# Patient Record
Sex: FEMALE | Race: WHITE | NOT HISPANIC OR LATINO | Employment: OTHER | URBAN - METROPOLITAN AREA
[De-identification: names, ages, dates, MRNs, and addresses within clinical notes are randomized per-mention and may not be internally consistent; named-entity substitution may affect disease eponyms.]

---

## 2017-03-06 ENCOUNTER — HOSPITAL ENCOUNTER (OUTPATIENT)
Dept: RADIOLOGY | Facility: HOSPITAL | Age: 67
Discharge: HOME | End: 2017-03-06
Attending: OBSTETRICS & GYNECOLOGY
Payer: MEDICARE

## 2017-03-06 DIAGNOSIS — Z12.31 OTHER SCREENING MAMMOGRAM: ICD-10-CM

## 2017-03-06 PROCEDURE — 77063 BREAST TOMOSYNTHESIS BI: CPT | Mod: 26,,, | Performed by: RADIOLOGY

## 2017-03-06 PROCEDURE — 77067 SCR MAMMO BI INCL CAD: CPT | Mod: 26,,, | Performed by: RADIOLOGY

## 2017-03-06 PROCEDURE — 77067 SCR MAMMO BI INCL CAD: CPT | Mod: TC

## 2017-08-04 RX ORDER — CYCLOBENZAPRINE HCL 10 MG
TABLET ORAL
Qty: 90 TABLET | Refills: 11 | OUTPATIENT
Start: 2017-08-04

## 2018-04-11 DIAGNOSIS — Z12.39 SCREENING BREAST EXAMINATION: Primary | ICD-10-CM

## 2018-04-12 ENCOUNTER — HOSPITAL ENCOUNTER (OUTPATIENT)
Dept: RADIOLOGY | Facility: HOSPITAL | Age: 68
Discharge: HOME | End: 2018-04-12
Attending: OBSTETRICS & GYNECOLOGY
Payer: MEDICARE

## 2018-04-12 DIAGNOSIS — N63.10 BREAST MASS, RIGHT: ICD-10-CM

## 2018-04-12 DIAGNOSIS — Z12.39 SCREENING BREAST EXAMINATION: ICD-10-CM

## 2018-04-12 PROCEDURE — 77067 SCR MAMMO BI INCL CAD: CPT | Mod: TC

## 2018-04-12 PROCEDURE — 77063 BREAST TOMOSYNTHESIS BI: CPT | Mod: 26,,, | Performed by: RADIOLOGY

## 2018-04-12 PROCEDURE — 77067 SCR MAMMO BI INCL CAD: CPT | Mod: 26,,, | Performed by: RADIOLOGY

## 2018-10-17 ENCOUNTER — OFFICE VISIT (OUTPATIENT)
Dept: ORTHOPEDICS | Facility: CLINIC | Age: 68
End: 2018-10-17
Payer: MEDICARE

## 2018-10-17 VITALS — BODY MASS INDEX: 27.67 KG/M2 | WEIGHT: 172.19 LBS | HEIGHT: 66 IN

## 2018-10-17 DIAGNOSIS — S63.501A WRIST SPRAIN, RIGHT, INITIAL ENCOUNTER: Primary | ICD-10-CM

## 2018-10-17 PROCEDURE — 99213 OFFICE O/P EST LOW 20 MIN: CPT | Mod: ,,, | Performed by: ORTHOPAEDIC SURGERY

## 2018-10-17 PROCEDURE — 73110 X-RAY EXAM OF WRIST: CPT | Mod: FY,RT,, | Performed by: ORTHOPAEDIC SURGERY

## 2018-10-17 RX ORDER — GABAPENTIN 300 MG/1
300 CAPSULE ORAL NIGHTLY
Qty: 30 CAPSULE | Refills: 1 | Status: SHIPPED | OUTPATIENT
Start: 2018-10-17 | End: 2019-08-07

## 2018-10-17 NOTE — PROGRESS NOTES
Subjective:       Chief Complaint    Chief Complaint   Patient presents with    Wrist Injury     DOI: 2.5 months. Pt states that she feel steping down off of a step and missed her step. She slammed her Rt wrist on the floor when she fell. She states that she has some pain in her right wrist.She states that her pain is in the right side of her right wrist. She has pain when she uses her Rt hand a lot. Pain today 2/10. She ia wearing a wrist brace and she states that it helps a lot.        HPI  Venkat Lara is a 68 y.o.  female who presents  to half months post injury to the right wrist. Has been keeping it splinted sometimes with a wooden tongue blade  in the wrist splint initially seemed to help. Now the problems getting worse. It's a neuritic type of pain on the ulnar side of the wrist.      Past History  Past Medical History:   Diagnosis Date    Cervical radiculopathy     Chronic pain of right wrist     COPD, moderate     Degeneration of cervical intervertebral disc     Depression with anxiety     Hypertension     Incontinence     Lumbar spinal stenosis     Primary osteoarthritis of first carpometacarpal joints, bilateral     Rheumatoid arthritis     Seizures      Past Surgical History:   Procedure Laterality Date    BUNIONECTOMY Bilateral 01/1999    CATARACT EXTRACTION BILATERAL W/ ANTERIOR VITRECTOMY  04/2010    CMC ARTHROPLASTY, RIGHT  02/23/2004    JOINT REPLACEMENT      RHINOPLASTY TIP  02/2010    TONSILLECTOMY, ADENOIDECTOMY  1970    TOTAL KNEE ARTHROPLASTY Right 12/2008    TOTAL KNEE ARTHROPLASTY Left 03/2015    TOTAL REDUCTION MAMMOPLASTY  01/2008    TUBAL LIGATION       Social History     Socioeconomic History    Marital status:      Spouse name: Not on file    Number of children: Not on file    Years of education: Not on file    Highest education level: Not on file   Social Needs    Financial resource strain: Not on file    Food insecurity - worry: Not on file    Food  insecurity - inability: Not on file    Transportation needs - medical: Not on file    Transportation needs - non-medical: Not on file   Occupational History    Not on file   Tobacco Use    Smoking status: Former Smoker     Types: Cigarettes     Last attempt to quit: 1996     Years since quittin.4   Substance and Sexual Activity    Alcohol use: Yes     Comment: socially    Drug use: Not on file    Sexual activity: Not on file   Other Topics Concern    Not on file   Social History Narrative    Not on file         Medications  Current Outpatient Medications   Medication Sig    albuterol (PROAIR HFA) 90 mcg/actuation HFAA Inhale 2 puffs into the lungs every 6 (six) hours as needed.    azelastine (ASTELIN) 137 mcg nasal spray 1 spray by Nasal route 2 (two) times daily.    buPROPion (WELLBUTRIN XL) 300 MG 24 hr tablet Take 300 mg by mouth once daily.    eszopiclone (LUNESTA) 2 MG Tab Take 2 mg by mouth every evening.    multivitamin with minerals tablet Take 1 tablet by mouth once daily.    telmisartan-hydrochlorothiazide (MICARDIS HCT) 40-12.5 mg per tablet Take 1 tablet by mouth once daily.    estradiol (ESTRING) 2 mg vaginal ring Place 2 mg vaginally every 3 (three) months.    estradiol-norethindrone (ACTIVELLA) 1-0.5 mg per tablet Take 1 tablet by mouth once daily.    gabapentin (NEURONTIN) 300 MG capsule Take 1 capsule (300 mg total) by mouth every evening.    MOMETASONE/FORMOTEROL (DULERA INHL) Inhale into the lungs.    tiotropium (SPIRIVA) 18 mcg inhalation capsule Inhale 18 mcg into the lungs once daily.     No current facility-administered medications for this visit.        Allergies  Review of patient's allergies indicates:   Allergen Reactions    Doxycycline          Review of Systems     Constitutional: Negative    HENT: Negative  Eyes: Negative  Respiratory: Negative  Cardiovascular: Negative  Musculoskeletal: HPI  Skin: Negative  Neurological: Negative  Hematological:  Negative  Endocrine: Negative      Physical Exam    There were no vitals filed for this visit.  Physical Examination: Right wrist exam shows full range of motion, flexion, extension and forearm rotation. Tinel sign is negative on the ulnar side. There is no swelling. The distal radioulnar joint is nontender. The neuritic symptoms are triggered by wiggling the fifth finger lateral ligaments of the fifth MCP joint are intact. There is no tenderness around the joint.     Skin-clear  General appearance -  well appearing, and in no distress  Mental status - awake  Neck - supple  Chest -  symmetric air entry  Heart - normal rate   Abdomen - soft      Assessment/Plan   Wrist sprain, right, initial encounter  -     X-Ray Wrist Complete Right    Other orders  -     gabapentin (NEURONTIN) 300 MG capsule; Take 1 capsule (300 mg total) by mouth every evening.  Dispense: 30 capsule; Refill: 1        Discontinue the splint. Follow-up will be with the text.    This note was dictated using voice recognition software and may contain grammatical errors.

## 2019-05-07 DIAGNOSIS — Z12.39 SCREENING BREAST EXAMINATION: Primary | ICD-10-CM

## 2019-05-09 ENCOUNTER — OFFICE VISIT (OUTPATIENT)
Dept: PODIATRY | Facility: CLINIC | Age: 69
End: 2019-05-09
Payer: MEDICARE

## 2019-05-09 VITALS
SYSTOLIC BLOOD PRESSURE: 120 MMHG | RESPIRATION RATE: 19 BRPM | BODY MASS INDEX: 29.89 KG/M2 | HEIGHT: 66 IN | HEART RATE: 96 BPM | DIASTOLIC BLOOD PRESSURE: 78 MMHG | WEIGHT: 186 LBS

## 2019-05-09 DIAGNOSIS — M21.619 BUNION: ICD-10-CM

## 2019-05-09 DIAGNOSIS — M79.671 BILATERAL FOOT PAIN: Primary | ICD-10-CM

## 2019-05-09 DIAGNOSIS — M19.071 OSTEOARTHRITIS OF MIDTARSAL JOINT OF RIGHT FOOT: ICD-10-CM

## 2019-05-09 DIAGNOSIS — M19.072 OSTEOARTHRITIS OF MIDTARSAL JOINT OF LEFT FOOT: ICD-10-CM

## 2019-05-09 DIAGNOSIS — M79.672 BILATERAL FOOT PAIN: Primary | ICD-10-CM

## 2019-05-09 PROCEDURE — 99203 OFFICE O/P NEW LOW 30 MIN: CPT | Mod: 25,,, | Performed by: PODIATRIST

## 2019-05-09 PROCEDURE — 73630 X-RAY EXAM OF FOOT: CPT | Mod: RT,,, | Performed by: PODIATRIST

## 2019-05-09 PROCEDURE — 99203 PR OFFICE/OUTPT VISIT, NEW, LEVL III, 30-44 MIN: ICD-10-PCS | Mod: 25,,, | Performed by: PODIATRIST

## 2019-05-09 PROCEDURE — 73630 PR  X-RAY FOOT 3+ VW: ICD-10-PCS | Mod: RT,,, | Performed by: PODIATRIST

## 2019-05-09 RX ORDER — PHENYLPROPANOLAMINE/CLEMASTINE 75-1.34MG
1 TABLET, EXTENDED RELEASE ORAL EVERY 6 HOURS PRN
COMMUNITY

## 2019-05-09 RX ORDER — MIRABEGRON 25 MG/1
25 TABLET, FILM COATED, EXTENDED RELEASE ORAL NIGHTLY
COMMUNITY
Start: 2019-04-26 | End: 2023-03-30

## 2019-05-09 RX ORDER — OMEPRAZOLE 40 MG/1
40 CAPSULE, DELAYED RELEASE ORAL EVERY MORNING
COMMUNITY
Start: 2019-04-05

## 2019-05-09 RX ORDER — HYDROGEN PEROXIDE 3 %
SOLUTION, NON-ORAL MISCELLANEOUS
COMMUNITY
End: 2019-05-09

## 2019-05-09 RX ORDER — OLOPATADINE HYDROCHLORIDE 1 MG/ML
1 SOLUTION/ DROPS OPHTHALMIC 2 TIMES DAILY
COMMUNITY
Start: 2019-04-05 | End: 2023-08-21 | Stop reason: ALTCHOICE

## 2019-05-09 RX ORDER — OXYCODONE HYDROCHLORIDE 10 MG/1
TABLET ORAL
COMMUNITY
End: 2019-05-09

## 2019-05-09 RX ORDER — TELMISARTAN 80 MG/1
80 TABLET ORAL DAILY
Status: ON HOLD | COMMUNITY
End: 2020-08-28 | Stop reason: SDUPTHER

## 2019-05-09 RX ORDER — VERAPAMIL HYDROCHLORIDE 240 MG/1
240 CAPSULE, EXTENDED RELEASE ORAL 2 TIMES DAILY
COMMUNITY
Start: 2019-04-05

## 2019-05-09 NOTE — PROGRESS NOTES
[address] [facility] [address]  [place] [address]  Phone: [phone]   [phone]    Patient's PCP:Jeffrey Henry MD  Referring Provider: No ref. provider found    Subjective:      Chief Complaint:: Bunions (bilateral)    HPI  Venkat Lara is a 69 y.o. female who presents with a complaint of  bilalteral bunion pain left is worse than right lasting for years, previously had surgery on both feet 20 years ago . Onset of the symptoms was unknown.  Current symptoms include pain and instability.  Aggravating factors are closed toe shoes. Symptoms have gotten worse in the last 5 years.Treatment to date have included cbd oil and nsaids. Patients rates pain 9/10 on pain scale.        Vitals:    05/09/19 1003   BP: 120/78   Pulse: 96   Resp: 19     Shoe Size: 10    Past Surgical History:   Procedure Laterality Date    BUNIONECTOMY Bilateral 01/1999    CATARACT EXTRACTION BILATERAL W/ ANTERIOR VITRECTOMY  04/2010    CMC ARTHROPLASTY, RIGHT  02/23/2004    JOINT REPLACEMENT      RHINOPLASTY TIP  02/2010    TONSILLECTOMY, ADENOIDECTOMY  1970    TOTAL KNEE ARTHROPLASTY Right 12/2008    TOTAL KNEE ARTHROPLASTY Left 03/2015    TOTAL REDUCTION MAMMOPLASTY  01/2008    TUBAL LIGATION       Past Medical History:   Diagnosis Date    Cervical radiculopathy     Chronic pain of right wrist     COPD, moderate     Degeneration of cervical intervertebral disc     Depression with anxiety     Hypertension     Incontinence     Lumbar spinal stenosis     Primary osteoarthritis of first carpometacarpal joints, bilateral     Rheumatoid arthritis     Seizures      Family History   Problem Relation Age of Onset    Cancer Father         colon        Social History:   Marital Status:   Alcohol History:  reports that she drinks alcohol.  Tobacco History:  reports that she quit smoking about 23 years ago. Her smoking use included cigarettes. She does not have any smokeless tobacco history on file.  Drug History:  has  no drug history on file.    Review of patient's allergies indicates:   Allergen Reactions    Doxycycline        Current Outpatient Medications   Medication Sig Dispense Refill    albuterol (PROAIR HFA) 90 mcg/actuation HFAA Inhale 2 puffs into the lungs every 6 (six) hours as needed.      azelastine (ASTELIN) 137 mcg nasal spray 1 spray by Nasal route 2 (two) times daily.      buPROPion (WELLBUTRIN XL) 300 MG 24 hr tablet Take 300 mg by mouth once daily.      eszopiclone (LUNESTA) 2 MG Tab Take 2 mg by mouth every evening.      ibuprofen 200 mg Cap ibuprofen 200 mg capsule   Take 1 capsule every 6 hours by oral route as needed.      MOMETASONE/FORMOTEROL (DULERA INHL) Inhale into the lungs.      multivitamin with minerals tablet Take 1 tablet by mouth once daily.      MYRBETRIQ 25 mg Tb24 ER tablet       olopatadine (PATANOL) 0.1 % ophthalmic solution       omeprazole (PRILOSEC) 40 MG capsule       telmisartan (MICARDIS) 80 MG Tab Micardis 80 mg tablet   Take 1 tablet every day by oral route.      tiotropium (SPIRIVA) 18 mcg inhalation capsule Inhale 18 mcg into the lungs once daily.      verapamil (VERELAN) 240 MG C24P       gabapentin (NEURONTIN) 300 MG capsule Take 1 capsule (300 mg total) by mouth every evening. 30 capsule 1    telmisartan-hydrochlorothiazide (MICARDIS HCT) 40-12.5 mg per tablet Take 1 tablet by mouth once daily.       No current facility-administered medications for this visit.        Review of Systems      Objective:        Physical Exam:   Foot Exam  Physical Exam   Musculoskeletal:        Feet:        Imaging:   X-Ray Foot Complete Bilateral  Bilateral x-rays reveal osteoarthritis Lisfranc's joint specifically second third metatarsocuneiform joints left foot worse than right foot. Mild bunion deformity with previous K wire fixation intra-osseus first metatarsal head with good alignment. Mild degenerative joint disease. Left foot has moderate bunion deformity with hallux  limitus 2 screws are in place no problem with hardware. She has increased intermetatarsal angle proximally 16°. Proximally 30° of hallux valgus angle.           Assessment:       1. Bilateral foot pain    2. 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 Osteoarthritis of midtarsal joint of right foot - Right Foot    4. 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 If deformity pain become worse patient come back to see me for further evaluation.  No follow-ups on file.    Procedures - None    Counseling:   I provided patient education regarding: Bunion deformity and causes. I discussed conservative treatment with shoe modification, pads, treating symptoms with OTC NSAIDs, pads between toes, inserts and pads over the bunion. I explained that conservative treatment is non-curative but may help with pain and slow down the progression of the deformity.     I discussed the conservative treatent of arthritis consisiting of shoe modification, OTC NSAID, cortisone shots, a series of supartz injections, avoiding painful activities and common sense.  I explained that the arthritis may become more painful causng more disability and the possibility of further treatment.  Also discussed may need evaluation by Rheumatologist for possible inflammatory arthritis.  I provided patient education verbally regarding:   Patient diagnosis, treatment options, as well as alternatives, risks, and benefits.     This note was created using Dragon voice recognition software that occasionally misinterpreted phrases or words.

## 2019-05-10 ENCOUNTER — HOSPITAL ENCOUNTER (OUTPATIENT)
Dept: RADIOLOGY | Facility: HOSPITAL | Age: 69
Discharge: HOME | End: 2019-05-10
Attending: OBSTETRICS & GYNECOLOGY
Payer: MEDICARE

## 2019-05-10 DIAGNOSIS — Z12.39 SCREENING BREAST EXAMINATION: ICD-10-CM

## 2019-05-10 PROCEDURE — 77067 MAMMO DIGITAL SCREENING BILAT WITH TOMOSYNTHESIS_CAD: ICD-10-PCS | Mod: 26,,, | Performed by: RADIOLOGY

## 2019-05-10 PROCEDURE — 77063 BREAST TOMOSYNTHESIS BI: CPT | Mod: 26,,, | Performed by: RADIOLOGY

## 2019-05-10 PROCEDURE — 77067 SCR MAMMO BI INCL CAD: CPT | Mod: TC

## 2019-05-10 PROCEDURE — 77067 SCR MAMMO BI INCL CAD: CPT | Mod: 26,,, | Performed by: RADIOLOGY

## 2019-05-10 PROCEDURE — 77063 MAMMO DIGITAL SCREENING BILAT WITH TOMOSYNTHESIS_CAD: ICD-10-PCS | Mod: 26,,, | Performed by: RADIOLOGY

## 2019-06-27 ENCOUNTER — OFFICE VISIT (OUTPATIENT)
Dept: PODIATRY | Facility: CLINIC | Age: 69
End: 2019-06-27
Payer: MEDICARE

## 2019-06-27 VITALS
DIASTOLIC BLOOD PRESSURE: 76 MMHG | WEIGHT: 186 LBS | HEIGHT: 66 IN | BODY MASS INDEX: 29.89 KG/M2 | HEART RATE: 92 BPM | SYSTOLIC BLOOD PRESSURE: 118 MMHG

## 2019-06-27 DIAGNOSIS — M19.072 OSTEOARTHRITIS OF MIDTARSAL JOINT OF LEFT FOOT: Primary | ICD-10-CM

## 2019-06-27 DIAGNOSIS — M79.672 BILATERAL FOOT PAIN: ICD-10-CM

## 2019-06-27 DIAGNOSIS — M21.619 BUNION: ICD-10-CM

## 2019-06-27 DIAGNOSIS — M79.671 BILATERAL FOOT PAIN: ICD-10-CM

## 2019-06-27 PROCEDURE — 99213 PR OFFICE/OUTPT VISIT, EST, LEVL III, 20-29 MIN: ICD-10-PCS | Mod: 25,,, | Performed by: PODIATRIST

## 2019-06-27 PROCEDURE — 20600 DRAIN/INJ JOINT/BURSA W/O US: CPT | Mod: LT,,, | Performed by: PODIATRIST

## 2019-06-27 PROCEDURE — 20600 SMALL JOINT ASPIRATION/INJECTION: L INTERTARSAL: ICD-10-PCS | Mod: LT,,, | Performed by: PODIATRIST

## 2019-06-27 PROCEDURE — 99213 OFFICE O/P EST LOW 20 MIN: CPT | Mod: 25,,, | Performed by: PODIATRIST

## 2019-06-27 RX ORDER — SULFAMETHOXAZOLE AND TRIMETHOPRIM 800; 160 MG/1; MG/1
TABLET ORAL
COMMUNITY
Start: 2019-05-07 | End: 2019-08-07

## 2019-06-27 RX ORDER — DEXAMETHASONE SODIUM PHOSPHATE 4 MG/ML
4 INJECTION, SOLUTION INTRA-ARTICULAR; INTRALESIONAL; INTRAMUSCULAR; INTRAVENOUS; SOFT TISSUE
Status: DISCONTINUED | OUTPATIENT
Start: 2019-06-27 | End: 2019-08-07

## 2019-06-27 RX ORDER — BUPIVACAINE HYDROCHLORIDE 5 MG/ML
1.5 INJECTION, SOLUTION PERINEURAL
Status: DISCONTINUED | OUTPATIENT
Start: 2019-06-27 | End: 2019-08-07

## 2019-06-27 RX ORDER — AMOXICILLIN AND CLAVULANATE POTASSIUM 875; 125 MG/1; MG/1
TABLET, FILM COATED ORAL
COMMUNITY
Start: 2019-05-07 | End: 2019-08-07

## 2019-06-27 RX ORDER — METHYLPREDNISOLONE ACETATE 40 MG/ML
40 INJECTION, SUSPENSION INTRA-ARTICULAR; INTRALESIONAL; INTRAMUSCULAR; SOFT TISSUE
Status: DISCONTINUED | OUTPATIENT
Start: 2019-06-27 | End: 2019-08-07

## 2019-06-27 RX ORDER — METHYLPREDNISOLONE ACETATE 40 MG/ML
40 INJECTION, SUSPENSION INTRA-ARTICULAR; INTRALESIONAL; INTRAMUSCULAR; SOFT TISSUE
Status: DISCONTINUED | OUTPATIENT
Start: 2019-06-27 | End: 2019-06-27 | Stop reason: HOSPADM

## 2019-06-27 RX ORDER — CYCLOSPORINE 0.5 MG/ML
EMULSION OPHTHALMIC
COMMUNITY
Start: 2019-06-03 | End: 2020-08-25

## 2019-06-27 RX ORDER — DEXAMETHASONE SODIUM PHOSPHATE 4 MG/ML
4 INJECTION, SOLUTION INTRA-ARTICULAR; INTRALESIONAL; INTRAMUSCULAR; INTRAVENOUS; SOFT TISSUE
Status: DISCONTINUED | OUTPATIENT
Start: 2019-06-27 | End: 2019-06-27 | Stop reason: HOSPADM

## 2019-06-27 RX ADMIN — DEXAMETHASONE SODIUM PHOSPHATE 4 MG: 4 INJECTION, SOLUTION INTRA-ARTICULAR; INTRALESIONAL; INTRAMUSCULAR; INTRAVENOUS; SOFT TISSUE at 05:06

## 2019-06-27 RX ADMIN — METHYLPREDNISOLONE ACETATE 40 MG: 40 INJECTION, SUSPENSION INTRA-ARTICULAR; INTRALESIONAL; INTRAMUSCULAR; SOFT TISSUE at 05:06

## 2019-06-27 NOTE — PROGRESS NOTES
[address] [facility] [address]  [place] [address]  Phone: [phone]   [phone]    Patient's PCP:Jeffrey Henry MD  Referring Provider: No ref. provider found    Subjective:      Chief Complaint:: Bunions (surgical consult)    HPI  Venkat Lara is a 69 y.o. female who presents with a complaint of bilalteral bunion pain left is worse than right lasting for years, previously had surgery on both feet 20 years ago . Onset of the symptoms was unknown.  Current symptoms include pain and instability.  Aggravating factors are closed toe shoes. Symptoms have gotten worse in the last 5 years.Treatment to date have included cbd oil, cbdlotions and nsaids. Patients rates pain 10/10 on pain scale.    Pt would like an injection and discuss surgery but would not like to schedule until after her trips after July.       Vitals:    06/27/19 1455   BP: 118/76   Pulse: 92     Shoe Size: 10     Past Surgical History:   Procedure Laterality Date    BUNIONECTOMY Bilateral 01/1999    CATARACT EXTRACTION BILATERAL W/ ANTERIOR VITRECTOMY  04/2010    CMC ARTHROPLASTY, RIGHT  02/23/2004    JOINT REPLACEMENT      RHINOPLASTY TIP  02/2010    TONSILLECTOMY, ADENOIDECTOMY  1970    TOTAL KNEE ARTHROPLASTY Right 12/2008    TOTAL KNEE ARTHROPLASTY Left 03/2015    TOTAL REDUCTION MAMMOPLASTY  01/2008    TUBAL LIGATION       Past Medical History:   Diagnosis Date    Cervical radiculopathy     Chronic pain of right wrist     COPD, moderate     Degeneration of cervical intervertebral disc     Depression with anxiety     Hypertension     Incontinence     Lumbar spinal stenosis     Primary osteoarthritis of first carpometacarpal joints, bilateral     Rheumatoid arthritis     Seizures      Family History   Problem Relation Age of Onset    Cancer Father         colon        Social History:   Marital Status:   Alcohol History:  reports that she drinks alcohol.  Tobacco History:  reports that she quit smoking about 23  years ago. Her smoking use included cigarettes. She does not have any smokeless tobacco history on file.  Drug History:  has no drug history on file.    Review of patient's allergies indicates:   Allergen Reactions    Doxycycline        Current Outpatient Medications   Medication Sig Dispense Refill    albuterol (PROAIR HFA) 90 mcg/actuation HFAA Inhale 2 puffs into the lungs every 6 (six) hours as needed.      amoxicillin-clavulanate 875-125mg (AUGMENTIN) 875-125 mg per tablet       azelastine (ASTELIN) 137 mcg nasal spray 1 spray by Nasal route 2 (two) times daily.      buPROPion (WELLBUTRIN XL) 300 MG 24 hr tablet Take 300 mg by mouth once daily.      eszopiclone (LUNESTA) 2 MG Tab Take 2 mg by mouth every evening.      ibuprofen 200 mg Cap ibuprofen 200 mg capsule   Take 1 capsule every 6 hours by oral route as needed.      linaCLOtide (LINZESS) 72 mcg Cap capsule Linzess 72 mcg capsule   TAKE 1 CAPSULE BY MOUTH ONCE DAILY IN THE MORNING FOR 30 DAYS      MOMETASONE/FORMOTEROL (DULERA INHL) Inhale into the lungs.      multivitamin with minerals tablet Take 1 tablet by mouth once daily.      MYRBETRIQ 25 mg Tb24 ER tablet       olopatadine (PATANOL) 0.1 % ophthalmic solution       omeprazole (PRILOSEC) 40 MG capsule       phytonadione, vit K1, (MEPHYTON ORAL) vitamin K      RESTASIS 0.05 % ophthalmic emulsion       sulfamethoxazole-trimethoprim 800-160mg (BACTRIM DS) 800-160 mg Tab       telmisartan (MICARDIS) 80 MG Tab Micardis 80 mg tablet   Take 1 tablet every day by oral route.      telmisartan-hydrochlorothiazide (MICARDIS HCT) 40-12.5 mg per tablet Take 1 tablet by mouth once daily.      tiotropium (SPIRIVA) 18 mcg inhalation capsule Inhale 18 mcg into the lungs once daily.      verapamil (VERELAN) 240 MG C24P       gabapentin (NEURONTIN) 300 MG capsule Take 1 capsule (300 mg total) by mouth every evening. 30 capsule 1     No current facility-administered medications for this visit.         Review of Systems      Objective:        Physical Exam:   Foot Exam    General  General Appearance: appears stated age and healthy   Orientation: alert and oriented to person, place, and time   Affect: appropriate   Gait: antalgic       Left Foot/Ankle      Inspection and Palpation  Ecchymosis: none  Tenderness: (Lisfranc's joint dorsal surface with palpable exostosis)  Swelling: (Dorsal surface Lisfranc's joint)  Arch: normal  Hammertoes: second toe (Semirigid hammertoe second with dorsal contracture of second MPJ)  Hallux valgus: yes  Skin Exam: skin intact;     Neurovascular  Dorsalis pedis: 2+  Posterior tibial: 2+  Saphenous nerve sensation: normal  Tibial nerve sensation: normal  Superficial peroneal nerve sensation: normal  Deep peroneal nerve sensation: normal  Sural nerve sensation: normal    Muscle Strength  Ankle dorsiflexion: 5  Ankle plantar flexion: 5  Ankle inversion: 5  Ankle eversion: 5  Great toe extension: 5  Great toe flexion: 5          Physical Exam   Cardiovascular:   Pulses:       Dorsalis pedis pulses are 2+ on the left side.        Posterior tibial pulses are 2+ on the left side.   Musculoskeletal:        Feet:        Imaging:            Assessment:       1. Bilateral foot pain    2. Bunion - Left Foot      Plan:   Bilateral foot pain    Bunion - Left Foot      No follow-ups on file.    Small Joint Aspiration/Injection: L intertarsal  Date/Time: 6/27/2019 5:59 PM  Performed by: Jackson Brannon DPM  Authorized by: Jackson Brannon DPM     Consent Done?:  Yes (Verbal)  Indications:  Pain and joint swelling  Site marked: The procedure site was marked    Timeout: Prior to procedure the correct patient, procedure, and site was verified      Location:  Foot  Site:  L intertarsal  Prep: Patient was prepped and draped in usual sterile fashion    Ultrasonic Guidance for needle placement: No  Needle size:  25 G  Approach:  Dorsal  Medications:  4 mg dexamethasone 4 mg/mL; 40 mg  methylPREDNISolone acetate 40 mg/mL  Patient tolerance:  Patient tolerated the procedure well with no immediate complications     - None  Patient was educated about the entire pre, giovani and post-operative time period.  They  were educated about the pro's and con's of surgery.  All conservative measures have been exhausted. Patient understands the particular risks involved which may occur in connection with the procedure proposed including pain, swelling, infection, stiffness, decreased ROM, recurrence, rejection, numbness, delayed healing, scar formation.    Patient was educated that their diagnosis may be surgically treated.  The patient's problem will probably advance and usually will not get better without surgery.  All of the pre-operative treatment plans have been exhausted or will no longer be successful at this point in time.  Patient was told of the possible outcomes and expectations of the surgical procedure.  They  will need to be followed-up post-operatively.  Today, pictures were drawn, questions answered.      We discussed the following surgical procedures: Redo bunionectomy with first metatarsal osteotomy left foot, arthrodesis PIPJ second toe and tenotomy and capsulotomy second MPJ left foot   Counseling:   I discussed the conservative treatent of arthritis consisiting of shoe modification, OTC NSAID, cortisone shots, a series of supartz injections, avoiding painful activities and common sense.  I explained that the arthritis may become more painful causng more disability and the possibility of further treatment.  Also discussed may need evaluation by Rheumatologist for possible inflammatory arthritis.    I discussed hammertoe deformity and conservative treatment of deep, wider shoes, padding of the sore areas, OTC NSAID, skin softners, palliative care.  I discussed surgical procedures of fusion of the PIPJ of the toes with wires or implants, the follow up and the possible complications.  I provided  patient education verbally regarding:   Patient diagnosis, treatment options, as well as alternatives, risks, and benefits.     This note was created using Dragon voice recognition software that occasionally misinterpreted phrases or words.

## 2019-06-28 ENCOUNTER — TELEPHONE (OUTPATIENT)
Dept: PODIATRY | Facility: CLINIC | Age: 69
End: 2019-06-28

## 2019-06-28 DIAGNOSIS — M19.072 OSTEOARTHRITIS OF MIDTARSAL JOINT OF LEFT FOOT: ICD-10-CM

## 2019-06-28 DIAGNOSIS — M21.619 BUNION: Primary | ICD-10-CM

## 2019-08-07 ENCOUNTER — HOSPITAL ENCOUNTER (OUTPATIENT)
Dept: PREADMISSION TESTING | Facility: HOSPITAL | Age: 69
Discharge: HOME | End: 2019-08-07
Attending: PODIATRIST
Payer: MEDICARE

## 2019-08-07 ENCOUNTER — HOSPITAL ENCOUNTER (OUTPATIENT)
Dept: RADIOLOGY | Facility: HOSPITAL | Age: 69
Discharge: HOME | End: 2019-08-07
Attending: PODIATRIST
Payer: MEDICARE

## 2019-08-07 VITALS
HEIGHT: 66 IN | OXYGEN SATURATION: 94 % | BODY MASS INDEX: 29.8 KG/M2 | TEMPERATURE: 98 F | DIASTOLIC BLOOD PRESSURE: 66 MMHG | HEART RATE: 87 BPM | RESPIRATION RATE: 18 BRPM | WEIGHT: 185.44 LBS | SYSTOLIC BLOOD PRESSURE: 130 MMHG

## 2019-08-07 DIAGNOSIS — M21.619 BUNION: Primary | ICD-10-CM

## 2019-08-07 DIAGNOSIS — M21.619 ASYMPTOMATIC BUNION: ICD-10-CM

## 2019-08-07 DIAGNOSIS — Z01.818 PREOP EXAMINATION: ICD-10-CM

## 2019-08-07 PROCEDURE — 71046 X-RAY EXAM CHEST 2 VIEWS: CPT | Mod: TC

## 2019-08-07 PROCEDURE — 93005 ELECTROCARDIOGRAM TRACING: CPT

## 2019-08-07 RX ORDER — UBIDECARENONE 30 MG
100 CAPSULE ORAL DAILY
COMMUNITY

## 2019-08-07 RX ORDER — L. ACIDOPHILUS/L.BULGARICUS 100MM CELL
1 GRANULES IN PACKET (EA) ORAL DAILY
COMMUNITY
End: 2023-09-08

## 2019-08-07 RX ORDER — AMPICILLIN TRIHYDRATE 250 MG
1200 CAPSULE ORAL DAILY
COMMUNITY
End: 2020-08-25

## 2019-08-07 RX ORDER — LANOLIN ALCOHOL/MO/W.PET/CERES
100 CREAM (GRAM) TOPICAL DAILY
COMMUNITY

## 2019-08-07 RX ORDER — CHOLECALCIFEROL (VITAMIN D3) 25 MCG
5000 TABLET ORAL DAILY
COMMUNITY

## 2019-08-07 RX ORDER — GARLIC 1000 MG
1000 CAPSULE ORAL DAILY
COMMUNITY

## 2019-08-13 ENCOUNTER — ANESTHESIA EVENT (OUTPATIENT)
Dept: SURGERY | Facility: HOSPITAL | Age: 69
End: 2019-08-13
Payer: MEDICARE

## 2019-08-14 ENCOUNTER — HOSPITAL ENCOUNTER (OUTPATIENT)
Facility: HOSPITAL | Age: 69
Discharge: HOME | End: 2019-08-14
Attending: PODIATRIST | Admitting: PODIATRIST
Payer: MEDICARE

## 2019-08-14 ENCOUNTER — ANESTHESIA (OUTPATIENT)
Dept: SURGERY | Facility: HOSPITAL | Age: 69
End: 2019-08-14
Payer: MEDICARE

## 2019-08-14 VITALS
HEART RATE: 73 BPM | RESPIRATION RATE: 16 BRPM | OXYGEN SATURATION: 94 % | DIASTOLIC BLOOD PRESSURE: 77 MMHG | TEMPERATURE: 98 F | SYSTOLIC BLOOD PRESSURE: 113 MMHG

## 2019-08-14 DIAGNOSIS — M20.12 HALLUX VALGUS OF LEFT FOOT: ICD-10-CM

## 2019-08-14 DIAGNOSIS — M21.619 BUNION: Primary | ICD-10-CM

## 2019-08-14 PROCEDURE — 71000015 HC POSTOP RECOV 1ST HR: Performed by: PODIATRIST

## 2019-08-14 PROCEDURE — 27000284 HC CANNULA NASAL: Performed by: STUDENT IN AN ORGANIZED HEALTH CARE EDUCATION/TRAINING PROGRAM

## 2019-08-14 PROCEDURE — 71000033 HC RECOVERY, INTIAL HOUR: Performed by: PODIATRIST

## 2019-08-14 PROCEDURE — 25000003 PHARM REV CODE 250: Performed by: NURSE ANESTHETIST, CERTIFIED REGISTERED

## 2019-08-14 PROCEDURE — 25000003 PHARM REV CODE 250: Performed by: PODIATRIST

## 2019-08-14 PROCEDURE — 27202107 HC XP QUATRO SENSOR: Performed by: STUDENT IN AN ORGANIZED HEALTH CARE EDUCATION/TRAINING PROGRAM

## 2019-08-14 PROCEDURE — 36000706: Performed by: PODIATRIST

## 2019-08-14 PROCEDURE — 27000671 HC TUBING MICROBORE EXT: Performed by: STUDENT IN AN ORGANIZED HEALTH CARE EDUCATION/TRAINING PROGRAM

## 2019-08-14 PROCEDURE — C9290 INJ, BUPIVACAINE LIPOSOME: HCPCS | Performed by: PODIATRIST

## 2019-08-14 PROCEDURE — C1713 ANCHOR/SCREW BN/BN,TIS/BN: HCPCS | Performed by: PODIATRIST

## 2019-08-14 PROCEDURE — 25000003 PHARM REV CODE 250: Performed by: ANESTHESIOLOGY

## 2019-08-14 PROCEDURE — 27000673 HC TUBING BLOOD Y: Performed by: STUDENT IN AN ORGANIZED HEALTH CARE EDUCATION/TRAINING PROGRAM

## 2019-08-14 PROCEDURE — 37000009 HC ANESTHESIA EA ADD 15 MINS: Performed by: PODIATRIST

## 2019-08-14 PROCEDURE — 63600175 PHARM REV CODE 636 W HCPCS

## 2019-08-14 PROCEDURE — 27000653 HC CATH, IV CATHLN: Performed by: STUDENT IN AN ORGANIZED HEALTH CARE EDUCATION/TRAINING PROGRAM

## 2019-08-14 PROCEDURE — 28285 PR REPAIR OF HAMMERTOE,ONE: ICD-10-PCS | Mod: 59,T1,, | Performed by: PODIATRIST

## 2019-08-14 PROCEDURE — 20680 PR REMOVAL DEEP IMPLANT: ICD-10-PCS | Mod: 51,LT,, | Performed by: PODIATRIST

## 2019-08-14 PROCEDURE — 27000080 OPTIME MED/SURG SUP & DEVICES GENERAL CLASSIFICATION: Performed by: PODIATRIST

## 2019-08-14 PROCEDURE — 27201423 OPTIME MED/SURG SUP & DEVICES STERILE SUPPLY: Performed by: PODIATRIST

## 2019-08-14 PROCEDURE — 28296 COR HLX VLGS DSTL MTAR OSTEO: CPT | Mod: TA,,, | Performed by: PODIATRIST

## 2019-08-14 PROCEDURE — S0020 INJECTION, BUPIVICAINE HYDRO: HCPCS | Performed by: PODIATRIST

## 2019-08-14 PROCEDURE — 36000707: Performed by: PODIATRIST

## 2019-08-14 PROCEDURE — 28296 PR CORRECT BUNION, DISTAL METATARSAL OSTEOTOMY: ICD-10-PCS | Mod: TA,,, | Performed by: PODIATRIST

## 2019-08-14 PROCEDURE — 63600175 PHARM REV CODE 636 W HCPCS: Performed by: ANESTHESIOLOGY

## 2019-08-14 PROCEDURE — 37000008 HC ANESTHESIA 1ST 15 MINUTES: Performed by: PODIATRIST

## 2019-08-14 PROCEDURE — 63600175 PHARM REV CODE 636 W HCPCS: Performed by: NURSE ANESTHETIST, CERTIFIED REGISTERED

## 2019-08-14 PROCEDURE — C1769 GUIDE WIRE: HCPCS | Performed by: PODIATRIST

## 2019-08-14 PROCEDURE — 20680 REMOVAL OF IMPLANT DEEP: CPT | Mod: 51,LT,, | Performed by: PODIATRIST

## 2019-08-14 PROCEDURE — 28285 REPAIR OF HAMMERTOE: CPT | Mod: 59,T1,, | Performed by: PODIATRIST

## 2019-08-14 PROCEDURE — 63600175 PHARM REV CODE 636 W HCPCS: Performed by: PODIATRIST

## 2019-08-14 PROCEDURE — 27201107 HC STYLET, STANDARD: Performed by: STUDENT IN AN ORGANIZED HEALTH CARE EDUCATION/TRAINING PROGRAM

## 2019-08-14 DEVICE — IMPLANTABLE DEVICE: Type: IMPLANTABLE DEVICE | Site: FOOT | Status: FUNCTIONAL

## 2019-08-14 RX ORDER — ROCURONIUM BROMIDE 10 MG/ML
INJECTION, SOLUTION INTRAVENOUS
Status: DISCONTINUED | OUTPATIENT
Start: 2019-08-14 | End: 2019-08-14

## 2019-08-14 RX ORDER — OXYCODONE HYDROCHLORIDE 5 MG/1
5 TABLET ORAL
Status: DISCONTINUED | OUTPATIENT
Start: 2019-08-14 | End: 2019-08-14 | Stop reason: HOSPADM

## 2019-08-14 RX ORDER — HYDROCODONE BITARTRATE AND ACETAMINOPHEN 10; 325 MG/1; MG/1
1 TABLET ORAL EVERY 4 HOURS PRN
Status: DISCONTINUED | OUTPATIENT
Start: 2019-08-14 | End: 2019-08-14

## 2019-08-14 RX ORDER — DEXAMETHASONE SODIUM PHOSPHATE 4 MG/ML
INJECTION, SOLUTION INTRA-ARTICULAR; INTRALESIONAL; INTRAMUSCULAR; INTRAVENOUS; SOFT TISSUE
Status: DISCONTINUED | OUTPATIENT
Start: 2019-08-14 | End: 2019-08-14

## 2019-08-14 RX ORDER — LIDOCAINE HYDROCHLORIDE 20 MG/ML
INJECTION, SOLUTION EPIDURAL; INFILTRATION; INTRACAUDAL; PERINEURAL
Status: DISCONTINUED | OUTPATIENT
Start: 2019-08-14 | End: 2019-08-14

## 2019-08-14 RX ORDER — GLYCOPYRROLATE 0.2 MG/ML
INJECTION INTRAMUSCULAR; INTRAVENOUS
Status: DISCONTINUED | OUTPATIENT
Start: 2019-08-14 | End: 2019-08-14

## 2019-08-14 RX ORDER — CEPHALEXIN 500 MG/1
500 CAPSULE ORAL EVERY 12 HOURS
Qty: 14 CAPSULE | Refills: 0
Start: 2019-08-14 | End: 2019-10-15

## 2019-08-14 RX ORDER — PROPOFOL 10 MG/ML
VIAL (ML) INTRAVENOUS
Status: DISCONTINUED | OUTPATIENT
Start: 2019-08-14 | End: 2019-08-14

## 2019-08-14 RX ORDER — GABAPENTIN 300 MG/1
300 CAPSULE ORAL ONCE
Status: COMPLETED | OUTPATIENT
Start: 2019-08-14 | End: 2019-08-14

## 2019-08-14 RX ORDER — ACETAMINOPHEN 10 MG/ML
INJECTION, SOLUTION INTRAVENOUS
Status: DISCONTINUED | OUTPATIENT
Start: 2019-08-14 | End: 2019-08-14

## 2019-08-14 RX ORDER — MEPERIDINE HYDROCHLORIDE 50 MG/ML
12.5 INJECTION INTRAMUSCULAR; INTRAVENOUS; SUBCUTANEOUS EVERY 10 MIN PRN
Status: DISCONTINUED | OUTPATIENT
Start: 2019-08-14 | End: 2019-08-14 | Stop reason: HOSPADM

## 2019-08-14 RX ORDER — MIDAZOLAM HYDROCHLORIDE 1 MG/ML
INJECTION INTRAMUSCULAR; INTRAVENOUS
Status: DISCONTINUED | OUTPATIENT
Start: 2019-08-14 | End: 2019-08-14

## 2019-08-14 RX ORDER — ONDANSETRON 2 MG/ML
4 INJECTION INTRAMUSCULAR; INTRAVENOUS DAILY PRN
Status: DISCONTINUED | OUTPATIENT
Start: 2019-08-14 | End: 2019-08-14 | Stop reason: HOSPADM

## 2019-08-14 RX ORDER — FENTANYL CITRATE 50 UG/ML
INJECTION, SOLUTION INTRAMUSCULAR; INTRAVENOUS
Status: DISCONTINUED | OUTPATIENT
Start: 2019-08-14 | End: 2019-08-14

## 2019-08-14 RX ORDER — SODIUM CHLORIDE, SODIUM LACTATE, POTASSIUM CHLORIDE, CALCIUM CHLORIDE 600; 310; 30; 20 MG/100ML; MG/100ML; MG/100ML; MG/100ML
INJECTION, SOLUTION INTRAVENOUS CONTINUOUS PRN
Status: DISCONTINUED | OUTPATIENT
Start: 2019-08-14 | End: 2019-08-14

## 2019-08-14 RX ORDER — ONDANSETRON 4 MG/1
4 TABLET, ORALLY DISINTEGRATING ORAL EVERY 12 HOURS PRN
Status: DISCONTINUED | OUTPATIENT
Start: 2019-08-14 | End: 2019-08-14

## 2019-08-14 RX ORDER — BUPIVACAINE HYDROCHLORIDE 5 MG/ML
INJECTION, SOLUTION EPIDURAL; INTRACAUDAL
Status: DISCONTINUED | OUTPATIENT
Start: 2019-08-14 | End: 2019-08-14 | Stop reason: HOSPADM

## 2019-08-14 RX ORDER — CEFAZOLIN SODIUM 1 G/3ML
INJECTION, POWDER, FOR SOLUTION INTRAMUSCULAR; INTRAVENOUS
Status: DISCONTINUED | OUTPATIENT
Start: 2019-08-14 | End: 2019-08-14

## 2019-08-14 RX ORDER — SODIUM CHLORIDE 0.9 % (FLUSH) 0.9 %
10 SYRINGE (ML) INJECTION
Status: DISCONTINUED | OUTPATIENT
Start: 2019-08-14 | End: 2019-08-14 | Stop reason: HOSPADM

## 2019-08-14 RX ORDER — CEPHALEXIN 250 MG/1
500 CAPSULE ORAL EVERY 12 HOURS
Status: DISCONTINUED | OUTPATIENT
Start: 2019-08-14 | End: 2019-08-14 | Stop reason: CLARIF

## 2019-08-14 RX ORDER — ONDANSETRON 4 MG/1
4 TABLET, ORALLY DISINTEGRATING ORAL EVERY 12 HOURS PRN
Qty: 1 TABLET | Refills: 0
Start: 2019-08-14 | End: 2020-08-25

## 2019-08-14 RX ORDER — PHENYLEPHRINE HYDROCHLORIDE 10 MG/ML
INJECTION INTRAVENOUS
Status: DISCONTINUED | OUTPATIENT
Start: 2019-08-14 | End: 2019-08-14

## 2019-08-14 RX ORDER — HYDROMORPHONE HYDROCHLORIDE 1 MG/ML
0.2 INJECTION, SOLUTION INTRAMUSCULAR; INTRAVENOUS; SUBCUTANEOUS
Status: DISCONTINUED | OUTPATIENT
Start: 2019-08-14 | End: 2019-08-14 | Stop reason: HOSPADM

## 2019-08-14 RX ORDER — HYDROCODONE BITARTRATE AND ACETAMINOPHEN 10; 325 MG/1; MG/1
1 TABLET ORAL EVERY 4 HOURS PRN
Qty: 30 TABLET | Refills: 0
Start: 2019-08-14 | End: 2019-08-16 | Stop reason: SDUPTHER

## 2019-08-14 RX ORDER — DIPHENHYDRAMINE HYDROCHLORIDE 50 MG/ML
25 INJECTION INTRAMUSCULAR; INTRAVENOUS EVERY 6 HOURS PRN
Status: DISCONTINUED | OUTPATIENT
Start: 2019-08-14 | End: 2019-08-14 | Stop reason: HOSPADM

## 2019-08-14 RX ADMIN — PROPOFOL 50 MG: 10 INJECTION, EMULSION INTRAVENOUS at 09:08

## 2019-08-14 RX ADMIN — ACETAMINOPHEN 1000 MG: 10 INJECTION, SOLUTION INTRAVENOUS at 07:08

## 2019-08-14 RX ADMIN — PROPOFOL 50 MG: 10 INJECTION, EMULSION INTRAVENOUS at 07:08

## 2019-08-14 RX ADMIN — DEXAMETHASONE SODIUM PHOSPHATE 4 MG: 4 INJECTION, SOLUTION INTRAMUSCULAR; INTRAVENOUS at 07:08

## 2019-08-14 RX ADMIN — PROPOFOL 150 MG: 10 INJECTION, EMULSION INTRAVENOUS at 07:08

## 2019-08-14 RX ADMIN — MIDAZOLAM 2 MG: 1 INJECTION INTRAMUSCULAR; INTRAVENOUS at 07:08

## 2019-08-14 RX ADMIN — PHENYLEPHRINE HYDROCHLORIDE 100 MCG: 10 INJECTION INTRAVENOUS at 07:08

## 2019-08-14 RX ADMIN — GLYCOPYRROLATE 0.6 MG: 0.2 INJECTION INTRAMUSCULAR; INTRAVENOUS at 09:08

## 2019-08-14 RX ADMIN — GABAPENTIN 300 MG: 300 CAPSULE ORAL at 06:08

## 2019-08-14 RX ADMIN — ONDANSETRON 4 MG: 2 INJECTION INTRAMUSCULAR; INTRAVENOUS at 07:08

## 2019-08-14 RX ADMIN — CEFAZOLIN 2 G: 330 INJECTION, POWDER, FOR SOLUTION INTRAMUSCULAR; INTRAVENOUS at 07:08

## 2019-08-14 RX ADMIN — ROCURONIUM BROMIDE 35 MG: 10 INJECTION, SOLUTION INTRAVENOUS at 07:08

## 2019-08-14 RX ADMIN — PROPOFOL 50 MG: 10 INJECTION, EMULSION INTRAVENOUS at 08:08

## 2019-08-14 RX ADMIN — FENTANYL CITRATE 50 MCG: 50 INJECTION, SOLUTION INTRAMUSCULAR; INTRAVENOUS at 09:08

## 2019-08-14 RX ADMIN — FENTANYL CITRATE 100 MCG: 50 INJECTION, SOLUTION INTRAMUSCULAR; INTRAVENOUS at 07:08

## 2019-08-14 RX ADMIN — SODIUM CHLORIDE, SODIUM LACTATE, POTASSIUM CHLORIDE, AND CALCIUM CHLORIDE: .6; .31; .03; .02 INJECTION, SOLUTION INTRAVENOUS at 07:08

## 2019-08-14 RX ADMIN — ROCURONIUM BROMIDE 20 MG: 10 INJECTION, SOLUTION INTRAVENOUS at 08:08

## 2019-08-14 RX ADMIN — PHENYLEPHRINE HYDROCHLORIDE 100 MCG: 10 INJECTION INTRAVENOUS at 08:08

## 2019-08-14 RX ADMIN — LIDOCAINE HYDROCHLORIDE 60 MG: 20 INJECTION, SOLUTION EPIDURAL; INFILTRATION; INTRACAUDAL; PERINEURAL at 07:08

## 2019-08-14 RX ADMIN — ROCURONIUM BROMIDE 5 MG: 10 INJECTION, SOLUTION INTRAVENOUS at 07:08

## 2019-08-14 NOTE — OP NOTE
Identifying data:  69-year-old female    Preoperative diagnosis:  Hallux valgus left foot, hammertoe 2nd left foot    Postoperative diagnosis:  Same as the above    Surgeon:  Jackson Brannon DPM    Assistance surgeon:  Danis Valenzuela DPM, PGY 2    Patient was consented for bunionectomy with osteotomy and and arthrodesis of 2nd toe left foot. She realizes that there is a possibility of further scar tissue vascular compromise failure of surgery secondary to previous surgery on this foot 20 years ago.    Antibiotics:  2 g of Ancef IV preop    Anesthesia:  General anesthesia supplemented with postoperative block with local anesthesia Exparel and Marcaine plain    Description of procedures:  The patient brought the operating room placed on the operating table supine position.  General anesthesia was obtained.  At the usual aseptic prep and drape of the left foot it was exsanguinated and attention was placed 1st MPJ.  Patient had previous bunion surgery at screws in the metatarsal.  Using the old cicatrix with a made an 8 cm longitudinal incision.  I carried down to the level of the capsule.  I coagulated a couple superficial vessels.  A protected the extensor hallucis longus tendon and I protected the medial neurovascular structures.  I made a T-shaped incision to the capsule of the 1st MPJ on the medial side.  A free the head of its soft tissue connections.  Identified the 3.0 screws that were using a previous surgery and I removed the.  The screw holes her my way of doing any distal metatarsal osteotomy site and reflect the periosteum proximally and I performed a daysi osteotomy.  I was able to pivot the head laterally but felt like it was still too tight so I did a sesamoid release in the interspace.  Also incised conjoined tendon. This freed the head up tells able displaced slightly more laterally I have temporarily fixated with 045 K-wires.  The correction of the intermetatarsal angle was better possibly not ideal  but better.  I fixated this with 2 x 3.0 mm partially threaded cancellous screws from dorsal plantar.  The osteotomy was well fixated.  Everything was verified by C-arm.  I irrigated the area and then modified close the capsular tissue when the toe was in a nice rectus position at the MPJ.  A close the subcutaneous tissue with 4 by:  3 0 Vicryl.  Attention was then placed in the 2nd toe and made a 3 cm longitudinal incision centered over the PIPJ.  I incised the PIPJ capsule reflected the tissue from the head remove the distal head of the proximal phalanx and the articulating cartilage from the base of the middle phalanx.  A fixated the arthrodesis site with a 0.045 K-wire.  Verified with C-arm that we had good approximation of the fusion site and good alignment of the toe.  With the foot loaded 2nd MPJ was in neutral site did not need to do anything with the 2nd MPJ.  Problem really is that her 3rd 4th and 5th toes are contracted and will probably need either flexor tendon releases are arthrodesis.  I did not have consented for these because they were not bothering her.  I discussed this with her aunt when she is in the office.  The area was irrigated the tendon closed 4.0 Vicryl suture skin with silver up to 4.0 Prolene sutures.  The foot was injected with Marcaine and Exparel.  The area was dressed with 4x4s and Krystle then pneumatic ankle tourniquet was deflated and vascular status of digits were intact.  The K-wire had been bent cut a cover the tip of the toe.  I placed an Ace wrap on the foot.  Cam Walker boot cast was applied anesthesia was reversed and the patient left the operating with stable vitals.    Blood loss:  2 cc    Specimen:  Bone from 2nd toe not sent for histopathology.    Complications:  None    Disposition:  Transferred to PACU from there to same day surgery will be sent home with ice elevation bathroom privileges for 5 days.  Partial weight-bearing on the heel of the Cam Walker boot cast or no  weight-bearing.  She will see me in 5 days in the office.    Prescriptions:  Keflex 500 mg dispense 14 1 twice a day, Norco 10/325 dispense 30, Zofran 4 mg dispense 15.

## 2019-08-14 NOTE — ANESTHESIA POSTPROCEDURE EVALUATION
Anesthesia Post Evaluation    Patient: Venkat Lara    Procedure(s) Performed: Procedure(s) (LRB):  BUNIONECTOMY, WITH METATARSAL OSTEOTOMY (Left)  REMOVAL, HARDWARE, LOWER EXTREMITY (Left)  CORRECTION, HAMMER TOE (Left)    Final Anesthesia Type: general  Patient location during evaluation: PACU  Patient participation: Yes- Able to Participate  Level of consciousness: awake and alert and awake  Post-procedure vital signs: reviewed and stable  Pain management: adequate  Airway patency: patent  PONV status at discharge: No PONV  Anesthetic complications: no      Cardiovascular status: blood pressure returned to baseline and hemodynamically stable  Respiratory status: unassisted, spontaneous ventilation and nasal cannula  Hydration status: euvolemic  Follow-up needed           Vitals Value Taken Time   /59 8/14/2019 10:15 AM   Temp 36.2 °C (97.2 °F) 8/14/2019 10:15 AM   Pulse 79 8/14/2019 10:24 AM   Resp 23 8/14/2019 10:24 AM   SpO2 98 % 8/14/2019 10:24 AM   Vitals shown include unvalidated device data.    Vital signs stable, patient with occasional decreased oxygen saturations into the low 90s. Patient does have history of using oxygen at home when necessary and at night. Will release patient to same day surgery and ultimately to be discharged home patient was instructed to use her oxygen this evening and overnightand then as needed afterwards.    No case tracking events are documented in the log.      Pain/Anabelle Score: Anabelle Score: 8 (8/14/2019  9:43 AM)

## 2019-08-14 NOTE — H&P
"                                                                                                             08/14/2019  Venkat Lara is a 69 y.o., female.    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    Osteoarthritis of midtarsal joint of left foot - Left Foot    Bilateral foot pain     Lab Results   Component Value Date    WBC 5.85 08/07/2019    HGB 13.7 08/07/2019    HCT 42.1 08/07/2019     (H) 08/07/2019     08/07/2019     BMP  Lab Results   Component Value Date     (L) 08/07/2019    K 4.2 08/07/2019    CL 99 08/07/2019    CO2 26 08/07/2019    BUN 23 08/07/2019    CREATININE 1.0 08/07/2019    CALCIUM 9.5 08/07/2019    ANIONGAP 9 08/07/2019    ESTGFRAFRICA >60.0 08/07/2019    EGFRNONAA 57.6 (A) 08/07/2019     LFT - WNL    CXR -8/7/19   No acute disease, normal    EKG 8/7/19     Normal sinus rhythm  Nonspecific T wave abnormality  Abnormal ECG  When compared with ECG of 25-JAN-2008 10:09,  Nonspecific T wave abnormality now evident in Anterior-lateral leads  Confirmed by Arun Muro MD ([id number]) on 8/7/2019 5:58:19 PM      Pre-op Assessment    I have reviewed the Patient Summary Reports.    I have reviewed the Nursing Notes.   I have reviewed the Medications.     Review of Systems  Anesthesia Hx:  No problems with previous Anesthesia  History of prior surgery of interest to airway management or planning: Previous anesthesia: General, MAC Denies Family Hx of Anesthesia complications.   Denies Personal Hx of Anesthesia complications.   Social:  Former Smoker, Social Alcohol Use    Cardiovascular:   Hypertension, well controlled Valvular problems/murmurs: hx of "leaky valves" Dysrhythmias (hx of tachycardia)    Pulmonary:   COPD (home O2), moderate Sleep Apnea (no CPAP)    Hepatic/GI:   Hiatal Hernia, GERD, well controlled    Musculoskeletal:   Arthritis (cervical " degenerative changes, bilateral feet)   Spine Disorders: lumbar stenosis.    Neurological:   Neuromuscular Disease, Headaches    Psych:   Psychiatric History (ADHD) anxiety          Physical Exam  General:  Well nourished    Airway/Jaw/Neck:  Airway Findings: Mouth Opening: Normal Tongue: Normal  General Airway Assessment: Adult  Mallampati: II  Improves to II with phonation.  TM Distance: Normal, at least 6 cm  Jaw/Neck Findings:  Neck ROM: Normal ROM      Dental:  Dental Findings: upper incisors bridge.   Chest/Lungs:  Chest/Lungs Findings: Clear to auscultation, Normal Respiratory Rate     Heart/Vascular:  Heart Findings: Rate: Normal  Rhythm: Regular Rhythm  Sounds: Normal     Abdomen:  Abdomen Findings: Normal    Musculoskeletal:  Musculoskeletal Findings: Normal   Skin:  Skin Findings: Normal    Mental Status:  Mental Status Findings:  Cooperative, Alert and Oriented         Anesthesia Plan  Type of Anesthesia, risks & benefits discussed:  Anesthesia Type:  general  Patient's Preference:   Intra-op Monitoring Plan: standard ASA monitors  Intra-op Monitoring Plan Comments:   Post Op Pain Control Plan: multimodal analgesia  Post Op Pain Control Plan Comments:   Induction:   IV  Beta Blocker:  Patient is not currently on a Beta-Blocker (No further documentation required).       Informed Consent: Patient understands risks and agrees with Anesthesia plan.  Questions answered. Anesthesia consent signed with patient.  ASA Score: 2

## 2019-08-14 NOTE — DISCHARGE INSTRUCTIONS
Ice pack and elevate x 5 days. Partial weight bearing on left heel. Patient states will use walker at home. Do not remove boot until instructed to by Dr. Brannon

## 2019-08-14 NOTE — ANESTHESIA PREPROCEDURE EVALUATION
"                                                                                                             08/14/2019  Venkat Lara is a 69 y.o., female.    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    Osteoarthritis of midtarsal joint of left foot - Left Foot    Bilateral foot pain     Lab Results   Component Value Date    WBC 5.85 08/07/2019    HGB 13.7 08/07/2019    HCT 42.1 08/07/2019     (H) 08/07/2019     08/07/2019     BMP  Lab Results   Component Value Date     (L) 08/07/2019    K 4.2 08/07/2019    CL 99 08/07/2019    CO2 26 08/07/2019    BUN 23 08/07/2019    CREATININE 1.0 08/07/2019    CALCIUM 9.5 08/07/2019    ANIONGAP 9 08/07/2019    ESTGFRAFRICA >60.0 08/07/2019    EGFRNONAA 57.6 (A) 08/07/2019     LFT - WNL    CXR -8/7/19   No acute disease, normal    EKG 8/7/19     Normal sinus rhythm  Nonspecific T wave abnormality  Abnormal ECG  When compared with ECG of 25-JAN-2008 10:09,  Nonspecific T wave abnormality now evident in Anterior-lateral leads  Confirmed by Arun Muro MD ([id number]) on 8/7/2019 5:58:19 PM      Pre-op Assessment    I have reviewed the Patient Summary Reports.    I have reviewed the Nursing Notes.   I have reviewed the Medications.     Review of Systems  Anesthesia Hx:  No problems with previous Anesthesia  History of prior surgery of interest to airway management or planning: Previous anesthesia: General, MAC Denies Family Hx of Anesthesia complications.   Denies Personal Hx of Anesthesia complications.   Social:  Former Smoker, Social Alcohol Use    Cardiovascular:   Hypertension, well controlled Valvular problems/murmurs: hx of "leaky valves" Dysrhythmias (hx of tachycardia)    Pulmonary:   COPD (home O2), moderate Sleep Apnea (no CPAP)    Hepatic/GI:   Hiatal Hernia, GERD, well controlled    Musculoskeletal:   Arthritis (cervical " degenerative changes, bilateral feet)   Spine Disorders: lumbar stenosis.    Neurological:   Neuromuscular Disease, Headaches    Psych:   Psychiatric History (ADHD) anxiety          Physical Exam  General:  Well nourished    Airway/Jaw/Neck:  Airway Findings: Mouth Opening: Normal Tongue: Normal  General Airway Assessment: Adult  Mallampati: II  Improves to II with phonation.  TM Distance: Normal, at least 6 cm  Jaw/Neck Findings:  Neck ROM: Normal ROM      Dental:  Dental Findings: upper incisors bridge.   Chest/Lungs:  Chest/Lungs Findings: Clear to auscultation, Normal Respiratory Rate     Heart/Vascular:  Heart Findings: Rate: Normal  Rhythm: Regular Rhythm  Sounds: Normal     Abdomen:  Abdomen Findings: Normal    Musculoskeletal:  Musculoskeletal Findings: Normal   Skin:  Skin Findings: Normal    Mental Status:  Mental Status Findings:  Cooperative, Alert and Oriented         Anesthesia Plan  Type of Anesthesia, risks & benefits discussed:  Anesthesia Type:  general  Patient's Preference:   Intra-op Monitoring Plan: standard ASA monitors  Intra-op Monitoring Plan Comments:   Post Op Pain Control Plan: multimodal analgesia  Post Op Pain Control Plan Comments:   Induction:   IV  Beta Blocker:  Patient is not currently on a Beta-Blocker (No further documentation required).       Informed Consent: Patient understands risks and agrees with Anesthesia plan.  Questions answered. Anesthesia consent signed with patient.  ASA Score: 2     Day of Surgery Review of History & Physical: I have interviewed and examined the patient. I have reviewed the patient's H&P dated:  There are no significant changes.      Anesthesia Plan Notes: GETA  Neurontin 300 given  Ofirmev intraop  Zofran/Decadron

## 2019-08-14 NOTE — TRANSFER OF CARE
Anesthesia Transfer of Care Note    Patient: Venkat Lara    Procedure(s) Performed: Procedure(s) (LRB):  BUNIONECTOMY, WITH METATARSAL OSTEOTOMY (Left)  REMOVAL, HARDWARE, LOWER EXTREMITY (Left)  CORRECTION, HAMMER TOE (Left)    Patient location: PACU    Anesthesia Type: general    Transport from OR: Transported from OR on room air with adequate spontaneous ventilation    Post pain: adequate analgesia    Post assessment: no apparent anesthetic complications    Post vital signs: stable    Level of consciousness: awake and alert    Nausea/Vomiting: no nausea/vomiting    Complications: none    Transfer of care protocol was followed      Last vitals:   Visit Vitals  /75   Pulse 84   Temp 36.6 °C (97.9 °F) (Temporal)   Resp 20   SpO2 98%   Breastfeeding? No

## 2019-08-15 ENCOUNTER — TELEPHONE (OUTPATIENT)
Dept: PODIATRY | Facility: CLINIC | Age: 69
End: 2019-08-15

## 2019-08-15 DIAGNOSIS — M19.072 OSTEOARTHRITIS OF MIDTARSAL JOINT OF LEFT FOOT: ICD-10-CM

## 2019-08-15 DIAGNOSIS — M21.619 BUNION: Primary | ICD-10-CM

## 2019-08-15 NOTE — TELEPHONE ENCOUNTER
Spoke with patient regarding prescription for walker for home use for after surgery.  [facility]. Faxed 8/15/2019@4483

## 2019-08-16 DIAGNOSIS — M21.619 BUNION: Primary | ICD-10-CM

## 2019-08-16 NOTE — TELEPHONE ENCOUNTER
Pt penny regarding pain medicine she was prescribed after her surgery.  Said that she was told that first order was incorrect.    Said the Rx was ordered for 30 tablets and that was more than [facility] would allow.  Asked that we re-send the Rx in to be written for 28 tablets.      Rx is attached - please sign.

## 2019-08-17 RX ORDER — HYDROCODONE BITARTRATE AND ACETAMINOPHEN 10; 325 MG/1; MG/1
1 TABLET ORAL EVERY 4 HOURS PRN
Qty: 28 TABLET | Refills: 0 | Status: SHIPPED | OUTPATIENT
Start: 2019-08-17 | End: 2019-08-19 | Stop reason: SDUPTHER

## 2019-08-19 ENCOUNTER — OFFICE VISIT (OUTPATIENT)
Dept: PODIATRY | Facility: CLINIC | Age: 69
End: 2019-08-19
Payer: MEDICARE

## 2019-08-19 VITALS
WEIGHT: 185 LBS | SYSTOLIC BLOOD PRESSURE: 109 MMHG | HEIGHT: 66 IN | HEART RATE: 96 BPM | BODY MASS INDEX: 29.73 KG/M2 | DIASTOLIC BLOOD PRESSURE: 72 MMHG

## 2019-08-19 DIAGNOSIS — M21.619 BUNION: Primary | ICD-10-CM

## 2019-08-19 DIAGNOSIS — M20.42 HAMMER TOE OF LEFT FOOT: ICD-10-CM

## 2019-08-19 DIAGNOSIS — M21.619 BUNION: ICD-10-CM

## 2019-08-19 PROCEDURE — 99999 PR PBB SHADOW E&M-EST. PATIENT-LVL III: ICD-10-PCS | Mod: PBBFAC,,, | Performed by: PODIATRIST

## 2019-08-19 PROCEDURE — 99024 POSTOP FOLLOW-UP VISIT: CPT | Mod: POP,,, | Performed by: PODIATRIST

## 2019-08-19 PROCEDURE — 99999 PR PBB SHADOW E&M-EST. PATIENT-LVL III: CPT | Mod: PBBFAC,,, | Performed by: PODIATRIST

## 2019-08-19 PROCEDURE — 99213 OFFICE O/P EST LOW 20 MIN: CPT | Mod: PBBFAC | Performed by: PODIATRIST

## 2019-08-19 PROCEDURE — 99024 PR POST-OP FOLLOW-UP VISIT: ICD-10-PCS | Mod: POP,,, | Performed by: PODIATRIST

## 2019-08-19 RX ORDER — GABAPENTIN 300 MG/1
300 CAPSULE ORAL NIGHTLY
Refills: 1 | COMMUNITY
Start: 2019-08-15 | End: 2020-09-08 | Stop reason: CLARIF

## 2019-08-19 RX ORDER — HYDROCODONE BITARTRATE AND ACETAMINOPHEN 10; 325 MG/1; MG/1
1 TABLET ORAL EVERY 4 HOURS PRN
Qty: 28 TABLET | Refills: 0 | Status: SHIPPED | OUTPATIENT
Start: 2019-08-19 | End: 2019-08-19 | Stop reason: SDUPTHER

## 2019-08-19 NOTE — PROGRESS NOTES
[address] [facility] [address]  [place] [address]  Phone: [phone]   [phone]    Patient's PCP:Jeffrey Henry MD  Referring Provider:No ref. provider found    Subjective:      Chief Complaint: Post-Op    Date of Surgery:8-14-19   Procedure: Removal of hardware left first and bunionectomy with osteotomy left and arthrodesis left second toe    HPI:   Venkat Lara is a 69 y.o. female who returns to the clinic today for her pop-operative visit. Venkat Lara rates pain a 0/10 on a pain scale. Compliance of Care: dressing dry,ace wrap and nwb with boot cast.    Vitals:    08/19/19 1017   BP: 109/72   Pulse: 96       Past Surgical History:   Procedure Laterality Date    BREAST SURGERY  2008    Reduction    BUNIONECTOMY Bilateral 01/1999    BUNIONECTOMY, WITH METATARSAL OSTEOTOMY Left 8/14/2019    Performed by Jackson Brannon DPM at [facility]    CARDIOVASCULAR STRESS TEST  2013    CARPAL TUNNEL RELEASE Left 10/08/2015    CATARACT EXTRACTION BILATERAL W/ ANTERIOR VITRECTOMY  04/2010    [facility]MC ARTHROPLASTY, RIGHT  02/23/2004    COLONOSCOPY  04/18/2013    CORRECTION, HAMMER TOE Left 8/14/2019    Performed by Jackson Brannon DPM at [facility]    ESOPHAGOGASTRODUODENOSCOPY  12/21/2012    2012-with dilation #1, 2013 #2    HAND SURGERY  2003    JOINT REPLACEMENT      REMOVAL, HARDWARE, LOWER EXTREMITY Left 8/14/2019    Performed by Jackson Brannon DPM at [facility]    RHINOPLASTY TIP  02/2010    TONSILLECTOMY, ADENOIDECTOMY  1970    TOTAL KNEE ARTHROPLASTY Right 12/2008    TOTAL KNEE ARTHROPLASTY Left 03/2015    TOTAL REDUCTION MAMMOPLASTY  01/2008    TUBAL LIGATION      VAGINAL DELIVERY  1983    x2     Past Medical History:   Diagnosis Date    ADHD 01/01/1975    Bronchitis 01/09/2015    Bruises easily 01/01/2012    Cataract     [id number]    Cervical radiculopathy     Chronic pain of right wrist     Colon polyp 01/01/2013    Constipation 01/01/2010    COPD, moderate 01/01/2010    was told  by Dr. Mccormick she has 60% lung capacity    Degeneration of cervical intervertebral disc     Depression 01/01/1997    Depression with anxiety     Dry eye 01/01/1994    GERD (gastroesophageal reflux disease) 01/01/2008    Hallux valgus (acquired), left foot 08/07/2019    Dr Truong    Hammertoe of second toe of left foot 08/07/2019    dr truong    Headaches, cluster 01/01/1997    Heart valve regurgitation 2014    dr verde    Hemorrhoids 01/01/1983    Hiatal hernia 01/01/1997    Hyperlipidemia 01/01/1995    Hypertension     on meds    Incontinence     Leg swelling 01/01/2014    bilateral, wears compression socks    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/1976    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    Lumbar spinal stenosis     Menopause 01/01/1997    MRSA infection 01/03/2015    Open leg wound 09/01/2015    right    Oxygen decrease 2014    02 2l/nc pm    Oxygen dependent 01/01/2011    2L NC Nightly    Primary osteoarthritis of first carpometacarpal joints, bilateral     Rheumatoid arthritis     Scoliosis 01/01/2014    Spinal stenosis 01/01/2014    upper and lowerr back - tingling in left arm at times    Tachycardia 02/01/2014    controlled with meds    Thumb pain 01/09/2015    Left     Family History   Problem Relation Age of Onset    Cancer Father         colon        Social History:   Marital Status:   Alcohol History:  reports that she drinks about 2.4 oz of alcohol per week.  Tobacco History:  reports that she quit smoking about 22 years ago. Her smoking use included cigarettes. She has a 66.00 pack-year smoking history. She quit smokeless tobacco use about 22 years ago.  Drug History:  reports that she does not use drugs.    Review of patient's allergies indicates:   Allergen Reactions    Statins-hmg-coa reductase inhibitors Other (See Comments)     Muscle cramps    Doxycycline Other (See Comments)     Stops gallbladder function    Adhesive Other (See Comments)     bruises    Erythromycin  Other (See Comments)     Stomach pain       Current Outpatient Medications   Medication Sig Dispense Refill    albuterol (PROAIR HFA) 90 mcg/actuation HFAA Inhale 2 puffs into the lungs every 6 (six) hours as needed.      buPROPion (WELLBUTRIN XL) 300 MG 24 hr tablet Take 300 mg by mouth every evening.       cephALEXin (KEFLEX) 500 MG capsule Take 1 capsule (500 mg total) by mouth every 12 (twelve) hours. 14 capsule 0    co-enzyme Q-10 30 mg capsule Take 100 mg by mouth once daily.      cyanocobalamin (VITAMIN B-12) 1000 MCG tablet Take 100 mcg by mouth once daily.      eszopiclone (LUNESTA) 2 MG Tab Take 2 mg by mouth every evening.      gabapentin (NEURONTIN) 300 MG capsule TAKE 1 CAPSULE BY MOUTH IN THE EVENING  1    garlic 1,000 mg Cap Take 1,000 mg by mouth once daily.      HYDROcodone-acetaminophen (NORCO)  mg per tablet Take 1 tablet by mouth every 4 (four) hours as needed for Pain. 28 tablet 0    ibuprofen 200 mg Cap ibuprofen 200 mg capsule   Take 1 capsule every 6 hours by oral route as needed.      lactobacillus acidophilus & bulgar (LACTINEX) 100 million cell packet Take 1 tablet by mouth once daily.      multivitamin with minerals tablet Take 1 tablet by mouth once daily.      MYRBETRIQ 25 mg Tb24 ER tablet Take 25 mg by mouth every evening.       olopatadine (PATANOL) 0.1 % ophthalmic solution       omeprazole (PRILOSEC) 40 MG capsule       ondansetron (ZOFRAN-ODT) 4 MG TbDL Take 1 tablet (4 mg total) by mouth every 12 (twelve) hours as needed. 1 tablet 0    phytonadione, vit K1, (MEPHYTON ORAL) vitamin K      red yeast rice 600 mg Cap Take 1,200 mg by mouth once daily.      RESTASIS 0.05 % ophthalmic emulsion       telmisartan (MICARDIS) 80 MG Tab Micardis 80 mg tablet   Take 1 tablet every day by oral route.      tiotropium-olodaterol (STIOLTO RESPIMAT) 2.5-2.5 mcg/actuation Mist Inhale into the lungs once daily. Controller      verapamil (VERELAN) 240 MG C24P Take 240 mg  by mouth 2 (two) times daily.       vitamin D (VITAMIN D3) 1000 units Tab Take 5,000 Units by mouth once daily.       No current facility-administered medications for this visit.        Review of Systems      Objective:        Post-op surgery of the osteotomy 1st metatarsal left foot and arthrodesis PIPJ 2nd toe left foot with normal healing, no signs of infection or dehiscence of wound. Hardware in place. Normal post op exam today. No redness, no drainage, no increase in local temperature, no significant swelling, sutures.steri-strips are intact.     Imaging:     Physical Exam:   Foot Exam  Physical Exam       Assessment:       1. Bunion - Left Foot    2. Hammer toe of left foot      Plan:   Bunion - Left Foot  -     HYDROcodone-acetaminophen (NORCO)  mg per tablet; Take 1 tablet by mouth every 4 (four) hours as needed for Pain.  Dispense: 28 tablet; Refill: 0    Hammer toe of left foot     Continue Cam walker boot with weight on the heel.  Return to see me in 9 days for evaluation.  Norco 7.5 dispensed today  No follow-ups on file.    Procedures - None    The surgical dressing was removed showing no signs of infection, excess edema or malalignment. A new dry dressing was applied and patient was instructed to leave dressing intact until next visit or until instructed to remove.     This note was created using Dragon voice recognition software that occasionally misinterpreted phrases or words.

## 2019-08-20 RX ORDER — HYDROCODONE BITARTRATE AND ACETAMINOPHEN 10; 325 MG/1; MG/1
1 TABLET ORAL EVERY 4 HOURS PRN
Qty: 28 TABLET | Refills: 0 | Status: SHIPPED | OUTPATIENT
Start: 2019-08-20 | End: 2019-09-19

## 2019-08-29 ENCOUNTER — OFFICE VISIT (OUTPATIENT)
Dept: PODIATRY | Facility: CLINIC | Age: 69
End: 2019-08-29
Payer: MEDICARE

## 2019-08-29 VITALS
HEART RATE: 75 BPM | HEIGHT: 66 IN | BODY MASS INDEX: 29.73 KG/M2 | WEIGHT: 185 LBS | SYSTOLIC BLOOD PRESSURE: 121 MMHG | DIASTOLIC BLOOD PRESSURE: 72 MMHG

## 2019-08-29 DIAGNOSIS — M21.619 BUNION: Primary | ICD-10-CM

## 2019-08-29 DIAGNOSIS — M20.42 HAMMER TOE OF LEFT FOOT: ICD-10-CM

## 2019-08-29 DIAGNOSIS — M19.072 OSTEOARTHRITIS OF MIDTARSAL JOINT OF LEFT FOOT: ICD-10-CM

## 2019-08-29 PROCEDURE — 99024 PR POST-OP FOLLOW-UP VISIT: ICD-10-PCS | Mod: POP,,, | Performed by: PODIATRIST

## 2019-08-29 PROCEDURE — 99024 POSTOP FOLLOW-UP VISIT: CPT | Mod: POP,,, | Performed by: PODIATRIST

## 2019-08-29 PROCEDURE — 99213 OFFICE O/P EST LOW 20 MIN: CPT | Mod: PBBFAC | Performed by: PODIATRIST

## 2019-08-29 PROCEDURE — 99999 PR PBB SHADOW E&M-EST. PATIENT-LVL III: ICD-10-PCS | Mod: PBBFAC,,, | Performed by: PODIATRIST

## 2019-08-29 PROCEDURE — 99999 PR PBB SHADOW E&M-EST. PATIENT-LVL III: CPT | Mod: PBBFAC,,, | Performed by: PODIATRIST

## 2019-08-29 NOTE — PROGRESS NOTES
[address] [facility] [address]  [place] [address]  Phone: [phone]   [phone]    Patient's PCP:Jeffrey Henry MD  Referring Provider:No ref. provider found    Subjective:      Chief Complaint: Post-Op      Date of Surgery: 8-14-19  Procedure:Removal of hard ware left first toe, bunionectomy with osteotomy left foot and arthrodesis left second toe     HPI:   Venkat Lara is a 69 y.o. female who returns to the clinic today for her pop-operative visit. Venkat Lara rates pain a 4/10 on a pain scale. Compliance of Care: Having a hard time keeping the dressing on. nwb boot cast, ace wrap and dressing dry.    There were no vitals filed for this visit.    Past Surgical History:   Procedure Laterality Date    BREAST SURGERY  2008    Reduction    BUNIONECTOMY Bilateral 01/1999    BUNIONECTOMY, WITH METATARSAL OSTEOTOMY Left 8/14/2019    Performed by Jackson Brannon DPM at [facility] OR    CARDIOVASCULAR STRESS TEST  2013    CARPAL TUNNEL RELEASE Left 10/08/2015    CATARACT EXTRACTION BILATERAL W/ ANTERIOR VITRECTOMY  04/2010    CMC ARTHROPLASTY, RIGHT  02/23/2004    COLONOSCOPY  04/18/2013    CORRECTION, HAMMER TOE Left 8/14/2019    Performed by Jackson Brannon DPM at [facility] OR    ESOPHAGOGASTRODUODENOSCOPY  12/21/2012    2012-with dilation #1, 2013 #2    HAND SURGERY  2003    JOINT REPLACEMENT      REMOVAL, HARDWARE, LOWER EXTREMITY Left 8/14/2019    Performed by Jackson Brannon DPM at [facility] OR    RHINOPLASTY TIP  02/2010    TONSILLECTOMY, ADENOIDECTOMY  1970    TOTAL KNEE ARTHROPLASTY Right 12/2008    TOTAL KNEE ARTHROPLASTY Left 03/2015    TOTAL REDUCTION MAMMOPLASTY  01/2008    TUBAL LIGATION      VAGINAL DELIVERY  1983    x2     Past Medical History:   Diagnosis Date    ADHD 01/01/1975    Bronchitis 01/09/2015    Bruises easily 01/01/2012    Cataract     [id number]    Cervical radiculopathy     Chronic pain of right wrist     Colon polyp 01/01/2013    Constipation 01/01/2010     COPD, moderate 01/01/2010    was told by Dr. Mccormick she has 60% lung capacity    Degeneration of cervical intervertebral disc     Depression 01/01/1997    Depression with anxiety     Dry eye 01/01/1994    GERD (gastroesophageal reflux disease) 01/01/2008    Hallux valgus (acquired), left foot 08/07/2019    Dr Truong    Hammyanete of second toe of left foot 08/07/2019    dr truong    Headaches, cluster 01/01/1997    Heart valve regurgitation 2014    dr verde    Hemorrhoids 01/01/1983    Hiatal hernia 01/01/1997    Hyperlipidemia 01/01/1995    Hypertension     on meds    Incontinence     Leg swelling 01/01/2014    bilateral, wears compression socks    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/1976    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    Lumbar spinal stenosis     Menopause 01/01/1997    MRSA infection 01/03/2015    Open leg wound 09/01/2015    right    Oxygen decrease 2014 02 2l/nc pm    Oxygen dependent 01/01/2011    2L NC Nightly    Primary osteoarthritis of first carpometacarpal joints, bilateral     Rheumatoid arthritis     Scoliosis 01/01/2014    Spinal stenosis 01/01/2014    upper and lowerr back - tingling in left arm at times    Tachycardia 02/01/2014    controlled with meds    Thumb pain 01/09/2015    Left     Family History   Problem Relation Age of Onset    Cancer Father         colon        Social History:   Marital Status:   Alcohol History:  reports that she drinks about 2.4 oz of alcohol per week.  Tobacco History:  reports that she quit smoking about 22 years ago. Her smoking use included cigarettes. She has a 66.00 pack-year smoking history. She quit smokeless tobacco use about 22 years ago.  Drug History:  reports that she does not use drugs.    Review of patient's allergies indicates:   Allergen Reactions    Statins-hmg-coa reductase inhibitors Other (See Comments)     Muscle cramps    Doxycycline Other (See Comments)     Stops gallbladder function    Adhesive Other (See  Comments)     bruises    Erythromycin Other (See Comments)     Stomach pain       Current Outpatient Medications   Medication Sig Dispense Refill    albuterol (PROAIR HFA) 90 mcg/actuation HFAA Inhale 2 puffs into the lungs every 6 (six) hours as needed.      buPROPion (WELLBUTRIN XL) 300 MG 24 hr tablet Take 300 mg by mouth every evening.       cephALEXin (KEFLEX) 500 MG capsule Take 1 capsule (500 mg total) by mouth every 12 (twelve) hours. 14 capsule 0    co-enzyme Q-10 30 mg capsule Take 100 mg by mouth once daily.      cyanocobalamin (VITAMIN B-12) 1000 MCG tablet Take 100 mcg by mouth once daily.      eszopiclone (LUNESTA) 2 MG Tab Take 2 mg by mouth every evening.      gabapentin (NEURONTIN) 300 MG capsule TAKE 1 CAPSULE BY MOUTH IN THE EVENING  1    garlic 1,000 mg Cap Take 1,000 mg by mouth once daily.      HYDROcodone-acetaminophen (NORCO)  mg per tablet Take 1 tablet by mouth every 4 (four) hours as needed for Pain. 28 tablet 0    ibuprofen 200 mg Cap ibuprofen 200 mg capsule   Take 1 capsule every 6 hours by oral route as needed.      lactobacillus acidophilus & bulgar (LACTINEX) 100 million cell packet Take 1 tablet by mouth once daily.      multivitamin with minerals tablet Take 1 tablet by mouth once daily.      MYRBETRIQ 25 mg Tb24 ER tablet Take 25 mg by mouth every evening.       olopatadine (PATANOL) 0.1 % ophthalmic solution       omeprazole (PRILOSEC) 40 MG capsule       ondansetron (ZOFRAN-ODT) 4 MG TbDL Take 1 tablet (4 mg total) by mouth every 12 (twelve) hours as needed. 1 tablet 0    phytonadione, vit K1, (MEPHYTON ORAL) vitamin K      red yeast rice 600 mg Cap Take 1,200 mg by mouth once daily.      RESTASIS 0.05 % ophthalmic emulsion       telmisartan (MICARDIS) 80 MG Tab Micardis 80 mg tablet   Take 1 tablet every day by oral route.      tiotropium-olodaterol (STIOLTO RESPIMAT) 2.5-2.5 mcg/actuation Mist Inhale into the lungs once daily. Controller       verapamil (VERELAN) 240 MG C24P Take 240 mg by mouth 2 (two) times daily.       vitamin D (VITAMIN D3) 1000 units Tab Take 5,000 Units by mouth once daily.       No current facility-administered medications for this visit.        Review of Systems      Objective:        Post-op surgery of the bunionectomy with 1st metatarsal osteotomy left foot and arthrodesis PIPJ 2nd toe is 0.045 K-wire fixation with normal healing, no signs of infection or dehiscence of wound. Hardware in place. Normal post op exam today. No redness, no drainage, no increase in local temperature, no significant swelling, sutures.steri-strips are intact.     Imaging:     Physical Exam:   Foot Exam  Physical Exam       Assessment:       1. Bunion - Left Foot    2. Hammer toe of left foot    3. 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 She remain nonweightbearing return to see me and 4 weeks for x-rays.  If she has a problems with the dressing she is to call me for assistance.  No follow-ups on file.    Procedures - None    The surgical dressing was removed showing no signs of infection, excess edema or malalignment. A new dry dressing was applied and patient was instructed to leave dressing intact until next visit or until instructed to remove.     This note was created using Dragon voice recognition software that occasionally misinterpreted phrases or words.

## 2019-09-26 ENCOUNTER — OFFICE VISIT (OUTPATIENT)
Dept: PODIATRY | Facility: CLINIC | Age: 69
End: 2019-09-26
Payer: MEDICARE

## 2019-09-26 ENCOUNTER — HOSPITAL ENCOUNTER (OUTPATIENT)
Dept: RADIOLOGY | Facility: CLINIC | Age: 69
Discharge: HOME | End: 2019-09-26
Attending: PODIATRIST
Payer: MEDICARE

## 2019-09-26 VITALS
WEIGHT: 185 LBS | BODY MASS INDEX: 29.73 KG/M2 | SYSTOLIC BLOOD PRESSURE: 114 MMHG | HEIGHT: 66 IN | HEART RATE: 89 BPM | DIASTOLIC BLOOD PRESSURE: 56 MMHG

## 2019-09-26 DIAGNOSIS — M20.42 HAMMER TOE OF LEFT FOOT: ICD-10-CM

## 2019-09-26 DIAGNOSIS — M19.072 OSTEOARTHRITIS OF MIDTARSAL JOINT OF LEFT FOOT: ICD-10-CM

## 2019-09-26 DIAGNOSIS — M21.619 BUNION: ICD-10-CM

## 2019-09-26 DIAGNOSIS — M21.619 BUNION: Primary | ICD-10-CM

## 2019-09-26 PROCEDURE — 99024 POSTOP FOLLOW-UP VISIT: CPT | Mod: POP,,, | Performed by: PODIATRIST

## 2019-09-26 PROCEDURE — 99214 OFFICE O/P EST MOD 30 MIN: CPT | Mod: PBBFAC | Performed by: PODIATRIST

## 2019-09-26 PROCEDURE — 99999 PR PBB SHADOW E&M-EST. PATIENT-LVL IV: ICD-10-PCS | Mod: PBBFAC,,, | Performed by: PODIATRIST

## 2019-09-26 PROCEDURE — 73630 XR FOOT COMPLETE 3 VIEW LEFT: ICD-10-PCS | Mod: 26,S$PBB,LT, | Performed by: PODIATRIST

## 2019-09-26 PROCEDURE — 99024 PR POST-OP FOLLOW-UP VISIT: ICD-10-PCS | Mod: POP,,, | Performed by: PODIATRIST

## 2019-09-26 PROCEDURE — 73630 X-RAY EXAM OF FOOT: CPT | Mod: 26,S$PBB,LT, | Performed by: PODIATRIST

## 2019-09-26 PROCEDURE — 73630 X-RAY EXAM OF FOOT: CPT | Mod: PBBFAC,LT

## 2019-09-26 PROCEDURE — 99999 PR PBB SHADOW E&M-EST. PATIENT-LVL IV: CPT | Mod: PBBFAC,,, | Performed by: PODIATRIST

## 2019-09-26 NOTE — PROGRESS NOTES
[address] [facility] [address]  [place] [address]  Phone: [phone]   [phone]    Patient's PCP:Jeffrey Henry MD  Referring Provider:No ref. provider found    Subjective:      Chief Complaint: Post-Op      Date of Surgery: 08/14/2019  Procedure:    bunionectomy with osteotomy left foot, and arthrodesis PIPJ left second toe    HPI:   Venkat Lara is a 69 y.o. female who returns to the clinic today for Post-operative X-ray. Venkat Lara rates pain a 2/10 on a pain scale. Compliance of Care: Boot cast, rest, and elevation.       Vitals:    09/26/19 1349   BP: (!) 114/56   Pulse: 89       Past Surgical History:   Procedure Laterality Date    BREAST SURGERY  2008    Reduction    BUNIONECTOMY Bilateral 01/1999    CARDIOVASCULAR STRESS TEST  2013    CARPAL TUNNEL RELEASE Left 10/08/2015    CATARACT EXTRACTION BILATERAL W/ ANTERIOR VITRECTOMY  04/2010    CMC ARTHROPLASTY, RIGHT  02/23/2004    COLONOSCOPY  04/18/2013    CORRECTION OF HAMMER TOE Left 8/14/2019    Procedure: CORRECTION, HAMMER TOE;  Surgeon: Jackson Brannon DPM;  Location: [facility] OR;  Service: Podiatry;  Laterality: Left;    ESOPHAGOGASTRODUODENOSCOPY  12/21/2012    2012-with dilation #1, 2013 #2    HAND SURGERY  2003    JOINT REPLACEMENT      REMOVAL OF HARDWARE FROM LOWER EXTREMITY Left 8/14/2019    Procedure: REMOVAL, HARDWARE, LOWER EXTREMITY;  Surgeon: Jackson Brannon DPM;  Location: [facility] OR;  Service: Podiatry;  Laterality: Left;    RHINOPLASTY TIP  02/2010    SURGICAL REMOVAL OF BUNION WITH OSTEOTOMY OF METATARSAL BONE Left 8/14/2019    Procedure: BUNIONECTOMY, WITH METATARSAL OSTEOTOMY;  Surgeon: Jackson Brannon DPM;  Location: [facility] OR;  Service: Podiatry;  Laterality: Left;  Arthrodesis 2nd PIP joint, screw 1st toe, C-arm, synthes-Gaby, 3.0, 4.0, AO modular set GABY BRUNSON NOTIFIED    TONSILLECTOMY, ADENOIDECTOMY  1970    TOTAL KNEE ARTHROPLASTY Right 12/2008    TOTAL KNEE ARTHROPLASTY Left 03/2015    TOTAL  REDUCTION MAMMOPLASTY  01/2008    TUBAL LIGATION      VAGINAL DELIVERY  1983    x2     Past Medical History:   Diagnosis Date    ADHD 01/01/1975    Bronchitis 01/09/2015    Bruises easily 01/01/2012    Cataract     [id number]    Cervical radiculopathy     Chronic pain of right wrist     Colon polyp 01/01/2013    Constipation 01/01/2010    COPD, moderate 01/01/2010    was told by Dr. Mccormick she has 60% lung capacity    Degeneration of cervical intervertebral disc     Depression 01/01/1997    Depression with anxiety     Dry eye 01/01/1994    GERD (gastroesophageal reflux disease) 01/01/2008    Hallux valgus (acquired), left foot 08/07/2019    Dr Truong    Hammertoe of second toe of left foot 08/07/2019    dr truong    Headaches, cluster 01/01/1997    Heart valve regurgitation 2014    dr verde    Hemorrhoids 01/01/1983    Hiatal hernia 01/01/1997    Hyperlipidemia 01/01/1995    Hypertension     on meds    Incontinence     Leg swelling 01/01/2014    bilateral, wears compression socks    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/1976    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    Lumbar spinal stenosis     Menopause 01/01/1997    MRSA infection 01/03/2015    Open leg wound 09/01/2015    right    Oxygen decrease 2014    02 2l/nc pm    Oxygen dependent 01/01/2011    2L NC Nightly    Primary osteoarthritis of first carpometacarpal joints, bilateral     Rheumatoid arthritis     Scoliosis 01/01/2014    Spinal stenosis 01/01/2014    upper and lowerr back - tingling in left arm at times    Tachycardia 02/01/2014    controlled with meds    Thumb pain 01/09/2015    Left     Family History   Problem Relation Age of Onset    Cancer Father         colon        Social History:   Marital Status:   Alcohol History:  reports that she drinks about 4.0 standard drinks of alcohol per week.  Tobacco History:  reports that she quit smoking about 22 years ago. Her smoking use included cigarettes. She has a 66.00  pack-year smoking history. She quit smokeless tobacco use about 22 years ago.  Drug History:  reports that she does not use drugs.    Review of patient's allergies indicates:   Allergen Reactions    Statins-hmg-coa reductase inhibitors Other (See Comments)     Muscle cramps    Doxycycline Other (See Comments)     Stops gallbladder function    Adhesive Other (See Comments)     bruises    Erythromycin Other (See Comments)     Stomach pain       Current Outpatient Medications   Medication Sig Dispense Refill    albuterol (PROAIR HFA) 90 mcg/actuation HFAA Inhale 2 puffs into the lungs every 6 (six) hours as needed.      buPROPion (WELLBUTRIN XL) 300 MG 24 hr tablet Take 300 mg by mouth every evening.       cephALEXin (KEFLEX) 500 MG capsule Take 1 capsule (500 mg total) by mouth every 12 (twelve) hours. 14 capsule 0    co-enzyme Q-10 30 mg capsule Take 100 mg by mouth once daily.      cyanocobalamin (VITAMIN B-12) 1000 MCG tablet Take 100 mcg by mouth once daily.      eszopiclone (LUNESTA) 2 MG Tab Take 2 mg by mouth every evening.      gabapentin (NEURONTIN) 300 MG capsule TAKE 1 CAPSULE BY MOUTH IN THE EVENING  1    garlic 1,000 mg Cap Take 1,000 mg by mouth once daily.      ibuprofen 200 mg Cap ibuprofen 200 mg capsule   Take 1 capsule every 6 hours by oral route as needed.      lactobacillus acidophilus & bulgar (LACTINEX) 100 million cell packet Take 1 tablet by mouth once daily.      multivitamin with minerals tablet Take 1 tablet by mouth once daily.      MYRBETRIQ 25 mg Tb24 ER tablet Take 25 mg by mouth every evening.       olopatadine (PATANOL) 0.1 % ophthalmic solution       omeprazole (PRILOSEC) 40 MG capsule       ondansetron (ZOFRAN-ODT) 4 MG TbDL Take 1 tablet (4 mg total) by mouth every 12 (twelve) hours as needed. 1 tablet 0    phytonadione, vit K1, (MEPHYTON ORAL) vitamin K      red yeast rice 600 mg Cap Take 1,200 mg by mouth once daily.      RESTASIS 0.05 % ophthalmic emulsion        telmisartan (MICARDIS) 80 MG Tab Micardis 80 mg tablet   Take 1 tablet every day by oral route.      tiotropium-olodaterol (STIOLTO RESPIMAT) 2.5-2.5 mcg/actuation Mist Inhale into the lungs once daily. Controller      verapamil (VERELAN) 240 MG C24P Take 240 mg by mouth 2 (two) times daily.       vitamin D (VITAMIN D3) 1000 units Tab Take 5,000 Units by mouth once daily.       No current facility-administered medications for this visit.        Review of Systems      Objective:        Post-op surgery of the bunionectomy with osteotomy left foot arthrodesis PIPJ 2nd toe left foot with normal healing, no signs of infection or dehiscence of wound. Hardware in place. Normal post op exam today. No redness, no drainage, no increase in local temperature, no significant swelling, sutures.steri-strips are intact.     Imaging:  AP, lateral, lateral oblique weight-bearing x-rays left foot:  Good alignment of metatarsal fragments with mild hallux valgus.  Osteotomy not completely healed.  No complication hardware.  Arthrodesis PIPJ 2nd toe healing normally.  Physical Exam:   Foot Exam  Physical Exam       Assessment:       1. Bunion - Left Foot    2. Hammer toe of left foot    3. 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 Bunion    5. 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  I demonstrated how to splint the 1st and 2nd toes with Coban  and Ace foam pad between the toes. She has continue Cam Walker boot cast for 3 weeks and return for x-rays.  No follow-ups on file.    Procedures - None        This note was created using Dragon voice recognition software that occasionally misinterpreted phrases or words.

## 2019-10-15 ENCOUNTER — OFFICE VISIT (OUTPATIENT)
Dept: PODIATRY | Facility: CLINIC | Age: 69
End: 2019-10-15
Payer: MEDICARE

## 2019-10-15 ENCOUNTER — HOSPITAL ENCOUNTER (OUTPATIENT)
Dept: RADIOLOGY | Facility: CLINIC | Age: 69
Discharge: HOME | End: 2019-10-15
Attending: PODIATRIST
Payer: MEDICARE

## 2019-10-15 VITALS
HEIGHT: 66 IN | DIASTOLIC BLOOD PRESSURE: 77 MMHG | BODY MASS INDEX: 29.73 KG/M2 | WEIGHT: 185 LBS | SYSTOLIC BLOOD PRESSURE: 120 MMHG | HEART RATE: 91 BPM

## 2019-10-15 DIAGNOSIS — M21.619 BUNION: Primary | ICD-10-CM

## 2019-10-15 DIAGNOSIS — M19.072 OSTEOARTHRITIS OF MIDTARSAL JOINT OF LEFT FOOT: ICD-10-CM

## 2019-10-15 DIAGNOSIS — M21.619 BUNION: ICD-10-CM

## 2019-10-15 PROCEDURE — 73630 XR FOOT COMPLETE 3 VIEW LEFT: ICD-10-PCS | Mod: 26,S$PBB,LT, | Performed by: PODIATRIST

## 2019-10-15 PROCEDURE — 99024 POSTOP FOLLOW-UP VISIT: CPT | Mod: POP,,, | Performed by: PODIATRIST

## 2019-10-15 PROCEDURE — 99213 OFFICE O/P EST LOW 20 MIN: CPT | Mod: 25 | Performed by: PODIATRIST

## 2019-10-15 PROCEDURE — 73630 X-RAY EXAM OF FOOT: CPT | Mod: PBBFAC,LT | Performed by: PODIATRIST

## 2019-10-15 PROCEDURE — 99024 PR POST-OP FOLLOW-UP VISIT: ICD-10-PCS | Mod: POP,,, | Performed by: PODIATRIST

## 2019-10-15 NOTE — PROGRESS NOTES
[address] [facility] [address]  [place] [address]  Phone: [phone]   [phone]    Patient's PCP:Jeffrey Henry MD  Referring Provider:No ref. provider found    Subjective:      Chief Complaint: Post-Op      Date of Surgery:8-14-19  Procedure: Bunionectomy with osteotomy left, arthroplasty left second toe    HPI:   Venkat Lara is a 69 y.o. female who returns to the clinic today for her pop-operative visit. Venkat Lara rates pain a 6/10 on a pain scale. Compliance of Care:  Boot cast.    There were no vitals filed for this visit.    Past Surgical History:   Procedure Laterality Date    BREAST SURGERY  2008    Reduction    BUNIONECTOMY Bilateral 01/1999    CARDIOVASCULAR STRESS TEST  2013    CARPAL TUNNEL RELEASE Left 10/08/2015    CATARACT EXTRACTION BILATERAL W/ ANTERIOR VITRECTOMY  04/2010    CMC ARTHROPLASTY, RIGHT  02/23/2004    COLONOSCOPY  04/18/2013    CORRECTION OF HAMMER TOE Left 8/14/2019    Procedure: CORRECTION, HAMMER TOE;  Surgeon: Jackson Brannon DPM;  Location: [facility] OR;  Service: Podiatry;  Laterality: Left;    ESOPHAGOGASTRODUODENOSCOPY  12/21/2012    2012-with dilation #1, 2013 #2    HAND SURGERY  2003    JOINT REPLACEMENT      REMOVAL OF HARDWARE FROM LOWER EXTREMITY Left 8/14/2019    Procedure: REMOVAL, HARDWARE, LOWER EXTREMITY;  Surgeon: Jackson Brannon DPM;  Location: [facility] OR;  Service: Podiatry;  Laterality: Left;    RHINOPLASTY TIP  02/2010    SURGICAL REMOVAL OF BUNION WITH OSTEOTOMY OF METATARSAL BONE Left 8/14/2019    Procedure: BUNIONECTOMY, WITH METATARSAL OSTEOTOMY;  Surgeon: Jackson Brannon DPM;  Location: [facility] OR;  Service: Podiatry;  Laterality: Left;  Arthrodesis 2nd PIP joint, screw 1st toe, C-arm, synthes-Gaby, 3.0, 4.0, AO modular set GABY BRUNSON NOTIFIED    TONSILLECTOMY, ADENOIDECTOMY  1970    TOTAL KNEE ARTHROPLASTY Right 12/2008    TOTAL KNEE ARTHROPLASTY Left 03/2015    TOTAL REDUCTION MAMMOPLASTY  01/2008    TUBAL LIGATION       VAGINAL DELIVERY  1983    x2     Past Medical History:   Diagnosis Date    ADHD 01/01/1975    Bronchitis 01/09/2015    Bruises easily 01/01/2012    Cataract     [id number]    Cervical radiculopathy     Chronic pain of right wrist     Colon polyp 01/01/2013    Constipation 01/01/2010    COPD, moderate 01/01/2010    was told by Dr. Mccormick she has 60% lung capacity    Degeneration of cervical intervertebral disc     Depression 01/01/1997    Depression with anxiety     Dry eye 01/01/1994    GERD (gastroesophageal reflux disease) 01/01/2008    Hallux valgus (acquired), left foot 08/07/2019    Dr Truong    Hammertoe of second toe of left foot 08/07/2019    dr truong    Headaches, cluster 01/01/1997    Heart valve regurgitation 2014    dr verde    Hemorrhoids 01/01/1983    Hiatal hernia 01/01/1997    Hyperlipidemia 01/01/1995    Hypertension     on meds    Incontinence     Leg swelling 01/01/2014    bilateral, wears compression socks    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/1976    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    Lumbar spinal stenosis     Menopause 01/01/1997    MRSA infection 01/03/2015    Open leg wound 09/01/2015    right    Oxygen decrease 2014    02 2l/nc pm    Oxygen dependent 01/01/2011    2L NC Nightly    Primary osteoarthritis of first carpometacarpal joints, bilateral     Rheumatoid arthritis     Scoliosis 01/01/2014    Spinal stenosis 01/01/2014    upper and lowerr back - tingling in left arm at times    Tachycardia 02/01/2014    controlled with meds    Thumb pain 01/09/2015    Left     Family History   Problem Relation Age of Onset    Cancer Father         colon        Social History:   Marital Status:   Alcohol History:  reports that she drinks about 4.0 standard drinks of alcohol per week.  Tobacco History:  reports that she quit smoking about 22 years ago. Her smoking use included cigarettes. She has a 66.00 pack-year smoking history. She quit smokeless tobacco use  about 22 years ago.  Drug History:  reports that she does not use drugs.    Review of patient's allergies indicates:   Allergen Reactions    Statins-hmg-coa reductase inhibitors Other (See Comments)     Muscle cramps    Doxycycline Other (See Comments)     Stops gallbladder function    Adhesive Other (See Comments)     bruises    Erythromycin Other (See Comments)     Stomach pain       Current Outpatient Medications   Medication Sig Dispense Refill    albuterol (PROAIR HFA) 90 mcg/actuation HFAA Inhale 2 puffs into the lungs every 6 (six) hours as needed.      buPROPion (WELLBUTRIN XL) 300 MG 24 hr tablet Take 300 mg by mouth every evening.       cephALEXin (KEFLEX) 500 MG capsule Take 1 capsule (500 mg total) by mouth every 12 (twelve) hours. 14 capsule 0    co-enzyme Q-10 30 mg capsule Take 100 mg by mouth once daily.      cyanocobalamin (VITAMIN B-12) 1000 MCG tablet Take 100 mcg by mouth once daily.      eszopiclone (LUNESTA) 2 MG Tab Take 2 mg by mouth every evening.      gabapentin (NEURONTIN) 300 MG capsule TAKE 1 CAPSULE BY MOUTH IN THE EVENING  1    garlic 1,000 mg Cap Take 1,000 mg by mouth once daily.      ibuprofen 200 mg Cap ibuprofen 200 mg capsule   Take 1 capsule every 6 hours by oral route as needed.      lactobacillus acidophilus & bulgar (LACTINEX) 100 million cell packet Take 1 tablet by mouth once daily.      multivitamin with minerals tablet Take 1 tablet by mouth once daily.      MYRBETRIQ 25 mg Tb24 ER tablet Take 25 mg by mouth every evening.       olopatadine (PATANOL) 0.1 % ophthalmic solution       omeprazole (PRILOSEC) 40 MG capsule       ondansetron (ZOFRAN-ODT) 4 MG TbDL Take 1 tablet (4 mg total) by mouth every 12 (twelve) hours as needed. 1 tablet 0    phytonadione, vit K1, (MEPHYTON ORAL) vitamin K      red yeast rice 600 mg Cap Take 1,200 mg by mouth once daily.      RESTASIS 0.05 % ophthalmic emulsion       telmisartan (MICARDIS) 80 MG Tab Micardis 80 mg  tablet   Take 1 tablet every day by oral route.      tiotropium-olodaterol (STIOLTO RESPIMAT) 2.5-2.5 mcg/actuation Mist Inhale into the lungs once daily. Controller      verapamil (VERELAN) 240 MG C24P Take 240 mg by mouth 2 (two) times daily.       vitamin D (VITAMIN D3) 1000 units Tab Take 5,000 Units by mouth once daily.       No current facility-administered medications for this visit.        Review of Systems      Objective:        Post-op surgery of the bunionectomy with osteotomy and screw fixation left foot with normal healing, no signs of infection or dehiscence of wound. Hardware in place. Normal post op exam today. No redness, no drainage, no increase in local temperature, no significant swelling, sutures.steri-strips are intact.     Imaging:   AP, lateral, lateral oblique weight-bearing x-rays left foot:  There is good alignment of the osteotomy in good alignment of 1st MPJ.  No complications with hardware.  There is still some healing that is not completely consolidated at the osteotomy site.  There is some bone callus forming.  Physical Exam:   Foot Exam  Physical Exam   Musculoskeletal:        Feet:           Assessment:       1. Bunion - Left Foot    2. 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  If her pain increases she is continues in a Cam walker boot longer before trying a running shoe.  No barefoot no impact exercising.  Return to see me in 4 weeks for x-rays.  No follow-ups on file.    Procedures - None        This note was created using Dragon voice recognition software that occasionally misinterpreted phrases or words.

## 2019-11-12 ENCOUNTER — OFFICE VISIT (OUTPATIENT)
Dept: PODIATRY | Facility: CLINIC | Age: 69
End: 2019-11-12
Payer: MEDICARE

## 2019-11-12 ENCOUNTER — HOSPITAL ENCOUNTER (OUTPATIENT)
Dept: RADIOLOGY | Facility: CLINIC | Age: 69
Discharge: HOME | End: 2019-11-12
Attending: PODIATRIST
Payer: MEDICARE

## 2019-11-12 VITALS
BODY MASS INDEX: 29.73 KG/M2 | DIASTOLIC BLOOD PRESSURE: 78 MMHG | SYSTOLIC BLOOD PRESSURE: 112 MMHG | HEIGHT: 66 IN | HEART RATE: 76 BPM | WEIGHT: 185 LBS

## 2019-11-12 DIAGNOSIS — M21.619 BUNION: Primary | ICD-10-CM

## 2019-11-12 DIAGNOSIS — M79.672 LEFT FOOT PAIN: ICD-10-CM

## 2019-11-12 DIAGNOSIS — M20.42 HAMMER TOE OF LEFT FOOT: ICD-10-CM

## 2019-11-12 DIAGNOSIS — M21.619 BUNION: ICD-10-CM

## 2019-11-12 PROCEDURE — 73630 XR FOOT COMPLETE 3 VIEW LEFT: ICD-10-PCS | Mod: 26,S$PBB,LT, | Performed by: PODIATRIST

## 2019-11-12 PROCEDURE — 99024 POSTOP FOLLOW-UP VISIT: CPT | Mod: POP,,, | Performed by: PODIATRIST

## 2019-11-12 PROCEDURE — 73630 X-RAY EXAM OF FOOT: CPT | Mod: PBBFAC,LT | Performed by: PODIATRIST

## 2019-11-12 PROCEDURE — 99024 PR POST-OP FOLLOW-UP VISIT: ICD-10-PCS | Mod: POP,,, | Performed by: PODIATRIST

## 2019-11-12 PROCEDURE — 99213 OFFICE O/P EST LOW 20 MIN: CPT | Mod: 25 | Performed by: PODIATRIST

## 2019-11-12 NOTE — PROGRESS NOTES
[address] [facility] [address]  [place] [address]  Phone: [phone]   [phone]    Patient's PCP:Jeffrey Henry MD  Referring Provider:No ref. provider found    Subjective:      Chief Complaint: Post-Op    Date of Surgery: 8-14-19  Procedure: Bunionectomy with osteotomy left foot, arthrodesis left second toe and removal of hardware left foot.    HPI:   Venkat Lara is a 69 y.o. female who returns to the clinic today for her pop-operative visit. Venkat Lara rates pain a 4/10 on a pain scale.     Vitals:    11/12/19 1341   BP: 112/78   Pulse: 76       Past Surgical History:   Procedure Laterality Date    BREAST SURGERY  2008    Reduction    BUNIONECTOMY Bilateral 01/1999    CARDIOVASCULAR STRESS TEST  2013    CARPAL TUNNEL RELEASE Left 10/08/2015    CATARACT EXTRACTION BILATERAL W/ ANTERIOR VITRECTOMY  04/2010    CMC ARTHROPLASTY, RIGHT  02/23/2004    COLONOSCOPY  04/18/2013    CORRECTION OF HAMMER TOE Left 8/14/2019    Procedure: CORRECTION, HAMMER TOE;  Surgeon: Jackson Brannon DPM;  Location: [facility] OR;  Service: Podiatry;  Laterality: Left;    ESOPHAGOGASTRODUODENOSCOPY  12/21/2012    2012-with dilation #1, 2013 #2    HAND SURGERY  2003    JOINT REPLACEMENT      REMOVAL OF HARDWARE FROM LOWER EXTREMITY Left 8/14/2019    Procedure: REMOVAL, HARDWARE, LOWER EXTREMITY;  Surgeon: Jackson Brannon DPM;  Location: [facility] OR;  Service: Podiatry;  Laterality: Left;    RHINOPLASTY TIP  02/2010    SURGICAL REMOVAL OF BUNION WITH OSTEOTOMY OF METATARSAL BONE Left 8/14/2019    Procedure: BUNIONECTOMY, WITH METATARSAL OSTEOTOMY;  Surgeon: Jackson Brannon DPM;  Location: [facility] OR;  Service: Podiatry;  Laterality: Left;  Arthrodesis 2nd PIP joint, screw 1st toe, C-arm, synthes-Gaby, 3.0, 4.0, AO modular set GABY BRUNSON NOTIFIED    TONSILLECTOMY, ADENOIDECTOMY  1970    TOTAL KNEE ARTHROPLASTY Right 12/2008    TOTAL KNEE ARTHROPLASTY Left 03/2015    TOTAL REDUCTION MAMMOPLASTY  01/2008    TUBAL  LIGATION      VAGINAL DELIVERY  1983    x2     Past Medical History:   Diagnosis Date    ADHD 01/01/1975    Bronchitis 01/09/2015    Bruises easily 01/01/2012    Cataract     [id number]    Cervical radiculopathy     Chronic pain of right wrist     Colon polyp 01/01/2013    Constipation 01/01/2010    COPD, moderate 01/01/2010    was told by Dr. Mccormick she has 60% lung capacity    Degeneration of cervical intervertebral disc     Depression 01/01/1997    Depression with anxiety     Dry eye 01/01/1994    GERD (gastroesophageal reflux disease) 01/01/2008    Hallux valgus (acquired), left foot 08/07/2019    Dr Truong    Hammertoe of second toe of left foot 08/07/2019    dr truong    Headaches, cluster 01/01/1997    Heart valve regurgitation 2014    dr verde    Hemorrhoids 01/01/1983    Hiatal hernia 01/01/1997    Hyperlipidemia 01/01/1995    Hypertension     on meds    Incontinence     Leg swelling 01/01/2014    bilateral, wears compression socks    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/1976    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    Lumbar spinal stenosis     Menopause 01/01/1997    MRSA infection 01/03/2015    Open leg wound 09/01/2015    right    Oxygen decrease 2014    02 2l/nc pm    Oxygen dependent 01/01/2011    2L NC Nightly    Primary osteoarthritis of first carpometacarpal joints, bilateral     Rheumatoid arthritis     Scoliosis 01/01/2014    Spinal stenosis 01/01/2014    upper and lowerr back - tingling in left arm at times    Tachycardia 02/01/2014    controlled with meds    Thumb pain 01/09/2015    Left     Family History   Problem Relation Age of Onset    Cancer Father         colon        Social History:   Marital Status:   Alcohol History:  reports that she drinks about 4.0 standard drinks of alcohol per week.  Tobacco History:  reports that she quit smoking about 22 years ago. Her smoking use included cigarettes. She has a 66.00 pack-year smoking history. She quit smokeless  tobacco use about 22 years ago.  Drug History:  reports that she does not use drugs.    Review of patient's allergies indicates:   Allergen Reactions    Statins-hmg-coa reductase inhibitors Other (See Comments)     Muscle cramps    Doxycycline Other (See Comments)     Stops gallbladder function    Adhesive Other (See Comments)     bruises    Erythromycin Other (See Comments)     Stomach pain       Current Outpatient Medications   Medication Sig Dispense Refill    albuterol (PROAIR HFA) 90 mcg/actuation HFAA Inhale 2 puffs into the lungs every 6 (six) hours as needed.      buPROPion (WELLBUTRIN XL) 300 MG 24 hr tablet Take 300 mg by mouth every evening.       co-enzyme Q-10 30 mg capsule Take 100 mg by mouth once daily.      cyanocobalamin (VITAMIN B-12) 1000 MCG tablet Take 100 mcg by mouth once daily.      eszopiclone (LUNESTA) 2 MG Tab Take 2 mg by mouth every evening.      gabapentin (NEURONTIN) 300 MG capsule TAKE 1 CAPSULE BY MOUTH IN THE EVENING  1    garlic 1,000 mg Cap Take 1,000 mg by mouth once daily.      ibuprofen 200 mg Cap ibuprofen 200 mg capsule   Take 1 capsule every 6 hours by oral route as needed.      lactobacillus acidophilus & bulgar (LACTINEX) 100 million cell packet Take 1 tablet by mouth once daily.      multivitamin with minerals tablet Take 1 tablet by mouth once daily.      MYRBETRIQ 25 mg Tb24 ER tablet Take 25 mg by mouth every evening.       olopatadine (PATANOL) 0.1 % ophthalmic solution       omeprazole (PRILOSEC) 40 MG capsule       ondansetron (ZOFRAN-ODT) 4 MG TbDL Take 1 tablet (4 mg total) by mouth every 12 (twelve) hours as needed. 1 tablet 0    phytonadione, vit K1, (MEPHYTON ORAL) vitamin K      red yeast rice 600 mg Cap Take 1,200 mg by mouth once daily.      RESTASIS 0.05 % ophthalmic emulsion       telmisartan (MICARDIS) 80 MG Tab Micardis 80 mg tablet   Take 1 tablet every day by oral route.      tiotropium-olodaterol (STIOLTO RESPIMAT) 2.5-2.5  mcg/actuation Mist Inhale into the lungs once daily. Controller      verapamil (VERELAN) 240 MG C24P Take 240 mg by mouth 2 (two) times daily.       vitamin D (VITAMIN D3) 1000 units Tab Take 5,000 Units by mouth once daily.       No current facility-administered medications for this visit.        Review of Systems      Objective:        Post-op surgery of the bunionectomy with osteotomy 1st metatarsal left foot and arthrodesis PIPJ 2nd toe with normal healing, no signs of infection or dehiscence of wound. Hardware in place. Normal post op exam today. No redness, no drainage, no increase in local temperature, no significant swelling, sutures.steri-strips are intact.     Imaging:   The osteotomy is slowly healing in a appears to be healed more than last visit.  There is some healing still to occur the hardware is in place there is slight elevation of the 1st toe of the 1st MPJ mild flexion contracture of the IPJ of the 1st toe.  There is good fusion of the arthrodesis site of the PIPJ of the 2nd toe with good alignment.  Physical Exam:   Foot Exam    General  General Appearance: appears stated age and healthy   Orientation: alert and oriented to person, place, and time   Affect: appropriate   Gait: antalgic       Left Foot/Ankle      Inspection and Palpation  Tenderness: great toe metatarsophalangeal joint   Swelling: great toe metatarsophalangeal joint   Hallux valgus: yes (Mild deformity 1st toe with slight flexion at IPJ slight elevation and MPJ.)  Skin Exam: skin intact;     Neurovascular  Dorsalis pedis: 2+  Posterior tibial: 2+          Physical Exam   Cardiovascular:   Pulses:       Dorsalis pedis pulses are 2+ on the left side.        Posterior tibial pulses are 2+ on the left side.                  Assessment:       1. Bunion - Left Foot    2. Left foot pain    3. Bunion    4. Hammer toe of left foot      Plan:   Bunion - Left Foot  -     X-Ray Foot Complete Left    Left foot pain    Bunion  -     X-Ray  Foot Complete Left    Hammer toe of left foot     Patient gradually increase activity levels as tolerated stay with comfortable shoes.  She was able do not impact exercising on a cruise in did fine so she will continue lead gradually increase her activity level.  Return in 2 months or as needed.  No follow-ups on file.    Procedures - None        This note was created using Dragon voice recognition software that occasionally misinterpreted phrases or words.

## 2019-12-23 ENCOUNTER — OFFICE VISIT (OUTPATIENT)
Dept: PODIATRY | Facility: CLINIC | Age: 69
End: 2019-12-23
Payer: MEDICARE

## 2019-12-23 VITALS
BODY MASS INDEX: 29.73 KG/M2 | DIASTOLIC BLOOD PRESSURE: 79 MMHG | HEIGHT: 66 IN | HEART RATE: 85 BPM | SYSTOLIC BLOOD PRESSURE: 130 MMHG | WEIGHT: 185 LBS

## 2019-12-23 DIAGNOSIS — L60.0 INGROWN TOENAIL OF BOTH FEET: ICD-10-CM

## 2019-12-23 DIAGNOSIS — L60.1 ONYCHOLYSIS OF TOENAIL: Primary | ICD-10-CM

## 2019-12-23 PROCEDURE — 1159F MED LIST DOCD IN RCRD: CPT | Mod: ,,, | Performed by: PODIATRIST

## 2019-12-23 PROCEDURE — 1159F PR MEDICATION LIST DOCUMENTED IN MEDICAL RECORD: ICD-10-PCS | Mod: ,,, | Performed by: PODIATRIST

## 2019-12-23 PROCEDURE — 99214 PR OFFICE/OUTPT VISIT, EST, LEVL IV, 30-39 MIN: ICD-10-PCS | Mod: S$PBB,,, | Performed by: PODIATRIST

## 2019-12-23 PROCEDURE — 1126F PR PAIN SEVERITY QUANTIFIED, NO PAIN PRESENT: ICD-10-PCS | Mod: ,,, | Performed by: PODIATRIST

## 2019-12-23 PROCEDURE — 99214 OFFICE O/P EST MOD 30 MIN: CPT | Mod: S$PBB,,, | Performed by: PODIATRIST

## 2019-12-23 PROCEDURE — 99213 OFFICE O/P EST LOW 20 MIN: CPT | Performed by: PODIATRIST

## 2019-12-23 PROCEDURE — 1126F AMNT PAIN NOTED NONE PRSNT: CPT | Mod: ,,, | Performed by: PODIATRIST

## 2019-12-23 RX ORDER — CEPHALEXIN 500 MG/1
500 CAPSULE ORAL EVERY 12 HOURS
Qty: 20 CAPSULE | Refills: 0 | Status: CANCELLED | OUTPATIENT
Start: 2019-12-23 | End: 2020-01-02

## 2019-12-23 NOTE — PATIENT INSTRUCTIONS

## 2019-12-23 NOTE — PROGRESS NOTES
[address] [facility] [address]  [place] [address]  Phone: [phone]   [phone]    Patient's PCP:Jeffrey Henry MD  Referring Provider: No ref. provider found    Subjective:      Chief Complaint:: Ingrown Toenail (bilateral)    HPI  Venkat Lara is a 69 y.o. female who presents with a complaint of ingrown toenails bilateral first toenails, bilateral medial border second toenail. I don't like the way they look and grown out.    Vitals:    12/23/19 1351   BP: 130/79   Pulse: 85     Shoe Size:     Past Surgical History:   Procedure Laterality Date    BREAST SURGERY  2008    Reduction    BUNIONECTOMY Bilateral 01/1999    CARDIOVASCULAR STRESS TEST  2013    CARPAL TUNNEL RELEASE Left 10/08/2015    CATARACT EXTRACTION BILATERAL W/ ANTERIOR VITRECTOMY  04/2010    CMC ARTHROPLASTY, RIGHT  02/23/2004    COLONOSCOPY  04/18/2013    CORRECTION OF HAMMER TOE Left 8/14/2019    Procedure: CORRECTION, HAMMER TOE;  Surgeon: Jackson Brannon DPM;  Location: [facility] OR;  Service: Podiatry;  Laterality: Left;    ESOPHAGOGASTRODUODENOSCOPY  12/21/2012    2012-with dilation #1, 2013 #2    HAND SURGERY  2003    JOINT REPLACEMENT      REMOVAL OF HARDWARE FROM LOWER EXTREMITY Left 8/14/2019    Procedure: REMOVAL, HARDWARE, LOWER EXTREMITY;  Surgeon: Jackson Brannon DPM;  Location: [facility] OR;  Service: Podiatry;  Laterality: Left;    RHINOPLASTY TIP  02/2010    SURGICAL REMOVAL OF BUNION WITH OSTEOTOMY OF METATARSAL BONE Left 8/14/2019    Procedure: BUNIONECTOMY, WITH METATARSAL OSTEOTOMY;  Surgeon: Jackson Brannon DPM;  Location: [facility] OR;  Service: Podiatry;  Laterality: Left;  Arthrodesis 2nd PIP joint, screw 1st toe, C-arm, synthes-Gaby, 3.0, 4.0, AO modular set GABY BRUNSON NOTIFIED    TONSILLECTOMY, ADENOIDECTOMY  1970    TOTAL KNEE ARTHROPLASTY Right 12/2008    TOTAL KNEE ARTHROPLASTY Left 03/2015    TOTAL REDUCTION MAMMOPLASTY  01/2008    TUBAL LIGATION      VAGINAL DELIVERY  1983    x2     Past  Medical History:   Diagnosis Date    ADHD 01/01/1975    Bronchitis 01/09/2015    Bruises easily 01/01/2012    Cataract     [id number]    Cervical radiculopathy     Chronic pain of right wrist     Colon polyp 01/01/2013    Constipation 01/01/2010    COPD, moderate 01/01/2010    was told by Dr. Mccormick she has 60% lung capacity    Degeneration of cervical intervertebral disc     Depression 01/01/1997    Depression with anxiety     Dry eye 01/01/1994    GERD (gastroesophageal reflux disease) 01/01/2008    Hallux valgus (acquired), left foot 08/07/2019    Dr Truong    Hammertoe of second toe of left foot 08/07/2019    dr truong    Headaches, cluster 01/01/1997    Heart valve regurgitation 2014    dr verde    Hemorrhoids 01/01/1983    Hiatal hernia 01/01/1997    Hyperlipidemia 01/01/1995    Hypertension     on meds    Incontinence     Leg swelling 01/01/2014    bilateral, wears compression socks    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/1976    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    Lumbar spinal stenosis     Menopause 01/01/1997    MRSA infection 01/03/2015    Open leg wound 09/01/2015    right    Oxygen decrease 2014    02 2l/nc pm    Oxygen dependent 01/01/2011    2L NC Nightly    Primary osteoarthritis of first carpometacarpal joints, bilateral     Rheumatoid arthritis     Scoliosis 01/01/2014    Spinal stenosis 01/01/2014    upper and lowerr back - tingling in left arm at times    Tachycardia 02/01/2014    controlled with meds    Thumb pain 01/09/2015    Left     Family History   Problem Relation Age of Onset    Cancer Father         colon        Social History:   Marital Status:   Alcohol History:  reports that she drinks about 4.0 standard drinks of alcohol per week.  Tobacco History:  reports that she quit smoking about 22 years ago. Her smoking use included cigarettes. She has a 66.00 pack-year smoking history. She quit smokeless tobacco use about 22 years ago.  Drug History:   reports that she does not use drugs.    Review of patient's allergies indicates:   Allergen Reactions    Statins-hmg-coa reductase inhibitors Other (See Comments)     Muscle cramps    Doxycycline Other (See Comments)     Stops gallbladder function    Adhesive Other (See Comments)     bruises    Erythromycin Other (See Comments)     Stomach pain       Current Outpatient Medications   Medication Sig Dispense Refill    albuterol (PROAIR HFA) 90 mcg/actuation HFAA Inhale 2 puffs into the lungs every 6 (six) hours as needed.      buPROPion (WELLBUTRIN XL) 300 MG 24 hr tablet Take 300 mg by mouth every evening.       co-enzyme Q-10 30 mg capsule Take 100 mg by mouth once daily.      cyanocobalamin (VITAMIN B-12) 1000 MCG tablet Take 100 mcg by mouth once daily.      eszopiclone (LUNESTA) 2 MG Tab Take 2 mg by mouth every evening.      gabapentin (NEURONTIN) 300 MG capsule TAKE 1 CAPSULE BY MOUTH IN THE EVENING  1    garlic 1,000 mg Cap Take 1,000 mg by mouth once daily.      ibuprofen 200 mg Cap ibuprofen 200 mg capsule   Take 1 capsule every 6 hours by oral route as needed.      lactobacillus acidophilus & bulgar (LACTINEX) 100 million cell packet Take 1 tablet by mouth once daily.      multivitamin with minerals tablet Take 1 tablet by mouth once daily.      MYRBETRIQ 25 mg Tb24 ER tablet Take 25 mg by mouth every evening.       olopatadine (PATANOL) 0.1 % ophthalmic solution       omeprazole (PRILOSEC) 40 MG capsule       ondansetron (ZOFRAN-ODT) 4 MG TbDL Take 1 tablet (4 mg total) by mouth every 12 (twelve) hours as needed. (Patient not taking: Reported on 12/23/2019) 1 tablet 0    phytonadione, vit K1, (MEPHYTON ORAL) vitamin K      red yeast rice 600 mg Cap Take 1,200 mg by mouth once daily.      RESTASIS 0.05 % ophthalmic emulsion       telmisartan (MICARDIS) 80 MG Tab Micardis 80 mg tablet   Take 1 tablet every day by oral route.      tiotropium-olodaterol (STIOLTO RESPIMAT) 2.5-2.5  mcg/actuation Mist Inhale into the lungs once daily. Controller      verapamil (VERELAN) 240 MG C24P Take 240 mg by mouth 2 (two) times daily.       vitamin D (VITAMIN D3) 1000 units Tab Take 5,000 Units by mouth once daily.       No current facility-administered medications for this visit.        Review of Systems      Objective:        Physical Exam:   Foot Exam    General  General Appearance: appears stated age and healthy   Orientation: alert and oriented to person, place, and time   Affect: appropriate   Gait: unimpaired           Physical Exam   Musculoskeletal:        Feet:        Imaging:            Assessment:       1. Onycholysis of toenail - Left Foot    2. Ingrown toenail of both feet - Left Foot      Plan:   Onycholysis of toenail - Left Foot    Ingrown toenail of both feet - Left Foot    Utilizing sterile nail nippers and electric , I aggressively debrided nails 1-2 left foot  down to nail beds to thin the nail plates. Pt. tolerated well. No blood was drawn.  No follow-ups on file.    Procedures - None    Counseling:     I provided patient education verbally regarding:   Patient diagnosis, treatment options, as well as alternatives, risks, and benefits.     This note was created using Dragon voice recognition software that occasionally misinterpreted phrases or words.

## 2020-08-12 DIAGNOSIS — R06.02 SHORTNESS OF BREATH: Primary | ICD-10-CM

## 2020-08-21 ENCOUNTER — HOSPITAL ENCOUNTER (OUTPATIENT)
Dept: PULMONOLOGY | Facility: HOSPITAL | Age: 70
Discharge: HOME | End: 2020-08-21
Attending: INTERNAL MEDICINE
Payer: MEDICARE

## 2020-08-21 VITALS — HEIGHT: 66 IN | BODY MASS INDEX: 29.73 KG/M2 | WEIGHT: 185 LBS

## 2020-08-21 DIAGNOSIS — R06.02 SHORTNESS OF BREATH: ICD-10-CM

## 2020-08-21 PROCEDURE — 99900035 HC TECH TIME PER 15 MIN (STAT)

## 2020-08-21 PROCEDURE — 94618 PULMONARY STRESS TESTING: CPT

## 2020-08-21 NOTE — CARE UPDATE
"   08/21/20 1007   6MW Test   Ordering Provider Dr. Gregory Mccormick   Diagnosis Qualify for Oxygen   Height 5' 6" (1.676 m)   Weight 83.9 kg (185 lb)   BMI (Calculated) 29.9   Predicted Distance 283.32   Patient Race    6MWT Status completed without stopping   Patient Reported Dyspnea   Was O2 used? Yes   Delivery Method Cannula;Pull Tank;Continuous Flow   6MW Distance walked (feet) 1200 feet   Distance walked (meters) 365.76 meters   Did patient stop? No   Type of assistive device(s) used? no assistive devices   Is extra documentation required for this patient? Yes   Pre-Exercise   Oxygen Saturation 89 %   Supplemental Oxygen Room Air   Heart Rate 109 bpm   Efraín Dyspnea Rating  moderate   Exercise 1 Minute   Oxygen Saturation 85 %   Supplemental Oxygen Room Air   Heart Rate 114 bpm   Exercise 2 Minutes   Oxygen Saturation 87 %   Supplemental Oxygen 2 L/M   Heart Rate 112 bpm   Exercise 3 Minutes   Oxygen Saturation 90 %   Supplemental Oxygen 3 L/M   Heart Rate 112 bpm   Exercise 4 Minutes   Oxygen Saturation 93 %   Supplemental Oxygen 3 L/M   Heart Rate 110 bpm   Exercise 5 Minutes   Oxygen Saturation 91 %   Supplemental Oxygen 3 L/M   Heart Rate 113 bpm   Post Exercise   Oxygen Saturation 96 %   Supplemental Oxygen 3 L/M   Heart Rate 100 bpm   Efraín Dyspnea Rating  somewhat heavy   Recovery   Oxygen Saturation 96 %   Supplemental Oxygen 3 L/M   Heart Rate 98 bpm   Efraín Dyspnea Rating  moderate   Interpretation   Is procedure ready for interpretation? Yes   Did the patient stop or pause? No   Total Time Walked (Calculated) 360 seconds   Total Laps Walked 6   Final Partial Lap Distance (feet) 0 feet   Total Distance Feet (Calculated) 1200 feet   Total Distance Meters (Calculated) 365.76 meters   Predicted Distance Meters (Calculated) 423.87 meters   Percentage of Predicted (Calculated) 86.29   Peak VO2 (Calculated) 14.95   Mets 4.27   Comments This is a hypoxemic 6 min. walk. Patient requires 3lpm of  " supplemental oxygen with ambulation.   Oxygen Qualification   Oxygen Qualification? Yes

## 2020-08-24 ENCOUNTER — HOSPITAL ENCOUNTER (OUTPATIENT)
Dept: RADIOLOGY | Facility: HOSPITAL | Age: 70
Discharge: HOME | DRG: 177 | End: 2020-08-24
Attending: NURSE PRACTITIONER
Payer: MEDICARE

## 2020-08-24 PROCEDURE — 71046 X-RAY EXAM CHEST 2 VIEWS: CPT | Mod: TC,PO

## 2020-08-25 ENCOUNTER — HOSPITAL ENCOUNTER (INPATIENT)
Facility: HOSPITAL | Age: 70
LOS: 3 days | Discharge: HOME | DRG: 177 | End: 2020-08-28
Attending: EMERGENCY MEDICINE | Admitting: INTERNAL MEDICINE
Payer: MEDICARE

## 2020-08-25 DIAGNOSIS — J44.1 COPD EXACERBATION: ICD-10-CM

## 2020-08-25 DIAGNOSIS — J96.22 ACUTE ON CHRONIC RESPIRATORY FAILURE WITH HYPOXIA AND HYPERCAPNIA: ICD-10-CM

## 2020-08-25 DIAGNOSIS — J69.0 ASPIRATION PNEUMONIA OF LEFT LOWER LOBE DUE TO GASTRIC SECRETIONS: Primary | ICD-10-CM

## 2020-08-25 DIAGNOSIS — J96.21 ACUTE ON CHRONIC RESPIRATORY FAILURE WITH HYPOXIA AND HYPERCAPNIA: ICD-10-CM

## 2020-08-25 DIAGNOSIS — R07.9 CHEST PAIN: ICD-10-CM

## 2020-08-25 DIAGNOSIS — R06.02 SHORTNESS OF BREATH: ICD-10-CM

## 2020-08-25 DIAGNOSIS — J18.9 ATYPICAL PNEUMONIA: ICD-10-CM

## 2020-08-25 DIAGNOSIS — R07.9 ACUTE CHEST PAIN: ICD-10-CM

## 2020-08-25 PROBLEM — J44.9 COPD (CHRONIC OBSTRUCTIVE PULMONARY DISEASE): Status: ACTIVE | Noted: 2020-08-25

## 2020-08-25 PROBLEM — I10 ESSENTIAL HYPERTENSION: Status: ACTIVE | Noted: 2020-08-25

## 2020-08-25 LAB
ALBUMIN SERPL BCP-MCNC: 3.8 G/DL (ref 3.5–5.2)
ALP SERPL-CCNC: 76 U/L (ref 55–135)
ALT SERPL W/O P-5'-P-CCNC: 26 U/L (ref 10–44)
ANION GAP SERPL CALC-SCNC: 11 MMOL/L (ref 8–16)
AST SERPL-CCNC: 28 U/L (ref 10–40)
BASOPHILS # BLD AUTO: 0.04 K/UL (ref 0–0.2)
BASOPHILS NFR BLD: 0.6 % (ref 0–1.9)
BILIRUB SERPL-MCNC: 0.9 MG/DL (ref 0.1–1)
BNP SERPL-MCNC: 26 PG/ML (ref 0–99)
BUN SERPL-MCNC: 24 MG/DL (ref 8–23)
CALCIUM SERPL-MCNC: 9.4 MG/DL (ref 8.7–10.5)
CHLORIDE SERPL-SCNC: 101 MMOL/L (ref 95–110)
CO2 SERPL-SCNC: 23 MMOL/L (ref 23–29)
CREAT SERPL-MCNC: 0.9 MG/DL (ref 0.5–1.4)
DIFFERENTIAL METHOD: ABNORMAL
EOSINOPHIL # BLD AUTO: 0.4 K/UL (ref 0–0.5)
EOSINOPHIL NFR BLD: 6.1 % (ref 0–8)
ERYTHROCYTE [DISTWIDTH] IN BLOOD BY AUTOMATED COUNT: 12.8 % (ref 11.5–14.5)
EST. GFR  (AFRICAN AMERICAN): >60 ML/MIN/1.73 M^2
EST. GFR  (NON AFRICAN AMERICAN): >60 ML/MIN/1.73 M^2
GLUCOSE SERPL-MCNC: 101 MG/DL (ref 70–110)
HCT VFR BLD AUTO: 43.1 % (ref 37–48.5)
HGB BLD-MCNC: 14 G/DL (ref 12–16)
IMM GRANULOCYTES # BLD AUTO: 0.02 K/UL (ref 0–0.04)
IMM GRANULOCYTES NFR BLD AUTO: 0.3 % (ref 0–0.5)
INR PPP: 1
LYMPHOCYTES # BLD AUTO: 1.2 K/UL (ref 1–4.8)
LYMPHOCYTES NFR BLD: 17.9 % (ref 18–48)
MAGNESIUM SERPL-MCNC: 1.7 MG/DL (ref 1.6–2.6)
MCH RBC QN AUTO: 32.6 PG (ref 27–31)
MCHC RBC AUTO-ENTMCNC: 32.5 G/DL (ref 32–36)
MCV RBC AUTO: 101 FL (ref 82–98)
MONOCYTES # BLD AUTO: 0.6 K/UL (ref 0.3–1)
MONOCYTES NFR BLD: 8.9 % (ref 4–15)
NEUTROPHILS # BLD AUTO: 4.6 K/UL (ref 1.8–7.7)
NEUTROPHILS NFR BLD: 66.2 % (ref 38–73)
NRBC BLD-RTO: 0 /100 WBC
PLATELET # BLD AUTO: 260 K/UL (ref 150–350)
PMV BLD AUTO: 8.8 FL (ref 9.2–12.9)
POTASSIUM SERPL-SCNC: 4.6 MMOL/L (ref 3.5–5.1)
PROCALCITONIN SERPL IA-MCNC: <0.05 NG/ML (ref 0–0.5)
PROT SERPL-MCNC: 7.5 G/DL (ref 6–8.4)
PROTHROMBIN TIME: 12.7 SEC (ref 10.6–14.8)
RBC # BLD AUTO: 4.29 M/UL (ref 4–5.4)
SARS-COV-2 RDRP RESP QL NAA+PROBE: NEGATIVE
SODIUM SERPL-SCNC: 135 MMOL/L (ref 136–145)
TROPONIN I SERPL DL<=0.01 NG/ML-MCNC: <0.03 NG/ML
WBC # BLD AUTO: 6.93 K/UL (ref 3.9–12.7)

## 2020-08-25 PROCEDURE — 84484 ASSAY OF TROPONIN QUANT: CPT

## 2020-08-25 PROCEDURE — 83735 ASSAY OF MAGNESIUM: CPT

## 2020-08-25 PROCEDURE — 25000242 PHARM REV CODE 250 ALT 637 W/ HCPCS: Performed by: EMERGENCY MEDICINE

## 2020-08-25 PROCEDURE — 94640 AIRWAY INHALATION TREATMENT: CPT

## 2020-08-25 PROCEDURE — 94761 N-INVAS EAR/PLS OXIMETRY MLT: CPT

## 2020-08-25 PROCEDURE — 85025 COMPLETE CBC W/AUTO DIFF WBC: CPT

## 2020-08-25 PROCEDURE — 63600175 PHARM REV CODE 636 W HCPCS: Performed by: INTERNAL MEDICINE

## 2020-08-25 PROCEDURE — 12000002 HC ACUTE/MED SURGE SEMI-PRIVATE ROOM

## 2020-08-25 PROCEDURE — 99900035 HC TECH TIME PER 15 MIN (STAT)

## 2020-08-25 PROCEDURE — 96374 THER/PROPH/DIAG INJ IV PUSH: CPT

## 2020-08-25 PROCEDURE — 36415 COLL VENOUS BLD VENIPUNCTURE: CPT

## 2020-08-25 PROCEDURE — 84145 PROCALCITONIN (PCT): CPT

## 2020-08-25 PROCEDURE — 63600175 PHARM REV CODE 636 W HCPCS: Performed by: EMERGENCY MEDICINE

## 2020-08-25 PROCEDURE — U0002 COVID-19 LAB TEST NON-CDC: HCPCS

## 2020-08-25 PROCEDURE — 83880 ASSAY OF NATRIURETIC PEPTIDE: CPT

## 2020-08-25 PROCEDURE — 85610 PROTHROMBIN TIME: CPT

## 2020-08-25 PROCEDURE — 25000003 PHARM REV CODE 250: Performed by: INTERNAL MEDICINE

## 2020-08-25 PROCEDURE — 27000221 HC OXYGEN, UP TO 24 HOURS

## 2020-08-25 PROCEDURE — 80053 COMPREHEN METABOLIC PANEL: CPT

## 2020-08-25 PROCEDURE — 93005 ELECTROCARDIOGRAM TRACING: CPT | Performed by: INTERNAL MEDICINE

## 2020-08-25 PROCEDURE — 99285 EMERGENCY DEPT VISIT HI MDM: CPT | Mod: 25

## 2020-08-25 RX ORDER — BUPROPION HYDROCHLORIDE 150 MG/1
300 TABLET ORAL NIGHTLY
Status: DISCONTINUED | OUTPATIENT
Start: 2020-08-25 | End: 2020-08-28 | Stop reason: HOSPADM

## 2020-08-25 RX ORDER — OLOPATADINE HYDROCHLORIDE 1 MG/ML
1 SOLUTION/ DROPS OPHTHALMIC 2 TIMES DAILY
Status: DISCONTINUED | OUTPATIENT
Start: 2020-08-25 | End: 2020-08-28 | Stop reason: HOSPADM

## 2020-08-25 RX ORDER — TIOTROPIUM BROMIDE 18 UG/1
1 CAPSULE ORAL; RESPIRATORY (INHALATION) DAILY
Status: DISCONTINUED | OUTPATIENT
Start: 2020-08-26 | End: 2020-08-28 | Stop reason: HOSPADM

## 2020-08-25 RX ORDER — OXYBUTYNIN CHLORIDE 5 MG/1
5 TABLET, EXTENDED RELEASE ORAL DAILY
Status: DISCONTINUED | OUTPATIENT
Start: 2020-08-26 | End: 2020-08-28 | Stop reason: HOSPADM

## 2020-08-25 RX ORDER — METHYLPREDNISOLONE SOD SUCC 125 MG
125 VIAL (EA) INJECTION
Status: COMPLETED | OUTPATIENT
Start: 2020-08-25 | End: 2020-08-25

## 2020-08-25 RX ORDER — TELMISARTAN 40 MG/1
80 TABLET ORAL DAILY
Status: DISCONTINUED | OUTPATIENT
Start: 2020-08-26 | End: 2020-08-27

## 2020-08-25 RX ORDER — VERAPAMIL HYDROCHLORIDE 240 MG/1
240 TABLET, FILM COATED, EXTENDED RELEASE ORAL 2 TIMES DAILY
Status: DISCONTINUED | OUTPATIENT
Start: 2020-08-25 | End: 2020-08-28 | Stop reason: HOSPADM

## 2020-08-25 RX ORDER — FLUTICASONE FUROATE AND VILANTEROL 100; 25 UG/1; UG/1
1 POWDER RESPIRATORY (INHALATION) DAILY
Status: DISCONTINUED | OUTPATIENT
Start: 2020-08-26 | End: 2020-08-28 | Stop reason: HOSPADM

## 2020-08-25 RX ORDER — GABAPENTIN 300 MG/1
300 CAPSULE ORAL NIGHTLY
Status: DISCONTINUED | OUTPATIENT
Start: 2020-08-25 | End: 2020-08-28 | Stop reason: HOSPADM

## 2020-08-25 RX ORDER — METHYLPREDNISOLONE SOD SUCC 125 MG
60 VIAL (EA) INJECTION EVERY 8 HOURS
Status: DISCONTINUED | OUTPATIENT
Start: 2020-08-25 | End: 2020-08-26

## 2020-08-25 RX ORDER — ALBUTEROL SULFATE 0.83 MG/ML
2.5 SOLUTION RESPIRATORY (INHALATION) EVERY 4 HOURS PRN
Status: DISCONTINUED | OUTPATIENT
Start: 2020-08-25 | End: 2020-08-28 | Stop reason: HOSPADM

## 2020-08-25 RX ORDER — ALBUTEROL SULFATE 0.83 MG/ML
2.5 SOLUTION RESPIRATORY (INHALATION) EVERY 4 HOURS PRN
COMMUNITY

## 2020-08-25 RX ORDER — GLUCAGON 1 MG
1 KIT INJECTION
Status: DISCONTINUED | OUTPATIENT
Start: 2020-08-25 | End: 2020-08-28 | Stop reason: HOSPADM

## 2020-08-25 RX ORDER — ALBUTEROL SULFATE 90 UG/1
2 AEROSOL, METERED RESPIRATORY (INHALATION) EVERY 6 HOURS PRN
Status: DISCONTINUED | OUTPATIENT
Start: 2020-08-25 | End: 2020-08-28 | Stop reason: HOSPADM

## 2020-08-25 RX ORDER — IBUPROFEN 200 MG
16 TABLET ORAL
Status: DISCONTINUED | OUTPATIENT
Start: 2020-08-25 | End: 2020-08-28 | Stop reason: HOSPADM

## 2020-08-25 RX ORDER — IPRATROPIUM BROMIDE 0.5 MG/2.5ML
1 SOLUTION RESPIRATORY (INHALATION)
Status: COMPLETED | OUTPATIENT
Start: 2020-08-25 | End: 2020-08-25

## 2020-08-25 RX ORDER — ZOLPIDEM TARTRATE 5 MG/1
5 TABLET ORAL NIGHTLY PRN
Status: DISCONTINUED | OUTPATIENT
Start: 2020-08-25 | End: 2020-08-28 | Stop reason: HOSPADM

## 2020-08-25 RX ORDER — PREDNISONE 20 MG/1
20 TABLET ORAL DAILY
COMMUNITY
Start: 2020-08-11 | End: 2020-08-28

## 2020-08-25 RX ORDER — IBUPROFEN 200 MG
24 TABLET ORAL
Status: DISCONTINUED | OUTPATIENT
Start: 2020-08-25 | End: 2020-08-28 | Stop reason: HOSPADM

## 2020-08-25 RX ORDER — ENOXAPARIN SODIUM 100 MG/ML
40 INJECTION SUBCUTANEOUS EVERY 24 HOURS
Status: DISCONTINUED | OUTPATIENT
Start: 2020-08-25 | End: 2020-08-28 | Stop reason: HOSPADM

## 2020-08-25 RX ORDER — LEVALBUTEROL 1.25 MG/.5ML
1.25 SOLUTION, CONCENTRATE RESPIRATORY (INHALATION)
Status: COMPLETED | OUTPATIENT
Start: 2020-08-25 | End: 2020-08-25

## 2020-08-25 RX ORDER — SODIUM CHLORIDE 0.9 % (FLUSH) 0.9 %
10 SYRINGE (ML) INJECTION
Status: DISCONTINUED | OUTPATIENT
Start: 2020-08-25 | End: 2020-08-28 | Stop reason: HOSPADM

## 2020-08-25 RX ORDER — CIPROFLOXACIN 500 MG/1
500 TABLET ORAL EVERY 12 HOURS
COMMUNITY
Start: 2020-08-11 | End: 2020-08-28

## 2020-08-25 RX ORDER — PANTOPRAZOLE SODIUM 40 MG/1
40 TABLET, DELAYED RELEASE ORAL DAILY
Status: DISCONTINUED | OUTPATIENT
Start: 2020-08-26 | End: 2020-08-28 | Stop reason: HOSPADM

## 2020-08-25 RX ADMIN — LEVALBUTEROL HYDROCHLORIDE 1.25 MG: 1.25 SOLUTION, CONCENTRATE RESPIRATORY (INHALATION) at 04:08

## 2020-08-25 RX ADMIN — IPRATROPIUM BROMIDE 1 MG: 0.5 SOLUTION RESPIRATORY (INHALATION) at 04:08

## 2020-08-25 RX ADMIN — BUPROPION HYDROCHLORIDE 300 MG: 150 TABLET, FILM COATED, EXTENDED RELEASE ORAL at 09:08

## 2020-08-25 RX ADMIN — METHYLPREDNISOLONE SODIUM SUCCINATE 125 MG: 125 INJECTION, POWDER, FOR SOLUTION INTRAMUSCULAR; INTRAVENOUS at 04:08

## 2020-08-25 RX ADMIN — METHYLPREDNISOLONE SODIUM SUCCINATE 60 MG: 125 INJECTION, POWDER, FOR SOLUTION INTRAMUSCULAR; INTRAVENOUS at 09:08

## 2020-08-25 RX ADMIN — VERAPAMIL HYDROCHLORIDE 240 MG: 240 TABLET, FILM COATED, EXTENDED RELEASE ORAL at 09:08

## 2020-08-25 NOTE — PLAN OF CARE
08/25/20 1605   Patient Assessment/Suction   Level of Consciousness (AVPU) alert   Respiratory Effort Unlabored   Expansion/Accessory Muscles/Retractions no use of accessory muscles   Rhythm/Pattern, Respiratory unlabored   Cough Frequency with stimulation   PRE-TX-O2   O2 Device (Oxygen Therapy) nasal cannula   $ Is the patient on Low Flow Oxygen? Yes   Flow (L/min) 2.5   SpO2 98 %   Pulse Oximetry Type Continuous   $ Pulse Oximetry - Multiple Charge Pulse Oximetry - Multiple   Oximetry Probe Site Assessed   Pulse 91   Resp 20   Positioning HOB elevated 30 degrees   Aerosol Therapy   $ Aerosol Therapy Charges Aerosol Treatment   Daily Review of Necessity (SVN) completed   Respiratory Treatment Status (SVN) given   Treatment Route (SVN) mask   Patient Position (SVN) HOB elevated   Post Treatment Assessment (SVN) breath sounds improved   Signs of Intolerance (SVN) none   Breath Sounds Post-Respiratory Treatment   Throughout All Fields Post-Treatment All Fields   Throughout All Fields Post-Treatment aeration increased   Post-treatment Heart Rate (beats/min) 94   Post-treatment Resp Rate (breaths/min) 20

## 2020-08-25 NOTE — ED PROVIDER NOTES
Encounter Date: 8/25/2020       History     Chief Complaint   Patient presents with    Cough     TOLD SHE HAS PNEUMONIA PER PCP, CXR DONE YESTERDAY    Shortness of Breath     LOW 02 SATS, 80'S     Patient here with reported cough with associated shortness of breath onset over last few weeks she was treated at home with 2 rounds of antibiotics and around steroids however despite this she can not remains hypoxic today on her home O2 the patient was experiencing oxygen saturation of 87% at rest she was told to come to the emergency department by her pulmonologist for further evaluation patient reports significant dyspnea on exertion she denies any orthopnea she did see her cardiologist who felt that her symptoms are more likely lung related        Review of patient's allergies indicates:   Allergen Reactions    Statins-hmg-coa reductase inhibitors Other (See Comments)     Muscle cramps    Doxycycline Other (See Comments)     Stops gallbladder function    Adhesive Other (See Comments)     bruises    Erythromycin Other (See Comments)     Stomach pain     Past Medical History:   Diagnosis Date    ADHD 01/01/1975    Bronchitis 01/09/2015    Bruises easily 01/01/2012    Cataract     [id number]    Cervical radiculopathy     Chronic pain of right wrist     Colon polyp 01/01/2013    Constipation 01/01/2010    COPD, moderate 01/01/2010    was told by Dr. Mccormick she has 60% lung capacity    Degeneration of cervical intervertebral disc     Depression 01/01/1997    Depression with anxiety     Dry eye 01/01/1994    GERD (gastroesophageal reflux disease) 01/01/2008    Hallux valgus (acquired), left foot 08/07/2019    Dr Truong    Hammertoe of second toe of left foot 08/07/2019    dr truong    Headaches, cluster 01/01/1997    Heart valve regurgitation 2014    dr verde    Hemorrhoids 01/01/1983    Hiatal hernia 01/01/1997    Hyperlipidemia 01/01/1995    Hypertension     on meds    Incontinence     Leg  swelling 01/01/2014    bilateral, wears compression socks    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/1976    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    Lumbar spinal stenosis     Menopause 01/01/1997    MRSA infection 01/03/2015    Open leg wound 09/01/2015    right    Oxygen decrease 2014    02 2l/nc pm    Oxygen dependent 01/01/2011    2L NC Nightly    Primary osteoarthritis of first carpometacarpal joints, bilateral     Rheumatoid arthritis     Scoliosis 01/01/2014    Spinal stenosis 01/01/2014    upper and lowerr back - tingling in left arm at times    Tachycardia 02/01/2014    controlled with meds    Thumb pain 01/09/2015    Left     Past Surgical History:   Procedure Laterality Date    BREAST SURGERY  2008    Reduction    BUNIONECTOMY Bilateral 01/1999    CARDIOVASCULAR STRESS TEST  2013    CARPAL TUNNEL RELEASE Left 10/08/2015    CATARACT EXTRACTION BILATERAL W/ ANTERIOR VITRECTOMY  04/2010    CMC ARTHROPLASTY, RIGHT  02/23/2004    COLONOSCOPY  04/18/2013    CORRECTION OF HAMMER TOE Left 8/14/2019    Procedure: CORRECTION, HAMMER TOE;  Surgeon: Jackson Brannon DPM;  Location: [facility] OR;  Service: Podiatry;  Laterality: Left;    ESOPHAGOGASTRODUODENOSCOPY  12/21/2012    2012-with dilation #1, 2013 #2    HAND SURGERY  2003    JOINT REPLACEMENT      REMOVAL OF HARDWARE FROM LOWER EXTREMITY Left 8/14/2019    Procedure: REMOVAL, HARDWARE, LOWER EXTREMITY;  Surgeon: Jackson Brannon DPM;  Location: [facility] OR;  Service: Podiatry;  Laterality: Left;    RHINOPLASTY TIP  02/2010    SURGICAL REMOVAL OF BUNION WITH OSTEOTOMY OF METATARSAL BONE Left 8/14/2019    Procedure: BUNIONECTOMY, WITH METATARSAL OSTEOTOMY;  Surgeon: Jackson Brannon DPM;  Location: [facility] OR;  Service: Podiatry;  Laterality: Left;  Arthrodesis 2nd PIP joint, screw 1st toe, C-arm, synthes-Merrill, 3.0, 4.0, AO modular set MERRILL BRUNSON NOTIFIED    TONSILLECTOMY, ADENOIDECTOMY  1970    TOTAL KNEE ARTHROPLASTY Right 12/2008    TOTAL KNEE  ARTHROPLASTY Left 2015    TOTAL REDUCTION MAMMOPLASTY  2008    TUBAL LIGATION      VAGINAL DELIVERY  1983    x2     Family History   Problem Relation Age of Onset    Cancer Father         colon     Social History     Tobacco Use    Smoking status: Former Smoker     Packs/day: 2.00     Years: 33.00     Pack years: 66.00     Types: Cigarettes     Quit date: 1997     Years since quittin.2    Smokeless tobacco: Former User     Quit date: 1997   Substance Use Topics    Alcohol use: Yes     Alcohol/week: 4.0 standard drinks     Types: 4 Glasses of wine per week     Comment: monthly    Drug use: Never     Review of Systems   Constitutional: Positive for fatigue. Negative for chills and fever.   HENT: Negative for congestion, ear pain and sore throat.    Eyes: Negative for pain.   Respiratory: Positive for cough and shortness of breath.    Cardiovascular: Negative for chest pain and leg swelling.   Gastrointestinal: Negative for abdominal pain, constipation, diarrhea, nausea and vomiting.   Genitourinary: Negative for dysuria, frequency and hematuria.   Skin: Negative for rash.   Neurological: Negative for weakness and headaches.   Hematological: Negative for adenopathy.       Physical Exam     Initial Vitals [20 1345]   BP Pulse Resp Temp SpO2   (!) 143/67 103 (!) 22 99.7 °F (37.6 °C) (!) 87 %      MAP       --         Physical Exam    Constitutional: She appears well-developed and well-nourished. No distress.   HENT:   Head: Normocephalic and atraumatic.   Right Ear: External ear normal.   Left Ear: External ear normal.   Mouth/Throat: Oropharynx is clear and moist.   Eyes: Conjunctivae and EOM are normal. Pupils are equal, round, and reactive to light.   Neck: Normal range of motion. Neck supple.   Cardiovascular: Normal rate, regular rhythm, normal heart sounds and intact distal pulses.   Pulmonary/Chest: Tachypnea noted. She has decreased breath sounds. She has rhonchi.   Abdominal:  Soft. Bowel sounds are normal. There is no abdominal tenderness.   Musculoskeletal: Normal range of motion. No edema.   Neurological: She is alert and oriented to person, place, and time. GCS score is 15. GCS eye subscore is 4. GCS verbal subscore is 5. GCS motor subscore is 6.   Skin: Skin is warm and dry. Capillary refill takes less than 2 seconds. No rash noted.   Psychiatric: She has a normal mood and affect. Her behavior is normal.         ED Course   Procedures  Labs Reviewed   CBC W/ AUTO DIFFERENTIAL - Abnormal; Notable for the following components:       Result Value    Mean Corpuscular Volume 101 (*)     Mean Corpuscular Hemoglobin 32.6 (*)     MPV 8.8 (*)     Lymph% 17.9 (*)     All other components within normal limits   COMPREHENSIVE METABOLIC PANEL - Abnormal; Notable for the following components:    Sodium 135 (*)     BUN, Bld 24 (*)     All other components within normal limits   TROPONIN I   B-TYPE NATRIURETIC PEPTIDE   PROTIME-INR   SARS-COV-2 RNA AMPLIFICATION, QUAL   MAGNESIUM        ECG Results          EKG 12-lead (In process)  Result time 08/25/20 14:50:44    In process by Interface, Lab In [facility] (08/25/20 14:50:44)                 Narrative:    Test Reason : R06.02,    Vent. Rate : 097 BPM     Atrial Rate : 097 BPM     P-R Int : 172 ms          QRS Dur : 070 ms      QT Int : 352 ms       P-R-T Axes : 070 006 054 degrees     QTc Int : 447 ms    Normal sinus rhythm  Normal ECG  When compared with ECG of 07-AUG-2019 13:26,  Nonspecific T wave abnormality no longer evident in Lateral leads    Referred By: [email or web address]   SELF           Confirmed By:                             Imaging Results          X-Ray Chest AP Portable (Final result)  Result time 08/25/20 14:21:53    Final result by Jackson Reilly MD (08/25/20 14:21:53)                 Narrative:    CLINICAL HISTORY:  70 years (1950) Female CHF COUGH, SOB Hx-CHF.; 08/07/2019 Hallux  valgus (acquired), left foot; 08/07/2019 Ilene  of second toe of  left foot; 09/01/2015 Open leg wound; 01/09/2015 Bronchitis;  01/09/2015 Thumb pain; 01/03/2015 MRSA infection; 02/01/2014  Tachycardia; 01/01/2014 Scoliosis; 01/01/2014 Leg swelling; 01/01/2014  Spinal stenosis; 2014 Heart valve regurgitation; 2014 Oxygen decrease;  01/01/2013 Colon polyp; 01/01/ TECHNIQUE:  Portable AP radiograph the chest.    COMPARISON:  Most recent radiograph from  August 24, 2020    FINDINGS:  Slightly improved reticular lung markings with no consolidative  airspace opacity. Costophrenic angles are seen without effusion. No  pneumothorax is identified. The heart is normal in size. The  mediastinum is within normal limits. Osseous structures show  degenerative changes in the spine. The visualized upper abdomen is  unremarkable.    IMPRESSION:  Slightly improved reticular interstitial lung markings when compared  to the previous exam.                  .            Electronically Signed by KRISTIAN Lamar on 8/25/2020 2:24 PM                               Medical Decision Making:   ED Management:  Patient here with reported cough worsened over last few weeks radiographs revealed no evidence of infiltrates coronavirus screen is negative patient was persistently hypoxic despite oxygen replacement therapy at home she has received [facility] emergency department I have discussed case with hospitalist Dr. Castano to evaluate patient emergency department                                 Clinical Impression:       ICD-10-CM ICD-9-CM   1. COPD exacerbation  J44.1 491.21   2. Shortness of breath  R06.02 786.05   3. Acute on chronic respiratory failure with hypoxia and hypercapnia  J96.21 518.84    J96.22 786.09     799.02   4. Chest pain  R07.9 786.50                                Ron Joyce MD  08/26/20 1004

## 2020-08-25 NOTE — NURSING
Attempted to get report from Nilda in the ER. Was not able to give me much information. When I asked a simple question about the patient's history, she stated that she didn't know and I would have to ask the patient all those funs questions. When I tried to ask more questions I was told 'tell you what I will just bring the patient up and you can ask her any questions you have'.

## 2020-08-26 PROBLEM — J96.12 CHRONIC RESPIRATORY FAILURE WITH HYPERCAPNIA: Chronic | Status: ACTIVE | Noted: 2020-08-26

## 2020-08-26 LAB
ANION GAP SERPL CALC-SCNC: 10 MMOL/L (ref 8–16)
BASOPHILS # BLD AUTO: 0 K/UL (ref 0–0.2)
BASOPHILS NFR BLD: 0 % (ref 0–1.9)
BUN SERPL-MCNC: 27 MG/DL (ref 8–23)
CALCIUM SERPL-MCNC: 9.5 MG/DL (ref 8.7–10.5)
CHLORIDE SERPL-SCNC: 102 MMOL/L (ref 95–110)
CO2 SERPL-SCNC: 24 MMOL/L (ref 23–29)
CREAT SERPL-MCNC: 0.8 MG/DL (ref 0.5–1.4)
DIFFERENTIAL METHOD: ABNORMAL
EOSINOPHIL # BLD AUTO: 0 K/UL (ref 0–0.5)
EOSINOPHIL NFR BLD: 0 % (ref 0–8)
ERYTHROCYTE [DISTWIDTH] IN BLOOD BY AUTOMATED COUNT: 12.2 % (ref 11.5–14.5)
EST. GFR  (AFRICAN AMERICAN): >60 ML/MIN/1.73 M^2
EST. GFR  (NON AFRICAN AMERICAN): >60 ML/MIN/1.73 M^2
GLUCOSE SERPL-MCNC: 186 MG/DL (ref 70–110)
HCT VFR BLD AUTO: 39.8 % (ref 37–48.5)
HGB BLD-MCNC: 13.6 G/DL (ref 12–16)
IMM GRANULOCYTES # BLD AUTO: 0 K/UL (ref 0–0.04)
IMM GRANULOCYTES NFR BLD AUTO: 0 % (ref 0–0.5)
LYMPHOCYTES # BLD AUTO: 0.4 K/UL (ref 1–4.8)
LYMPHOCYTES NFR BLD: 14 % (ref 18–48)
MAGNESIUM SERPL-MCNC: 2 MG/DL (ref 1.6–2.6)
MCH RBC QN AUTO: 33.3 PG (ref 27–31)
MCHC RBC AUTO-ENTMCNC: 34.2 G/DL (ref 32–36)
MCV RBC AUTO: 98 FL (ref 82–98)
MONOCYTES # BLD AUTO: 0 K/UL (ref 0.3–1)
MONOCYTES NFR BLD: 1.1 % (ref 4–15)
NEUTROPHILS # BLD AUTO: 2.4 K/UL (ref 1.8–7.7)
NEUTROPHILS NFR BLD: 84.9 % (ref 38–73)
NRBC BLD-RTO: 0 /100 WBC
PLATELET # BLD AUTO: 259 K/UL (ref 150–350)
PMV BLD AUTO: 8.8 FL (ref 9.2–12.9)
POTASSIUM SERPL-SCNC: 4.6 MMOL/L (ref 3.5–5.1)
RBC # BLD AUTO: 4.08 M/UL (ref 4–5.4)
SODIUM SERPL-SCNC: 136 MMOL/L (ref 136–145)
WBC # BLD AUTO: 2.79 K/UL (ref 3.9–12.7)

## 2020-08-26 PROCEDURE — 94640 AIRWAY INHALATION TREATMENT: CPT

## 2020-08-26 PROCEDURE — 99900035 HC TECH TIME PER 15 MIN (STAT)

## 2020-08-26 PROCEDURE — 63600175 PHARM REV CODE 636 W HCPCS: Performed by: INTERNAL MEDICINE

## 2020-08-26 PROCEDURE — 25500020 PHARM REV CODE 255: Performed by: INTERNAL MEDICINE

## 2020-08-26 PROCEDURE — 87205 SMEAR GRAM STAIN: CPT

## 2020-08-26 PROCEDURE — 80048 BASIC METABOLIC PNL TOTAL CA: CPT

## 2020-08-26 PROCEDURE — 87070 CULTURE OTHR SPECIMN AEROBIC: CPT

## 2020-08-26 PROCEDURE — 94761 N-INVAS EAR/PLS OXIMETRY MLT: CPT

## 2020-08-26 PROCEDURE — 12000002 HC ACUTE/MED SURGE SEMI-PRIVATE ROOM

## 2020-08-26 PROCEDURE — 85025 COMPLETE CBC W/AUTO DIFF WBC: CPT

## 2020-08-26 PROCEDURE — 36415 COLL VENOUS BLD VENIPUNCTURE: CPT

## 2020-08-26 PROCEDURE — 99223 PR INITIAL HOSPITAL CARE,LEVL III: ICD-10-PCS | Mod: ,,, | Performed by: INTERNAL MEDICINE

## 2020-08-26 PROCEDURE — 25000242 PHARM REV CODE 250 ALT 637 W/ HCPCS: Performed by: INTERNAL MEDICINE

## 2020-08-26 PROCEDURE — 27000221 HC OXYGEN, UP TO 24 HOURS

## 2020-08-26 PROCEDURE — 25000003 PHARM REV CODE 250: Performed by: INTERNAL MEDICINE

## 2020-08-26 PROCEDURE — 83735 ASSAY OF MAGNESIUM: CPT

## 2020-08-26 PROCEDURE — 99223 1ST HOSP IP/OBS HIGH 75: CPT | Mod: ,,, | Performed by: INTERNAL MEDICINE

## 2020-08-26 RX ORDER — LEVOFLOXACIN 750 MG/1
750 TABLET ORAL DAILY
Status: DISCONTINUED | OUTPATIENT
Start: 2020-08-27 | End: 2020-08-28 | Stop reason: HOSPADM

## 2020-08-26 RX ORDER — METHYLPREDNISOLONE SOD SUCC 125 MG
40 VIAL (EA) INJECTION EVERY 12 HOURS
Status: DISCONTINUED | OUTPATIENT
Start: 2020-08-26 | End: 2020-08-28 | Stop reason: HOSPADM

## 2020-08-26 RX ADMIN — OLOPATADINE HYDROCHLORIDE 1 DROP: 1 SOLUTION/ DROPS OPHTHALMIC at 08:08

## 2020-08-26 RX ADMIN — BUPROPION HYDROCHLORIDE 300 MG: 150 TABLET, FILM COATED, EXTENDED RELEASE ORAL at 08:08

## 2020-08-26 RX ADMIN — OXYBUTYNIN CHLORIDE 5 MG: 5 TABLET, EXTENDED RELEASE ORAL at 08:08

## 2020-08-26 RX ADMIN — METHYLPREDNISOLONE SODIUM SUCCINATE 40 MG: 125 INJECTION, POWDER, FOR SOLUTION INTRAMUSCULAR; INTRAVENOUS at 08:08

## 2020-08-26 RX ADMIN — ALBUTEROL SULFATE 2 PUFF: 90 AEROSOL, METERED RESPIRATORY (INHALATION) at 09:08

## 2020-08-26 RX ADMIN — TIOTROPIUM BROMIDE 18 MCG: 18 CAPSULE ORAL; RESPIRATORY (INHALATION) at 09:08

## 2020-08-26 RX ADMIN — IOHEXOL 100 ML: 350 INJECTION, SOLUTION INTRAVENOUS at 04:08

## 2020-08-26 RX ADMIN — ALBUTEROL SULFATE 2.5 MG: 2.5 SOLUTION RESPIRATORY (INHALATION) at 09:08

## 2020-08-26 RX ADMIN — PANTOPRAZOLE SODIUM 40 MG: 40 TABLET, DELAYED RELEASE ORAL at 05:08

## 2020-08-26 RX ADMIN — METHYLPREDNISOLONE SODIUM SUCCINATE 60 MG: 125 INJECTION, POWDER, FOR SOLUTION INTRAMUSCULAR; INTRAVENOUS at 01:08

## 2020-08-26 RX ADMIN — VERAPAMIL HYDROCHLORIDE 240 MG: 240 TABLET, FILM COATED, EXTENDED RELEASE ORAL at 08:08

## 2020-08-26 RX ADMIN — FLUTICASONE FUROATE AND VILANTEROL TRIFENATATE 1 PUFF: 100; 25 POWDER RESPIRATORY (INHALATION) at 09:08

## 2020-08-26 RX ADMIN — TELMISARTAN 80 MG: 40 TABLET ORAL at 08:08

## 2020-08-26 RX ADMIN — METHYLPREDNISOLONE SODIUM SUCCINATE 60 MG: 125 INJECTION, POWDER, FOR SOLUTION INTRAMUSCULAR; INTRAVENOUS at 05:08

## 2020-08-26 NOTE — PROGRESS NOTES
[facility] Medicine  Progress Note    Patient name: Venkat Lara  MRN: [id number]  Admit Date: 8/25/2020   LOS: 1 day     SUBJECTIVE:     Principal problem: Acute on chronic respiratory failure with hypoxia    Interval History:  No acute overnight events reported.  Shortness of breath is about the same; worse with exertion and better with rest.  Denies wheezing.  Cough is somewhat improved.  No chest pain or chest pressure.  Denies bilateral lower extremity edema.  She could not rest well last night secondary to steroids.    Scheduled Meds:   buPROPion  300 mg Oral QHS    enoxaparin  40 mg Subcutaneous Q24H    fluticasone furoate-vilanteroL  1 puff Inhalation Daily    gabapentin  300 mg Oral QHS    methylPREDNISolone sodium succinate  40 mg Intravenous Q12H    olopatadine  1 drop Both Eyes BID    oxybutynin  5 mg Oral Daily    pantoprazole  40 mg Oral Daily    telmisartan  80 mg Oral Daily    tiotropium  1 capsule Inhalation Daily    verapamiL  240 mg Oral BID     Continuous Infusions:  PRN Meds:albuterol, albuterol, dextrose 50%, dextrose 50%, glucagon (human recombinant), glucose, glucose, sodium chloride 0.9%, zolpidem    Review of patient's allergies indicates:   Allergen Reactions    Statins-hmg-coa reductase inhibitors Other (See Comments)     Muscle cramps    Doxycycline Other (See Comments)     Stops gallbladder function    Adhesive Other (See Comments)     bruises    Erythromycin Other (See Comments)     Stomach pain       Review of Systems: As per interval history    OBJECTIVE:     Vital Signs (Most Recent)  Temp: 97.8 °F (36.6 °C) (08/26/20 1218)  Pulse: 79 (08/26/20 1218)  Resp: 19 (08/26/20 1218)  BP: 102/63 (08/26/20 1218)  SpO2: (!) 90 % (08/26/20 1218)    Vital Signs Range (Last 24H):  Temp:  [97.5 °F (36.4 °C)-98.7 °F (37.1 °C)]   Pulse:  []   Resp:  [16-35]   BP: (102-169)/(55-71)   SpO2:  [90 %-98 %]     I & O (Last 24H):No intake or output data in the 24  hours ending 08/26/20 1512    Physical Exam:  General: Patient resting comfortably in no acute distress. Appears as stated age. Calm  Eyes: No conjunctival injection. No scleral icterus.  ENT: Hearing grossly intact. No discharge from ears. No nasal discharge.   Neck: Supple, trachea midline. No JVD  CVS: RRR. No LE edema BL  Lungs:  No tachypnea or accessory muscle use.  Improved air movement when compared to yesterday.  No wheezing or rhonchi  Abdomen:  Soft, nontender and nondistended.  No organomegaly  Neuro: AOx3. Moves all extremities. Follows commands. Responds appropriately   Skin:  No rash or erythema noted    Laboratory:  CBC:   Recent Labs   Lab 08/26/20  0449   WBC 2.79*   RBC 4.08   HGB 13.6   HCT 39.8      MCV 98   MCH 33.3*   MCHC 34.2     CMP:   Recent Labs   Lab 08/25/20  1356 08/26/20  0449    186*   CALCIUM 9.4 9.5   ALBUMIN 3.8  --    PROT 7.5  --    * 136   K 4.6 4.6   CO2 23 24    102   BUN 24* 27*   CREATININE 0.9 0.8   ALKPHOS 76  --    ALT 26  --    AST 28  --    BILITOT 0.9  --      Microbiology Results (last 7 days)     Procedure Component Value Units Date/Time    Culture, Respiratory with Gram Stain [[id number]] Collected: 08/26/20 0020    Order Status: Completed Specimen: Respiratory from Sputum Updated: 08/26/20 0602     Gram Stain (Respiratory) <10 epithelial cells per low power field      Gram Stain (Respiratory) Moderate WBC's     Gram Stain (Respiratory) Moderate Gram positive cocci     Gram Stain (Respiratory) Few Gram negative rods    Culture, Respiratory with Gram Stain [[id number]]     Order Status: Sent Specimen: Sputum, Expectorated           Diagnostic Results:  Labs: Reviewed    ASSESSMENT/PLAN:     70-year-old  female with known moderate COPD on nocturnal home oxygen as well as bronchiectasis admitted secondary to worsening shortness of breath.    Active Hospital Problems    Diagnosis  POA    *Acute on chronic respiratory failure with  hypoxia [J96.21]  Yes    Chronic respiratory failure with hypercapnia [J96.12]  Yes     Chronic    Chronic obstructive pulmonary disease with acute exacerbation [J44.1]  Yes    Atypical pneumonia [J18.9]  Yes    Essential hypertension [I10]  Yes      Resolved Hospital Problems   No resolved problems to display.       Plan:   Continue supplemental oxygen to maintain SpO2 in the range of 88-92%  Appreciate pulmonology input  CTA chest to rule out pulmonary embolism and further evaluation of her lung disease  Will reduce the frequency of intravenous methylprednisolone to 40 mg q.12 hours   Continue scheduled bronchodilators and p.r.n. nebulizers  Procalcitonin negative. Follow sputum cx  Home meds for chronic medical conditions      VTE Risk Mitigation (From admission, onward)         Ordered     enoxaparin injection 40 mg  Every 24 hours      08/25/20 1835     IP VTE HIGH RISK PATIENT  Once      08/25/20 1835     Place sequential compression device  Until discontinued      08/25/20 1835                  Department Hospital Medicine  [facility]  Abilio Castano MD  Date of service: 08/26/2020

## 2020-08-26 NOTE — H&P
[facility] Medicine  History & Physical    Patient Name: Venkat Lara  MRN: [id number]  Admission Date: 8/25/2020  Attending Physician: Abilio Castano MD  Primary Care Provider: Soha Rowan NP         Patient information was obtained from patient, past medical records and ER records.     Subjective:     Principal Problem:Acute on chronic respiratory failure with hypoxia and hypercapnia    Chief Complaint:   Chief Complaint   Patient presents with    Cough     TOLD SHE HAS PNEUMONIA PER PCP, CXR DONE YESTERDAY    Shortness of Breath     LOW 02 SATS, 80'S        HPI: Patient is a 70-year-old  female with known COPD on home oxygen at night and essential hypertension was directed to the ED by her pulmonologist secondary to gradually worsening shortness of breath.    Onset of symptoms approximately 6 weeks ago after she returned from a trip to [place].  She had worsening exertional dyspnea with increasing productive cough worse than her baseline. Sx are constant and moderate in severity. She finished a round of cephalosporin (?cefuroxime) for 10 days with no improvement.  She was then started on ciprofloxacin for a week along with a short burst of prednisone with no significant improvement in symptoms.  She was evaluated by her cardiologist to rule out possible cardiac etiology however chest x-ray obtained via cardiologist's office revealed bilateral reticular interstitial markings and thus the patient was directed here.    She endorses subjective fevers but denies chills.  Cough is productive with occasional greenish sputum.  States that she was hypoxic down to the 80s despite using her home supplemental oxygen which she uses at night.  Has been needing to use her nebulizers and rescue inhaler more often lately.  She denies chest pain or chest pressure.  COVID testing from 08/12/2020 is negative and repeat COVID-19 testing today in the ER is also negative.  She denies  palpitations, lightheadedness/dizziness or peripheral edema.    In the ED:  Hemodynamically stable.  She was noted to be hypoxic down to 87% on room air.  Received nebulizers as well as methylprednisolone.  Chest x-ray with no jesús infiltrate and instead shows improvement in reticular opacities.    Rest of the 10 point review of systems is negative except as mentioned above.      Past Medical History:   Diagnosis Date    ADHD 01/01/1975    Bronchitis 01/09/2015    Bruises easily 01/01/2012    Cataract     [id number]    Cervical radiculopathy     Chronic pain of right wrist     Colon polyp 01/01/2013    Constipation 01/01/2010    COPD, moderate 01/01/2010    was told by Dr. Mccormick she has 60% lung capacity    Degeneration of cervical intervertebral disc     Depression 01/01/1997    Depression with anxiety     Dry eye 01/01/1994    GERD (gastroesophageal reflux disease) 01/01/2008    Hallux valgus (acquired), left foot 08/07/2019    Dr Truong    Hammertoe of second toe of left foot 08/07/2019    dr truong    Headaches, cluster 01/01/1997    Heart valve regurgitation 2014    dr verde    Hemorrhoids 01/01/1983    Hiatal hernia 01/01/1997    Hyperlipidemia 01/01/1995    Hypertension     on meds    Incontinence     Leg swelling 01/01/2014    bilateral, wears compression socks    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/1976    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    Lumbar spinal stenosis     Menopause 01/01/1997    MRSA infection 01/03/2015    Open leg wound 09/01/2015    right    Oxygen decrease 2014    02 2l/nc pm    Oxygen dependent 01/01/2011    2L NC Nightly    Primary osteoarthritis of first carpometacarpal joints, bilateral     Rheumatoid arthritis     Scoliosis 01/01/2014    Spinal stenosis 01/01/2014    upper and lowerr back - tingling in left arm at times    Tachycardia 02/01/2014    controlled with meds    Thumb pain 01/09/2015    Left       Past Surgical History:   Procedure Laterality  Date    BREAST SURGERY  2008    Reduction    BUNIONECTOMY Bilateral 01/1999    CARDIOVASCULAR STRESS TEST  2013    CARPAL TUNNEL RELEASE Left 10/08/2015    CATARACT EXTRACTION BILATERAL W/ ANTERIOR VITRECTOMY  04/2010    CMC ARTHROPLASTY, RIGHT  02/23/2004    COLONOSCOPY  04/18/2013    CORRECTION OF HAMMER TOE Left 8/14/2019    Procedure: CORRECTION, HAMMER TOE;  Surgeon: Jackson Brannon DPM;  Location: [facility] OR;  Service: Podiatry;  Laterality: Left;    ESOPHAGOGASTRODUODENOSCOPY  12/21/2012    2012-with dilation #1, 2013 #2    HAND SURGERY  2003    JOINT REPLACEMENT      REMOVAL OF HARDWARE FROM LOWER EXTREMITY Left 8/14/2019    Procedure: REMOVAL, HARDWARE, LOWER EXTREMITY;  Surgeon: Jackson Brannon DPM;  Location: [facility] OR;  Service: Podiatry;  Laterality: Left;    RHINOPLASTY TIP  02/2010    SURGICAL REMOVAL OF BUNION WITH OSTEOTOMY OF METATARSAL BONE Left 8/14/2019    Procedure: BUNIONECTOMY, WITH METATARSAL OSTEOTOMY;  Surgeon: Jackson Brannon DPM;  Location: [facility] OR;  Service: Podiatry;  Laterality: Left;  Arthrodesis 2nd PIP joint, screw 1st toe, C-arm, POPRAGEOUS-Merrill, 3.0, 4.0, AO modular set MERRILL BRUNSON NOTIFIED    TONSILLECTOMY, ADENOIDECTOMY  1970    TOTAL KNEE ARTHROPLASTY Right 12/2008    TOTAL KNEE ARTHROPLASTY Left 03/2015    TOTAL REDUCTION MAMMOPLASTY  01/2008    TUBAL LIGATION      VAGINAL DELIVERY  1983    x2       Review of patient's allergies indicates:   Allergen Reactions    Statins-hmg-coa reductase inhibitors Other (See Comments)     Muscle cramps    Doxycycline Other (See Comments)     Stops gallbladder function    Adhesive Other (See Comments)     bruises    Erythromycin Other (See Comments)     Stomach pain       No current facility-administered medications on file prior to encounter.      Current Outpatient Medications on File Prior to Encounter   Medication Sig    albuterol (PROAIR HFA) 90 mcg/actuation HFAA Inhale 2 puffs into the lungs every 6 (six)  hours as needed.    albuterol (PROVENTIL) 2.5 mg /3 mL (0.083 %) nebulizer solution Take 2.5 mg by nebulization every 4 (four) hours as needed for Wheezing. Rescue    buPROPion (WELLBUTRIN XL) 300 MG 24 hr tablet Take 300 mg by mouth every evening.     co-enzyme Q-10 30 mg capsule Take 100 mg by mouth once daily.    cyanocobalamin (VITAMIN B-12) 1000 MCG tablet Take 100 mcg by mouth once daily.    eszopiclone (LUNESTA) 2 MG Tab Take 2 mg by mouth every evening.    garlic 1,000 mg Cap Take 1,000 mg by mouth once daily.    lactobacillus acidophilus & bulgar (LACTINEX) 100 million cell packet Take 1 tablet by mouth once daily.    MYRBETRIQ 25 mg Tb24 ER tablet Take 25 mg by mouth every evening.     olopatadine (PATANOL) 0.1 % ophthalmic solution Place 1 drop into both eyes 2 (two) times daily.     omeprazole (PRILOSEC) 40 MG capsule Take 40 mg by mouth every morning.     telmisartan (MICARDIS) 80 MG Tab Take 80 mg by mouth once daily.     tiotropium-olodaterol (STIOLTO RESPIMAT) 2.5-2.5 mcg/actuation Mist Inhale 1 puff into the lungs once daily. Controller     verapamil (VERELAN) 240 MG C24P Take 240 mg by mouth 2 (two) times daily.     vitamin D (VITAMIN D3) 1000 units Tab Take 5,000 Units by mouth once daily.    gabapentin (NEURONTIN) 300 MG capsule Take 300 mg by mouth every evening.     ibuprofen 200 mg Cap ibuprofen 200 mg capsule   Take 1 capsule every 6 hours by oral route as needed.    multivitamin with minerals tablet Take 1 tablet by mouth once daily.    [DISCONTINUED] ondansetron (ZOFRAN-ODT) 4 MG TbDL Take 1 tablet (4 mg total) by mouth every 12 (twelve) hours as needed. (Patient not taking: Reported on 12/23/2019)    [DISCONTINUED] phytonadione, vit K1, (MEPHYTON ORAL) vitamin K    [DISCONTINUED] red yeast rice 600 mg Cap Take 1,200 mg by mouth once daily.    [DISCONTINUED] RESTASIS 0.05 % ophthalmic emulsion      Family History     Problem Relation (Age of Onset)    Cancer Father         Tobacco Use    Smoking status: Former Smoker     Packs/day: 2.00     Years: 33.00     Pack years: 66.00     Types: Cigarettes     Quit date: 1997     Years since quittin.2    Smokeless tobacco: Former User     Quit date: 1997   Substance and Sexual Activity    Alcohol use: Yes     Alcohol/week: 4.0 standard drinks     Types: 4 Glasses of wine per week     Comment: monthly    Drug use: Never    Sexual activity: Not on file       Objective:     Vital Signs (Most Recent):  Temp: 99.7 °F (37.6 °C) (20 1345)  Pulse: 98 (20 1730)  Resp: (!) 28 (20 1730)  BP: 138/61 (20 1730)  SpO2: (!) 94 % (20 1730) Vital Signs (24h Range):  Temp:  [99.7 °F (37.6 °C)] 99.7 °F (37.6 °C)  Pulse:  [] 98  Resp:  [16-35] 28  SpO2:  [87 %-98 %] 94 %  BP: (115-169)/(57-88) 138/61     Weight: 81.6 kg (180 lb)  Body mass index is 29.05 kg/m².    Physical Exam  Vitals signs and nursing note reviewed.   Constitutional:       General: She is not in acute distress.     Appearance: Normal appearance. She is well-developed.   HENT:      Head: Normocephalic and atraumatic.   Eyes:      General: No scleral icterus.     Conjunctiva/sclera: Conjunctivae normal.   Neck:      Musculoskeletal: Normal range of motion and neck supple.      Thyroid: No thyromegaly.   Cardiovascular:      Rate and Rhythm: Normal rate and regular rhythm.      Heart sounds: Normal heart sounds. No murmur.   Pulmonary:      Effort: Tachypnea present. No respiratory distress.      Breath sounds: Decreased air movement present. No wheezing.      Comments: Diminished air entry at the bases (L>R)  Abdominal:      General: Bowel sounds are normal. There is no distension.      Palpations: Abdomen is soft.      Tenderness: There is no abdominal tenderness.   Musculoskeletal:         General: No deformity.      Right lower leg: No edema.      Left lower leg: No edema.   Skin:     General: Skin is warm and dry.      Capillary Refill:  Capillary refill takes less than 2 seconds.      Findings: No erythema.   Neurological:      General: No focal deficit present.      Mental Status: She is alert and oriented to person, place, and time. Mental status is at baseline.   Psychiatric:         Mood and Affect: Mood normal.         Behavior: Behavior normal.             Significant Labs:   CBC:   Recent Labs   Lab 08/25/20  1356   WBC 6.93   HGB 14.0   HCT 43.1        CMP:   Recent Labs   Lab 08/25/20  1356   *   K 4.6      CO2 23      BUN 24*   CREATININE 0.9   CALCIUM 9.4   PROT 7.5   ALBUMIN 3.8   BILITOT 0.9   ALKPHOS 76   AST 28   ALT 26   ANIONGAP 11   EGFRNONAA >60.0       Significant Imaging: I have reviewed all pertinent imaging results/findings within the past 24 hours.     Imaging Results          X-Ray Chest AP Portable (Final result)  Result time 08/25/20 14:21:53    Final result by Jackson Reilly MD (08/25/20 14:21:53)                 Narrative:    CLINICAL HISTORY:  70 years (1950) Female CHF COUGH, SOB Hx-CHF.; 08/07/2019 Hallux  valgus (acquired), left foot; 08/07/2019 Hammertoe of second toe of  left foot; 09/01/2015 Open leg wound; 01/09/2015 Bronchitis;  01/09/2015 Thumb pain; 01/03/2015 MRSA infection; 02/01/2014  Tachycardia; 01/01/2014 Scoliosis; 01/01/2014 Leg swelling; 01/01/2014  Spinal stenosis; 2014 Heart valve regurgitation; 2014 Oxygen decrease;  01/01/2013 Colon polyp; 01/01/    TECHNIQUE:  Portable AP radiograph the chest.    COMPARISON:  Most recent radiograph from  August 24, 2020    FINDINGS:  Slightly improved reticular lung markings with no consolidative  airspace opacity. Costophrenic angles are seen without effusion. No  pneumothorax is identified. The heart is normal in size. The  mediastinum is within normal limits. Osseous structures show  degenerative changes in the spine. The visualized upper abdomen is  unremarkable.    IMPRESSION:  Slightly improved reticular interstitial  lung markings when compared  to the previous exam.                  .            Electronically Signed by KRISTIAN Lamar on 8/25/2020 2:24 PM                                Assessment/Plan:     Active Hospital Problems    Diagnosis  POA    *Acute on chronic respiratory failure with hypoxia and hypercapnia [J96.21, J96.22]  Yes    Chronic obstructive pulmonary disease with acute exacerbation [J44.1]  Yes    Atypical pneumonia [J18.9]  Yes    Essential hypertension [I10]  Yes      Resolved Hospital Problems   No resolved problems to display.       Plan:  Suspect acute on chronic respiratory failure likely secondary to atypical infection in setting of COPD exacerbation  Supplemental oxygen to target SpO2 of 88-92%; wean as tolerated  Failed a trial of oral steroids; will start IV steroids  Scheduled fluticasone/vilanterol and tiotropium inhaler daily  P.r.n. nebulized breathing treatments  Obtain sputum culture with gram stain and procalcitonin  Will hold off on antibiotics pending procalcitonin; of note patient is intolerant of doxycycline as well as erythromycin  Obtain pulmonology consultation        VTE Risk Mitigation (From admission, onward)         Ordered     enoxaparin injection 40 mg  Every 24 hours      08/25/20 1835     IP VTE HIGH RISK PATIENT  Once      08/25/20 1835     Place sequential compression device  Until discontinued      08/25/20 1835                   Abilio Castano MD  Department of Hospital Medicine   Formerly [facility]

## 2020-08-26 NOTE — PLAN OF CARE
Important Message from Medicare was sign, explained and given to patient/caregiver on 08/26/2020 at 9:35 am

## 2020-08-26 NOTE — PROGRESS NOTES
Pulmonary/Critical Care Consult      PATIENT NAME: Venkat Lara  MRN: [id number]  TODAY'S DATE: 2020  2:22 PM  ADMIT DATE: 2020  AGE: 70 y.o. : 1950    CONSULT REQUESTED BY: Abilio Castano MD    REASON FOR CONSULT:   Shortness of breath    HPI:  The patient is a 70-year-old female patient of Dr. Mccormick who has been feeling more short of breath since she got back from [place].  She normally coughs up sputum daily.  She normally takes rotating antibiotics.  She normally sleeps on oxygen.  She noted that her sats are in the low 80s at home.  She has an Acapella.  She is not familiar with the word bronchiectasis.  She did not have a CT a on presentation.  She states she thinks she has some COPD and emphysema.    REVIEW OF SYSTEMS  GENERAL: Feeling Well.  EYES: Vision is good.  ENT: No sinusitis or pharyngitis.   HEART: No chest pain or palpitations.  LUNGS: No cough, sputum, or wheezing.  GI: No Nausea, vomiting, constipation, diarrhea, or reflux.  : No dysuria, hesitancy, or nocturia.  SKIN: No lesions or rashes.  MUSCULOSKELETAL: No joint pain or myalgias.  NEURO: No headaches or neuropathy.  LYMPH: No edema or adenopathy.  PSYCH: No anxiety or depression.  ENDO: No weight change.    ALLERGIES  Review of patient's allergies indicates:   Allergen Reactions    Statins-hmg-coa reductase inhibitors Other (See Comments)     Muscle cramps    Doxycycline Other (See Comments)     Stops gallbladder function    Adhesive Other (See Comments)     bruises    Erythromycin Other (See Comments)     Stomach pain       INPATIENT SCHEDULED MEDICATIONS   buPROPion  300 mg Oral QHS    enoxaparin  40 mg Subcutaneous Q24H    fluticasone furoate-vilanteroL  1 puff Inhalation Daily    gabapentin  300 mg Oral QHS    methylPREDNISolone sodium succinate  60 mg Intravenous Q8H    olopatadine  1 drop Both Eyes BID    oxybutynin  5 mg Oral Daily    pantoprazole  40 mg Oral Daily    telmisartan  80 mg Oral  Daily    tiotropium  1 capsule Inhalation Daily    verapamiL  240 mg Oral BID         MEDICAL AND SURGICAL HISTORY  Past Medical History:   Diagnosis Date    ADHD 01/01/1975    Bronchitis 01/09/2015    Bruises easily 01/01/2012    Cataract     [id number]    Cervical radiculopathy     Chronic pain of right wrist     Colon polyp 01/01/2013    Constipation 01/01/2010    COPD, moderate 01/01/2010    was told by Dr. Mccormick she has 60% lung capacity    Degeneration of cervical intervertebral disc     Depression 01/01/1997    Depression with anxiety     Dry eye 01/01/1994    GERD (gastroesophageal reflux disease) 01/01/2008    Hallux valgus (acquired), left foot 08/07/2019    Dr Truong    Hammertoe of second toe of left foot 08/07/2019    dr truong    Headaches, cluster 01/01/1997    Heart valve regurgitation 2014    dr verde    Hemorrhoids 01/01/1983    Hiatal hernia 01/01/1997    Hyperlipidemia 01/01/1995    Hypertension     on meds    Incontinence     Leg swelling 01/01/2014    bilateral, wears compression socks    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/1976    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    Lumbar spinal stenosis     Menopause 01/01/1997    MRSA infection 01/03/2015    Open leg wound 09/01/2015    right    Oxygen decrease 2014    02 2l/nc pm    Oxygen dependent 01/01/2011    2L NC Nightly    Primary osteoarthritis of first carpometacarpal joints, bilateral     Rheumatoid arthritis     Scoliosis 01/01/2014    Spinal stenosis 01/01/2014    upper and lowerr back - tingling in left arm at times    Tachycardia 02/01/2014    controlled with meds    Thumb pain 01/09/2015    Left     Past Surgical History:   Procedure Laterality Date    BREAST SURGERY  2008    Reduction    BUNIONECTOMY Bilateral 01/1999    CARDIOVASCULAR STRESS TEST  2013    CARPAL TUNNEL RELEASE Left 10/08/2015    CATARACT EXTRACTION BILATERAL W/ ANTERIOR VITRECTOMY  04/2010    CMC ARTHROPLASTY, RIGHT  02/23/2004     COLONOSCOPY  2013    CORRECTION OF HAMMER TOE Left 2019    Procedure: CORRECTION, HAMMER TOE;  Surgeon: Jackson Brannon DPM;  Location: [facility] OR;  Service: Podiatry;  Laterality: Left;    ESOPHAGOGASTRODUODENOSCOPY  2012    2012-with dilation #1, 2013 #2    HAND SURGERY  2003    JOINT REPLACEMENT      REMOVAL OF HARDWARE FROM LOWER EXTREMITY Left 2019    Procedure: REMOVAL, HARDWARE, LOWER EXTREMITY;  Surgeon: Jackson Brannon DPM;  Location: [facility] OR;  Service: Podiatry;  Laterality: Left;    RHINOPLASTY TIP  2010    SURGICAL REMOVAL OF BUNION WITH OSTEOTOMY OF METATARSAL BONE Left 2019    Procedure: BUNIONECTOMY, WITH METATARSAL OSTEOTOMY;  Surgeon: Jackson Brannon DPM;  Location: [facility] OR;  Service: Podiatry;  Laterality: Left;  Arthrodesis 2nd PIP joint, screw 1st toe, C-arm, synthes-Merrill, 3.0, 4.0, AO modular set MERRILL BRUNSON NOTIFIED    TONSILLECTOMY, ADENOIDECTOMY  1970    TOTAL KNEE ARTHROPLASTY Right 2008    TOTAL KNEE ARTHROPLASTY Left 2015    TOTAL REDUCTION MAMMOPLASTY  2008    TUBAL LIGATION      VAGINAL DELIVERY  1983    x2       ALCOHOL, TOBACCO AND DRUG USE  Social History     Tobacco Use   Smoking Status Former Smoker    Packs/day: 2.00    Years: 33.00    Pack years: 66.00    Types: Cigarettes    Quit date: 1997    Years since quittin.2   Smokeless Tobacco Former User    Quit date: 1997     Social History     Substance and Sexual Activity   Alcohol Use Yes    Alcohol/week: 4.0 standard drinks    Types: 4 Glasses of wine per week    Comment: monthly     Social History     Substance and Sexual Activity   Drug Use Never       FAMILY HISTORY  Family History   Problem Relation Age of Onset    Cancer Father         colon       VITAL SIGNS (MOST RECENT)  Temp: 97.8 °F (36.6 °C) (20 121)  Pulse: 79 (20 1218)  Resp: 19 (20 121)  BP: 102/63 (20)  SpO2: (!) 90 % (20)    INTAKE AND OUTPUT  (LAST 24 HOURS):No intake or output data in the 24 hours ending 08/26/20 1422    WEIGHT  Wt Readings from Last 1 Encounters:   08/25/20 81.6 kg (180 lb)       PHYSICAL EXAM  GENERAL: Older patient in no distress.  HEENT: Pupils equal and reactive. Extraocular movements intact. Nose intact. Pharynx moist.  NECK: Supple.   HEART: Regular rate and rhythm. No murmur or gallop auscultated.  LUNGS: Clear to auscultation and percussion. Lung excursion symmetrical. No change in fremitus. No adventitial noises.  ABDOMEN: Bowel sounds present. Non-tender, no masses palpated.  : Normal anatomy.  EXTREMITIES: Normal muscle tone and joint movement, no cyanosis or clubbing.   LYMPHATICS: No adenopathy palpated, no edema.  SKIN: Dry, intact, no lesions.  Patient has chronic hyperpigmentation over the lower extremities.  NEURO: Cranial nerves II-XII intact. Motor strength 5/5 bilaterally, upper and lower extremities.  PSYCH: Appropriate affect    CBC LAST (LAST 24 HOURS)  Recent Labs   Lab 08/26/20  0449   WBC 2.79*   RBC 4.08   HGB 13.6   HCT 39.8   MCV 98   MCH 33.3*   MCHC 34.2   RDW 12.2      MPV 8.8*   GRAN 84.9*  2.4   LYMPH 14.0*  0.4*   MONO 1.1*  0.0*   BASO 0.00   NRBC 0       CHEMISTRY LAST (LAST 24 HOURS)  Recent Labs   Lab 08/26/20  0449      K 4.6      CO2 24   ANIONGAP 10   BUN 27*   CREATININE 0.8   *   CALCIUM 9.5   MG 2.0       COAGULATION LAST (LAST 24 HOURS)  No results for input(s): LABPT, INR, APTT in the last 24 hours.    CARDIAC PROFILE (LAST 24 HOURS)  Recent Labs   Lab 08/25/20  1356   BNP 26   TROPONINI <0.030       LAST 7 DAYS MICROBIOLOGY   Microbiology Results (last 7 days)     Procedure Component Value Units Date/Time    Culture, Respiratory with Gram Stain [[id number]] Collected: 08/26/20 0020    Order Status: Completed Specimen: Respiratory from Sputum Updated: 08/26/20 0602     Gram Stain (Respiratory) <10 epithelial cells per low power field      Gram Stain  (Respiratory) Moderate WBC's     Gram Stain (Respiratory) Moderate Gram positive cocci     Gram Stain (Respiratory) Few Gram negative rods    Culture, Respiratory with Gram Stain [[id number]]     Order Status: Sent Specimen: Sputum, Expectorated           MOST RECENT IMAGING  X-Ray Chest AP Portable, 8/25  Slightly improved reticular lung markings with no consolidative  airspace opacity. Costophrenic angles are seen without effusion. No  pneumothorax is identified. The heart is normal in size. The  mediastinum is within normal limits. Osseous structures show  degenerative changes in the spine. The visualized upper abdomen is  unremarkable.    I  CURRENT VISIT EKG  Results for orders placed or performed during the hospital encounter of 08/25/20   EKG 12-lead    Narrative    Test Reason : R06.02,    Vent. Rate : 097 BPM     Atrial Rate : 097 BPM     P-R Int : 172 ms          QRS Dur : 070 ms      QT Int : 352 ms       P-R-T Axes : 070 006 054 degrees     QTc Int : 447 ms    Normal sinus rhythm  Normal ECG  When compared with ECG of 07-AUG-2019 13:26,  Nonspecific T wave abnormality no longer evident in Lateral leads    Referred By: [email or web address]   SELF           Confirmed By:      Per Dr. Mccormick's note from October of 2019, the patient has moderate COPD with an FEV1 of 55% of predicted.  She also has bronchiectasis.    IMPRESSION AND PLAN    Increased shortness of breath since returning from a prolonged car trip  History of COPD  History of nocturnal hypoxemia  History of bronchiectasis    CTA to rule out PE and better delineate degree of bronchiectasis and emphysema  No evidence of pneumonia on current chest x-ray  Continue baseline bronchodilators  Would decrease the amount of steroids being given as she is not wheezing nor she having excessive mucus production  Will reassess after evaluating CT a

## 2020-08-26 NOTE — SUBJECTIVE & OBJECTIVE
Past Medical History:   Diagnosis Date    ADHD 01/01/1975    Bronchitis 01/09/2015    Bruises easily 01/01/2012    Cataract     [id number]    Cervical radiculopathy     Chronic pain of right wrist     Colon polyp 01/01/2013    Constipation 01/01/2010    COPD, moderate 01/01/2010    was told by Dr. Mccormick she has 60% lung capacity    Degeneration of cervical intervertebral disc     Depression 01/01/1997    Depression with anxiety     Dry eye 01/01/1994    GERD (gastroesophageal reflux disease) 01/01/2008    Hallux valgus (acquired), left foot 08/07/2019    Dr Truong    Hammertoe of second toe of left foot 08/07/2019    dr truong    Headaches, cluster 01/01/1997    Heart valve regurgitation 2014    dr verde    Hemorrhoids 01/01/1983    Hiatal hernia 01/01/1997    Hyperlipidemia 01/01/1995    Hypertension     on meds    Incontinence     Leg swelling 01/01/2014    bilateral, wears compression socks    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/1976    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    Lumbar spinal stenosis     Menopause 01/01/1997    MRSA infection 01/03/2015    Open leg wound 09/01/2015    right    Oxygen decrease 2014    02 2l/nc pm    Oxygen dependent 01/01/2011    2L NC Nightly    Primary osteoarthritis of first carpometacarpal joints, bilateral     Rheumatoid arthritis     Scoliosis 01/01/2014    Spinal stenosis 01/01/2014    upper and lowerr back - tingling in left arm at times    Tachycardia 02/01/2014    controlled with meds    Thumb pain 01/09/2015    Left       Past Surgical History:   Procedure Laterality Date    BREAST SURGERY  2008    Reduction    BUNIONECTOMY Bilateral 01/1999    CARDIOVASCULAR STRESS TEST  2013    CARPAL TUNNEL RELEASE Left 10/08/2015    CATARACT EXTRACTION BILATERAL W/ ANTERIOR VITRECTOMY  04/2010    CMC ARTHROPLASTY, RIGHT  02/23/2004    COLONOSCOPY  04/18/2013    CORRECTION OF HAMMER TOE Left 8/14/2019    Procedure: CORRECTION, HAMMER TOE;  Surgeon:  Jackson Brannon DPM;  Location: [facility] OR;  Service: Podiatry;  Laterality: Left;    ESOPHAGOGASTRODUODENOSCOPY  12/21/2012    2012-with dilation #1, 2013 #2    HAND SURGERY  2003    JOINT REPLACEMENT      REMOVAL OF HARDWARE FROM LOWER EXTREMITY Left 8/14/2019    Procedure: REMOVAL, HARDWARE, LOWER EXTREMITY;  Surgeon: Jackson Brannon DPM;  Location: [facility] OR;  Service: Podiatry;  Laterality: Left;    RHINOPLASTY TIP  02/2010    SURGICAL REMOVAL OF BUNION WITH OSTEOTOMY OF METATARSAL BONE Left 8/14/2019    Procedure: BUNIONECTOMY, WITH METATARSAL OSTEOTOMY;  Surgeon: Jackson Brannon DPM;  Location: [facility] OR;  Service: Podiatry;  Laterality: Left;  Arthrodesis 2nd PIP joint, screw 1st toe, C-arm, synthes-Merrill, 3.0, 4.0, AO modular set MERRILL BRUNSON NOTIFIED    TONSILLECTOMY, ADENOIDECTOMY  1970    TOTAL KNEE ARTHROPLASTY Right 12/2008    TOTAL KNEE ARTHROPLASTY Left 03/2015    TOTAL REDUCTION MAMMOPLASTY  01/2008    TUBAL LIGATION      VAGINAL DELIVERY  1983    x2       Review of patient's allergies indicates:   Allergen Reactions    Statins-hmg-coa reductase inhibitors Other (See Comments)     Muscle cramps    Doxycycline Other (See Comments)     Stops gallbladder function    Adhesive Other (See Comments)     bruises    Erythromycin Other (See Comments)     Stomach pain       No current facility-administered medications on file prior to encounter.      Current Outpatient Medications on File Prior to Encounter   Medication Sig    albuterol (PROAIR HFA) 90 mcg/actuation HFAA Inhale 2 puffs into the lungs every 6 (six) hours as needed.    albuterol (PROVENTIL) 2.5 mg /3 mL (0.083 %) nebulizer solution Take 2.5 mg by nebulization every 4 (four) hours as needed for Wheezing. Rescue    buPROPion (WELLBUTRIN XL) 300 MG 24 hr tablet Take 300 mg by mouth every evening.     co-enzyme Q-10 30 mg capsule Take 100 mg by mouth once daily.    cyanocobalamin (VITAMIN B-12) 1000 MCG tablet Take 100 mcg by  mouth once daily.    eszopiclone (LUNESTA) 2 MG Tab Take 2 mg by mouth every evening.    garlic 1,000 mg Cap Take 1,000 mg by mouth once daily.    lactobacillus acidophilus & bulgar (LACTINEX) 100 million cell packet Take 1 tablet by mouth once daily.    MYRBETRIQ 25 mg Tb24 ER tablet Take 25 mg by mouth every evening.     olopatadine (PATANOL) 0.1 % ophthalmic solution Place 1 drop into both eyes 2 (two) times daily.     omeprazole (PRILOSEC) 40 MG capsule Take 40 mg by mouth every morning.     telmisartan (MICARDIS) 80 MG Tab Take 80 mg by mouth once daily.     tiotropium-olodaterol (STIOLTO RESPIMAT) 2.5-2.5 mcg/actuation Mist Inhale 1 puff into the lungs once daily. Controller     verapamil (VERELAN) 240 MG C24P Take 240 mg by mouth 2 (two) times daily.     vitamin D (VITAMIN D3) 1000 units Tab Take 5,000 Units by mouth once daily.    gabapentin (NEURONTIN) 300 MG capsule Take 300 mg by mouth every evening.     ibuprofen 200 mg Cap ibuprofen 200 mg capsule   Take 1 capsule every 6 hours by oral route as needed.    multivitamin with minerals tablet Take 1 tablet by mouth once daily.    [DISCONTINUED] ondansetron (ZOFRAN-ODT) 4 MG TbDL Take 1 tablet (4 mg total) by mouth every 12 (twelve) hours as needed. (Patient not taking: Reported on 2019)    [DISCONTINUED] phytonadione, vit K1, (MEPHYTON ORAL) vitamin K    [DISCONTINUED] red yeast rice 600 mg Cap Take 1,200 mg by mouth once daily.    [DISCONTINUED] RESTASIS 0.05 % ophthalmic emulsion      Family History     Problem Relation (Age of Onset)    Cancer Father        Tobacco Use    Smoking status: Former Smoker     Packs/day: 2.00     Years: 33.00     Pack years: 66.00     Types: Cigarettes     Quit date: 1997     Years since quittin.2    Smokeless tobacco: Former User     Quit date: 1997   Substance and Sexual Activity    Alcohol use: Yes     Alcohol/week: 4.0 standard drinks     Types: 4 Glasses of wine per week      Comment: monthly    Drug use: Never    Sexual activity: Not on file       Objective:     Vital Signs (Most Recent):  Temp: 99.7 °F (37.6 °C) (08/25/20 1345)  Pulse: 98 (08/25/20 1730)  Resp: (!) 28 (08/25/20 1730)  BP: 138/61 (08/25/20 1730)  SpO2: (!) 94 % (08/25/20 1730) Vital Signs (24h Range):  Temp:  [99.7 °F (37.6 °C)] 99.7 °F (37.6 °C)  Pulse:  [] 98  Resp:  [16-35] 28  SpO2:  [87 %-98 %] 94 %  BP: (115-169)/(57-88) 138/61     Weight: 81.6 kg (180 lb)  Body mass index is 29.05 kg/m².    Physical Exam  Vitals signs and nursing note reviewed.   Constitutional:       General: She is not in acute distress.     Appearance: Normal appearance. She is well-developed.   HENT:      Head: Normocephalic and atraumatic.   Eyes:      General: No scleral icterus.     Conjunctiva/sclera: Conjunctivae normal.   Neck:      Musculoskeletal: Normal range of motion and neck supple.      Thyroid: No thyromegaly.   Cardiovascular:      Rate and Rhythm: Normal rate and regular rhythm.      Heart sounds: Normal heart sounds. No murmur.   Pulmonary:      Effort: Tachypnea present. No respiratory distress.      Breath sounds: Decreased air movement present. No wheezing.      Comments: Diminished air entry at the bases (L>R)  Abdominal:      General: Bowel sounds are normal. There is no distension.      Palpations: Abdomen is soft.      Tenderness: There is no abdominal tenderness.   Musculoskeletal:         General: No deformity.      Right lower leg: No edema.      Left lower leg: No edema.   Skin:     General: Skin is warm and dry.      Capillary Refill: Capillary refill takes less than 2 seconds.      Findings: No erythema.   Neurological:      General: No focal deficit present.      Mental Status: She is alert and oriented to person, place, and time. Mental status is at baseline.   Psychiatric:         Mood and Affect: Mood normal.         Behavior: Behavior normal.             Significant Labs:   CBC:   Recent Labs   Lab  08/25/20  1356   WBC 6.93   HGB 14.0   HCT 43.1        CMP:   Recent Labs   Lab 08/25/20  1356   *   K 4.6      CO2 23      BUN 24*   CREATININE 0.9   CALCIUM 9.4   PROT 7.5   ALBUMIN 3.8   BILITOT 0.9   ALKPHOS 76   AST 28   ALT 26   ANIONGAP 11   EGFRNONAA >60.0       Significant Imaging: I have reviewed all pertinent imaging results/findings within the past 24 hours.     Imaging Results          X-Ray Chest AP Portable (Final result)  Result time 08/25/20 14:21:53    Final result by Jackson Reilly MD (08/25/20 14:21:53)                 Narrative:    CLINICAL HISTORY:  70 years (1950) Female CHF COUGH, SOB Hx-CHF.; 08/07/2019 Hallux  valgus (acquired), left foot; 08/07/2019 Hammertoe of second toe of  left foot; 09/01/2015 Open leg wound; 01/09/2015 Bronchitis;  01/09/2015 Thumb pain; 01/03/2015 MRSA infection; 02/01/2014  Tachycardia; 01/01/2014 Scoliosis; 01/01/2014 Leg swelling; 01/01/2014  Spinal stenosis; 2014 Heart valve regurgitation; 2014 Oxygen decrease;  01/01/2013 Colon polyp; 01/01/    TECHNIQUE:  Portable AP radiograph the chest.    COMPARISON:  Most recent radiograph from  August 24, 2020    FINDINGS:  Slightly improved reticular lung markings with no consolidative  airspace opacity. Costophrenic angles are seen without effusion. No  pneumothorax is identified. The heart is normal in size. The  mediastinum is within normal limits. Osseous structures show  degenerative changes in the spine. The visualized upper abdomen is  unremarkable.    IMPRESSION:  Slightly improved reticular interstitial lung markings when compared  to the previous exam.                  .            Electronically Signed by KRISTIAN Lamar on 8/25/2020 2:24 PM

## 2020-08-26 NOTE — PLAN OF CARE
PCP Soha benson  Rx [facility] on [address] for regular and express scripts for 90 day   Medicare and  for life   08/26/20 1450   Discharge Assessment   Assessment Type Discharge Planning Assessment   Confirmed/corrected address and phone number on facesheet? Yes   Assessment information obtained from? Patient;Medical Record   Communicated expected length of stay with patient/caregiver no   Prior to hospitilization cognitive status: Alert/Oriented   Prior to hospitalization functional status: Independent   Current cognitive status: Alert/Oriented   Current Functional Status: Independent   Facility Arrived From: home   Lives With child(gilles), adult;spouse   Able to Return to Prior Arrangements yes   Is patient able to care for self after discharge? No   Who are your caregiver(s) and their phone number(s)? regine enriquez [phone]   Patient's perception of discharge disposition home or selfcare   Readmission Within the Last 30 Days no previous admission in last 30 days   Patient currently being followed by outpatient case management? No   Patient currently receives any other outside agency services? No   Equipment Currently Used at Home oxygen;nebulizer  (accepella machine)   Do you have any problems affording any of your prescribed medications? No   Does the patient have transportation home? Yes   Transportation Anticipated family or friend will provide   Dialysis Name and Scheduled days n/a   Does the patient receive services at the [facility]? No   Discharge Plan A Home   Discharge Plan B Home with family   DME Needed Upon Discharge  none   Patient/Family in Agreement with Plan no  (pt stated their was no need to ask family questions.  she was capable of answering.)       The family listed in graft is wrong family contact is cathi meade [phone]

## 2020-08-26 NOTE — PHYSICIAN QUERY
PT Name: Venkat Lara  MR #: [id number]     Documentation Clarification      CDS/: Wanda Byrd RN, CCDS               Contact information:  [phone]    08/26/2020 Hospital Medicine:  Chronic respiratory failure with hypercapnia     This form is a permanent document in the medical record.     Query Date: August 26, 2020    By submitting this query, we are merely seeking further clarification of documentation. Please utilize your independent clinical judgment when addressing the question(s) below.    The Medical Record reflects the following:    Clinical Findings Location in Medical Record   known COPD on home oxygen at night   States that she was hypoxic down to the 80s despite using her home supplemental oxygen which she uses at night.   She was noted to be hypoxic down to 87% on room air.     Acute on chronic respiratory failure with hypoxia and hypercapnia   Chronic obstructive pulmonary disease with acute exacerbation   Atypical pneumonia     Suspect acute on chronic respiratory failure likely secondary to atypical infection in setting of COPD exacerbation  Supplemental oxygen to target SpO2 of 88-92%; wean as tolerated  Failed a trial of oral steroids; will start IV steroids  Scheduled fluticasone/vilanterol and tiotropium inhaler daily  P.r.n. nebulized breathing treatments  Obtain sputum culture with gram stain and procalcitonin  Will hold off on antibiotics pending procalcitonin; of note patient is intolerant of doxycycline as well as erythromycin  Obtain pulmonology consultation [facility]                                                                                      Provider, please clarify the diagnosis of Acute on chronic respiratory failure with hypercapnia:      [   ] Diagnosis is confirmed and additional clinical support/decision-making indicators for the diagnosis include (please specify):________________   [   ] Above stated diagnosis is not confirmed and/or it has  been ruled out   [   ] Above stated diagnosis is not confirmed and/or it has been ruled out, other diagnosis ruled in (please specify):_______________   [   ] Other clarification (please specify): ___________________   [  ] Clinically undetermined

## 2020-08-26 NOTE — CARE UPDATE
08/25/20 2130   Patient Assessment/Suction   Level of Consciousness (AVPU) alert   Respiratory Effort Unlabored   Expansion/Accessory Muscles/Retractions no retractions   Rhythm/Pattern, Respiratory no shortness of breath reported   Cough Frequency with stimulation   PRE-TX-O2   O2 Device (Oxygen Therapy) nasal cannula   $ Is the patient on Low Flow Oxygen? Yes   Flow (L/min) 2.5   SpO2 (!) 93 %   Pulse Oximetry Type Intermittent   $ Pulse Oximetry - Multiple Charge Pulse Oximetry - Multiple   Pulse 99   Resp (!) 22   Positioning   Head of Bed (HOB) HOB elevated   Aerosol Therapy   $ Aerosol Therapy Charges PRN treatment not required   Respiratory Evaluation   $ Care Plan Tech Time 15 min

## 2020-08-26 NOTE — PLAN OF CARE
08/26/20 0915   Patient Assessment/Suction   Level of Consciousness (AVPU) alert   Respiratory Effort Normal;Unlabored   PRE-TX-O2   O2 Device (Oxygen Therapy) nasal cannula   $ Is the patient on Low Flow Oxygen? Yes   Flow (L/min) 3   SpO2 (!) 94 %   Pulse Oximetry Type Intermittent   $ Pulse Oximetry - Multiple Charge Pulse Oximetry - Multiple   Pulse 87   Resp 18   Inhaler   $ Inhaler Charges MDI (Metered Dose Inahler) Treatment;Spacer - Equipment;Given With Spacer   Respiratory Treatment Status (Inhaler) given   Treatment Route (Inhaler) mouthpiece;spacer/holding chamber   Patient Position (Inhaler) Rivero's   Post Treatment Assessment (Inhaler) breath sounds improved   Signs of Intolerance (Inhaler) none   Respiratory Evaluation   $ Care Plan Tech Time 15 min

## 2020-08-26 NOTE — HPI
Patient is a 70-year-old  female with known COPD on home oxygen at night and essential hypertension was directed to the ED by her pulmonologist secondary to gradually worsening shortness of breath.    Onset of symptoms approximately 6 weeks ago after she returned from a trip to [place].  She had worsening exertional dyspnea with increasing productive cough worse than her baseline. Sx are constant and moderate in severity. She finished a round of cephalosporin (?cefuroxime) for 10 days with no improvement.  She was then started on ciprofloxacin for a week along with a short burst of prednisone with no significant improvement in symptoms.  She was evaluated by her cardiologist to rule out possible cardiac etiology however chest x-ray obtained via cardiologist's office revealed bilateral reticular interstitial markings and thus the patient was directed here.    She endorses subjective fevers but denies chills.  Cough is productive with occasional greenish sputum.  States that she was hypoxic down to the 80s despite using her home supplemental oxygen which she uses at night.  Has been needing to use her nebulizers and rescue inhaler more often lately.  She denies chest pain or chest pressure.  COVID testing from 08/12/2020 is negative and repeat COVID-19 testing today in the ER is also negative.  She denies palpitations, lightheadedness/dizziness or peripheral edema.    In the ED:  Hemodynamically stable.  She was noted to be hypoxic down to 87% on room air.  Received nebulizers as well as methylprednisolone.  Chest x-ray with no jesús infiltrate and instead shows improvement in reticular opacities.    Rest of the 10 point review of systems is negative except as mentioned above.

## 2020-08-27 LAB
ANION GAP SERPL CALC-SCNC: 8 MMOL/L (ref 8–16)
BASOPHILS # BLD AUTO: 0.01 K/UL (ref 0–0.2)
BASOPHILS NFR BLD: 0.1 % (ref 0–1.9)
BUN SERPL-MCNC: 34 MG/DL (ref 8–23)
CALCIUM SERPL-MCNC: 9 MG/DL (ref 8.7–10.5)
CHLORIDE SERPL-SCNC: 103 MMOL/L (ref 95–110)
CO2 SERPL-SCNC: 24 MMOL/L (ref 23–29)
CREAT SERPL-MCNC: 0.8 MG/DL (ref 0.5–1.4)
DIFFERENTIAL METHOD: ABNORMAL
EOSINOPHIL # BLD AUTO: 0 K/UL (ref 0–0.5)
EOSINOPHIL NFR BLD: 0 % (ref 0–8)
ERYTHROCYTE [DISTWIDTH] IN BLOOD BY AUTOMATED COUNT: 12.3 % (ref 11.5–14.5)
EST. GFR  (AFRICAN AMERICAN): >60 ML/MIN/1.73 M^2
EST. GFR  (NON AFRICAN AMERICAN): >60 ML/MIN/1.73 M^2
GLUCOSE SERPL-MCNC: 144 MG/DL (ref 70–110)
GLUCOSE SERPL-MCNC: 147 MG/DL (ref 70–110)
GRAM STN SPEC: NORMAL
HCT VFR BLD AUTO: 39.2 % (ref 37–48.5)
HGB BLD-MCNC: 13.1 G/DL (ref 12–16)
IMM GRANULOCYTES # BLD AUTO: 0.04 K/UL (ref 0–0.04)
IMM GRANULOCYTES NFR BLD AUTO: 0.5 % (ref 0–0.5)
LYMPHOCYTES # BLD AUTO: 0.5 K/UL (ref 1–4.8)
LYMPHOCYTES NFR BLD: 6.3 % (ref 18–48)
MAGNESIUM SERPL-MCNC: 2 MG/DL (ref 1.6–2.6)
MCH RBC QN AUTO: 33.1 PG (ref 27–31)
MCHC RBC AUTO-ENTMCNC: 33.4 G/DL (ref 32–36)
MCV RBC AUTO: 99 FL (ref 82–98)
MONOCYTES # BLD AUTO: 0.2 K/UL (ref 0.3–1)
MONOCYTES NFR BLD: 2.3 % (ref 4–15)
NEUTROPHILS # BLD AUTO: 7.2 K/UL (ref 1.8–7.7)
NEUTROPHILS NFR BLD: 90.8 % (ref 38–73)
NRBC BLD-RTO: 0 /100 WBC
PLATELET # BLD AUTO: 322 K/UL (ref 150–350)
PMV BLD AUTO: 8.9 FL (ref 9.2–12.9)
POTASSIUM SERPL-SCNC: 4.3 MMOL/L (ref 3.5–5.1)
RBC # BLD AUTO: 3.96 M/UL (ref 4–5.4)
SODIUM SERPL-SCNC: 135 MMOL/L (ref 136–145)
WBC # BLD AUTO: 7.97 K/UL (ref 3.9–12.7)

## 2020-08-27 PROCEDURE — 36415 COLL VENOUS BLD VENIPUNCTURE: CPT

## 2020-08-27 PROCEDURE — 12000002 HC ACUTE/MED SURGE SEMI-PRIVATE ROOM

## 2020-08-27 PROCEDURE — 63600175 PHARM REV CODE 636 W HCPCS: Performed by: INTERNAL MEDICINE

## 2020-08-27 PROCEDURE — 99900035 HC TECH TIME PER 15 MIN (STAT)

## 2020-08-27 PROCEDURE — 99233 PR SUBSEQUENT HOSPITAL CARE,LEVL III: ICD-10-PCS | Mod: ,,, | Performed by: INTERNAL MEDICINE

## 2020-08-27 PROCEDURE — 94640 AIRWAY INHALATION TREATMENT: CPT

## 2020-08-27 PROCEDURE — 87205 SMEAR GRAM STAIN: CPT

## 2020-08-27 PROCEDURE — 83735 ASSAY OF MAGNESIUM: CPT

## 2020-08-27 PROCEDURE — 87449 NOS EACH ORGANISM AG IA: CPT

## 2020-08-27 PROCEDURE — 94761 N-INVAS EAR/PLS OXIMETRY MLT: CPT

## 2020-08-27 PROCEDURE — 27000221 HC OXYGEN, UP TO 24 HOURS

## 2020-08-27 PROCEDURE — 86631 CHLAMYDIA ANTIBODY: CPT

## 2020-08-27 PROCEDURE — 85025 COMPLETE CBC W/AUTO DIFF WBC: CPT

## 2020-08-27 PROCEDURE — 86738 MYCOPLASMA ANTIBODY: CPT

## 2020-08-27 PROCEDURE — 99233 SBSQ HOSP IP/OBS HIGH 50: CPT | Mod: ,,, | Performed by: INTERNAL MEDICINE

## 2020-08-27 PROCEDURE — 25000003 PHARM REV CODE 250: Performed by: INTERNAL MEDICINE

## 2020-08-27 PROCEDURE — 80048 BASIC METABOLIC PNL TOTAL CA: CPT

## 2020-08-27 RX ORDER — TELMISARTAN 40 MG/1
40 TABLET ORAL DAILY
Status: DISCONTINUED | OUTPATIENT
Start: 2020-08-27 | End: 2020-08-28 | Stop reason: HOSPADM

## 2020-08-27 RX ADMIN — METHYLPREDNISOLONE SODIUM SUCCINATE 40 MG: 125 INJECTION, POWDER, FOR SOLUTION INTRAMUSCULAR; INTRAVENOUS at 09:08

## 2020-08-27 RX ADMIN — FLUTICASONE FUROATE AND VILANTEROL TRIFENATATE 1 PUFF: 100; 25 POWDER RESPIRATORY (INHALATION) at 09:08

## 2020-08-27 RX ADMIN — ZOLPIDEM TARTRATE 5 MG: 5 TABLET ORAL at 12:08

## 2020-08-27 RX ADMIN — TIOTROPIUM BROMIDE 18 MCG: 18 CAPSULE ORAL; RESPIRATORY (INHALATION) at 09:08

## 2020-08-27 RX ADMIN — OXYBUTYNIN CHLORIDE 5 MG: 5 TABLET, EXTENDED RELEASE ORAL at 09:08

## 2020-08-27 RX ADMIN — OLOPATADINE HYDROCHLORIDE 1 DROP: 1 SOLUTION/ DROPS OPHTHALMIC at 08:08

## 2020-08-27 RX ADMIN — BUPROPION HYDROCHLORIDE 300 MG: 150 TABLET, FILM COATED, EXTENDED RELEASE ORAL at 08:08

## 2020-08-27 RX ADMIN — METHYLPREDNISOLONE SODIUM SUCCINATE 40 MG: 125 INJECTION, POWDER, FOR SOLUTION INTRAMUSCULAR; INTRAVENOUS at 08:08

## 2020-08-27 RX ADMIN — VERAPAMIL HYDROCHLORIDE 240 MG: 240 TABLET, FILM COATED, EXTENDED RELEASE ORAL at 08:08

## 2020-08-27 RX ADMIN — VERAPAMIL HYDROCHLORIDE 240 MG: 240 TABLET, FILM COATED, EXTENDED RELEASE ORAL at 09:08

## 2020-08-27 RX ADMIN — LEVOFLOXACIN 750 MG: 750 TABLET, FILM COATED ORAL at 06:08

## 2020-08-27 RX ADMIN — OLOPATADINE HYDROCHLORIDE 1 DROP: 1 SOLUTION/ DROPS OPHTHALMIC at 09:08

## 2020-08-27 RX ADMIN — PANTOPRAZOLE SODIUM 40 MG: 40 TABLET, DELAYED RELEASE ORAL at 06:08

## 2020-08-27 NOTE — PLAN OF CARE
08/27/20 0951   Patient Assessment/Suction   Level of Consciousness (AVPU) alert   Respiratory Effort Unlabored   All Lung Fields Breath Sounds diminished   PRE-TX-O2   O2 Device (Oxygen Therapy) nasal cannula   $ Is the patient on Low Flow Oxygen? Yes   SpO2 96 %   Pulse Oximetry Type Intermittent   $ Pulse Oximetry - Multiple Charge Pulse Oximetry - Multiple   Pulse 83   Resp 18   Inhaler   $ Inhaler Charges MDI (Metered Dose Inahler) Treatment;Mouth rinsed post treatment   Daily Review of Necessity (Inhaler) completed   Respiratory Treatment Status (Inhaler) given   Treatment Route (Inhaler) mouthpiece   Patient Position (Inhaler) HOB elevated   Post Treatment Assessment (Inhaler) breath sounds unchanged   Signs of Intolerance (Inhaler) none   Respiratory Evaluation   $ Care Plan Tech Time 15 min

## 2020-08-27 NOTE — PLAN OF CARE
Problem: Adult Inpatient Plan of Care  Goal: Plan of Care Review  Outcome: Ongoing, Progressing  Goal: Patient-Specific Goal (Individualization)  Outcome: Ongoing, Progressing  Goal: Absence of Hospital-Acquired Illness or Injury  Outcome: Ongoing, Progressing  Goal: Optimal Comfort and Wellbeing  Outcome: Ongoing, Progressing  Goal: Readiness for Transition of Care  Outcome: Ongoing, Progressing  Goal: Rounds/Family Conference  Outcome: Ongoing, Progressing     Problem: Fluid Imbalance (Pneumonia)  Goal: Fluid Balance  Outcome: Ongoing, Progressing     Problem: Infection (Pneumonia)  Goal: Resolution of Infection Signs/Symptoms  Outcome: Ongoing, Progressing     Problem: Respiratory Compromise (Pneumonia)  Goal: Effective Oxygenation and Ventilation  Outcome: Ongoing, Progressing     Problem: Fall Injury Risk  Goal: Absence of Fall and Fall-Related Injury  Outcome: Ongoing, Progressing     Problem: Skin Injury Risk Increased  Goal: Skin Health and Integrity  Outcome: Ongoing, Progressing

## 2020-08-27 NOTE — PLAN OF CARE
08/26/20 2125   Patient Assessment/Suction   Level of Consciousness (AVPU) alert   Respiratory Effort Unlabored   Expansion/Accessory Muscles/Retractions no use of accessory muscles   All Lung Fields Breath Sounds clear   Rhythm/Pattern, Respiratory unlabored   Cough Frequency no cough   PRE-TX-O2   O2 Device (Oxygen Therapy) nasal cannula   Flow (L/min) 3   SpO2 (!) 94 %   Pulse 71   Resp 18   Aerosol Therapy   $ Aerosol Therapy Charges Aerosol Treatment   Daily Review of Necessity (SVN) completed   Respiratory Treatment Status (SVN) given   Treatment Route (SVN) mask   Patient Position (SVN) HOB elevated   Post Treatment Assessment (SVN) breath sounds improved   Signs of Intolerance (SVN) none   Breath Sounds Post-Respiratory Treatment   Throughout All Fields Post-Treatment All Fields   Throughout All Fields Post-Treatment aeration increased   Post-treatment Heart Rate (beats/min) 72   Post-treatment Resp Rate (breaths/min) 18   Respiratory Evaluation   $ Care Plan Tech Time 15 min   Evaluation For   (CARE PLAN)

## 2020-08-27 NOTE — PROGRESS NOTES
[facility] Medicine  Progress Note    Patient name: Venkat Lara  MRN: [id number]  Admit Date: 8/25/2020   LOS: 2 days     SUBJECTIVE:     Principal problem: Acute on chronic respiratory failure with hypoxia    Interval History:  No acute overnight events reported.  Slept better last night after reduction in steroid dose. No improvement in shortness of breath; worse with exertion and better with rest.  Denies wheezing.  Has cough; unable to get sputum out.    Scheduled Meds:   buPROPion  300 mg Oral QHS    enoxaparin  40 mg Subcutaneous Q24H    fluticasone furoate-vilanteroL  1 puff Inhalation Daily    gabapentin  300 mg Oral QHS    levoFLOXacin  750 mg Oral Daily    methylPREDNISolone sodium succinate  40 mg Intravenous Q12H    olopatadine  1 drop Both Eyes BID    oxybutynin  5 mg Oral Daily    pantoprazole  40 mg Oral Daily    telmisartan  40 mg Oral Daily    tiotropium  1 capsule Inhalation Daily    verapamiL  240 mg Oral BID     Continuous Infusions:  PRN Meds:albuterol, albuterol, dextrose 50%, dextrose 50%, glucagon (human recombinant), glucose, glucose, sodium chloride 0.9%, zolpidem    Review of patient's allergies indicates:   Allergen Reactions    Statins-hmg-coa reductase inhibitors Other (See Comments)     Muscle cramps    Doxycycline Other (See Comments)     Stops gallbladder function    Adhesive Other (See Comments)     bruises    Erythromycin Other (See Comments)     Stomach pain       Review of Systems: As per interval history    OBJECTIVE:     Vital Signs (Most Recent)  Temp: 97.7 °F (36.5 °C) (08/27/20 0754)  Pulse: 80 (08/27/20 0754)  Resp: 18 (08/27/20 0754)  BP: 96/60 (08/27/20 0754)  SpO2: (!) 92 % (08/27/20 0754)    Vital Signs Range (Last 24H):  Temp:  [97.3 °F (36.3 °C)-98 °F (36.7 °C)]   Pulse:  [71-87]   Resp:  [17-19]   BP: ()/(60-71)   SpO2:  [90 %-95 %]     I & O (Last 24H):    Intake/Output Summary (Last 24 hours) at 8/27/2020 0912  Last data  filed at 8/27/2020 0813  Gross per 24 hour   Intake 240 ml   Output --   Net 240 ml       Physical Exam:  General: Patient resting comfortably in no acute distress. Appears as stated age. Calm  Eyes: No conjunctival injection. No scleral icterus.  ENT: Hearing grossly intact. No discharge from ears. No nasal discharge.   Neck: Supple, trachea midline. No JVD  CVS: RRR. No LE edema BL  Lungs:  No tachypnea or accessory muscle use. Good air movement.  No wheezing or rhonchi  Abdomen:  Soft, nontender and nondistended.  No organomegaly  Neuro: AOx3. Moves all extremities. Follows commands. Responds appropriately   Skin:  No rash or erythema noted    Laboratory:  CBC:   Recent Labs   Lab 08/27/20  0552   WBC 7.97   RBC 3.96*   HGB 13.1   HCT 39.2      MCV 99*   MCH 33.1*   MCHC 33.4     CMP:   Recent Labs   Lab 08/25/20  1356  08/27/20  0552      < > 144*   CALCIUM 9.4   < > 9.0   ALBUMIN 3.8  --   --    PROT 7.5  --   --    *   < > 135*   K 4.6   < > 4.3   CO2 23   < > 24      < > 103   BUN 24*   < > 34*   CREATININE 0.9   < > 0.8   ALKPHOS 76  --   --    ALT 26  --   --    AST 28  --   --    BILITOT 0.9  --   --     < > = values in this interval not displayed.     Microbiology Results (last 7 days)     Procedure Component Value Units Date/Time    Culture, Respiratory with Gram Stain [[id number]] Collected: 08/27/20 0230    Order Status: Sent Specimen: Sputum, Expectorated Updated: 08/27/20 0350    Culture, Respiratory with Gram Stain [[id number]] Collected: 08/26/20 0020    Order Status: Completed Specimen: Respiratory from Sputum Updated: 08/26/20 0602     Gram Stain (Respiratory) <10 epithelial cells per low power field      Gram Stain (Respiratory) Moderate WBC's     Gram Stain (Respiratory) Moderate Gram positive cocci     Gram Stain (Respiratory) Few Gram negative rods          Diagnostic Results:  Labs: Reviewed  CT: Reviewed     CTA Chest Non-Coronary [[id number]] Collected: 08/26/20 1601    Order Status: Completed Updated: 08/26/20 1617   Narrative:     CMS MANDATED QUALITY DATA - CT RADIATION  436     All CT scans at this facility utilize dose modulation, iterative   reconstruction, and/or weight based dosing when appropriate to reduce   radiation dose to as low as reasonably achievable.     CLINICAL HISTORY:   70 years (1950) Female PE suspected, high pretest prob     TECHNIQUE:   CTA CHEST NON CORONARY.  431 images obtained. Axial CT examination of   the chest with attention to the pulmonary arteries was performed using   contiguous axial images from the diaphragms to the lung apices   following the intravenous administration of non-ionic contrast   material. Images were reviewed using lung, mediastinal, and bone   windows. The study was performed with thin sections with subsequent   3-D reformats/MIP/reconstructions in multiple planes.     CONTRAST:   IV contrast was administered uneventfully.     COMPARISON:   Radiograph of the chest from the same day.     FINDINGS:   CTA PE evaluation: This is a high quality study for the evaluation of   pulmonary embolism. The pulmonary arteries are normal in appearance   without pulmonary emboli noted up to the segmental level, noting the   limitations of CT technique for identifying small or isolated   subsegmental emboli.     CT Chest:   Visualized neck: Within normal limits.   Airway/lungs: There is heterogeneous foamy debris in the posterior   segments of both lower lobes. There is mild cylindrical lower lobe   bronchiectasis. Very faint mild apical groundglass attenuation is seen   bilaterally.   Pleura: There is no pleural effusion. There is no pneumothorax.   Cardiovascular: The heart is normal in size. There are no findings of   right heart failure. The pulmonary artery is normal in size. There is   no pericardial effusion. The thoracic aorta and great vessels are   normal in course and caliber.   Mediastinum: There is no supraclavicular,  axillary, mediastinal, or   hilar lymphadenopathy.   Soft tissues: The peripheral soft tissues appear normal.   Musculoskeletal: There are moderate degenerative changes of the   thoracic spine.  There are no suspicious osseous lesions.   Esophagus: The esophagus appears grossly normal.   Upper Abdomen: The visualized upper abdominal organs appear normal.     IMPRESSION:   1. No evidence of pulmonary embolism to the segmental arterial level.   If there is continued clinical concern, consider further evaluation   with lower extremity doppler.   2. Heterogeneous foamy debris opacifying the dependent lower lobe   airways in keeping with aspiration.   3. Mild basilar predominant bronchiectasis.   4. Mild upper lobe predominant mosaic groundglass attenuation   suggesting mild atypical infection/pneumonia.      Electronically Signed by KRISTIAN Lamar on 8/26/2020 4:14 PM         ASSESSMENT/PLAN:     70-year-old  female with known moderate COPD on nocturnal home oxygen as well as bronchiectasis admitted secondary to worsening shortness of breath.    Active Hospital Problems    Diagnosis  POA    *Acute on chronic respiratory failure with hypoxia [J96.21]  Yes    Chronic respiratory failure with hypercapnia [J96.12]  Yes     Chronic    Chronic obstructive pulmonary disease with acute exacerbation [J44.1]  Yes    Atypical pneumonia [J18.9]  Yes    Essential hypertension [I10]  Yes      Resolved Hospital Problems   No resolved problems to display.       Plan:   Continue supplemental oxygen to maintain SpO2 in the range of 88-92%  Appreciate pulmonology input  CTA chest - negative for pulmonary embolism. Notable for atypical pneumonia   Started on levofloxacin. Follow serology for atypical organisms as well as Legionella urine antigen   Continue intravenous methylprednisolone at 40 mg q.12 hours   Continue scheduled bronchodilators and p.r.n. nebulizers  Reduce telmisartan due to soft blood pressures  Home  meds for chronic medical conditions      VTE Risk Mitigation (From admission, onward)         Ordered     enoxaparin injection 40 mg  Every 24 hours      08/25/20 1835     IP VTE HIGH RISK PATIENT  Once      08/25/20 1835     Place sequential compression device  Until discontinued      08/25/20 1835                  Department Hospital Medicine  [facility]  Abilio Castano MD  Date of service: 08/27/2020

## 2020-08-27 NOTE — PROGRESS NOTES
Pulmonary/Critical Care progress note      PATIENT NAME: Venkat Lara  MRN: [id number]  TODAY'S DATE: 2020  2:22 PM  ADMIT DATE: 2020  AGE: 70 y.o. : 1950    HPI:  The patient is a 70-year-old female patient of Dr. Mccormick who has been feeling more short of breath since she got back from [place].  She normally coughs up sputum daily.  She normally takes rotating antibiotics.  She normally sleeps on oxygen.  She noted that her sats are in the low 80s at home.  She has an Acapella.  She is not familiar with the word bronchiectasis.  She did not have a CT a on presentation.  She states she thinks she has some COPD and emphysema.     the patient's CTA showed no PE.  It does show some patchy apically predominant ground-glass infiltrates.  Legionella, mycoplasma, and Chlamydia studies are out. Levaquin was started.  The patient is saturating 93% on room air.  She states she is coughing a great deal but not producing any mucus.  (It is interesting that she statesd yesterday that she always coughs up great quantities of mucus every day.)  The patient's CT shows a small amount of bronchiectasis.  The patient is afraid that she would not be able to do postural drainage because she has such severe reflux and has been told never to bend over.  When I asked why she did have a Nissen fundoplication for this she states that Dr. Wang and Dr. Mccormick done think her reflux is a problem.  (But they do not want her to bend over?)  The patient is interested in a percussion vest.    REVIEW OF SYSTEMS  GENERAL: Feeling Well.  EYES: Vision is good.  ENT: No sinusitis or pharyngitis.   HEART: No chest pain or palpitations.  LUNGS: No cough, sputum, or wheezing.  GI: No Nausea, vomiting, constipation, diarrhea, or reflux.  : No dysuria, hesitancy, or nocturia.  SKIN: No lesions or rashes.  MUSCULOSKELETAL: No joint pain or myalgias.  NEURO: No headaches or neuropathy.  LYMPH: No edema or adenopathy.  PSYCH:  No anxiety or depression.  ENDO: No weight change.    ROSELINE SIGNS (MOST RECENT)  Temp: 97.7 °F (36.5 °C) (08/27/20 0754)  Pulse: 81 (08/27/20 0954)  Resp: 18 (08/27/20 0954)  BP: 96/60 (08/27/20 0754)  SpO2: 96 % (08/27/20 0954)    INTAKE AND OUTPUT (LAST 24 HOURS):    Intake/Output Summary (Last 24 hours) at 8/27/2020 1003  Last data filed at 8/27/2020 0813  Gross per 24 hour   Intake 240 ml   Output --   Net 240 ml       WEIGHT  Wt Readings from Last 1 Encounters:   08/25/20 81.6 kg (180 lb)       PHYSICAL EXAM  GENERAL: Older patient in no distress.  HEENT: Pupils equal and reactive. Extraocular movements intact. Nose intact. Pharynx moist.  NECK: Supple.   HEART: Regular rate and rhythm. No murmur or gallop auscultated.  LUNGS: Clear to auscultation and percussion. Lung excursion symmetrical. No change in fremitus. No adventitial noises.  ABDOMEN: Bowel sounds present. Non-tender, no masses palpated.  : Normal anatomy.  EXTREMITIES: Normal muscle tone and joint movement, no cyanosis or clubbing.   LYMPHATICS: No adenopathy palpated, no edema.  SKIN: Dry, intact, no lesions.  Patient has chronic hyperpigmentation over the lower extremities.  NEURO: Cranial nerves II-XII intact. Motor strength 5/5 bilaterally, upper and lower extremities.  PSYCH: Appropriate affect    CBC LAST (LAST 24 HOURS)  Recent Labs   Lab 08/27/20  0552   WBC 7.97   RBC 3.96*   HGB 13.1   HCT 39.2   MCV 99*   MCH 33.1*   MCHC 33.4   RDW 12.3      MPV 8.9*   GRAN 90.8*  7.2   LYMPH 6.3*  0.5*   MONO 2.3*  0.2*   BASO 0.01   NRBC 0       CHEMISTRY LAST (LAST 24 HOURS)  Recent Labs   Lab 08/27/20  0552   *   K 4.3      CO2 24   ANIONGAP 8   BUN 34*   CREATININE 0.8   *   CALCIUM 9.0   MG 2.0     CT chest  CARDIAC PROFILE (LAST 24 HOURS)  Recent Labs   Lab 08/25/20  1356   BNP 26   TROPONINI <0.030       LAST 7 DAYS MICROBIOLOGY   Microbiology Results (last 7 days)     Procedure Component Value Units Date/Time     Culture, Respiratory with Gram Stain [[id number]] Collected: 08/27/20 0230    Order Status: Completed Specimen: Sputum, Expectorated Updated: 08/27/20 0956     Gram Stain (Respiratory) >10 epithelial cells per low power field     Gram Stain (Respiratory) Few WBC's     Gram Stain (Respiratory) Many Gram positive cocci     Gram Stain (Respiratory) Few Gram positive rods     Gram Stain (Respiratory) Rare Gram negative rods     Gram Stain (Respiratory) Predominance of oropharyngeal jennifer. Please recollect.     Gram Stain (Respiratory) Results called to and read back by: Martha Holloway RN on [facility] by DRNICOLA     Gram Stain (Respiratory) 08/27/2020  09:56    Culture, Respiratory with Gram Stain [[id number]] Collected: 08/26/20 0020    Order Status: Completed Specimen: Respiratory from Sputum Updated: 08/27/20 0915     Respiratory Culture Normal respiratory jennifer     Gram Stain (Respiratory) <10 epithelial cells per low power field      Gram Stain (Respiratory) Moderate WBC's     Gram Stain (Respiratory) Moderate Gram positive cocci     Gram Stain (Respiratory) Few Gram negative rods          MOST RECENT IMAGING  X-Ray Chest AP Portable, 8/25  Slightly improved reticular lung markings with no consolidative  airspace opacity. Costophrenic angles are seen without effusion. No  pneumothorax is identified. The heart is normal in size. The  mediastinum is within normal limits. Osseous structures show  degenerative changes in the spine. The visualized upper abdomen is  unremarkable.    CT chest  1. No evidence of pulmonary embolism to the segmental arterial level.  2. Heterogeneous foamy debris opacifying the dependent lower lobe  airways in keeping with aspiration.  3. Mild basilar predominant bronchiectasis.  4. Mild upper lobe predominant mosaic groundglass attenuation  suggesting mild atypical infection/pneumonia.       CURRENT VISIT EKG  Results for orders placed or performed during the hospital encounter of 08/25/20   EKG  12-lead    Narrative    Test Reason : R06.02,    Vent. Rate : 097 BPM     Atrial Rate : 097 BPM     P-R Int : 172 ms          QRS Dur : 070 ms      QT Int : 352 ms       P-R-T Axes : 070 006 054 degrees     QTc Int : 447 ms    Normal sinus rhythm  Normal ECG  When compared with ECG of 07-AUG-2019 13:26,  Nonspecific T wave abnormality no longer evident in Lateral leads    Referred By: [email or web address]   SELF           Confirmed By:      Per Dr. Mccormick's note from October of 2019, the patient has moderate COPD with an FEV1 of 55% of predicted.  She also has bronchiectasis.    IMPRESSION AND PLAN    Atypical bilateral pneumonia, aspiration  History of COPD , moderate per Dr. Mccormick's 2019 note  History of nocturnal hypoxemia  History of bronchiectasis  Aspiration seen in the airways  Daytime hypoxemia appears resolved      Continue baseline bronchodilators  Continue Levaquin  Patient should sleep on oxygen, it appears she is saturating adequately on room air during the day  Patient should be able to go home tomorrow  With ongoing aspiration and known GERD this patient may benefit from a Nissen fundoplication  Time spent greater than 35 minutes

## 2020-08-28 VITALS
BODY MASS INDEX: 28.93 KG/M2 | SYSTOLIC BLOOD PRESSURE: 107 MMHG | DIASTOLIC BLOOD PRESSURE: 60 MMHG | HEIGHT: 66 IN | RESPIRATION RATE: 18 BRPM | WEIGHT: 180 LBS | OXYGEN SATURATION: 94 % | HEART RATE: 66 BPM | TEMPERATURE: 98 F

## 2020-08-28 PROBLEM — J69.0 ASPIRATION PNEUMONIA: Status: ACTIVE | Noted: 2020-08-25

## 2020-08-28 PROBLEM — J44.1 CHRONIC OBSTRUCTIVE PULMONARY DISEASE WITH ACUTE EXACERBATION: Status: RESOLVED | Noted: 2020-08-25 | Resolved: 2020-08-28

## 2020-08-28 PROBLEM — J96.21 ACUTE ON CHRONIC RESPIRATORY FAILURE WITH HYPOXIA AND HYPERCAPNIA: Status: RESOLVED | Noted: 2020-08-25 | Resolved: 2020-08-28

## 2020-08-28 PROBLEM — J96.22 ACUTE ON CHRONIC RESPIRATORY FAILURE WITH HYPOXIA AND HYPERCAPNIA: Status: RESOLVED | Noted: 2020-08-25 | Resolved: 2020-08-28

## 2020-08-28 LAB
ANION GAP SERPL CALC-SCNC: 10 MMOL/L (ref 8–16)
BACTERIA SPEC AEROBE CULT: NORMAL
BASOPHILS # BLD AUTO: 0 K/UL (ref 0–0.2)
BASOPHILS NFR BLD: 0 % (ref 0–1.9)
BUN SERPL-MCNC: 33 MG/DL (ref 8–23)
CALCIUM SERPL-MCNC: 9.4 MG/DL (ref 8.7–10.5)
CHLORIDE SERPL-SCNC: 102 MMOL/L (ref 95–110)
CO2 SERPL-SCNC: 25 MMOL/L (ref 23–29)
CREAT SERPL-MCNC: 0.8 MG/DL (ref 0.5–1.4)
DIFFERENTIAL METHOD: ABNORMAL
EOSINOPHIL # BLD AUTO: 0 K/UL (ref 0–0.5)
EOSINOPHIL NFR BLD: 0 % (ref 0–8)
ERYTHROCYTE [DISTWIDTH] IN BLOOD BY AUTOMATED COUNT: 12.4 % (ref 11.5–14.5)
EST. GFR  (AFRICAN AMERICAN): >60 ML/MIN/1.73 M^2
EST. GFR  (NON AFRICAN AMERICAN): >60 ML/MIN/1.73 M^2
GLUCOSE SERPL-MCNC: 147 MG/DL (ref 70–110)
GRAM STN SPEC: NORMAL
HCT VFR BLD AUTO: 39.9 % (ref 37–48.5)
HGB BLD-MCNC: 13.1 G/DL (ref 12–16)
IMM GRANULOCYTES # BLD AUTO: 0.04 K/UL (ref 0–0.04)
IMM GRANULOCYTES NFR BLD AUTO: 0.6 % (ref 0–0.5)
LYMPHOCYTES # BLD AUTO: 0.5 K/UL (ref 1–4.8)
LYMPHOCYTES NFR BLD: 7.4 % (ref 18–48)
MAGNESIUM SERPL-MCNC: 2.1 MG/DL (ref 1.6–2.6)
MCH RBC QN AUTO: 32.8 PG (ref 27–31)
MCHC RBC AUTO-ENTMCNC: 32.8 G/DL (ref 32–36)
MCV RBC AUTO: 100 FL (ref 82–98)
MONOCYTES # BLD AUTO: 0.2 K/UL (ref 0.3–1)
MONOCYTES NFR BLD: 2.5 % (ref 4–15)
NEUTROPHILS # BLD AUTO: 6.2 K/UL (ref 1.8–7.7)
NEUTROPHILS NFR BLD: 89.5 % (ref 38–73)
NRBC BLD-RTO: 0 /100 WBC
PLATELET # BLD AUTO: 306 K/UL (ref 150–350)
PMV BLD AUTO: 8.9 FL (ref 9.2–12.9)
POTASSIUM SERPL-SCNC: 4.6 MMOL/L (ref 3.5–5.1)
RBC # BLD AUTO: 4 M/UL (ref 4–5.4)
SODIUM SERPL-SCNC: 137 MMOL/L (ref 136–145)
WBC # BLD AUTO: 6.91 K/UL (ref 3.9–12.7)

## 2020-08-28 PROCEDURE — 99232 SBSQ HOSP IP/OBS MODERATE 35: CPT | Mod: ,,, | Performed by: INTERNAL MEDICINE

## 2020-08-28 PROCEDURE — 99232 PR SUBSEQUENT HOSPITAL CARE,LEVL II: ICD-10-PCS | Mod: ,,, | Performed by: INTERNAL MEDICINE

## 2020-08-28 PROCEDURE — 36415 COLL VENOUS BLD VENIPUNCTURE: CPT

## 2020-08-28 PROCEDURE — 63600175 PHARM REV CODE 636 W HCPCS: Performed by: INTERNAL MEDICINE

## 2020-08-28 PROCEDURE — 80048 BASIC METABOLIC PNL TOTAL CA: CPT

## 2020-08-28 PROCEDURE — 99900035 HC TECH TIME PER 15 MIN (STAT)

## 2020-08-28 PROCEDURE — 85025 COMPLETE CBC W/AUTO DIFF WBC: CPT

## 2020-08-28 PROCEDURE — 94640 AIRWAY INHALATION TREATMENT: CPT

## 2020-08-28 PROCEDURE — 94761 N-INVAS EAR/PLS OXIMETRY MLT: CPT

## 2020-08-28 PROCEDURE — 25000003 PHARM REV CODE 250: Performed by: INTERNAL MEDICINE

## 2020-08-28 PROCEDURE — 83735 ASSAY OF MAGNESIUM: CPT

## 2020-08-28 RX ORDER — LEVOFLOXACIN 750 MG/1
750 TABLET ORAL DAILY
Qty: 3 TABLET | Refills: 0 | Status: SHIPPED | OUTPATIENT
Start: 2020-08-29 | End: 2020-09-01

## 2020-08-28 RX ORDER — TELMISARTAN 40 MG/1
40 TABLET ORAL DAILY
Qty: 90 TABLET | Refills: 2 | Status: SHIPPED | OUTPATIENT
Start: 2020-08-28 | End: 2023-03-30

## 2020-08-28 RX ADMIN — LEVOFLOXACIN 750 MG: 750 TABLET, FILM COATED ORAL at 05:08

## 2020-08-28 RX ADMIN — ALBUTEROL SULFATE 2 PUFF: 90 AEROSOL, METERED RESPIRATORY (INHALATION) at 06:08

## 2020-08-28 RX ADMIN — OLOPATADINE HYDROCHLORIDE 1 DROP: 1 SOLUTION/ DROPS OPHTHALMIC at 09:08

## 2020-08-28 RX ADMIN — FLUTICASONE FUROATE AND VILANTEROL TRIFENATATE 1 PUFF: 100; 25 POWDER RESPIRATORY (INHALATION) at 07:08

## 2020-08-28 RX ADMIN — VERAPAMIL HYDROCHLORIDE 240 MG: 240 TABLET, FILM COATED, EXTENDED RELEASE ORAL at 09:08

## 2020-08-28 RX ADMIN — METHYLPREDNISOLONE SODIUM SUCCINATE 40 MG: 125 INJECTION, POWDER, FOR SOLUTION INTRAMUSCULAR; INTRAVENOUS at 09:08

## 2020-08-28 RX ADMIN — TELMISARTAN 40 MG: 40 TABLET ORAL at 09:08

## 2020-08-28 RX ADMIN — OXYBUTYNIN CHLORIDE 5 MG: 5 TABLET, EXTENDED RELEASE ORAL at 09:08

## 2020-08-28 RX ADMIN — PANTOPRAZOLE SODIUM 40 MG: 40 TABLET, DELAYED RELEASE ORAL at 05:08

## 2020-08-28 RX ADMIN — TIOTROPIUM BROMIDE 18 MCG: 18 CAPSULE ORAL; RESPIRATORY (INHALATION) at 08:08

## 2020-08-28 NOTE — DISCHARGE SUMMARY
On license of [facility] Medicine  Discharge Summary      Patient Name: Venkat Lara  MRN: [id number]  Admission Date: 8/25/2020  Hospital Length of Stay: 3 days  Discharge Date and Time:  08/28/2020 9:41 AM  Attending Physician: Abilio Castano MD   Discharging Provider: Abilio Castano MD  Primary Care Provider: Soha Rowan NP      HPI:   Patient is a 70-year-old  female with known COPD on home oxygen at night and essential hypertension was directed to the ED by her pulmonologist secondary to gradually worsening shortness of breath.    Onset of symptoms approximately 6 weeks ago after she returned from a trip to [place].  She had worsening exertional dyspnea with increasing productive cough worse than her baseline. Sx are constant and moderate in severity. She finished a round of cephalosporin (?cefuroxime) for 10 days with no improvement.  She was then started on ciprofloxacin for a week along with a short burst of prednisone with no significant improvement in symptoms.  She was evaluated by her cardiologist to rule out possible cardiac etiology however chest x-ray obtained via cardiologist's office revealed bilateral reticular interstitial markings and thus the patient was directed here.    She endorses subjective fevers but denies chills.  Cough is productive with occasional greenish sputum.  States that she was hypoxic down to the 80s despite using her home supplemental oxygen which she uses at night.  Has been needing to use her nebulizers and rescue inhaler more often lately.  She denies chest pain or chest pressure.  COVID testing from 08/12/2020 is negative and repeat COVID-19 testing today in the ER is also negative.  She denies palpitations, lightheadedness/dizziness or peripheral edema.    In the ED:  Hemodynamically stable.  She was noted to be hypoxic down to 87% on room air.  Received nebulizers as well as methylprednisolone.  Chest x-ray with no jesús infiltrate and  instead shows improvement in reticular opacities.    Rest of the 10 point review of systems is negative except as mentioned above.      * No surgery found *      Hospital Course:   Admitted for acute on chronic hypoxic respiratory failure in the setting of COPD.  Seen by pulmonology.  Sx secondary to aspiration pneumonia in the setting of acid reflux.  Received short course of intravenous steroids and levofloxacin for a 5 day course.  She has been weaned to room air.  Advised to continue using nocturnal home oxygen.  Advised to follow with pulmonology and GI for COPD and GERD follow-up respectively.  Voiced understanding of the plan.    Instructions provided to follow up with primary care physician as outpatient. Patient verbalized understanding and is aware to contact primary care physician or return to ED if new or worsening symptoms.    Physical exam on the day of discharge:  General: Patient resting comfortably in no acute distress.  Lungs: CTA. Good air entry.  Cor: Regular rate and rhythm. No murmurs. No pedal edema.  Abd: Soft. Nontender. Non-distended.  Neuro: A&O x3. Moving all 4 extremities equally  Ext: No clubbing. No cyanosis.        Consults:   Consults (From admission, onward)        Status Ordering Provider     Inpatient consult to Hospitalist  Once     Provider:  Jaspreet Leal MD    Acknowledged JASPREET LEAL     Inpatient consult to Hospitalist  Once     Provider:  Jaspreet Leal MD    Acknowledged JASPREET LEAL     Inpatient consult to Pulmonology  Once     Provider:  Danis Mendoza MD    Completed JASPREET LEAL          No new Assessment & Plan notes have been filed under this hospital service since the last note was generated.  Service: Hospital Medicine    Final Active Diagnoses:    Diagnosis Date Noted POA    Chronic respiratory failure with hypercapnia [J96.12] 08/26/2020 Yes     Chronic    Aspiration pneumonia [J69.0] 08/25/2020 Yes    Essential hypertension [I10]  08/25/2020 Yes      Problems Resolved During this Admission:    Diagnosis Date Noted Date Resolved POA    PRINCIPAL PROBLEM:  Acute on chronic respiratory failure with hypoxia and hypercapnia [J96.21, J96.22] 08/25/2020 08/28/2020 Yes    Chronic obstructive pulmonary disease with acute exacerbation [J44.1] 08/25/2020 08/28/2020 Yes       Discharged Condition: fair    Disposition: Home or Self Care    Follow Up:  Follow-up Information     Soha Rowan NP In 2 weeks.    Specialty: Family Medicine  Why: As needed  [facility] information:  [address] [facility] [address]  [phone]             Gregory Mccormick MD. Schedule an appointment as soon as possible for a visit in 2 weeks.    Specialty: Pulmonary Disease  Why: COPD and aspiration pneumonia follow-up  Contact information:  [address] [facility] [address]  [phone]             Mega Wang MD. Schedule an appointment as soon as possible for a visit in 2 weeks.    Specialty: Gastroenterology  Why: Discuss about acid reflux   Contact information:  [address]  [address]  [facility] [address]  [phone]                 Patient Instructions:      Diet Cardiac     Notify your health care provider if you experience any of the following:  temperature >100.4     Notify your health care provider if you experience any of the following:  persistent nausea and vomiting or diarrhea     Notify your health care provider if you experience any of the following:  persistent dizziness, light-headedness, or visual disturbances     Notify your health care provider if you experience any of the following:  difficulty breathing or increased cough     Notify your health care provider if you experience any of the following:   Order Comments: Recurrent or worsening symptoms     Activity as tolerated       Significant Diagnostic Studies: Labs:   CMP   Recent Labs   Lab 08/27/20  0552 08/28/20  0452   * 137   K 4.3 4.6     102   CO2 24 25   * 147*   BUN 34* 33*   CREATININE 0.8 0.8   CALCIUM 9.0 9.4   ANIONGAP 8 10   ESTGFRAFRICA >60.0 >60.0   EGFRNONAA >60.0 >60.0    and CBC   Recent Labs   Lab 08/27/20  0552 08/28/20  0452   WBC 7.97 6.91   HGB 13.1 13.1   HCT 39.2 39.9    306     Radiology: CT scan: CTA chest     CTA Chest Non-Coronary [[id number]] Collected: 08/26/20 1601   Order Status: Completed Updated: 08/26/20 1617   Narrative:     CMS MANDATED QUALITY DATA - CT RADIATION  436     All CT scans at this facility utilize dose modulation, iterative   reconstruction, and/or weight based dosing when appropriate to reduce   radiation dose to as low as reasonably achievable.     CLINICAL HISTORY:   70 years (1950) Female PE suspected, high pretest prob     TECHNIQUE:   CTA CHEST NON CORONARY.  431 images obtained. Axial CT examination of   the chest with attention to the pulmonary arteries was performed using   contiguous axial images from the diaphragms to the lung apices   following the intravenous administration of non-ionic contrast   material. Images were reviewed using lung, mediastinal, and bone   windows. The study was performed with thin sections with subsequent   3-D reformats/MIP/reconstructions in multiple planes.     CONTRAST:   IV contrast was administered uneventfully.     COMPARISON:   Radiograph of the chest from the same day.     FINDINGS:   CTA PE evaluation: This is a high quality study for the evaluation of   pulmonary embolism. The pulmonary arteries are normal in appearance   without pulmonary emboli noted up to the segmental level, noting the   limitations of CT technique for identifying small or isolated   subsegmental emboli.     CT Chest:   Visualized neck: Within normal limits.   Airway/lungs: There is heterogeneous foamy debris in the posterior   segments of both lower lobes. There is mild cylindrical lower lobe   bronchiectasis. Very faint mild apical groundglass attenuation is  seen   bilaterally.   Pleura: There is no pleural effusion. There is no pneumothorax.   Cardiovascular: The heart is normal in size. There are no findings of   right heart failure. The pulmonary artery is normal in size. There is   no pericardial effusion. The thoracic aorta and great vessels are   normal in course and caliber.   Mediastinum: There is no supraclavicular, axillary, mediastinal, or   hilar lymphadenopathy.   Soft tissues: The peripheral soft tissues appear normal.   Musculoskeletal: There are moderate degenerative changes of the   thoracic spine.  There are no suspicious osseous lesions.   Esophagus: The esophagus appears grossly normal.   Upper Abdomen: The visualized upper abdominal organs appear normal.     IMPRESSION:   1. No evidence of pulmonary embolism to the segmental arterial level.   If there is continued clinical concern, consider further evaluation   with lower extremity doppler.   2. Heterogeneous foamy debris opacifying the dependent lower lobe   airways in keeping with aspiration.   3. Mild basilar predominant bronchiectasis.   4. Mild upper lobe predominant mosaic groundglass attenuation   suggesting mild atypical infection/pneumonia.   .   Electronically Signed by KRISTIAN Lamar on 8/26/2020 4:14 PM         Pending Diagnostic Studies:     Procedure Component Value Units Date/Time    Chlamydia Serology, Serum [[id number]] Collected: 08/27/20 0552    Order Status: Sent Lab Status: In process Updated: 08/27/20 0604    Specimen: Blood     Legionella pneumophila DFA Sputum, induced [[id number]] Collected: 08/27/20 0230    Order Status: Sent Lab Status: In process Updated: 08/27/20 0338    Mycoplasma pneumoniae Ab IgG & IgM [[id number]] Collected: 08/27/20 0552    Order Status: Sent Lab Status: In process Updated: 08/27/20 0604    Specimen: Blood          Medications:  Reconciled Home Medications:      Medication List      START taking these medications    levoFLOXacin 750 MG  tablet  Commonly known as: LEVAQUIN  Take 1 tablet (750 mg total) by mouth once daily. for 3 days  Start taking on: August 29, 2020        CHANGE how you take these medications    telmisartan 40 MG Tab  Commonly known as: MICARDIS  Take 1 tablet (40 mg total) by mouth once daily.  What changed:   · medication strength  · how much to take        CONTINUE taking these medications    buPROPion 300 MG 24 hr tablet  Commonly known as: WELLBUTRIN XL  Take 300 mg by mouth every evening.     co-enzyme Q-10 30 mg capsule  Take 100 mg by mouth once daily.     eszopiclone 2 MG Tab  Commonly known as: LUNESTA  Take 2 mg by mouth every evening.     gabapentin 300 MG capsule  Commonly known as: NEURONTIN  Take 300 mg by mouth every evening.     garlic 1,000 mg Cap  Take 1,000 mg by mouth once daily.     ibuprofen 200 mg Cap  ibuprofen 200 mg capsule   Take 1 capsule every 6 hours by oral route as needed.     lactobacillus acidophilus & bulgar 100 million cell packet  Commonly known as: LACTINEX  Take 1 tablet by mouth once daily.     multivitamin with minerals tablet  Take 1 tablet by mouth once daily.     MYRBETRIQ 25 mg Tb24 ER tablet  Generic drug: mirabegron  Take 25 mg by mouth every evening.     olopatadine 0.1 % ophthalmic solution  Commonly known as: PATANOL  Place 1 drop into both eyes 2 (two) times daily.     omeprazole 40 MG capsule  Commonly known as: PRILOSEC  Take 40 mg by mouth every morning.     * PROAIR HFA 90 mcg/actuation inhaler  Generic drug: albuterol  Inhale 2 puffs into the lungs every 6 (six) hours as needed.     * albuterol 2.5 mg /3 mL (0.083 %) nebulizer solution  Commonly known as: PROVENTIL  Take 2.5 mg by nebulization every 4 (four) hours as needed for Wheezing. Rescue     STIOLTO RESPIMAT 2.5-2.5 mcg/actuation Mist  Generic drug: tiotropium-olodateroL  Inhale 1 puff into the lungs once daily. Controller     verapamiL 240 MG C24p  Commonly known as: VERELAN  Take 240 mg by mouth 2 (two) times  daily.     VITAMIN B-12 1000 MCG tablet  Generic drug: cyanocobalamin  Take 100 mcg by mouth once daily.     vitamin D 1000 units Tab  Commonly known as: VITAMIN D3  Take 5,000 Units by mouth once daily.         * This list has 2 medication(s) that are the same as other medications prescribed for you. Read the directions carefully, and ask your doctor or other care provider to review them with you.            STOP taking these medications    ciprofloxacin HCl 500 MG tablet  Commonly known as: CIPRO     MEPHYTON ORAL     ondansetron 4 MG Tbdl  Commonly known as: ZOFRAN-ODT     predniSONE 20 MG tablet  Commonly known as: DELTASONE     red yeast rice 600 mg Cap     RESTASIS 0.05 % ophthalmic emulsion  Generic drug: cycloSPORINE            Indwelling Lines/Drains at time of discharge:   Lines/Drains/Airways     None                 Time spent on the discharge of patient: 32 minutes  Patient was seen and examined on the date of discharge and determined to be suitable for discharge.         Abilio Castano MD  Department of Hospital Medicine  On license of [facility]

## 2020-08-28 NOTE — HOSPITAL COURSE
Admitted for acute on chronic hypoxic respiratory failure in the setting of COPD.  Seen by pulmonology.  Sx secondary to aspiration pneumonia in the setting of acid reflux.  Received short course of intravenous steroids and levofloxacin for a 5 day course.  She has been weaned to room air.  Advised to continue using nocturnal home oxygen.  Advised to follow with pulmonology and GI for COPD and GERD follow-up respectively.  Voiced understanding of the plan.    Instructions provided to follow up with primary care physician as outpatient. Patient verbalized understanding and is aware to contact primary care physician or return to ED if new or worsening symptoms.    Physical exam on the day of discharge:  General: Patient resting comfortably in no acute distress.  Lungs: CTA. Good air entry.  Cor: Regular rate and rhythm. No murmurs. No pedal edema.  Abd: Soft. Nontender. Non-distended.  Neuro: A&O x3. Moving all 4 extremities equally  Ext: No clubbing. No cyanosis.

## 2020-08-28 NOTE — RESPIRATORY THERAPY
08/27/20 2107   Patient Assessment/Suction   Level of Consciousness (AVPU) alert   Respiratory Effort Unlabored   PRE-TX-O2   O2 Device (Oxygen Therapy) nasal cannula   $ Is the patient on Low Flow Oxygen? Yes   Flow (L/min) 2   SpO2 95 %   Pulse Oximetry Type Intermittent   $ Pulse Oximetry - Multiple Charge Pulse Oximetry - Multiple   Pulse 78   Resp 18   Aerosol Therapy   $ Aerosol Therapy Charges PRN treatment not required

## 2020-08-28 NOTE — PLAN OF CARE
08/28/20 0934   Final Note   Assessment Type Final Discharge Note   Anticipated Discharge Disposition Home

## 2020-08-28 NOTE — CARE UPDATE
Pt left off oxygen to finish breakfast. Will assess saturation on room air with exercise after breakfast.       08/28/20 0752   Patient Assessment/Suction   Level of Consciousness (AVPU) alert   Respiratory Effort Unlabored   Expansion/Accessory Muscles/Retractions no use of accessory muscles   All Lung Fields Breath Sounds diminished   Rhythm/Pattern, Respiratory no shortness of breath reported;unlabored;pattern regular;depth regular   PRE-TX-O2   O2 Device (Oxygen Therapy) room air   SpO2 (!) 94 %   Pulse Oximetry Type Intermittent   $ Pulse Oximetry - Multiple Charge Pulse Oximetry - Multiple   Pulse 66   Resp 18   Inhaler   $ Inhaler Charges MDI (Metered Dose Inahler) Treatment;Mouth rinsed post treatment   Daily Review of Necessity (Inhaler) completed   Respiratory Treatment Status (Inhaler) given   Treatment Route (Inhaler) mouthpiece   Patient Position (Inhaler) semi-Rivero's   Post Treatment Assessment (Inhaler) breath sounds unchanged   Signs of Intolerance (Inhaler) none   Respiratory Evaluation   $ Care Plan Tech Time 15 min

## 2020-08-28 NOTE — PROGRESS NOTES
Pulmonary/Critical Care progress note      PATIENT NAME: Venkat Lara  MRN: [id number]  TODAY'S DATE: 2020  2:22 PM  ADMIT DATE: 2020  AGE: 70 y.o. : 1950    HPI:  The patient is a 70-year-old female patient of Dr. Mccormick who has been feeling more short of breath since she got back from [place].  She normally coughs up sputum daily.  She normally takes rotating antibiotics.  She normally sleeps on oxygen.  She noted that her sats are in the low 80s at home.  She has an Acapella.  She is not familiar with the word bronchiectasis.  She did not have a CT a on presentation.  She states she thinks she has some COPD and emphysema.     the patient's CTA showed no PE.  It does show some patchy apically predominant ground-glass infiltrates.  Legionella, mycoplasma, and Chlamydia studies are out. Levaquin was started.  The patient is saturating 93% on room air.  She states she is coughing a great deal but not producing any mucus.  (It is interesting that she statesd yesterday that she always coughs up great quantities of mucus every day.)  The patient's CT shows a small amount of bronchiectasis.  The patient is afraid that she would not be able to do postural drainage because she has such severe reflux and has been told never to bend over.  When I asked why she did have a Nissen fundoplication for this she states that Dr. Wang and Dr. Mccormick done think her reflux is a problem.  (But they do not want her to bend over?)  The patient is interested in a percussion vest.     review of the patient's CT shows debris in the airway.  This is obviously an aspiration pneumonia.  Reviewed this with the patient this morning.  Asked her to get in touch with Dr. Wang on discharge to discuss of fundoplication.  Will complete 5 days of Levaquin.    REVIEW OF SYSTEMS  GENERAL: Feeling Well.  EYES: Vision is good.  ENT: No sinusitis or pharyngitis.   HEART: No chest pain or palpitations.  LUNGS: No  cough, sputum, or wheezing.  GI: No Nausea, vomiting, constipation, diarrhea, or reflux.  : No dysuria, hesitancy, or nocturia.  SKIN: No lesions or rashes.  MUSCULOSKELETAL: No joint pain or myalgias.  NEURO: No headaches or neuropathy.  LYMPH: No edema or adenopathy.  PSYCH: No anxiety or depression.  ENDO: No weight change.    ROSELINE SIGNS (MOST RECENT)  Temp: 97.5 °F (36.4 °C) (08/28/20 0400)  Pulse: 80 (08/28/20 0400)  Resp: 19 (08/28/20 0400)  BP: 112/67 (08/28/20 0400)  SpO2: (!) 91 % (08/28/20 0400)    INTAKE AND OUTPUT (LAST 24 HOURS):    Intake/Output Summary (Last 24 hours) at 8/28/2020 0602  Last data filed at 8/27/2020 1738  Gross per 24 hour   Intake 480 ml   Output --   Net 480 ml       WEIGHT  Wt Readings from Last 1 Encounters:   08/25/20 81.6 kg (180 lb)       PHYSICAL EXAM  GENERAL: Older patient in no distress.  HEENT: Pupils equal and reactive. Extraocular movements intact. Nose intact. Pharynx moist.  NECK: Supple.   HEART: Regular rate and rhythm. No murmur or gallop auscultated.  LUNGS: Clear to auscultation and percussion. Lung excursion symmetrical. No change in fremitus. No adventitial noises.  ABDOMEN: Bowel sounds present. Non-tender, no masses palpated.  : Normal anatomy.  EXTREMITIES: Normal muscle tone and joint movement, no cyanosis or clubbing.   LYMPHATICS: No adenopathy palpated, no edema.  SKIN: Dry, intact, no lesions.  Patient has chronic hyperpigmentation over the lower extremities.  NEURO: Cranial nerves II-XII intact. Motor strength 5/5 bilaterally, upper and lower extremities.  PSYCH: Appropriate affect    CBC LAST (LAST 24 HOURS)  Recent Labs   Lab 08/28/20  0452   WBC 6.91   RBC 4.00   HGB 13.1   HCT 39.9   *   MCH 32.8*   MCHC 32.8   RDW 12.4      MPV 8.9*   GRAN 89.5*  6.2   LYMPH 7.4*  0.5*   MONO 2.5*  0.2*   BASO 0.00   NRBC 0       CHEMISTRY LAST (LAST 24 HOURS)  Recent Labs   Lab 08/28/20  0452      K 4.6      CO2 25   ANIONGAP 10    BUN 33*   CREATININE 0.8   *   CALCIUM 9.4   MG 2.1     CT chest  CARDIAC PROFILE (LAST 24 HOURS)  Recent Labs   Lab 08/25/20  1356   BNP 26   TROPONINI <0.030       LAST 7 DAYS MICROBIOLOGY   Microbiology Results (last 7 days)     Procedure Component Value Units Date/Time    Culture, Respiratory with Gram Stain [[id number]] Collected: 08/26/20 0020    Order Status: Completed Specimen: Respiratory from Sputum Updated: 08/27/20 1419     Respiratory Culture Normal respiratory jennifer     Gram Stain (Respiratory) <10 epithelial cells per low power field      Gram Stain (Respiratory) Moderate WBC's     Gram Stain (Respiratory) Moderate Gram positive cocci     Gram Stain (Respiratory) Few Gram negative rods    Culture, Respiratory with Gram Stain [[id number]] Collected: 08/27/20 0230    Order Status: Completed Specimen: Sputum, Expectorated Updated: 08/27/20 0956     Gram Stain (Respiratory) >10 epithelial cells per low power field     Gram Stain (Respiratory) Few WBC's     Gram Stain (Respiratory) Many Gram positive cocci     Gram Stain (Respiratory) Few Gram positive rods     Gram Stain (Respiratory) Rare Gram negative rods     Gram Stain (Respiratory) Predominance of oropharyngeal jennifer. Please recollect.     Gram Stain (Respiratory) Results called to and read back by: Martha Holloway RN on [facility] by SLOANE     Gram Stain (Respiratory) 08/27/2020  09:56          MOST RECENT IMAGING  X-Ray Chest AP Portable, 8/25  Slightly improved reticular lung markings with no consolidative  airspace opacity. Costophrenic angles are seen without effusion. No  pneumothorax is identified. The heart is normal in size. The  mediastinum is within normal limits. Osseous structures show  degenerative changes in the spine. The visualized upper abdomen is  unremarkable.    CT chest  1. No evidence of pulmonary embolism to the segmental arterial level.  2. Heterogeneous foamy debris opacifying the dependent lower lobe  airways in keeping  with aspiration.  3. Mild basilar predominant bronchiectasis.  4. Mild upper lobe predominant mosaic groundglass attenuation  suggesting mild atypical infection/pneumonia.       CURRENT VISIT EKG  Results for orders placed or performed during the hospital encounter of 08/25/20   EKG 12-lead    Narrative    Test Reason : R06.02,    Vent. Rate : 097 BPM     Atrial Rate : 097 BPM     P-R Int : 172 ms          QRS Dur : 070 ms      QT Int : 352 ms       P-R-T Axes : 070 006 054 degrees     QTc Int : 447 ms    Normal sinus rhythm  Normal ECG  When compared with ECG of 07-AUG-2019 13:26,  Nonspecific T wave abnormality no longer evident in Lateral leads    Referred By: [email or web address]   SELF           Confirmed By:      Per Dr. Mccormick's note from October of 2019, the patient has moderate COPD with an FEV1 of 55% of predicted.  She also has bronchiectasis.    IMPRESSION AND PLAN    Atypical bilateral pneumonia, aspiration  History of COPD , moderate per Dr. Mccormick's 2019 note  History of nocturnal hypoxemia  History of bronchiectasis  Aspiration seen in the airways  Daytime hypoxemia appears resolved      Continue baseline bronchodilators  Continue Levaquin  Patient should sleep on oxygen, it appears she is saturating adequately on room air during the day  Patient should be able to go home  With ongoing aspiration and known GERD this patient may benefit from a Nissen fundoplication

## 2020-08-28 NOTE — CARE UPDATE
08/28/20 0844   Patient Assessment/Suction   Level of Consciousness (AVPU) alert   Respiratory Effort Unlabored   PRE-TX-O2   O2 Device (Oxygen Therapy) room air   SpO2 (!) 94 %   Home Oxygen Qualification   Room Air SpO2 At Rest 94 %   Room Air SpO2 on Exertion (!) 87 %   SpO2 During Exertion on O2 92 %   Heart Rate on O2 103 bpm   Exertion O2 LPM 3 LPM   SpO2 on Recovery 94 %   Recovery Heart Rate 87 bpm   Recovery O2 LPM 3 LPM   Home O2 Eval Comments pt qualifies for home o2 on exertion

## 2020-08-29 LAB
M PNEUMO IGG SER IA-ACNC: 444 U/ML (ref 0–99)
M PNEUMO IGM SER IA-ACNC: <770 U/ML (ref 0–769)

## 2020-08-30 LAB
L PNEUMO1 AG UR QL IA: NEGATIVE
LEGIONELLA SPECIMEN SOURCE: NORMAL

## 2020-08-31 LAB
C PNEUM IGG TITR SER IF: ABNORMAL {TITER}
C PNEUM IGM TITR SER IF: ABNORMAL {TITER}
CHLAMYDIA PNEUMONIAE TEST INFORMATION: ABNORMAL

## 2020-09-08 ENCOUNTER — HOSPITAL ENCOUNTER (INPATIENT)
Facility: HOSPITAL | Age: 70
LOS: 6 days | Discharge: HOME | DRG: 193 | End: 2020-09-14
Attending: EMERGENCY MEDICINE | Admitting: FAMILY MEDICINE
Payer: MEDICARE

## 2020-09-08 DIAGNOSIS — R06.02 SHORTNESS OF BREATH: ICD-10-CM

## 2020-09-08 DIAGNOSIS — J44.0 CHRONIC OBSTRUCTIVE PULMONARY DISEASE WITH ACUTE LOWER RESPIRATORY INFECTION: ICD-10-CM

## 2020-09-08 DIAGNOSIS — J96.21 ACUTE ON CHRONIC RESPIRATORY FAILURE WITH HYPOXIA: ICD-10-CM

## 2020-09-08 DIAGNOSIS — J18.9 PNEUMONIA OF BOTH LUNGS DUE TO INFECTIOUS ORGANISM, UNSPECIFIED PART OF LUNG: ICD-10-CM

## 2020-09-08 DIAGNOSIS — R07.9 CHEST PAIN: ICD-10-CM

## 2020-09-08 DIAGNOSIS — J15.7 PNEUMONIA OF BOTH LUNGS DUE TO MYCOPLASMA PNEUMONIAE, UNSPECIFIED PART OF LUNG: Primary | ICD-10-CM

## 2020-09-08 DIAGNOSIS — J96.90 RESPIRATORY FAILURE: ICD-10-CM

## 2020-09-08 PROBLEM — M79.672 LEFT FOOT PAIN: Status: RESOLVED | Noted: 2019-05-09 | Resolved: 2020-09-08

## 2020-09-08 LAB
ALBUMIN SERPL BCP-MCNC: 3.2 G/DL (ref 3.5–5.2)
ALLENS TEST: ABNORMAL
ALP SERPL-CCNC: 68 U/L (ref 55–135)
ALT SERPL W/O P-5'-P-CCNC: 25 U/L (ref 10–44)
ANION GAP SERPL CALC-SCNC: 13 MMOL/L (ref 8–16)
APTT PPP: 35.7 SEC (ref 23.6–33.3)
AST SERPL-CCNC: 30 U/L (ref 10–40)
BASOPHILS # BLD AUTO: 0.03 K/UL (ref 0–0.2)
BASOPHILS NFR BLD: 0.4 % (ref 0–1.9)
BILIRUB SERPL-MCNC: 1 MG/DL (ref 0.1–1)
BILIRUB UR QL STRIP: NEGATIVE
BNP SERPL-MCNC: 143 PG/ML (ref 0–99)
BUN SERPL-MCNC: 18 MG/DL (ref 8–23)
CALCIUM SERPL-MCNC: 8.8 MG/DL (ref 8.7–10.5)
CHLORIDE SERPL-SCNC: 94 MMOL/L (ref 95–110)
CK MB SERPL-MCNC: 3.6 NG/ML (ref 0.1–6.5)
CK SERPL-CCNC: 145 U/L (ref 20–180)
CLARITY UR: CLEAR
CO2 SERPL-SCNC: 24 MMOL/L (ref 23–29)
COLOR UR: YELLOW
CREAT SERPL-MCNC: 0.8 MG/DL (ref 0.5–1.4)
CRP SERPL-MCNC: 13.57 MG/DL
DELSYS: ABNORMAL
DIFFERENTIAL METHOD: ABNORMAL
EOSINOPHIL # BLD AUTO: 0.2 K/UL (ref 0–0.5)
EOSINOPHIL NFR BLD: 2.7 % (ref 0–8)
ERYTHROCYTE [DISTWIDTH] IN BLOOD BY AUTOMATED COUNT: 12.5 % (ref 11.5–14.5)
EST. GFR  (AFRICAN AMERICAN): >60 ML/MIN/1.73 M^2
EST. GFR  (NON AFRICAN AMERICAN): >60 ML/MIN/1.73 M^2
FERRITIN SERPL-MCNC: 53 NG/ML (ref 20–300)
FIO2: 36
FLOW: 4
GLUCOSE SERPL-MCNC: 114 MG/DL (ref 70–110)
GLUCOSE SERPL-MCNC: 159 MG/DL (ref 70–110)
GLUCOSE UR QL STRIP: NEGATIVE
HCO3 UR-SCNC: 26.4 MMOL/L (ref 24–28)
HCT VFR BLD AUTO: 36.8 % (ref 37–48.5)
HGB BLD-MCNC: 12.1 G/DL (ref 12–16)
HGB UR QL STRIP: NEGATIVE
IMM GRANULOCYTES # BLD AUTO: 0.02 K/UL (ref 0–0.04)
IMM GRANULOCYTES NFR BLD AUTO: 0.3 % (ref 0–0.5)
INR PPP: 1.1
KETONES UR QL STRIP: NEGATIVE
LACTATE SERPL-SCNC: 1.2 MMOL/L (ref 0.5–1.9)
LDH SERPL L TO P-CCNC: 278 U/L (ref 110–260)
LEUKOCYTE ESTERASE UR QL STRIP: NEGATIVE
LYMPHOCYTES # BLD AUTO: 0.9 K/UL (ref 1–4.8)
LYMPHOCYTES NFR BLD: 12.3 % (ref 18–48)
MAGNESIUM SERPL-MCNC: 1.6 MG/DL (ref 1.6–2.6)
MCH RBC QN AUTO: 32.4 PG (ref 27–31)
MCHC RBC AUTO-ENTMCNC: 32.9 G/DL (ref 32–36)
MCV RBC AUTO: 99 FL (ref 82–98)
MODE: ABNORMAL
MONOCYTES # BLD AUTO: 0.5 K/UL (ref 0.3–1)
MONOCYTES NFR BLD: 6.7 % (ref 4–15)
NEUTROPHILS # BLD AUTO: 5.4 K/UL (ref 1.8–7.7)
NEUTROPHILS NFR BLD: 77.6 % (ref 38–73)
NITRITE UR QL STRIP: NEGATIVE
NRBC BLD-RTO: 0 /100 WBC
PCO2 BLDA: 43.4 MMHG (ref 35–45)
PH SMN: 7.39 [PH] (ref 7.35–7.45)
PH UR STRIP: 6 [PH] (ref 5–8)
PLATELET # BLD AUTO: 234 K/UL (ref 150–350)
PMV BLD AUTO: 8.7 FL (ref 9.2–12.9)
PO2 BLDA: 55 MMHG (ref 80–100)
POC BE: 1 MMOL/L
POC SATURATED O2: 88 % (ref 95–100)
POC TCO2: 28 MMOL/L (ref 23–27)
POTASSIUM SERPL-SCNC: 4 MMOL/L (ref 3.5–5.1)
PROCALCITONIN SERPL IA-MCNC: 0.08 NG/ML (ref 0–0.5)
PROT SERPL-MCNC: 6.3 G/DL (ref 6–8.4)
PROT UR QL STRIP: NEGATIVE
PROTHROMBIN TIME: 14 SEC (ref 10.6–14.8)
RBC # BLD AUTO: 3.73 M/UL (ref 4–5.4)
SAMPLE: ABNORMAL
SARS-COV-2 RDRP RESP QL NAA+PROBE: NEGATIVE
SITE: ABNORMAL
SODIUM SERPL-SCNC: 131 MMOL/L (ref 136–145)
SP GR UR STRIP: 1.01 (ref 1–1.03)
SP02: 90
TROPONIN I SERPL DL<=0.01 NG/ML-MCNC: 0.04 NG/ML
TROPONIN I SERPL DL<=0.01 NG/ML-MCNC: 0.04 NG/ML
TROPONIN I SERPL DL<=0.01 NG/ML-MCNC: <0.03 NG/ML
URN SPEC COLLECT METH UR: NORMAL
UROBILINOGEN UR STRIP-ACNC: NEGATIVE EU/DL
WBC # BLD AUTO: 6.98 K/UL (ref 3.9–12.7)

## 2020-09-08 PROCEDURE — 25000003 PHARM REV CODE 250: Performed by: FAMILY MEDICINE

## 2020-09-08 PROCEDURE — 81003 URINALYSIS AUTO W/O SCOPE: CPT

## 2020-09-08 PROCEDURE — 82550 ASSAY OF CK (CPK): CPT

## 2020-09-08 PROCEDURE — 27000221 HC OXYGEN, UP TO 24 HOURS

## 2020-09-08 PROCEDURE — 82803 BLOOD GASES ANY COMBINATION: CPT

## 2020-09-08 PROCEDURE — 36600 WITHDRAWAL OF ARTERIAL BLOOD: CPT

## 2020-09-08 PROCEDURE — 99900031 HC PATIENT EDUCATION (STAT)

## 2020-09-08 PROCEDURE — 84484 ASSAY OF TROPONIN QUANT: CPT | Mod: 91

## 2020-09-08 PROCEDURE — 93005 ELECTROCARDIOGRAM TRACING: CPT | Performed by: INTERNAL MEDICINE

## 2020-09-08 PROCEDURE — 85025 COMPLETE CBC W/AUTO DIFF WBC: CPT

## 2020-09-08 PROCEDURE — 83735 ASSAY OF MAGNESIUM: CPT

## 2020-09-08 PROCEDURE — 83036 HEMOGLOBIN GLYCOSYLATED A1C: CPT

## 2020-09-08 PROCEDURE — 99900035 HC TECH TIME PER 15 MIN (STAT)

## 2020-09-08 PROCEDURE — 63600175 PHARM REV CODE 636 W HCPCS: Performed by: EMERGENCY MEDICINE

## 2020-09-08 PROCEDURE — 84145 PROCALCITONIN (PCT): CPT

## 2020-09-08 PROCEDURE — 83880 ASSAY OF NATRIURETIC PEPTIDE: CPT

## 2020-09-08 PROCEDURE — 86140 C-REACTIVE PROTEIN: CPT

## 2020-09-08 PROCEDURE — 83605 ASSAY OF LACTIC ACID: CPT

## 2020-09-08 PROCEDURE — 87040 BLOOD CULTURE FOR BACTERIA: CPT | Mod: 59

## 2020-09-08 PROCEDURE — 27100171 HC OXYGEN HIGH FLOW UP TO 24 HOURS

## 2020-09-08 PROCEDURE — 96365 THER/PROPH/DIAG IV INF INIT: CPT

## 2020-09-08 PROCEDURE — 85730 THROMBOPLASTIN TIME PARTIAL: CPT

## 2020-09-08 PROCEDURE — 82553 CREATINE MB FRACTION: CPT

## 2020-09-08 PROCEDURE — 94761 N-INVAS EAR/PLS OXIMETRY MLT: CPT

## 2020-09-08 PROCEDURE — 80053 COMPREHEN METABOLIC PANEL: CPT

## 2020-09-08 PROCEDURE — 36415 COLL VENOUS BLD VENIPUNCTURE: CPT

## 2020-09-08 PROCEDURE — U0002 COVID-19 LAB TEST NON-CDC: HCPCS

## 2020-09-08 PROCEDURE — 93010 EKG 12-LEAD: ICD-10-PCS | Mod: ,,, | Performed by: INTERNAL MEDICINE

## 2020-09-08 PROCEDURE — 63600175 PHARM REV CODE 636 W HCPCS: Performed by: FAMILY MEDICINE

## 2020-09-08 PROCEDURE — 82728 ASSAY OF FERRITIN: CPT

## 2020-09-08 PROCEDURE — 82962 GLUCOSE BLOOD TEST: CPT

## 2020-09-08 PROCEDURE — 93010 ELECTROCARDIOGRAM REPORT: CPT | Mod: ,,, | Performed by: INTERNAL MEDICINE

## 2020-09-08 PROCEDURE — 85610 PROTHROMBIN TIME: CPT

## 2020-09-08 PROCEDURE — 83615 LACTATE (LD) (LDH) ENZYME: CPT

## 2020-09-08 PROCEDURE — 84484 ASSAY OF TROPONIN QUANT: CPT

## 2020-09-08 PROCEDURE — 99285 EMERGENCY DEPT VISIT HI MDM: CPT | Mod: 25

## 2020-09-08 PROCEDURE — 12000002 HC ACUTE/MED SURGE SEMI-PRIVATE ROOM

## 2020-09-08 RX ORDER — TALC
6 POWDER (GRAM) TOPICAL NIGHTLY PRN
Status: DISCONTINUED | OUTPATIENT
Start: 2020-09-08 | End: 2020-09-10

## 2020-09-08 RX ORDER — ENOXAPARIN SODIUM 100 MG/ML
40 INJECTION SUBCUTANEOUS EVERY 24 HOURS
Status: DISCONTINUED | OUTPATIENT
Start: 2020-09-08 | End: 2020-09-14 | Stop reason: HOSPADM

## 2020-09-08 RX ORDER — PROCHLORPERAZINE EDISYLATE 5 MG/ML
5 INJECTION INTRAMUSCULAR; INTRAVENOUS EVERY 6 HOURS PRN
Status: DISCONTINUED | OUTPATIENT
Start: 2020-09-08 | End: 2020-09-09

## 2020-09-08 RX ORDER — IBUPROFEN 200 MG
16 TABLET ORAL
Status: DISCONTINUED | OUTPATIENT
Start: 2020-09-08 | End: 2020-09-14 | Stop reason: HOSPADM

## 2020-09-08 RX ORDER — GLUCAGON 1 MG
1 KIT INJECTION
Status: DISCONTINUED | OUTPATIENT
Start: 2020-09-08 | End: 2020-09-14 | Stop reason: HOSPADM

## 2020-09-08 RX ORDER — PANTOPRAZOLE SODIUM 40 MG/1
40 TABLET, DELAYED RELEASE ORAL DAILY
Status: DISCONTINUED | OUTPATIENT
Start: 2020-09-09 | End: 2020-09-08

## 2020-09-08 RX ORDER — TIOTROPIUM BROMIDE 18 UG/1
1 CAPSULE ORAL; RESPIRATORY (INHALATION) DAILY
Status: DISCONTINUED | OUTPATIENT
Start: 2020-09-09 | End: 2020-09-14 | Stop reason: HOSPADM

## 2020-09-08 RX ORDER — ALBUTEROL SULFATE 0.83 MG/ML
2.5 SOLUTION RESPIRATORY (INHALATION) EVERY 4 HOURS PRN
Status: DISCONTINUED | OUTPATIENT
Start: 2020-09-08 | End: 2020-09-09

## 2020-09-08 RX ORDER — OXYBUTYNIN CHLORIDE 5 MG/1
5 TABLET, EXTENDED RELEASE ORAL DAILY
Status: DISCONTINUED | OUTPATIENT
Start: 2020-09-08 | End: 2020-09-14 | Stop reason: HOSPADM

## 2020-09-08 RX ORDER — ALBUTEROL SULFATE 90 UG/1
2 AEROSOL, METERED RESPIRATORY (INHALATION) EVERY 6 HOURS PRN
Status: DISCONTINUED | OUTPATIENT
Start: 2020-09-08 | End: 2020-09-14 | Stop reason: HOSPADM

## 2020-09-08 RX ORDER — L. ACIDOPHILUS/L.BULGARICUS 100MM CELL
1 GRANULES IN PACKET (EA) ORAL DAILY
Status: DISCONTINUED | OUTPATIENT
Start: 2020-09-09 | End: 2020-09-14 | Stop reason: HOSPADM

## 2020-09-08 RX ORDER — LANOLIN ALCOHOL/MO/W.PET/CERES
800 CREAM (GRAM) TOPICAL ONCE
Status: COMPLETED | OUTPATIENT
Start: 2020-09-08 | End: 2020-09-08

## 2020-09-08 RX ORDER — BUPROPION HYDROCHLORIDE 150 MG/1
300 TABLET ORAL NIGHTLY
Status: DISCONTINUED | OUTPATIENT
Start: 2020-09-08 | End: 2020-09-14 | Stop reason: HOSPADM

## 2020-09-08 RX ORDER — IPRATROPIUM BROMIDE AND ALBUTEROL SULFATE 2.5; .5 MG/3ML; MG/3ML
3 SOLUTION RESPIRATORY (INHALATION) EVERY 4 HOURS PRN
Status: DISCONTINUED | OUTPATIENT
Start: 2020-09-08 | End: 2020-09-14 | Stop reason: HOSPADM

## 2020-09-08 RX ORDER — SODIUM CHLORIDE 0.9 % (FLUSH) 0.9 %
10 SYRINGE (ML) INJECTION
Status: DISCONTINUED | OUTPATIENT
Start: 2020-09-08 | End: 2020-09-09

## 2020-09-08 RX ORDER — VERAPAMIL HYDROCHLORIDE 240 MG/1
240 TABLET, FILM COATED, EXTENDED RELEASE ORAL 2 TIMES DAILY
Status: DISCONTINUED | OUTPATIENT
Start: 2020-09-08 | End: 2020-09-10

## 2020-09-08 RX ORDER — ACETAMINOPHEN 500 MG
5000 TABLET ORAL DAILY
Status: DISCONTINUED | OUTPATIENT
Start: 2020-09-09 | End: 2020-09-14 | Stop reason: HOSPADM

## 2020-09-08 RX ORDER — POLYETHYLENE GLYCOL 3350 17 G/17G
17 POWDER, FOR SOLUTION ORAL 2 TIMES DAILY PRN
Status: DISCONTINUED | OUTPATIENT
Start: 2020-09-08 | End: 2020-09-09

## 2020-09-08 RX ORDER — OLOPATADINE HYDROCHLORIDE 1 MG/ML
1 SOLUTION/ DROPS OPHTHALMIC 2 TIMES DAILY
Status: DISCONTINUED | OUTPATIENT
Start: 2020-09-08 | End: 2020-09-08

## 2020-09-08 RX ORDER — FLUTICASONE FUROATE AND VILANTEROL 100; 25 UG/1; UG/1
1 POWDER RESPIRATORY (INHALATION) DAILY
Status: DISCONTINUED | OUTPATIENT
Start: 2020-09-08 | End: 2020-09-14 | Stop reason: HOSPADM

## 2020-09-08 RX ORDER — METHYLPREDNISOLONE SOD SUCC 125 MG
40 VIAL (EA) INJECTION
Status: DISCONTINUED | OUTPATIENT
Start: 2020-09-08 | End: 2020-09-09

## 2020-09-08 RX ORDER — MORPHINE SULFATE 4 MG/ML
4 INJECTION, SOLUTION INTRAMUSCULAR; INTRAVENOUS EVERY 4 HOURS PRN
Status: DISCONTINUED | OUTPATIENT
Start: 2020-09-08 | End: 2020-09-09

## 2020-09-08 RX ORDER — LEVOFLOXACIN 5 MG/ML
750 INJECTION, SOLUTION INTRAVENOUS
Status: COMPLETED | OUTPATIENT
Start: 2020-09-08 | End: 2020-09-08

## 2020-09-08 RX ORDER — ZOLPIDEM TARTRATE 5 MG/1
5 TABLET ORAL NIGHTLY PRN
Status: DISCONTINUED | OUTPATIENT
Start: 2020-09-08 | End: 2020-09-09

## 2020-09-08 RX ORDER — PANTOPRAZOLE SODIUM 40 MG/1
40 TABLET, DELAYED RELEASE ORAL
Status: DISCONTINUED | OUTPATIENT
Start: 2020-09-09 | End: 2020-09-14 | Stop reason: HOSPADM

## 2020-09-08 RX ORDER — LEVOFLOXACIN 5 MG/ML
750 INJECTION, SOLUTION INTRAVENOUS
Status: DISCONTINUED | OUTPATIENT
Start: 2020-09-09 | End: 2020-09-09

## 2020-09-08 RX ORDER — GABAPENTIN 300 MG/1
300 CAPSULE ORAL NIGHTLY
Status: DISCONTINUED | OUTPATIENT
Start: 2020-09-08 | End: 2020-09-14 | Stop reason: HOSPADM

## 2020-09-08 RX ORDER — ONDANSETRON 2 MG/ML
4 INJECTION INTRAMUSCULAR; INTRAVENOUS EVERY 8 HOURS PRN
Status: DISCONTINUED | OUTPATIENT
Start: 2020-09-08 | End: 2020-09-09

## 2020-09-08 RX ORDER — TELMISARTAN 40 MG/1
40 TABLET ORAL DAILY
Status: DISCONTINUED | OUTPATIENT
Start: 2020-09-08 | End: 2020-09-10

## 2020-09-08 RX ORDER — ACETAMINOPHEN 325 MG/1
650 TABLET ORAL EVERY 8 HOURS PRN
Status: DISCONTINUED | OUTPATIENT
Start: 2020-09-08 | End: 2020-09-09

## 2020-09-08 RX ORDER — OLOPATADINE HYDROCHLORIDE 1 MG/ML
1 SOLUTION/ DROPS OPHTHALMIC 2 TIMES DAILY
Status: DISCONTINUED | OUTPATIENT
Start: 2020-09-08 | End: 2020-09-14 | Stop reason: HOSPADM

## 2020-09-08 RX ORDER — IBUPROFEN 200 MG
24 TABLET ORAL
Status: DISCONTINUED | OUTPATIENT
Start: 2020-09-08 | End: 2020-09-14 | Stop reason: HOSPADM

## 2020-09-08 RX ADMIN — ZOLPIDEM TARTRATE 5 MG: 5 TABLET ORAL at 10:09

## 2020-09-08 RX ADMIN — MAGNESIUM OXIDE 800 MG: 400 TABLET ORAL at 06:09

## 2020-09-08 RX ADMIN — PIPERACILLIN AND TAZOBACTAM 3.38 G: 3; .375 INJECTION, POWDER, LYOPHILIZED, FOR SOLUTION INTRAVENOUS; PARENTERAL at 08:09

## 2020-09-08 RX ADMIN — OXYBUTYNIN CHLORIDE 5 MG: 5 TABLET, EXTENDED RELEASE ORAL at 08:09

## 2020-09-08 RX ADMIN — LEVOFLOXACIN 750 MG: 750 INJECTION, SOLUTION INTRAVENOUS at 01:09

## 2020-09-08 RX ADMIN — METHYLPREDNISOLONE SODIUM SUCCINATE 40 MG: 125 INJECTION, POWDER, FOR SOLUTION INTRAMUSCULAR; INTRAVENOUS at 02:09

## 2020-09-08 RX ADMIN — PIPERACILLIN AND TAZOBACTAM 3.38 G: 3; .375 INJECTION, POWDER, LYOPHILIZED, FOR SOLUTION INTRAVENOUS; PARENTERAL at 01:09

## 2020-09-08 RX ADMIN — BUPROPION HYDROCHLORIDE 300 MG: 150 TABLET, FILM COATED, EXTENDED RELEASE ORAL at 08:09

## 2020-09-08 RX ADMIN — VERAPAMIL HYDROCHLORIDE 240 MG: 240 TABLET, FILM COATED, EXTENDED RELEASE ORAL at 08:09

## 2020-09-08 NOTE — PLAN OF CARE
This note also relates to the following rows which could not be included:  SpO2 - Cannot attach notes to unvalidated device data  Pulse - Cannot attach notes to unvalidated device data  Resp - Cannot attach notes to unvalidated device data       09/08/20 1213   Patient Assessment/Suction   Level of Consciousness (AVPU) alert   Respiratory Effort Unlabored;Normal   PRE-TX-O2   O2 Device (Oxygen Therapy) nasal cannula   $ Is the patient on Low Flow Oxygen? Yes   Flow (L/min) 4   Pulse Oximetry Type Continuous   $ Pulse Oximetry - Multiple Charge Pulse Oximetry - Multiple   Oximetry Probe Site Assessed   Respiratory Evaluation   $ Care Plan Tech Time 30 min        09/08/20 1213   Patient Assessment/Suction   Level of Consciousness (AVPU) alert   Respiratory Effort Unlabored;Normal   PRE-TX-O2   O2 Device (Oxygen Therapy) nasal cannula   $ Is the patient on Low Flow Oxygen? Yes   Flow (L/min) 4   Pulse Oximetry Type Continuous   $ Pulse Oximetry - Multiple Charge Pulse Oximetry - Multiple   Oximetry Probe Site Assessed   Respiratory Evaluation   $ Care Plan Tech Time 30 min

## 2020-09-08 NOTE — PLAN OF CARE
09/08/20 1700   Patient Assessment/Suction   Level of Consciousness (AVPU) alert   Respiratory Effort Mild;Labored   Expansion/Accessory Muscles/Retractions accessory muscle use   All Lung Fields Breath Sounds diminished   Rhythm/Pattern, Respiratory unlabored;pattern regular;depth regular   Cough Frequency infrequent   Cough Type dry;nonproductive   PRE-TX-O2   O2 Device (Oxygen Therapy) Vapotherm   $ Is the patient on High Flow Oxygen? Yes   Humidification temp set 36   Flow (L/min) 20   Oxygen Concentration (%) 90   SpO2 (!) 94 %   Pulse Oximetry Type Continuous   $ Pulse Oximetry - Multiple Charge Pulse Oximetry - Multiple   Probe Placed On (Pulse Ox) Left:;finger   Oximetry Probe Site Assessed   Pulse 86   Resp (!) 28   Transport Patient   $ Transport Tech Time Charge 15 min   Time to transport 15   Oxygen Method Nasal cannula   Transport to [facility]   [place]

## 2020-09-08 NOTE — ED NOTES
"Pt states "I feel better now that I have the oxygen on." rr even and unlabored. Pt with a frequent non-productive cough. Updated pt on plan of care. Pt verbalized understanding.   "

## 2020-09-08 NOTE — ED NOTES
Pt presents to the ED with c/o SOB. Upon assessment pt o2 sats were in the 70% on ra. Pt placed on 4LNC pt o2 sats 95%. Pt denies cp, headache, weakness, abdominal pain, n/v. Pt states she was recently discharged from the hospital with aspiration PNA.

## 2020-09-08 NOTE — ED PROVIDER NOTES
Encounter Date: 9/8/2020       History     Chief Complaint   Patient presents with    Shortness of Breath    Cough     Patient with history of aspiration pneumonitis approximately 1 month ago.  Patient with 1 day history of increasing shortness of breath.  Home O2 not helping.  No fever or chills.  Positive nonproductive cough.  No pleurisy hemoptysis.  Similar to previous episode of aspiration pneumonitis.  Symptoms becoming severe in nature.  Triage nurse reports patient is profoundly hypoxic.        Review of patient's allergies indicates:   Allergen Reactions    Statins-hmg-coa reductase inhibitors Other (See Comments)     Muscle cramps    Doxycycline Other (See Comments)     Stops gallbladder function    Adhesive Other (See Comments)     bruises    Erythromycin Other (See Comments)     Stomach pain     Past Medical History:   Diagnosis Date    ADHD 01/01/1975    Bronchitis 01/09/2015    Bruises easily 01/01/2012    Cataract     [id number]    Cervical radiculopathy     Chronic pain of right wrist     Colon polyp 01/01/2013    Constipation 01/01/2010    COPD, moderate 01/01/2010    was told by Dr. Mccormick she has 60% lung capacity    Degeneration of cervical intervertebral disc     Depression 01/01/1997    Depression with anxiety     Dry eye 01/01/1994    GERD (gastroesophageal reflux disease) 01/01/2008    Hallux valgus (acquired), left foot 08/07/2019    Dr Truong    Hammertoe of second toe of left foot 08/07/2019    dr truong    Headaches, cluster 01/01/1997    Heart valve regurgitation 2014    dr verde    Hemorrhoids 01/01/1983    Hiatal hernia 01/01/1997    Hyperlipidemia 01/01/1995    Hypertension     on meds    Incontinence     Leg swelling 01/01/2014    bilateral, wears compression socks    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/1976    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    Lumbar spinal stenosis     Menopause 01/01/1997    MRSA infection 01/03/2015    Open leg wound 09/01/2015     right    Oxygen decrease 2014 02 2l/nc pm    Oxygen dependent 01/01/2011    2L NC Nightly    Primary osteoarthritis of first carpometacarpal joints, bilateral     Rheumatoid arthritis     Scoliosis 01/01/2014    Spinal stenosis 01/01/2014    upper and lowerr back - tingling in left arm at times    Tachycardia 02/01/2014    controlled with meds    Thumb pain 01/09/2015    Left     Past Surgical History:   Procedure Laterality Date    BREAST SURGERY  2008    Reduction    BUNIONECTOMY Bilateral 01/1999    CARDIOVASCULAR STRESS TEST  2013    CARPAL TUNNEL RELEASE Left 10/08/2015    CATARACT EXTRACTION BILATERAL W/ ANTERIOR VITRECTOMY  04/2010    CMC ARTHROPLASTY, RIGHT  02/23/2004    COLONOSCOPY  04/18/2013    CORRECTION OF HAMMER TOE Left 8/14/2019    Procedure: CORRECTION, HAMMER TOE;  Surgeon: Jackson Brannon DPM;  Location: [facility] OR;  Service: Podiatry;  Laterality: Left;    ESOPHAGOGASTRODUODENOSCOPY  12/21/2012    2012-with dilation #1, 2013 #2    HAND SURGERY  2003    JOINT REPLACEMENT      REMOVAL OF HARDWARE FROM LOWER EXTREMITY Left 8/14/2019    Procedure: REMOVAL, HARDWARE, LOWER EXTREMITY;  Surgeon: Jackson Brannon DPM;  Location: [facility] OR;  Service: Podiatry;  Laterality: Left;    RHINOPLASTY TIP  02/2010    SURGICAL REMOVAL OF BUNION WITH OSTEOTOMY OF METATARSAL BONE Left 8/14/2019    Procedure: BUNIONECTOMY, WITH METATARSAL OSTEOTOMY;  Surgeon: Jackson Brannon DPM;  Location: [facility] OR;  Service: Podiatry;  Laterality: Left;  Arthrodesis 2nd PIP joint, screw 1st toe, C-arm, [employer], 3.0, 4.0, AO modular set MERRILL BRUNSON NOTIFIED    TONSILLECTOMY, ADENOIDECTOMY  1970    TOTAL KNEE ARTHROPLASTY Right 12/2008    TOTAL KNEE ARTHROPLASTY Left 03/2015    TOTAL REDUCTION MAMMOPLASTY  01/2008    TUBAL LIGATION      VAGINAL DELIVERY  1983    x2     Family History   Problem Relation Age of Onset    Cancer Father         colon     Social History     Tobacco Use    Smoking  status: Former Smoker     Packs/day: 2.00     Years: 33.00     Pack years: 66.00     Types: Cigarettes     Quit date: 1997     Years since quittin.3    Smokeless tobacco: Former User     Quit date: 1997   Substance Use Topics    Alcohol use: Yes     Alcohol/week: 4.0 standard drinks     Types: 4 Glasses of wine per week     Comment: monthly    Drug use: Never     Review of Systems   Constitutional: Negative for chills and fever.   HENT: Negative for congestion.    Eyes: Negative for visual disturbance.   Respiratory: Positive for shortness of breath.    Cardiovascular: Negative for chest pain and palpitations.   Gastrointestinal: Negative for abdominal pain and vomiting.   Genitourinary: Negative for dysuria.   Musculoskeletal: Negative for joint swelling.   Neurological: Negative for headaches.   Psychiatric/Behavioral: Negative for confusion.       Physical Exam     Initial Vitals [20 1158]   BP Pulse Resp Temp SpO2   (!) 111/57 98 18 98.7 °F (37.1 °C) (!) 67 %      MAP       --         Physical Exam    Nursing note and vitals reviewed.  Constitutional: She is not diaphoretic. No distress.   HENT:   Head: Normocephalic and atraumatic.   Eyes: Conjunctivae are normal.   Neck: Normal range of motion.   Cardiovascular: Normal rate.   Pulmonary/Chest:   Slight rhonchi.  No wheezing.   Abdominal: Soft. There is no abdominal tenderness.   Musculoskeletal: Normal range of motion.   Neurological: She is alert.   No gross deficits   Skin: No rash noted.   Psychiatric: She has a normal mood and affect.         ED Course   Critical Care    Date/Time: 2020 1:08 PM  Performed by: Gilmar Schwartz MD  Authorized by: Gilmar Schwartz MD   Direct patient critical care time: 15 minutes  Additional history critical care time: 5 minutes  Ordering / reviewing critical care time: 5 minutes  Documentation critical care time: 5 minutes  Consulting other physicians critical care time: 5 minutes  Total  critical care time (exclusive of procedural time) : 35 minutes        Labs Reviewed   APTT - Abnormal; Notable for the following components:       Result Value    aPTT 35.7 (*)     All other components within normal limits   CBC W/ AUTO DIFFERENTIAL - Abnormal; Notable for the following components:    RBC 3.73 (*)     Hematocrit 36.8 (*)     Mean Corpuscular Volume 99 (*)     Mean Corpuscular Hemoglobin 32.4 (*)     MPV 8.7 (*)     Lymph # 0.9 (*)     Gran% 77.6 (*)     Lymph% 12.3 (*)     All other components within normal limits   CULTURE, BLOOD   CULTURE, BLOOD   PROTIME-INR   LACTIC ACID, PLASMA   PROCALCITONIN   B-TYPE NATRIURETIC PEPTIDE   SARS-COV-2 RNA AMPLIFICATION, QUAL   COMPREHENSIVE METABOLIC PANEL   TROPONIN I   CK-MB   CK   MAGNESIUM   URINALYSIS, REFLEX TO URINE CULTURE          Imaging Results          X-Ray Chest AP Portable (Final result)  Result time 09/08/20 12:35:53    Final result by Jackson Reilly MD (09/08/20 12:35:53)                 Narrative:    CLINICAL HISTORY:  70 years (1950) Female Shortness of breath Chief Complaint;  Shortness of Breath?; Cough?; RULE OUT COVID    TECHNIQUE:  Portable AP radiograph the chest.    COMPARISON:  Radiograph from August 25, 2020.    FINDINGS:  Mild diffuse scattered interstitial opacities throughout both lungs,  new from the previous exam. Costophrenic angles are seen without  effusion. No pneumothorax is identified. The heart is normal in size.  The mediastinum is within normal limits. Osseous structures appear  within normal limits. The visualized upper abdomen is unremarkable.    IMPRESSION:  Mild scattered diffuse interstitial opacities in both lungs, new from  the previous exam suggesting mild atypical infection/pneumonia given  the clinical history.                  .            Electronically Signed by KRISTIAN Lamar on 9/8/2020 12:39 PM                               Medical Decision Making:   History:   Old Medical Records: I  decided to obtain old medical records.  Clinical Tests:   Lab Tests: Reviewed  Radiological Study: Reviewed  Medical Tests: Reviewed  ED Management:  Patient presents with hypoxia with O2 saturation 67% on room air.  With oxygen O2 saturation 94%.  Patient appears comfortable.  No evidence of bronchospasm.  Chest x-ray consistent with aspiration pneumonitis.  Broad-spectrum antibiotics initiated.  Hospitalist consulted for admission.                                 Clinical Impression:       ICD-10-CM ICD-9-CM   1. Pneumonia of both lungs due to infectious organism, unspecified part of lung  J18.9 483.8   2. Shortness of breath  R06.02 786.05                                Gilmar Schwartz MD  09/08/20 1318

## 2020-09-08 NOTE — SUBJECTIVE & OBJECTIVE
Past Medical History:   Diagnosis Date    ADHD 01/01/1975    Bronchitis 01/09/2015    Bruises easily 01/01/2012    Cataract     [id number]    Cervical radiculopathy     Chronic pain of right wrist     Colon polyp 01/01/2013    Constipation 01/01/2010    COPD, moderate 01/01/2010    was told by Dr. Mccormick she has 60% lung capacity    Degeneration of cervical intervertebral disc     Depression 01/01/1997    Depression with anxiety     Dry eye 01/01/1994    GERD (gastroesophageal reflux disease) 01/01/2008    Hallux valgus (acquired), left foot 08/07/2019    Dr Truong    Hammertoe of second toe of left foot 08/07/2019    dr truong    Headaches, cluster 01/01/1997    Heart valve regurgitation 2014    dr verde    Hemorrhoids 01/01/1983    Hiatal hernia 01/01/1997    Hyperlipidemia 01/01/1995    Hypertension     on meds    Incontinence     Leg swelling 01/01/2014    bilateral, wears compression socks    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/1976    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    Lumbar spinal stenosis     Menopause 01/01/1997    MRSA infection 01/03/2015    Open leg wound 09/01/2015    right    Oxygen decrease 2014    02 2l/nc pm    Oxygen dependent 01/01/2011    2L NC Nightly    Primary osteoarthritis of first carpometacarpal joints, bilateral     Rheumatoid arthritis     Scoliosis 01/01/2014    Spinal stenosis 01/01/2014    upper and lowerr back - tingling in left arm at times    Tachycardia 02/01/2014    controlled with meds    Thumb pain 01/09/2015    Left       Past Surgical History:   Procedure Laterality Date    BREAST SURGERY  2008    Reduction    BUNIONECTOMY Bilateral 01/1999    CARDIOVASCULAR STRESS TEST  2013    CARPAL TUNNEL RELEASE Left 10/08/2015    CATARACT EXTRACTION BILATERAL W/ ANTERIOR VITRECTOMY  04/2010    CMC ARTHROPLASTY, RIGHT  02/23/2004    COLONOSCOPY  04/18/2013    CORRECTION OF HAMMER TOE Left 8/14/2019    Procedure: CORRECTION, HAMMER TOE;  Surgeon:  Jackson Brannon DPM;  Location: [facility] OR;  Service: Podiatry;  Laterality: Left;    ESOPHAGOGASTRODUODENOSCOPY  12/21/2012    2012-with dilation #1, 2013 #2    HAND SURGERY  2003    JOINT REPLACEMENT      REMOVAL OF HARDWARE FROM LOWER EXTREMITY Left 8/14/2019    Procedure: REMOVAL, HARDWARE, LOWER EXTREMITY;  Surgeon: Jackson Brannon DPM;  Location: [facility] OR;  Service: Podiatry;  Laterality: Left;    RHINOPLASTY TIP  02/2010    SURGICAL REMOVAL OF BUNION WITH OSTEOTOMY OF METATARSAL BONE Left 8/14/2019    Procedure: BUNIONECTOMY, WITH METATARSAL OSTEOTOMY;  Surgeon: Jackson Brannon DPM;  Location: [facility] OR;  Service: Podiatry;  Laterality: Left;  Arthrodesis 2nd PIP joint, screw 1st toe, C-arm, [employer], 3.0, 4.0, AO modular set MERRILL BRUNSON NOTIFIED    TONSILLECTOMY, ADENOIDECTOMY  1970    TOTAL KNEE ARTHROPLASTY Right 12/2008    TOTAL KNEE ARTHROPLASTY Left 03/2015    TOTAL REDUCTION MAMMOPLASTY  01/2008    TUBAL LIGATION      VAGINAL DELIVERY  1983    x2       Review of patient's allergies indicates:   Allergen Reactions    Statins-hmg-coa reductase inhibitors Other (See Comments)     Muscle cramps    Doxycycline Other (See Comments)     Stops gallbladder function    Adhesive Other (See Comments)     bruises    Erythromycin Other (See Comments)     Stomach pain       No current facility-administered medications on file prior to encounter.      Current Outpatient Medications on File Prior to Encounter   Medication Sig    albuterol (PROAIR HFA) 90 mcg/actuation HFAA Inhale 2 puffs into the lungs every 6 (six) hours as needed.    albuterol (PROVENTIL) 2.5 mg /3 mL (0.083 %) nebulizer solution Take 2.5 mg by nebulization every 4 (four) hours as needed for Wheezing. Rescue    buPROPion (WELLBUTRIN XL) 300 MG 24 hr tablet Take 300 mg by mouth every evening.     co-enzyme Q-10 30 mg capsule Take 100 mg by mouth once daily.    cyanocobalamin (VITAMIN B-12) 1000 MCG tablet Take 100 mcg by  mouth once daily.    eszopiclone (LUNESTA) 2 MG Tab Take 2 mg by mouth every evening.    garlic 1,000 mg Cap Take 1,000 mg by mouth once daily.    lactobacillus acidophilus & bulgar (LACTINEX) 100 million cell packet Take 1 tablet by mouth once daily.    multivitamin with minerals tablet Take 1 tablet by mouth once daily.    MYRBETRIQ 25 mg Tb24 ER tablet Take 25 mg by mouth every evening.     omeprazole (PRILOSEC) 40 MG capsule Take 40 mg by mouth every morning.     telmisartan (MICARDIS) 40 MG Tab Take 1 tablet (40 mg total) by mouth once daily.    tiotropium-olodaterol (STIOLTO RESPIMAT) 2.5-2.5 mcg/actuation Mist Inhale 1 puff into the lungs once daily. Controller     verapamil (VERELAN) 240 MG C24P Take 240 mg by mouth 2 (two) times daily.     vitamin D (VITAMIN D3) 1000 units Tab Take 5,000 Units by mouth once daily.    ibuprofen 200 mg Cap ibuprofen 200 mg capsule   Take 1 capsule every 6 hours by oral route as needed.    olopatadine (PATANOL) 0.1 % ophthalmic solution Place 1 drop into both eyes 2 (two) times daily.     [DISCONTINUED] gabapentin (NEURONTIN) 300 MG capsule Take 300 mg by mouth every evening.      Family History     Problem Relation (Age of Onset)    Cancer Father        Tobacco Use    Smoking status: Former Smoker     Packs/day: 2.00     Years: 33.00     Pack years: 66.00     Types: Cigarettes     Quit date: 1997     Years since quittin.3    Smokeless tobacco: Former User     Quit date: 1997   Substance and Sexual Activity    Alcohol use: Yes     Alcohol/week: 4.0 standard drinks     Types: 4 Glasses of wine per week     Comment: monthly    Drug use: Never    Sexual activity: Not on file     Review of Systems     All systems reviewed and are negative except as noted per above.    Objective:     Vital Signs (Most Recent):  Temp: 98.7 °F (37.1 °C) (20 1158)  Pulse: 81 (20 1421)  Resp: 15 (20 1421)  BP: (!) 98/54 (20 1421)  SpO2: 97 %  (09/08/20 1421) Vital Signs (24h Range):  Temp:  [98.7 °F (37.1 °C)] 98.7 °F (37.1 °C)  Pulse:  [80-98] 81  Resp:  [15-26] 15  SpO2:  [67 %-97 %] 97 %  BP: ()/(54-63) 98/54     Weight: 80.7 kg (178 lb)  Body mass index is 28.73 kg/m².    Physical Exam    Gen: alert, responsive  HEENT:  Eyes - no pallor  External ears with no lesions  Nares patent  Mouth - lips chapped  CV: RRR  Lungs: ON VAPOTHERM 15L 90% FiO2, COARSE  Abd: +BS, soft, NT, ND  Ext: no atrophy or edema  Skin: warm, dry  Neuro: grossly intact  Psych: pleasant     Significant Labs:   CBC:   Recent Labs   Lab 09/08/20  1229   WBC 6.98   HGB 12.1   HCT 36.8*        CMP:   Recent Labs   Lab 09/08/20  1229   *   K 4.0   CL 94*   CO2 24   *   BUN 18   CREATININE 0.8   CALCIUM 8.8   PROT 6.3   ALBUMIN 3.2*   BILITOT 1.0   ALKPHOS 68   AST 30   ALT 25   ANIONGAP 13   EGFRNONAA >60.0     Lactic Acid:   Recent Labs   Lab 09/08/20  1229   LACTATE 1.2     Troponin:   Recent Labs   Lab 09/08/20  1229 09/08/20  1423   TROPONINI 0.041* 0.040       Significant Imaging:    Imaging Results          X-Ray Chest AP Portable (Final result)  Result time 09/08/20 12:35:53    Final result by Jackson Reilly MD (09/08/20 12:35:53)                 Narrative:    CLINICAL HISTORY:  70 years (1950) Female Shortness of breath Chief Complaint;  Shortness of Breath?; Cough?; RULE OUT COVID    TECHNIQUE:  Portable AP radiograph the chest.    COMPARISON:  Radiograph from August 25, 2020.    FINDINGS:  Mild diffuse scattered interstitial opacities throughout both lungs,  new from the previous exam. Costophrenic angles are seen without  effusion. No pneumothorax is identified. The heart is normal in size.  The mediastinum is within normal limits. Osseous structures appear  within normal limits. The visualized upper abdomen is unremarkable.    IMPRESSION:  Mild scattered diffuse interstitial opacities in both lungs, new from  the previous exam suggesting  mild atypical infection/pneumonia given  the clinical history.                  .            Electronically Signed by KRISTIAN Lamar on 9/8/2020 12:39 PM

## 2020-09-08 NOTE — HPI
69 yo CF with PMH of aspiration pneumonia, moderate COPD, nocturnal hypoxemia, bronchiectasis presents with SOB.  Onset since discharge on 8/28/2020  She reports she hasn't improved since discharge  Held off on coming back to the ED due to labor day weekend  However she has become progressively more hypoxemic at home during the night  For the past 2 days, SaO2 60s per pt report with episodes of AMS  Has been wearing home O2 without improvement  +dry cough  No chest pain

## 2020-09-08 NOTE — H&P
[facility]  History & Physical    Patient Name: Venkat Lara  MRN: [id number]  Admission Date: 9/8/2020  Attending Physician: Sara Jones MD   Primary Care Provider: Soha Rowan NP    Patient information was obtained from patient, past medical records, ED physician and ER records.     Subjective:     Principal Problem:Acute on chronic respiratory failure with hypoxia    Chief Complaint:   Chief Complaint   Patient presents with    Shortness of Breath    Cough        HPI: 69 yo CF with PMH of aspiration pneumonia, moderate COPD, nocturnal hypoxemia, bronchiectasis presents with SOB.  Onset since discharge on 8/28/2020  She reports she hasn't improved since discharge  Held off on coming back to the ED due to labor day weekend  However she has become progressively more hypoxemic at home during the night  For the past 2 days, SaO2 60s per pt report with episodes of AMS  Has been wearing home O2 without improvement  +dry cough  No chest pain    Past Medical History:   Diagnosis Date    ADHD 01/01/1975    Bronchitis 01/09/2015    Bruises easily 01/01/2012    Cataract     [id number]    Cervical radiculopathy     Chronic pain of right wrist     Colon polyp 01/01/2013    Constipation 01/01/2010    COPD, moderate 01/01/2010    was told by Dr. Mccormick she has 60% lung capacity    Degeneration of cervical intervertebral disc     Depression 01/01/1997    Depression with anxiety     Dry eye 01/01/1994    GERD (gastroesophageal reflux disease) 01/01/2008    Hallux valgus (acquired), left foot 08/07/2019    Dr Truong    Hammertoe of second toe of left foot 08/07/2019    dr truong    Headaches, cluster 01/01/1997    Heart valve regurgitation 2014    dr verde    Hemorrhoids 01/01/1983    Hiatal hernia 01/01/1997    Hyperlipidemia 01/01/1995    Hypertension     on meds    Incontinence     Leg swelling 01/01/2014    bilateral, wears compression socks     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/1976    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    Lumbar spinal stenosis     Menopause 01/01/1997    MRSA infection 01/03/2015    Open leg wound 09/01/2015    right    Oxygen decrease 2014    02 2l/nc pm    Oxygen dependent 01/01/2011    2L NC Nightly    Primary osteoarthritis of first carpometacarpal joints, bilateral     Rheumatoid arthritis     Scoliosis 01/01/2014    Spinal stenosis 01/01/2014    upper and lowerr back - tingling in left arm at times    Tachycardia 02/01/2014    controlled with meds    Thumb pain 01/09/2015    Left       Past Surgical History:   Procedure Laterality Date    BREAST SURGERY  2008    Reduction    BUNIONECTOMY Bilateral 01/1999    CARDIOVASCULAR STRESS TEST  2013    CARPAL TUNNEL RELEASE Left 10/08/2015    CATARACT EXTRACTION BILATERAL W/ ANTERIOR VITRECTOMY  04/2010    CMC ARTHROPLASTY, RIGHT  02/23/2004    COLONOSCOPY  04/18/2013    CORRECTION OF HAMMER TOE Left 8/14/2019    Procedure: CORRECTION, HAMMER TOE;  Surgeon: Jackson Brannon DPM;  Location: [facility] OR;  Service: Podiatry;  Laterality: Left;    ESOPHAGOGASTRODUODENOSCOPY  12/21/2012    2012-with dilation #1, 2013 #2    HAND SURGERY  2003    JOINT REPLACEMENT      REMOVAL OF HARDWARE FROM LOWER EXTREMITY Left 8/14/2019    Procedure: REMOVAL, HARDWARE, LOWER EXTREMITY;  Surgeon: Jackson Brannon DPM;  Location: [facility] OR;  Service: Podiatry;  Laterality: Left;    RHINOPLASTY TIP  02/2010    SURGICAL REMOVAL OF BUNION WITH OSTEOTOMY OF METATARSAL BONE Left 8/14/2019    Procedure: BUNIONECTOMY, WITH METATARSAL OSTEOTOMY;  Surgeon: Jackson Brannon DPM;  Location: [facility] OR;  Service: Podiatry;  Laterality: Left;  Arthrodesis 2nd PIP joint, screw 1st toe, C-arm, synthes-Merrill, 3.0, 4.0, AO modular set MERRILL BRUNSON NOTIFIED    TONSILLECTOMY, ADENOIDECTOMY  1970    TOTAL KNEE ARTHROPLASTY Right 12/2008    TOTAL KNEE ARTHROPLASTY Left 03/2015    TOTAL REDUCTION MAMMOPLASTY  01/2008     TUBAL LIGATION      VAGINAL DELIVERY  1983    x2       Review of patient's allergies indicates:   Allergen Reactions    Statins-hmg-coa reductase inhibitors Other (See Comments)     Muscle cramps    Doxycycline Other (See Comments)     Stops gallbladder function    Adhesive Other (See Comments)     bruises    Erythromycin Other (See Comments)     Stomach pain       No current facility-administered medications on file prior to encounter.      Current Outpatient Medications on File Prior to Encounter   Medication Sig    albuterol (PROAIR HFA) 90 mcg/actuation HFAA Inhale 2 puffs into the lungs every 6 (six) hours as needed.    albuterol (PROVENTIL) 2.5 mg /3 mL (0.083 %) nebulizer solution Take 2.5 mg by nebulization every 4 (four) hours as needed for Wheezing. Rescue    buPROPion (WELLBUTRIN XL) 300 MG 24 hr tablet Take 300 mg by mouth every evening.     co-enzyme Q-10 30 mg capsule Take 100 mg by mouth once daily.    cyanocobalamin (VITAMIN B-12) 1000 MCG tablet Take 100 mcg by mouth once daily.    eszopiclone (LUNESTA) 2 MG Tab Take 2 mg by mouth every evening.    garlic 1,000 mg Cap Take 1,000 mg by mouth once daily.    lactobacillus acidophilus & bulgar (LACTINEX) 100 million cell packet Take 1 tablet by mouth once daily.    multivitamin with minerals tablet Take 1 tablet by mouth once daily.    MYRBETRIQ 25 mg Tb24 ER tablet Take 25 mg by mouth every evening.     omeprazole (PRILOSEC) 40 MG capsule Take 40 mg by mouth every morning.     telmisartan (MICARDIS) 40 MG Tab Take 1 tablet (40 mg total) by mouth once daily.    tiotropium-olodaterol (STIOLTO RESPIMAT) 2.5-2.5 mcg/actuation Mist Inhale 1 puff into the lungs once daily. Controller     verapamil (VERELAN) 240 MG C24P Take 240 mg by mouth 2 (two) times daily.     vitamin D (VITAMIN D3) 1000 units Tab Take 5,000 Units by mouth once daily.    ibuprofen 200 mg Cap ibuprofen 200 mg capsule   Take 1 capsule every 6 hours by oral route as  needed.    olopatadine (PATANOL) 0.1 % ophthalmic solution Place 1 drop into both eyes 2 (two) times daily.     [DISCONTINUED] gabapentin (NEURONTIN) 300 MG capsule Take 300 mg by mouth every evening.      Family History     Problem Relation (Age of Onset)    Cancer Father        Tobacco Use    Smoking status: Former Smoker     Packs/day: 2.00     Years: 33.00     Pack years: 66.00     Types: Cigarettes     Quit date: 1997     Years since quittin.3    Smokeless tobacco: Former User     Quit date: 1997   Substance and Sexual Activity    Alcohol use: Yes     Alcohol/week: 4.0 standard drinks     Types: 4 Glasses of wine per week     Comment: monthly    Drug use: Never    Sexual activity: Not on file     Review of Systems     All systems reviewed and are negative except as noted per above.    Objective:     Vital Signs (Most Recent):  Temp: 98.7 °F (37.1 °C) (20 1158)  Pulse: 81 (20 1421)  Resp: 15 (20 1421)  BP: (!) 98/54 (20 1421)  SpO2: 97 % (20 1421) Vital Signs (24h Range):  Temp:  [98.7 °F (37.1 °C)] 98.7 °F (37.1 °C)  Pulse:  [80-98] 81  Resp:  [15-26] 15  SpO2:  [67 %-97 %] 97 %  BP: ()/(54-63) 98/54     Weight: 80.7 kg (178 lb)  Body mass index is 28.73 kg/m².    Physical Exam    Gen: alert, responsive  HEENT:  Eyes - no pallor  External ears with no lesions  Nares patent  Mouth - lips chapped  CV: RRR  Lungs: ON VAPOTHERM 15L 90% FiO2, COARSE  Abd: +BS, soft, NT, ND  Ext: no atrophy or edema  Skin: warm, dry  Neuro: grossly intact  Psych: pleasant     Significant Labs:   CBC:   Recent Labs   Lab 20  1229   WBC 6.98   HGB 12.1   HCT 36.8*        CMP:   Recent Labs   Lab 20  1229   *   K 4.0   CL 94*   CO2 24   *   BUN 18   CREATININE 0.8   CALCIUM 8.8   PROT 6.3   ALBUMIN 3.2*   BILITOT 1.0   ALKPHOS 68   AST 30   ALT 25   ANIONGAP 13   EGFRNONAA >60.0     Lactic Acid:   Recent Labs   Lab 20  1229   LACTATE 1.2      Troponin:   Recent Labs   Lab 09/08/20  1229 09/08/20  1423   TROPONINI 0.041* 0.040       Significant Imaging:    Imaging Results          X-Ray Chest AP Portable (Final result)  Result time 09/08/20 12:35:53    Final result by Jackson Reilly MD (09/08/20 12:35:53)                 Narrative:    CLINICAL HISTORY:  70 years (1950) Female Shortness of breath Chief Complaint;  Shortness of Breath?; Cough?; RULE OUT COVID    TECHNIQUE:  Portable AP radiograph the chest.    COMPARISON:  Radiograph from August 25, 2020.    FINDINGS:  Mild diffuse scattered interstitial opacities throughout both lungs,  new from the previous exam. Costophrenic angles are seen without  effusion. No pneumothorax is identified. The heart is normal in size.  The mediastinum is within normal limits. Osseous structures appear  within normal limits. The visualized upper abdomen is unremarkable.    IMPRESSION:  Mild scattered diffuse interstitial opacities in both lungs, new from  the previous exam suggesting mild atypical infection/pneumonia given  the clinical history.                  .            Electronically Signed by KRISTIAN Lamar on 9/8/2020 12:39 PM                                Assessment/Plan:     Aspiration pneumonia  With recent hospitalization approx 1 week ago  Complicated by copd, nocturnal hypoxemia, bronchiectasis  - continue zosyn, levaquin, steroids, nebs, supplemental oxygen  - pulmonology re-consulted, thank you    Other chronic conditions: home meds as appropriate  Electrolyte derangement:  Replacement prn  VTE ppx: lovenox  FULL CODE    VTE Risk Mitigation (From admission, onward)         Ordered     enoxaparin injection 40 mg  Every 24 hours      09/08/20 1329     IP VTE HIGH RISK PATIENT  Once      09/08/20 1329     Place sequential compression device  Until discontinued      09/08/20 1329              Sara Jones MD  Department of Hospital Medicine   [facility]

## 2020-09-08 NOTE — PLAN OF CARE
09/08/20 1338   Patient Assessment/Suction   Level of Consciousness (AVPU) alert   Respiratory Effort Unlabored;Normal   Expansion/Accessory Muscles/Retractions no use of accessory muscles   All Lung Fields Breath Sounds coarse   Rhythm/Pattern, Respiratory no shortness of breath reported   Cough Frequency frequent   Cough Type nonproductive   PRE-TX-O2   O2 Device (Oxygen Therapy) Vapotherm   $ Is the patient on High Flow Oxygen? Yes   Humidification temp set 36   Humidification temp actual 36   Flow (L/min) 18   Oxygen Concentration (%) 80   SpO2 (!) 85 %   Pulse Oximetry Type Continuous   $ Pulse Oximetry - Multiple Charge Pulse Oximetry - Multiple   Oximetry Probe Site Assessed   Pulse 80   Resp (!) 23

## 2020-09-09 PROBLEM — J47.9 BRONCHIECTASIS WITHOUT COMPLICATION: Status: ACTIVE | Noted: 2020-09-09

## 2020-09-09 PROBLEM — J96.01 ACUTE HYPOXEMIC RESPIRATORY FAILURE: Status: ACTIVE | Noted: 2020-09-09

## 2020-09-09 PROBLEM — J44.0 CHRONIC OBSTRUCTIVE PULMONARY DISEASE WITH ACUTE LOWER RESPIRATORY INFECTION: Status: ACTIVE | Noted: 2020-09-09

## 2020-09-09 PROBLEM — R79.89 ELEVATED BRAIN NATRIURETIC PEPTIDE (BNP) LEVEL: Status: ACTIVE | Noted: 2020-09-09

## 2020-09-09 PROBLEM — J15.7: Status: ACTIVE | Noted: 2020-09-09

## 2020-09-09 PROBLEM — T17.908A CHRONIC PULMONARY ASPIRATION: Status: ACTIVE | Noted: 2020-09-09

## 2020-09-09 LAB
ANION GAP SERPL CALC-SCNC: 12 MMOL/L (ref 8–16)
BASOPHILS # BLD AUTO: 0.01 K/UL (ref 0–0.2)
BASOPHILS NFR BLD: 0.2 % (ref 0–1.9)
BUN SERPL-MCNC: 17 MG/DL (ref 8–23)
CALCIUM SERPL-MCNC: 9.2 MG/DL (ref 8.7–10.5)
CHLORIDE SERPL-SCNC: 97 MMOL/L (ref 95–110)
CO2 SERPL-SCNC: 25 MMOL/L (ref 23–29)
CREAT SERPL-MCNC: 0.7 MG/DL (ref 0.5–1.4)
DIFFERENTIAL METHOD: ABNORMAL
EOSINOPHIL # BLD AUTO: 0 K/UL (ref 0–0.5)
EOSINOPHIL NFR BLD: 0 % (ref 0–8)
ERYTHROCYTE [DISTWIDTH] IN BLOOD BY AUTOMATED COUNT: 12.4 % (ref 11.5–14.5)
EST. GFR  (AFRICAN AMERICAN): >60 ML/MIN/1.73 M^2
EST. GFR  (NON AFRICAN AMERICAN): >60 ML/MIN/1.73 M^2
ESTIMATED AVG GLUCOSE: 108 MG/DL (ref 68–131)
GLUCOSE SERPL-MCNC: 132 MG/DL (ref 70–110)
GLUCOSE SERPL-MCNC: 148 MG/DL (ref 70–110)
HBA1C MFR BLD HPLC: 5.4 % (ref 4.5–6.2)
HCT VFR BLD AUTO: 36.2 % (ref 37–48.5)
HGB BLD-MCNC: 11.9 G/DL (ref 12–16)
IMM GRANULOCYTES # BLD AUTO: 0.03 K/UL (ref 0–0.04)
IMM GRANULOCYTES NFR BLD AUTO: 0.6 % (ref 0–0.5)
LYMPHOCYTES # BLD AUTO: 0.5 K/UL (ref 1–4.8)
LYMPHOCYTES NFR BLD: 10.6 % (ref 18–48)
MAGNESIUM SERPL-MCNC: 2.1 MG/DL (ref 1.6–2.6)
MCH RBC QN AUTO: 32.2 PG (ref 27–31)
MCHC RBC AUTO-ENTMCNC: 32.9 G/DL (ref 32–36)
MCV RBC AUTO: 98 FL (ref 82–98)
MONOCYTES # BLD AUTO: 0.1 K/UL (ref 0.3–1)
MONOCYTES NFR BLD: 1.7 % (ref 4–15)
NEUTROPHILS # BLD AUTO: 4 K/UL (ref 1.8–7.7)
NEUTROPHILS NFR BLD: 86.9 % (ref 38–73)
NRBC BLD-RTO: 0 /100 WBC
PHOSPHATE SERPL-MCNC: 3.6 MG/DL (ref 2.7–4.5)
PLATELET # BLD AUTO: 256 K/UL (ref 150–350)
PMV BLD AUTO: 8.8 FL (ref 9.2–12.9)
POTASSIUM SERPL-SCNC: 4.5 MMOL/L (ref 3.5–5.1)
RBC # BLD AUTO: 3.7 M/UL (ref 4–5.4)
SODIUM SERPL-SCNC: 134 MMOL/L (ref 136–145)
WBC # BLD AUTO: 4.64 K/UL (ref 3.9–12.7)

## 2020-09-09 PROCEDURE — 85025 COMPLETE CBC W/AUTO DIFF WBC: CPT

## 2020-09-09 PROCEDURE — 99223 1ST HOSP IP/OBS HIGH 75: CPT | Mod: ,,, | Performed by: INTERNAL MEDICINE

## 2020-09-09 PROCEDURE — 25000242 PHARM REV CODE 250 ALT 637 W/ HCPCS: Performed by: FAMILY MEDICINE

## 2020-09-09 PROCEDURE — 12000002 HC ACUTE/MED SURGE SEMI-PRIVATE ROOM

## 2020-09-09 PROCEDURE — 94668 MNPJ CHEST WALL SBSQ: CPT

## 2020-09-09 PROCEDURE — 99900035 HC TECH TIME PER 15 MIN (STAT)

## 2020-09-09 PROCEDURE — 94640 AIRWAY INHALATION TREATMENT: CPT

## 2020-09-09 PROCEDURE — 36415 COLL VENOUS BLD VENIPUNCTURE: CPT

## 2020-09-09 PROCEDURE — 25000003 PHARM REV CODE 250: Performed by: FAMILY MEDICINE

## 2020-09-09 PROCEDURE — 84100 ASSAY OF PHOSPHORUS: CPT

## 2020-09-09 PROCEDURE — 80048 BASIC METABOLIC PNL TOTAL CA: CPT

## 2020-09-09 PROCEDURE — 99223 PR INITIAL HOSPITAL CARE,LEVL III: ICD-10-PCS | Mod: ,,, | Performed by: INTERNAL MEDICINE

## 2020-09-09 PROCEDURE — 63600175 PHARM REV CODE 636 W HCPCS: Performed by: FAMILY MEDICINE

## 2020-09-09 PROCEDURE — 94761 N-INVAS EAR/PLS OXIMETRY MLT: CPT

## 2020-09-09 PROCEDURE — 27100171 HC OXYGEN HIGH FLOW UP TO 24 HOURS

## 2020-09-09 PROCEDURE — 82962 GLUCOSE BLOOD TEST: CPT

## 2020-09-09 PROCEDURE — 94667 MNPJ CHEST WALL 1ST: CPT

## 2020-09-09 PROCEDURE — 63600175 PHARM REV CODE 636 W HCPCS: Performed by: INTERNAL MEDICINE

## 2020-09-09 PROCEDURE — 83735 ASSAY OF MAGNESIUM: CPT

## 2020-09-09 RX ORDER — FUROSEMIDE 10 MG/ML
40 INJECTION INTRAMUSCULAR; INTRAVENOUS ONCE
Status: COMPLETED | OUTPATIENT
Start: 2020-09-09 | End: 2020-09-09

## 2020-09-09 RX ADMIN — VERAPAMIL HYDROCHLORIDE 240 MG: 240 TABLET, FILM COATED, EXTENDED RELEASE ORAL at 09:09

## 2020-09-09 RX ADMIN — PIPERACILLIN AND TAZOBACTAM 3.38 G: 3; .375 INJECTION, POWDER, LYOPHILIZED, FOR SOLUTION INTRAVENOUS; PARENTERAL at 06:09

## 2020-09-09 RX ADMIN — AZITHROMYCIN MONOHYDRATE 500 MG: 500 INJECTION, POWDER, LYOPHILIZED, FOR SOLUTION INTRAVENOUS at 03:09

## 2020-09-09 RX ADMIN — FUROSEMIDE 40 MG: 10 INJECTION, SOLUTION INTRAMUSCULAR; INTRAVENOUS at 02:09

## 2020-09-09 RX ADMIN — OXYBUTYNIN CHLORIDE 5 MG: 5 TABLET, EXTENDED RELEASE ORAL at 09:09

## 2020-09-09 RX ADMIN — PIPERACILLIN AND TAZOBACTAM 3.38 G: 3; .375 INJECTION, POWDER, LYOPHILIZED, FOR SOLUTION INTRAVENOUS; PARENTERAL at 01:09

## 2020-09-09 RX ADMIN — METHYLPREDNISOLONE SODIUM SUCCINATE 40 MG: 125 INJECTION, POWDER, FOR SOLUTION INTRAMUSCULAR; INTRAVENOUS at 03:09

## 2020-09-09 RX ADMIN — BUPROPION HYDROCHLORIDE 300 MG: 150 TABLET, FILM COATED, EXTENDED RELEASE ORAL at 09:09

## 2020-09-09 RX ADMIN — THERA TABS 1 TABLET: TAB at 09:09

## 2020-09-09 RX ADMIN — PANTOPRAZOLE SODIUM 40 MG: 40 TABLET, DELAYED RELEASE ORAL at 06:09

## 2020-09-09 RX ADMIN — TIOTROPIUM BROMIDE 18 MCG: 18 CAPSULE ORAL; RESPIRATORY (INHALATION) at 08:09

## 2020-09-09 RX ADMIN — OLOPATADINE HYDROCHLORIDE 1 DROP: 1 SOLUTION/ DROPS OPHTHALMIC at 09:09

## 2020-09-09 RX ADMIN — Medication 5000 UNITS: at 09:09

## 2020-09-09 RX ADMIN — GABAPENTIN 300 MG: 300 CAPSULE ORAL at 09:09

## 2020-09-09 RX ADMIN — TELMISARTAN 40 MG: 40 TABLET ORAL at 09:09

## 2020-09-09 RX ADMIN — LACTOBACILLUS ACIDOPHILUS / LACTOBACILLUS BULGARICUS 1 EACH: 100 MILLION CFU STRENGTH GRANULES at 09:09

## 2020-09-09 RX ADMIN — FLUTICASONE FUROATE AND VILANTEROL TRIFENATATE 1 PUFF: 100; 25 POWDER RESPIRATORY (INHALATION) at 08:09

## 2020-09-09 NOTE — PLAN OF CARE
09/09/20 0834   Inhaler   $ Inhaler Charges MDI (Metered Dose Inahler) Treatment  (spiriva)   Daily Review of Necessity (Inhaler) completed   Respiratory Treatment Status (Inhaler) given;mouth rinsed post treatment   Treatment Route (Inhaler) mouthpiece;breath hold   Patient Position (Inhaler) HOB elevated   Post Treatment Assessment (Inhaler) breath sounds unchanged   Signs of Intolerance (Inhaler) none

## 2020-09-09 NOTE — HPI
Ms. Venkat Lara is a 70 year old lady with a history of GERD, bronchiectasis, and COPD (no spirometry available for confirmation), who was in her USOH until approximately 48 weeks ago when she developed progressive exertional dyspnea and more persistent chronic cough.  She initially sought out her Pulmonologist who placed her on a course of oral ABx for a bronchiectasis flare.  Unfortunately, her symptoms continued to progress and she also developed associated hypoxemia.  Her symptom progression in combination with developing hypoxemia prompted her to come to the [facility] ED at the end of last month.  She was found to be in acute hypoxemic respiratory failure and after receiving a couple of days of ABx and systemic CS, her hypoxemia improved and she was discharged home last week.  Unfortunately, her symptoms rapidly recurred, as did her hypoxemia.  This prompted her to come back to the [facility] ED last night, where again, she was found to be in acute hypoxemic respiratory failure.  Hospital medicine was consulted, started her back on ABx/methylprednisolone and Pulmonology was consulted for the above stated reasons.  When I examined Ms. Lara this morning she was resting comfortably, sitting up in bed eating a salad and speaking in complete sentences on HFNC @ 50% / 10 LPM with an SpO2 of 88%.  She reports some subjective improvement since admission but is still very short of breath with any activity.  Aside from the above mentioned symptoms, she denies any sputum production, pleurisy, fevers, orthopnea or LE edema.

## 2020-09-09 NOTE — ASSESSMENT & PLAN NOTE
· Transition to macrolide monotherapy.  · Diuresis today.  · Echo.  · Stop CS.  · Continue frequent aerosolized BDs.  · Titrate supplemental oxygen to maintain SpO2  > 88%.

## 2020-09-09 NOTE — NURSING
Pt up to bedside commode with assist to void.  Pt wiped self and placed pad in underwear.  Pt returned to bed with assist.  Tolerated well.

## 2020-09-09 NOTE — PLAN OF CARE
09/09/20 0746   Patient Assessment/Suction   Level of Consciousness (AVPU)   (asleep)   Respiratory Effort Normal;Unlabored   Expansion/Accessory Muscles/Retractions expansion symmetric;no use of accessory muscles   Rhythm/Pattern, Respiratory pattern regular   PRE-TX-O2   O2 Device (Oxygen Therapy) Vapotherm   Humidification temp set 36   Flow (L/min) 20   Oxygen Concentration (%) 75  (decreased from 80)   SpO2 96 %   Pulse Oximetry Type Continuous   Pulse 74   Resp 20   Positioning HOB elevated 30 degrees;Left side   Ready to Wean/Extubation Screen   FIO2<=50 (chart decimal) (!) 0.75

## 2020-09-09 NOTE — CARE UPDATE
09/09/20 1332   Patient Assessment/Suction   Level of Consciousness (AVPU) alert   Respiratory Effort Normal;Unlabored  (SOB on exertion noted)   Expansion/Accessory Muscles/Retractions expansion symmetric;no use of accessory muscles   All Lung Fields Breath Sounds clear;equal bilaterally   Rhythm/Pattern, Respiratory pattern regular   Cough Frequency infrequent   Cough Type good;nonproductive   PRE-TX-O2   O2 Device (Oxygen Therapy) High Flow nasal Cannula  (vapo on stby)   Flow (L/min) 15   SpO2 (!) 90 %   Pulse Oximetry Type Continuous   Pulse 88   Resp 20   Positioning HOB elevated 90 degrees   Chest Physiotherapy   $ Chest Physiotherapy Charges Initial   Daily Review of Necessity (CPT) completed   Type (CPT) percussion   Method (CPT) mechanical percussor   Patient Position (CPT) HOB elevated   Lung Fields (CPT) Posterior:;SAUL (left upper lobe);LLL (left lower lobe);RUL (right upper lobe);RML (right middle lobe);RLL (right lower lobe)   Duration per Field (CPT) 1 min   CPT Total Time (Min) 7   Post Treatment Assessment (CPT) breath sounds unchanged   Signs of Intolerance (CPT) none

## 2020-09-09 NOTE — PLAN OF CARE
09/09/20 0835   Patient Assessment/Suction   Level of Consciousness (AVPU) alert   Respiratory Effort Normal;Unlabored   Expansion/Accessory Muscles/Retractions expansion symmetric;no use of accessory muscles   All Lung Fields Breath Sounds clear;equal bilaterally   Rhythm/Pattern, Respiratory pattern regular   Cough Frequency infrequent   Cough Type good;nonproductive   PRE-TX-O2   O2 Device (Oxygen Therapy) Vapotherm   $ Is the patient on High Flow Oxygen? Yes   Humidification temp set 36   Flow (L/min) 15  (decreased from 20)   Oxygen Concentration (%) 65  (decreased from 85)   SpO2 (!) 92 %   Pulse Oximetry Type Continuous   $ Pulse Oximetry - Multiple Charge Pulse Oximetry - Multiple   Pulse 92   Resp 18   Positioning HOB elevated 90 degrees   Aerosol Therapy   $ Aerosol Therapy Charges PRN treatment not required   Daily Review of Necessity (SVN) completed   Inhaler   $ Inhaler Charges MDI (Metered Dose Inahler) Treatment  (breo)   Daily Review of Necessity (Inhaler) completed   Respiratory Treatment Status (Inhaler) given;mouth rinsed post treatment   Treatment Route (Inhaler) mouthpiece;breath hold   Patient Position (Inhaler) HOB elevated   Post Treatment Assessment (Inhaler) breath sounds unchanged   Signs of Intolerance (Inhaler) none   Ready to Wean/Extubation Screen   FIO2<=50 (chart decimal) (!) 0.65   Respiratory Evaluation   $ Care Plan Tech Time 15 min

## 2020-09-09 NOTE — PROGRESS NOTES
[facility] Medicine  Progress Note    Patient name: Venkat Lara  MRN: [id number]  Admit Date: 9/8/2020   LOS: 1 day     SUBJECTIVE:     Principal problem: Primary atypical pneumonia due to Mycoplasma pneumoniae    Interval History:  Patient was seen and examined bedside.  Still remains on Vapotherm with 75% FiO2.  Denies any new symptoms.  Still has significant cough with copious amount of phlegm.  No other acute events overnight as per nursing staff.     Scheduled Meds:   azithromycin  500 mg Intravenous Q24H    buPROPion  300 mg Oral QHS    cholecalciferol (vitamin D3)  5,000 Units Oral Daily    enoxaparin  40 mg Subcutaneous Q24H    fluticasone furoate-vilanteroL  1 puff Inhalation Daily    gabapentin  300 mg Oral QHS    lactobacillus acidophilus & bulgar  1 packet Oral Daily    multivitamin  1 tablet Oral Daily    olopatadine  1 drop Both Eyes BID    oxybutynin  5 mg Oral Daily    pantoprazole  40 mg Oral Before breakfast    telmisartan  40 mg Oral Daily    tiotropium  1 capsule Inhalation Daily    verapamiL  240 mg Oral BID     Continuous Infusions:  PRN Meds:albuterol, albuterol-ipratropium, dextrose 50%, dextrose 50%, glucagon (human recombinant), glucose, glucose, melatonin    Review of patient's allergies indicates:   Allergen Reactions    Statins-hmg-coa reductase inhibitors Other (See Comments)     Muscle cramps    Doxycycline Other (See Comments)     Stops gallbladder function    Adhesive Other (See Comments)     bruises    Erythromycin Other (See Comments)     Stomach pain       Review of Systems: As per interval history    OBJECTIVE:     Vital Signs (Most Recent)  Temp: 98.4 °F (36.9 °C) (09/09/20 1156)  Pulse: 79 (09/09/20 1156)  Resp: 16 (09/09/20 1156)  BP: 102/66 (09/09/20 1156)  SpO2: (!) 91 % (09/09/20 1156)    Vital Signs Range (Last 24H):  Temp:  [97.9 °F (36.6 °C)-98.4 °F (36.9 °C)]   Pulse:  [74-92]   Resp:  [16-28]   BP: ()/(53-70)   SpO2:  [91  %-98 %]     I & O (Last 24H):    Intake/Output Summary (Last 24 hours) at 9/9/2020 1501  Last data filed at 9/9/2020 0800  Gross per 24 hour   Intake 550 ml   Output --   Net 550 ml       Physical Exam:  General: Patient resting comfortably in no acute distress. Appears as stated age. Calm  Eyes: No conjunctival injection. No scleral icterus.  ENT: Hearing grossly intact. No discharge from ears. No nasal discharge.   Neck: Supple, trachea midline. No JVD  CVS: RRR. No LE edema BL  Lungs:  On Vapotherm with 75% FiO2, bilateral crackles noted. No accessory muscle use.  Abdomen:  Soft, nontender and nondistended.  No organomegaly  Neuro: AOx3. Moves all extremities. Follows commands. Responds appropriately   Skin:  No rash or erythema noted    Laboratory:  CBC:   Recent Labs   Lab 09/09/20  0616   WBC 4.64   RBC 3.70*   HGB 11.9*   HCT 36.2*      MCV 98   MCH 32.2*   MCHC 32.9     BMP:   Recent Labs   Lab 09/09/20  0616   *   *   K 4.5   CL 97   CO2 25   BUN 17   CREATININE 0.7   CALCIUM 9.2   MG 2.1       Diagnostic Results:  Reviewed all imaging    ASSESSMENT/PLAN:   70-year-old lady who was just discharged from our service when she was admitted for acute on chronic hypoxic respiratory failure likely due to atypical pneumonia, chronic aspiration in the background of bronchiectasis came back with shortness of breath and hypoxia.     Active Hospital Problems    Diagnosis  POA    *Primary atypical pneumonia due to Mycoplasma pneumoniae [J15.7]  Yes    Chronic pulmonary aspiration [T17.908A]  Yes    Bronchiectasis without complication [J47.9]  Yes    Elevated brain natriuretic peptide (BNP) level [R79.89]  Yes    Chronic obstructive pulmonary disease with acute lower respiratory infection [J44.0]  Yes    Pneumonia of both lungs due to infectious organism [J18.9]  Yes    Essential hypertension [I10]  Yes    Acute on chronic respiratory failure with hypoxia [J96.21]  Yes      Resolved Hospital  Problems   No resolved problems to display.         Plan:   Overall presentation is concerning for atypical pneumonia due to mycoplasma pneumonia    Consulted Pulmonary-started on IV macrolide    Diuresis upon daily assessment    Nebs, supplemental oxygen, chest PT    Wean oxygen as tolerated-goal SpO2 is 88%    Continue current orders    Daily labs      VTE Risk Mitigation (From admission, onward)         Ordered     enoxaparin injection 40 mg  Every 24 hours      09/08/20 1329     IP VTE HIGH RISK PATIENT  Once      09/08/20 1329     Place sequential compression device  Until discontinued      09/08/20 1329                  Department Hospital Medicine  [facility]  Azeem Blum MD  Date of service: 09/09/2020

## 2020-09-09 NOTE — PLAN OF CARE
Problem: Fall Injury Risk  Goal: Absence of Fall and Fall-Related Injury  Outcome: Ongoing, Progressing     Problem: Adult Inpatient Plan of Care  Goal: Plan of Care Review  Outcome: Ongoing, Progressing     Problem: Adult Inpatient Plan of Care  Goal: Optimal Comfort and Wellbeing  Outcome: Ongoing, Progressing     Problem: Adult Inpatient Plan of Care  Goal: Rounds/Family Conference  Outcome: Ongoing, Progressing     Problem: Infection (Pneumonia)  Goal: Resolution of Infection Signs/Symptoms  Outcome: Ongoing, Progressing

## 2020-09-09 NOTE — PLAN OF CARE
[place] [address] phone number [phone]  PCP - Soha Rowna, No Home Health and no advance directives.       09/09/20 1257   Discharge Assessment   Assessment Type Discharge Planning Assessment   Confirmed/corrected address and phone number on facesheet? Yes   Assessment information obtained from? Patient   Prior to hospitilization cognitive status: Alert/Oriented   Prior to hospitalization functional status: Independent   Current cognitive status: Alert/Oriented   Current Functional Status: Independent   Lives With spouse   Able to Return to Prior Arrangements yes   Is patient able to care for self after discharge? Yes   Patient currently being followed by outpatient case management? No   Patient currently receives any other outside agency services? No   Equipment Currently Used at Home oxygen   Do you have any problems affording any of your prescribed medications? No   Is the patient taking medications as prescribed? yes   Does the patient have transportation home? Yes   Transportation Anticipated family or friend will provide   Dialysis Name and Scheduled days none   Does the patient receive services at the Coumadin Clinic? No   Discharge Plan A Home   Discharge Plan B Home   DME Needed Upon Discharge  none   Patient/Family in Agreement with Plan yes

## 2020-09-09 NOTE — PLAN OF CARE
Problem: Fall Injury Risk  Goal: Absence of Fall and Fall-Related Injury  Outcome: Ongoing, Progressing     Problem: Adult Inpatient Plan of Care  Goal: Plan of Care Review  Outcome: Ongoing, Progressing  Goal: Optimal Comfort and Wellbeing  Outcome: Ongoing, Progressing     Problem: Infection (Pneumonia)  Goal: Resolution of Infection Signs/Symptoms  Outcome: Ongoing, Progressing     Problem: Respiratory Compromise (Pneumonia)  Goal: Effective Oxygenation and Ventilation  Outcome: Ongoing, Progressing

## 2020-09-09 NOTE — RESPIRATORY THERAPY
09/08/20 2100   Patient Assessment/Suction   Level of Consciousness (AVPU) alert   Respiratory Effort Unlabored   Expansion/Accessory Muscles/Retractions expansion symmetric;no retractions;no use of accessory muscles   All Lung Fields Breath Sounds diminished   Rhythm/Pattern, Respiratory pattern regular;unlabored   Cough Type good;nonproductive   PRE-TX-O2   O2 Device (Oxygen Therapy) Vapotherm   Humidification temp set 36   Flow (L/min) 20   Oxygen Concentration (%) 85   SpO2 95 %   Pulse Oximetry Type Continuous   $ Pulse Oximetry - Multiple Charge Pulse Oximetry - Multiple   Aerosol Therapy   $ Aerosol Therapy Charges PRN treatment not required   Respiratory Interventions   Cough And Deep Breathing done with encouragement   Breathing Techniques/Airway Clearance deep/controlled cough encouraged;diaphragmatic breathing promoted   Ready to Wean/Extubation Screen   FIO2<=50 (chart decimal) (!) 0.85   Education   $ Education Bronchodilator;Other (see comment);15 min  (vapotherm,prn tx,deep diaphragmatic breathing exercises)   Respiratory Evaluation   $ Care Plan Tech Time 15 min   Evaluation For   (care plan)        09/08/20 2100   Patient Assessment/Suction   Level of Consciousness (AVPU) alert   Respiratory Effort Unlabored   Expansion/Accessory Muscles/Retractions expansion symmetric;no retractions;no use of accessory muscles   All Lung Fields Breath Sounds diminished   Rhythm/Pattern, Respiratory pattern regular;unlabored   Cough Type good;nonproductive   PRE-TX-O2   O2 Device (Oxygen Therapy) Vapotherm   Humidification temp set 36   Flow (L/min) 20   Oxygen Concentration (%) 85   SpO2 95 %   Pulse Oximetry Type Continuous   $ Pulse Oximetry - Multiple Charge Pulse Oximetry - Multiple   Aerosol Therapy   $ Aerosol Therapy Charges PRN treatment not required   Respiratory Interventions   Cough And Deep Breathing done with encouragement   Breathing Techniques/Airway Clearance deep/controlled cough  encouraged;diaphragmatic breathing promoted   Ready to Wean/Extubation Screen   FIO2<=50 (chart decimal) (!) 0.85   Education   $ Education Bronchodilator;Other (see comment);15 min  (vapotherm,prn tx,deep diaphragmatic breathing exercises)   Respiratory Evaluation   $ Care Plan Tech Time 15 min   Evaluation For   (care plan)

## 2020-09-10 LAB
ANION GAP SERPL CALC-SCNC: 11 MMOL/L (ref 8–16)
BASOPHILS # BLD AUTO: 0.01 K/UL (ref 0–0.2)
BASOPHILS NFR BLD: 0.1 % (ref 0–1.9)
BUN SERPL-MCNC: 25 MG/DL (ref 8–23)
CALCIUM SERPL-MCNC: 9.5 MG/DL (ref 8.7–10.5)
CHLORIDE SERPL-SCNC: 97 MMOL/L (ref 95–110)
CO2 SERPL-SCNC: 28 MMOL/L (ref 23–29)
CREAT SERPL-MCNC: 0.8 MG/DL (ref 0.5–1.4)
DIFFERENTIAL METHOD: ABNORMAL
EOSINOPHIL # BLD AUTO: 0 K/UL (ref 0–0.5)
EOSINOPHIL NFR BLD: 0 % (ref 0–8)
ERYTHROCYTE [DISTWIDTH] IN BLOOD BY AUTOMATED COUNT: 12.6 % (ref 11.5–14.5)
EST. GFR  (AFRICAN AMERICAN): >60 ML/MIN/1.73 M^2
EST. GFR  (NON AFRICAN AMERICAN): >60 ML/MIN/1.73 M^2
GLUCOSE SERPL-MCNC: 102 MG/DL (ref 70–110)
GLUCOSE SERPL-MCNC: 118 MG/DL (ref 70–110)
GLUCOSE SERPL-MCNC: 135 MG/DL (ref 70–110)
GLUCOSE SERPL-MCNC: 137 MG/DL (ref 70–110)
GLUCOSE SERPL-MCNC: 145 MG/DL (ref 70–110)
HCT VFR BLD AUTO: 38 % (ref 37–48.5)
HGB BLD-MCNC: 12.6 G/DL (ref 12–16)
IMM GRANULOCYTES # BLD AUTO: 0.05 K/UL (ref 0–0.04)
IMM GRANULOCYTES NFR BLD AUTO: 0.6 % (ref 0–0.5)
LYMPHOCYTES # BLD AUTO: 0.8 K/UL (ref 1–4.8)
LYMPHOCYTES NFR BLD: 9.6 % (ref 18–48)
MAGNESIUM SERPL-MCNC: 2.2 MG/DL (ref 1.6–2.6)
MCH RBC QN AUTO: 32.9 PG (ref 27–31)
MCHC RBC AUTO-ENTMCNC: 33.2 G/DL (ref 32–36)
MCV RBC AUTO: 99 FL (ref 82–98)
MONOCYTES # BLD AUTO: 0.6 K/UL (ref 0.3–1)
MONOCYTES NFR BLD: 6.5 % (ref 4–15)
NEUTROPHILS # BLD AUTO: 7 K/UL (ref 1.8–7.7)
NEUTROPHILS NFR BLD: 83.2 % (ref 38–73)
NRBC BLD-RTO: 0 /100 WBC
PHOSPHATE SERPL-MCNC: 4.3 MG/DL (ref 2.7–4.5)
PLATELET # BLD AUTO: 338 K/UL (ref 150–350)
PMV BLD AUTO: 8.6 FL (ref 9.2–12.9)
POTASSIUM SERPL-SCNC: 3.9 MMOL/L (ref 3.5–5.1)
RBC # BLD AUTO: 3.83 M/UL (ref 4–5.4)
SODIUM SERPL-SCNC: 136 MMOL/L (ref 136–145)
WBC # BLD AUTO: 8.42 K/UL (ref 3.9–12.7)

## 2020-09-10 PROCEDURE — 25000003 PHARM REV CODE 250: Performed by: HOSPITALIST

## 2020-09-10 PROCEDURE — 80048 BASIC METABOLIC PNL TOTAL CA: CPT

## 2020-09-10 PROCEDURE — 94640 AIRWAY INHALATION TREATMENT: CPT

## 2020-09-10 PROCEDURE — 94668 MNPJ CHEST WALL SBSQ: CPT

## 2020-09-10 PROCEDURE — 94761 N-INVAS EAR/PLS OXIMETRY MLT: CPT

## 2020-09-10 PROCEDURE — 84100 ASSAY OF PHOSPHORUS: CPT

## 2020-09-10 PROCEDURE — 85025 COMPLETE CBC W/AUTO DIFF WBC: CPT

## 2020-09-10 PROCEDURE — 63600175 PHARM REV CODE 636 W HCPCS: Performed by: INTERNAL MEDICINE

## 2020-09-10 PROCEDURE — 83735 ASSAY OF MAGNESIUM: CPT

## 2020-09-10 PROCEDURE — 63600175 PHARM REV CODE 636 W HCPCS: Performed by: HOSPITALIST

## 2020-09-10 PROCEDURE — 25000003 PHARM REV CODE 250: Performed by: FAMILY MEDICINE

## 2020-09-10 PROCEDURE — 99233 SBSQ HOSP IP/OBS HIGH 50: CPT | Mod: ,,, | Performed by: INTERNAL MEDICINE

## 2020-09-10 PROCEDURE — 12000002 HC ACUTE/MED SURGE SEMI-PRIVATE ROOM

## 2020-09-10 PROCEDURE — 27000221 HC OXYGEN, UP TO 24 HOURS

## 2020-09-10 PROCEDURE — 99233 PR SUBSEQUENT HOSPITAL CARE,LEVL III: ICD-10-PCS | Mod: ,,, | Performed by: INTERNAL MEDICINE

## 2020-09-10 PROCEDURE — 36415 COLL VENOUS BLD VENIPUNCTURE: CPT

## 2020-09-10 PROCEDURE — 99900035 HC TECH TIME PER 15 MIN (STAT)

## 2020-09-10 RX ORDER — ZOLPIDEM TARTRATE 5 MG/1
5 TABLET ORAL NIGHTLY PRN
Status: DISCONTINUED | OUTPATIENT
Start: 2020-09-10 | End: 2020-09-14 | Stop reason: HOSPADM

## 2020-09-10 RX ORDER — GUAIFENESIN 600 MG/1
1200 TABLET, EXTENDED RELEASE ORAL 2 TIMES DAILY
Status: DISCONTINUED | OUTPATIENT
Start: 2020-09-10 | End: 2020-09-14 | Stop reason: HOSPADM

## 2020-09-10 RX ORDER — FUROSEMIDE 10 MG/ML
20 INJECTION INTRAMUSCULAR; INTRAVENOUS ONCE
Status: COMPLETED | OUTPATIENT
Start: 2020-09-10 | End: 2020-09-10

## 2020-09-10 RX ADMIN — THERA TABS 1 TABLET: TAB at 10:09

## 2020-09-10 RX ADMIN — BUPROPION HYDROCHLORIDE 300 MG: 150 TABLET, FILM COATED, EXTENDED RELEASE ORAL at 08:09

## 2020-09-10 RX ADMIN — TELMISARTAN 40 MG: 40 TABLET ORAL at 10:09

## 2020-09-10 RX ADMIN — GUAIFENESIN 1200 MG: 600 TABLET, EXTENDED RELEASE ORAL at 08:09

## 2020-09-10 RX ADMIN — GUAIFENESIN 1200 MG: 600 TABLET, EXTENDED RELEASE ORAL at 10:09

## 2020-09-10 RX ADMIN — FLUTICASONE FUROATE AND VILANTEROL TRIFENATATE 1 PUFF: 100; 25 POWDER RESPIRATORY (INHALATION) at 09:09

## 2020-09-10 RX ADMIN — OXYBUTYNIN CHLORIDE 5 MG: 5 TABLET, EXTENDED RELEASE ORAL at 10:09

## 2020-09-10 RX ADMIN — VERAPAMIL HYDROCHLORIDE 240 MG: 240 TABLET, FILM COATED, EXTENDED RELEASE ORAL at 10:09

## 2020-09-10 RX ADMIN — TIOTROPIUM BROMIDE 18 MCG: 18 CAPSULE ORAL; RESPIRATORY (INHALATION) at 09:09

## 2020-09-10 RX ADMIN — PANTOPRAZOLE SODIUM 40 MG: 40 TABLET, DELAYED RELEASE ORAL at 05:09

## 2020-09-10 RX ADMIN — ZOLPIDEM TARTRATE 5 MG: 5 TABLET ORAL at 08:09

## 2020-09-10 RX ADMIN — Medication 5000 UNITS: at 10:09

## 2020-09-10 RX ADMIN — FUROSEMIDE 20 MG: 10 INJECTION, SOLUTION INTRAMUSCULAR; INTRAVENOUS at 10:09

## 2020-09-10 RX ADMIN — LACTOBACILLUS ACIDOPHILUS / LACTOBACILLUS BULGARICUS 1 EACH: 100 MILLION CFU STRENGTH GRANULES at 10:09

## 2020-09-10 RX ADMIN — AZITHROMYCIN MONOHYDRATE 500 MG: 500 INJECTION, POWDER, LYOPHILIZED, FOR SOLUTION INTRAVENOUS at 04:09

## 2020-09-10 NOTE — PLAN OF CARE
09/09/20 2000   Patient Assessment/Suction   Level of Consciousness (AVPU) alert   Respiratory Effort Unlabored   Expansion/Accessory Muscles/Retractions no use of accessory muscles   All Lung Fields Breath Sounds clear   Rhythm/Pattern, Respiratory unlabored   Cough Frequency infrequent   Cough Type nonproductive   PRE-TX-O2   O2 Device (Oxygen Therapy) High Flow nasal Cannula   Flow (L/min) 15   SpO2 (!) 94 %   Pulse 80   Resp 18   Aerosol Therapy   $ Aerosol Therapy Charges PRN treatment not required   Respiratory Treatment Status (SVN) PRN treatment not required   Chest Physiotherapy   $ Chest Physiotherapy Charges Subsequent   Daily Review of Necessity (CPT) completed   Type (CPT) percussion   Method (CPT) mechanical percussor   Patient Position (CPT) HOB elevated   CPT Total Time (Min) 8   Post Treatment Assessment (CPT) breath sounds improved   Signs of Intolerance (CPT) none   Respiratory Evaluation   $ Care Plan Tech Time 15 min   Evaluation For   (CARE PLAN)

## 2020-09-10 NOTE — PLAN OF CARE
Important Message from Medicare was sign, explained and given to patient/caregiver on 09/10/2020 at 10:42am

## 2020-09-10 NOTE — CARE UPDATE
09/10/20 0944   PRE-TX-O2   SpO2 96 %   Pulse 86   Resp 18   Inhaler   $ Inhaler Charges MDI (Metered Dose Inahler) Treatment   Daily Review of Necessity (Inhaler) completed   Respiratory Treatment Status (Inhaler) given;mouth rinsed post treatment   Treatment Route (Inhaler) mouthpiece   Patient Position (Inhaler) Mat's   Post Treatment Assessment (Inhaler) breath sounds unchanged   Signs of Intolerance (Inhaler) none   Breath Sounds Post-Respiratory Treatment   Throughout All Fields Post-Treatment All Fields   Throughout All Fields Post-Treatment coarse   Post-treatment Heart Rate (beats/min) 96   Post-treatment Resp Rate (breaths/min) 20   Chest Physiotherapy   $ Chest Physiotherapy Charges Subsequent   Daily Review of Necessity (CPT) completed   Type (CPT) percussion/postural drainage;postural drainage   Method (CPT) mechanical percussor   Patient Position (CPT) head tilt down;semiprone on right side;semiprone on left side   Lung Walter (CPT) RLL (right lower lobe);RML (right middle lobe);RUL (right upper lobe);LLL (left lower lobe);SAUL (left upper lobe)   Duration per Field (CPT) 10 mins   Duration Left Side (CPT) 10 mins   Duration Right Side (CPT) 10 mins   Post Treatment Assessment (CPT) breath sounds improved   Signs of Intolerance (CPT) none

## 2020-09-10 NOTE — PROGRESS NOTES
[facility] Medicine  Progress Note    Patient name: Venkat Lara  MRN: [id number]  Admit Date: 9/8/2020   LOS: 2 days     SUBJECTIVE:     Principal problem: Primary atypical pneumonia due to Mycoplasma pneumoniae    Interval History:  Patient was seen and examined bedside.  Significant improvement in oxygen requirement.  Currently she is breathing on 7-10 L high-flow nasal cannula saturating around 88-94%.  No other acute events overnight as per nursing staff.  Did not get good sleep last night.       Scheduled Meds:   azithromycin  500 mg Intravenous Q24H    buPROPion  300 mg Oral QHS    cholecalciferol (vitamin D3)  5,000 Units Oral Daily    enoxaparin  40 mg Subcutaneous Q24H    fluticasone furoate-vilanteroL  1 puff Inhalation Daily    gabapentin  300 mg Oral QHS    lactobacillus acidophilus & bulgar  1 packet Oral Daily    multivitamin  1 tablet Oral Daily    olopatadine  1 drop Both Eyes BID    oxybutynin  5 mg Oral Daily    pantoprazole  40 mg Oral Before breakfast    telmisartan  40 mg Oral Daily    tiotropium  1 capsule Inhalation Daily    verapamiL  240 mg Oral BID     Continuous Infusions:  PRN Meds:albuterol, albuterol-ipratropium, dextrose 50%, dextrose 50%, glucagon (human recombinant), glucose, glucose, melatonin    Review of patient's allergies indicates:   Allergen Reactions    Statins-hmg-coa reductase inhibitors Other (See Comments)     Muscle cramps    Doxycycline Other (See Comments)     Stops gallbladder function    Adhesive Other (See Comments)     bruises    Erythromycin Other (See Comments)     Stomach pain       Review of Systems: As per interval history    OBJECTIVE:     Vital Signs (Most Recent)  Temp: 98.1 °F (36.7 °C) (09/10/20 0500)  Pulse: 84 (09/10/20 0500)  Resp: 16 (09/10/20 0500)  BP: (!) 97/50 (09/10/20 0500)  SpO2: (!) 90 % (09/10/20 0500)    Vital Signs Range (Last 24H):  Temp:  [97.8 °F (36.6 °C)-98.4 °F (36.9 °C)]   Pulse:  [79-92]    Resp:  [16-20]   BP: ()/(50-73)   SpO2:  [84 %-94 %]     I & O (Last 24H):    Intake/Output Summary (Last 24 hours) at 9/10/2020 0748  Last data filed at 9/9/2020 0800  Gross per 24 hour   Intake 300 ml   Output --   Net 300 ml       Physical Exam:  General: Patient resting comfortably in no acute distress. Appears as stated age. Calm  Eyes: No conjunctival injection. No scleral icterus.  ENT: Hearing grossly intact. No discharge from ears. No nasal discharge.   Neck: Supple, trachea midline. No JVD  CVS: RRR. No LE edema BL  Lungs:  On high-flow nasal cannula, bilateral crackles noted. No accessory muscle use.  Good air entry bilaterally  Abdomen:  Soft, nontender and nondistended.  No organomegaly  Neuro: AOx3. Moves all extremities. Follows commands. Responds appropriately   Skin:  No rash or erythema noted    Laboratory:  CBC:   Recent Labs   Lab 09/10/20  0529   WBC 8.42   RBC 3.83*   HGB 12.6   HCT 38.0      MCV 99*   MCH 32.9*   MCHC 33.2     BMP:   Recent Labs   Lab 09/10/20  0529   *      K 3.9   CL 97   CO2 28   BUN 25*   CREATININE 0.8   CALCIUM 9.5   MG 2.2       Diagnostic Results:  Reviewed all imaging    ASSESSMENT/PLAN:   70-year-old lady who was just discharged from our service when she was admitted for acute on chronic hypoxic respiratory failure likely due to atypical pneumonia, chronic aspiration in the background of bronchiectasis came back with shortness of breath and hypoxia.     Active Hospital Problems    Diagnosis  POA    *Primary atypical pneumonia due to Mycoplasma pneumoniae [J15.7]  Yes    Chronic pulmonary aspiration [T17.908A]  Yes    Bronchiectasis without complication [J47.9]  Yes    Elevated brain natriuretic peptide (BNP) level [R79.89]  Yes    Chronic obstructive pulmonary disease with acute lower respiratory infection [J44.0]  Yes    Pneumonia of both lungs due to infectious organism [J18.9]  Yes    Essential hypertension [I10]  Yes    Acute  on chronic respiratory failure with hypoxia [J96.21]  Yes      Resolved Hospital Problems   No resolved problems to display.         Plan:   Overall presentation is concerning for atypical pneumonia due to mycoplasma pneumonia    Continue supplemental oxygen, nebs, chest PT, IV azithromycin    Wean oxygen as tolerated    Consulted Pulmonary-started on IV macrolide    Diuresis upon daily assessment    Wean oxygen as tolerated-goal SpO2 is 88%    Continue current orders    Daily labs      VTE Risk Mitigation (From admission, onward)         Ordered     enoxaparin injection 40 mg  Every 24 hours      09/08/20 1329     IP VTE HIGH RISK PATIENT  Once      09/08/20 1329     Place sequential compression device  Until discontinued      09/08/20 1329                  Department Hospital Medicine  [facility]  Azeem Blum MD  Date of service: 09/10/2020

## 2020-09-11 LAB
ANION GAP SERPL CALC-SCNC: 7 MMOL/L (ref 8–16)
BASOPHILS # BLD AUTO: 0.03 K/UL (ref 0–0.2)
BASOPHILS NFR BLD: 0.6 % (ref 0–1.9)
BUN SERPL-MCNC: 25 MG/DL (ref 8–23)
CALCIUM SERPL-MCNC: 8.5 MG/DL (ref 8.7–10.5)
CHLORIDE SERPL-SCNC: 98 MMOL/L (ref 95–110)
CO2 SERPL-SCNC: 30 MMOL/L (ref 23–29)
CREAT SERPL-MCNC: 0.8 MG/DL (ref 0.5–1.4)
DIFFERENTIAL METHOD: ABNORMAL
EOSINOPHIL # BLD AUTO: 0.2 K/UL (ref 0–0.5)
EOSINOPHIL NFR BLD: 3 % (ref 0–8)
ERYTHROCYTE [DISTWIDTH] IN BLOOD BY AUTOMATED COUNT: 12.7 % (ref 11.5–14.5)
EST. GFR  (AFRICAN AMERICAN): >60 ML/MIN/1.73 M^2
EST. GFR  (NON AFRICAN AMERICAN): >60 ML/MIN/1.73 M^2
GLUCOSE SERPL-MCNC: 103 MG/DL (ref 70–110)
GLUCOSE SERPL-MCNC: 107 MG/DL (ref 70–110)
GLUCOSE SERPL-MCNC: 95 MG/DL (ref 70–110)
GLUCOSE SERPL-MCNC: 96 MG/DL (ref 70–110)
HCT VFR BLD AUTO: 36.4 % (ref 37–48.5)
HGB BLD-MCNC: 11.9 G/DL (ref 12–16)
IMM GRANULOCYTES # BLD AUTO: 0.02 K/UL (ref 0–0.04)
IMM GRANULOCYTES NFR BLD AUTO: 0.4 % (ref 0–0.5)
LYMPHOCYTES # BLD AUTO: 1 K/UL (ref 1–4.8)
LYMPHOCYTES NFR BLD: 19.1 % (ref 18–48)
MAGNESIUM SERPL-MCNC: 2.1 MG/DL (ref 1.6–2.6)
MCH RBC QN AUTO: 32.8 PG (ref 27–31)
MCHC RBC AUTO-ENTMCNC: 32.7 G/DL (ref 32–36)
MCV RBC AUTO: 100 FL (ref 82–98)
MONOCYTES # BLD AUTO: 0.4 K/UL (ref 0.3–1)
MONOCYTES NFR BLD: 8.2 % (ref 4–15)
NEUTROPHILS # BLD AUTO: 3.7 K/UL (ref 1.8–7.7)
NEUTROPHILS NFR BLD: 68.7 % (ref 38–73)
NRBC BLD-RTO: 0 /100 WBC
PHOSPHATE SERPL-MCNC: 3.7 MG/DL (ref 2.7–4.5)
PLATELET # BLD AUTO: 274 K/UL (ref 150–350)
PMV BLD AUTO: 8.6 FL (ref 9.2–12.9)
POTASSIUM SERPL-SCNC: 3.6 MMOL/L (ref 3.5–5.1)
RBC # BLD AUTO: 3.63 M/UL (ref 4–5.4)
SODIUM SERPL-SCNC: 135 MMOL/L (ref 136–145)
WBC # BLD AUTO: 5.38 K/UL (ref 3.9–12.7)

## 2020-09-11 PROCEDURE — 25000003 PHARM REV CODE 250: Performed by: HOSPITALIST

## 2020-09-11 PROCEDURE — 99900035 HC TECH TIME PER 15 MIN (STAT)

## 2020-09-11 PROCEDURE — 27000221 HC OXYGEN, UP TO 24 HOURS

## 2020-09-11 PROCEDURE — 25000003 PHARM REV CODE 250: Performed by: FAMILY MEDICINE

## 2020-09-11 PROCEDURE — 63600175 PHARM REV CODE 636 W HCPCS: Performed by: FAMILY MEDICINE

## 2020-09-11 PROCEDURE — 99233 SBSQ HOSP IP/OBS HIGH 50: CPT | Mod: ,,, | Performed by: INTERNAL MEDICINE

## 2020-09-11 PROCEDURE — 63600175 PHARM REV CODE 636 W HCPCS: Performed by: HOSPITALIST

## 2020-09-11 PROCEDURE — 63700000 PHARM REV CODE 250 ALT 637 W/O HCPCS: Performed by: HOSPITALIST

## 2020-09-11 PROCEDURE — 36415 COLL VENOUS BLD VENIPUNCTURE: CPT

## 2020-09-11 PROCEDURE — 94761 N-INVAS EAR/PLS OXIMETRY MLT: CPT

## 2020-09-11 PROCEDURE — 94668 MNPJ CHEST WALL SBSQ: CPT

## 2020-09-11 PROCEDURE — 93010 EKG 12-LEAD: ICD-10-PCS | Mod: ,,, | Performed by: INTERNAL MEDICINE

## 2020-09-11 PROCEDURE — 94640 AIRWAY INHALATION TREATMENT: CPT

## 2020-09-11 PROCEDURE — 93010 ELECTROCARDIOGRAM REPORT: CPT | Mod: ,,, | Performed by: INTERNAL MEDICINE

## 2020-09-11 PROCEDURE — 85025 COMPLETE CBC W/AUTO DIFF WBC: CPT

## 2020-09-11 PROCEDURE — 99233 PR SUBSEQUENT HOSPITAL CARE,LEVL III: ICD-10-PCS | Mod: ,,, | Performed by: INTERNAL MEDICINE

## 2020-09-11 PROCEDURE — 93005 ELECTROCARDIOGRAM TRACING: CPT | Performed by: INTERNAL MEDICINE

## 2020-09-11 PROCEDURE — 80048 BASIC METABOLIC PNL TOTAL CA: CPT

## 2020-09-11 PROCEDURE — 84100 ASSAY OF PHOSPHORUS: CPT

## 2020-09-11 PROCEDURE — 12000002 HC ACUTE/MED SURGE SEMI-PRIVATE ROOM

## 2020-09-11 PROCEDURE — 25000242 PHARM REV CODE 250 ALT 637 W/ HCPCS: Performed by: FAMILY MEDICINE

## 2020-09-11 PROCEDURE — 83735 ASSAY OF MAGNESIUM: CPT

## 2020-09-11 RX ORDER — FUROSEMIDE 10 MG/ML
20 INJECTION INTRAMUSCULAR; INTRAVENOUS ONCE
Status: COMPLETED | OUTPATIENT
Start: 2020-09-11 | End: 2020-09-11

## 2020-09-11 RX ORDER — AZITHROMYCIN 250 MG/1
500 TABLET, FILM COATED ORAL DAILY
Status: COMPLETED | OUTPATIENT
Start: 2020-09-11 | End: 2020-09-13

## 2020-09-11 RX ORDER — POTASSIUM CHLORIDE 20 MEQ/1
40 TABLET, EXTENDED RELEASE ORAL ONCE
Status: COMPLETED | OUTPATIENT
Start: 2020-09-11 | End: 2020-09-11

## 2020-09-11 RX ORDER — FUROSEMIDE 10 MG/ML
20 INJECTION INTRAMUSCULAR; INTRAVENOUS ONCE
Status: DISCONTINUED | OUTPATIENT
Start: 2020-09-11 | End: 2020-09-11

## 2020-09-11 RX ORDER — POTASSIUM CHLORIDE 20 MEQ/1
40 TABLET, EXTENDED RELEASE ORAL ONCE
Status: DISCONTINUED | OUTPATIENT
Start: 2020-09-11 | End: 2020-09-11

## 2020-09-11 RX ADMIN — PANTOPRAZOLE SODIUM 40 MG: 40 TABLET, DELAYED RELEASE ORAL at 05:09

## 2020-09-11 RX ADMIN — BUPROPION HYDROCHLORIDE 300 MG: 150 TABLET, FILM COATED, EXTENDED RELEASE ORAL at 08:09

## 2020-09-11 RX ADMIN — ZOLPIDEM TARTRATE 5 MG: 5 TABLET ORAL at 08:09

## 2020-09-11 RX ADMIN — OXYBUTYNIN CHLORIDE 5 MG: 5 TABLET, EXTENDED RELEASE ORAL at 08:09

## 2020-09-11 RX ADMIN — GABAPENTIN 300 MG: 300 CAPSULE ORAL at 08:09

## 2020-09-11 RX ADMIN — TIOTROPIUM BROMIDE 18 MCG: 18 CAPSULE ORAL; RESPIRATORY (INHALATION) at 09:09

## 2020-09-11 RX ADMIN — FLUTICASONE FUROATE AND VILANTEROL TRIFENATATE 1 PUFF: 100; 25 POWDER RESPIRATORY (INHALATION) at 09:09

## 2020-09-11 RX ADMIN — IPRATROPIUM BROMIDE AND ALBUTEROL SULFATE 3 ML: .5; 3 SOLUTION RESPIRATORY (INHALATION) at 02:09

## 2020-09-11 RX ADMIN — THERA TABS 1 TABLET: TAB at 08:09

## 2020-09-11 RX ADMIN — GUAIFENESIN 1200 MG: 600 TABLET, EXTENDED RELEASE ORAL at 08:09

## 2020-09-11 RX ADMIN — FUROSEMIDE 20 MG: 10 INJECTION, SOLUTION INTRAMUSCULAR; INTRAVENOUS at 05:09

## 2020-09-11 RX ADMIN — AZITHROMYCIN 500 MG: 250 TABLET, FILM COATED ORAL at 03:09

## 2020-09-11 RX ADMIN — Medication 5000 UNITS: at 08:09

## 2020-09-11 RX ADMIN — POTASSIUM CHLORIDE 40 MEQ: 20 TABLET, EXTENDED RELEASE ORAL at 05:09

## 2020-09-11 RX ADMIN — LACTOBACILLUS ACIDOPHILUS / LACTOBACILLUS BULGARICUS 1 EACH: 100 MILLION CFU STRENGTH GRANULES at 08:09

## 2020-09-11 NOTE — PROGRESS NOTES
[facility] Medicine  Progress Note    Patient name: Venkat Lara  MRN: [id number]  Admit Date: 9/8/2020   LOS: 3 days     SUBJECTIVE:     Principal problem: Primary atypical pneumonia due to Mycoplasma pneumoniae    Interval History:  Patient was seen and examined bedside. Significant improvement in oxygen requirement and currently breathing comfortably on 3-4 L oxygen via nasal cannula.  However upon ambulation patient required up to 10 L oxygen to maintain pulse ox between 88-90% and she complained of some chest heaviness.  No other acute events overnight as per nursing staff.  Looking forward to go home.       Scheduled Meds:   azithromycin  500 mg Oral Daily    buPROPion  300 mg Oral QHS    cholecalciferol (vitamin D3)  5,000 Units Oral Daily    enoxaparin  40 mg Subcutaneous Q24H    fluticasone furoate-vilanteroL  1 puff Inhalation Daily    gabapentin  300 mg Oral QHS    guaiFENesin  1,200 mg Oral BID    lactobacillus acidophilus & bulgar  1 packet Oral Daily    multivitamin  1 tablet Oral Daily    olopatadine  1 drop Both Eyes BID    oxybutynin  5 mg Oral Daily    pantoprazole  40 mg Oral Before breakfast    tiotropium  1 capsule Inhalation Daily     Continuous Infusions:  PRN Meds:albuterol, albuterol-ipratropium, dextrose 50%, dextrose 50%, glucagon (human recombinant), glucose, glucose, zolpidem    Review of patient's allergies indicates:   Allergen Reactions    Statins-hmg-coa reductase inhibitors Other (See Comments)     Muscle cramps    Doxycycline Other (See Comments)     Stops gallbladder function    Adhesive Other (See Comments)     bruises    Erythromycin Other (See Comments)     Stomach pain       Review of Systems: As per interval history    OBJECTIVE:     Vital Signs (Most Recent)  Temp: 97.9 °F (36.6 °C) (09/11/20 0730)  Pulse: 104 (09/11/20 1419)  Resp: 20 (09/11/20 1419)  BP: (!) 105/59 (09/11/20 0730)  SpO2: (!) 91 % (09/11/20 1419)    Vital Signs Range  (Last 24H):  Temp:  [97.9 °F (36.6 °C)-98.4 °F (36.9 °C)]   Pulse:  []   Resp:  [17-20]   BP: ()/(54-59)   SpO2:  [91 %-94 %]     I & O (Last 24H):  No intake or output data in the 24 hours ending 09/11/20 1127    Physical Exam:  General: Patient resting comfortably in no acute distress. Appears as stated age. Calm  Eyes: No conjunctival injection. No scleral icterus.  ENT: Hearing grossly intact. No discharge from ears. No nasal discharge.   Neck: Supple, trachea midline. No JVD  CVS: RRR. No LE edema BL  Lungs:  On 3-4 L oxygen, bilateral crackles noted. No accessory muscle use.  Good air entry bilaterally  Abdomen:  Soft, nontender and nondistended.  No organomegaly  Neuro: AOx3. Moves all extremities. Follows commands. Responds appropriately   Skin:  No rash or erythema noted    Laboratory:  CBC:   Recent Labs   Lab 09/11/20  0510   WBC 5.38   RBC 3.63*   HGB 11.9*   HCT 36.4*      *   MCH 32.8*   MCHC 32.7     BMP:   Recent Labs   Lab 09/11/20  0510   GLU 95   *   K 3.6   CL 98   CO2 30*   BUN 25*   CREATININE 0.8   CALCIUM 8.5*   MG 2.1       Diagnostic Results:  Reviewed all imaging    ASSESSMENT/PLAN:   70-year-old lady who was just discharged from our service when she was admitted for acute on chronic hypoxic respiratory failure likely due to atypical pneumonia, chronic aspiration in the background of bronchiectasis came back with shortness of breath and hypoxia.     Active Hospital Problems    Diagnosis  POA    *Primary atypical pneumonia due to Mycoplasma pneumoniae [J15.7]  Yes    Chronic pulmonary aspiration [T17.908A]  Yes    Bronchiectasis without complication [J47.9]  Yes    Elevated brain natriuretic peptide (BNP) level [R79.89]  Yes    Chronic obstructive pulmonary disease with acute lower respiratory infection [J44.0]  Yes    Pneumonia of both lungs due to infectious organism [J18.9]  Yes    Essential hypertension [I10]  Yes    Acute on chronic respiratory  failure with hypoxia [J96.21]  Yes      Resolved Hospital Problems   No resolved problems to display.         Plan:   Overall presentation is concerning for atypical pneumonia due to mycoplasma pneumonia.  Still significant oxygen requirement upon ambulation    20 mg IV Lasix with 20 mEq KCL    EKG, 2D echo    Continue supplemental oxygen, nebs, chest PT, azithromycin    Wean oxygen as tolerated    Consulted Pulmonary-started on macrolide    Diuresis upon daily assessment    Wean oxygen as tolerated-goal SpO2 is 88%    Continue current orders    Daily labs      VTE Risk Mitigation (From admission, onward)         Ordered     enoxaparin injection 40 mg  Every 24 hours      09/08/20 1329     IP VTE HIGH RISK PATIENT  Once      09/08/20 1329     Place sequential compression device  Until discontinued      09/08/20 1329                  Department Hospital Medicine  [facility]  Azeem Blum MD  Date of service: 09/11/2020

## 2020-09-11 NOTE — CARE UPDATE
09/11/20 1200   Home Oxygen Qualification   Room Air SpO2 At Rest (!) 81 %   SpO2 During Exertion on O2 90 %  (on 10L)   Heart Rate on O2 116 bpm   Exertion O2 LPM 10 LPM   Home O2 Eval Comments 90% on 3L at rest; pt qualifies for home O2  (I do not recommend the patient to be discharged just yet)

## 2020-09-11 NOTE — PLAN OF CARE
09/11/20 0919   Patient Assessment/Suction   Level of Consciousness (AVPU) alert   Respiratory Effort Normal;Unlabored   Expansion/Accessory Muscles/Retractions expansion symmetric;no use of accessory muscles   All Lung Fields Breath Sounds diminished;equal bilaterally   Rhythm/Pattern, Respiratory pattern regular   Cough Frequency infrequent   Cough Type good;nonproductive   PRE-TX-O2   O2 Device (Oxygen Therapy) High Flow nasal Cannula   $ Is the patient on Low Flow Oxygen? Yes   Flow (L/min) 3   SpO2 (!) 92 %   Pulse Oximetry Type Continuous   $ Pulse Oximetry - Multiple Charge Pulse Oximetry - Multiple   Pulse 104   Resp 20   Positioning HOB elevated 90 degrees   Inhaler   $ Inhaler Charges MDI (Metered Dose Inahler) Treatment  (BREO)   Daily Review of Necessity (Inhaler) completed   Respiratory Treatment Status (Inhaler) given;mouth rinsed post treatment   Treatment Route (Inhaler) mouthpiece;breath hold   Patient Position (Inhaler) HOB elevated   Post Treatment Assessment (Inhaler) breath sounds unchanged   Signs of Intolerance (Inhaler) none

## 2020-09-11 NOTE — PLAN OF CARE
09/11/20 0920   Inhaler   $ Inhaler Charges MDI (Metered Dose Inahler) Treatment  (SPIRIVA)   Daily Review of Necessity (Inhaler) completed   Respiratory Treatment Status (Inhaler) given;mouth rinsed post treatment   Treatment Route (Inhaler) mouthpiece;breath hold   Patient Position (Inhaler) HOB elevated   Post Treatment Assessment (Inhaler) breath sounds unchanged   Signs of Intolerance (Inhaler) none   Chest Physiotherapy   $ Chest Physiotherapy Charges Subsequent   Daily Review of Necessity (CPT) completed   Type (CPT) percussion/postural drainage   Method (CPT) mechanical percussor   Patient Position (CPT) sitting (supported);semiprone on right side;semiprone on left side   Lung Fields (CPT) Anterior:;Posterior:;Lateral:;SAUL (left upper lobe);LLL (left lower lobe);RUL (right upper lobe);RML (right middle lobe);RLL (right lower lobe)   CPT Total Time (Min) 15   Post Treatment Assessment (CPT) breath sounds unchanged   Signs of Intolerance (CPT) none   Respiratory Evaluation   $ Care Plan Tech Time 15 min

## 2020-09-11 NOTE — PROGRESS NOTES
[facility] the [facility]  Pulmonology  Progress Note    Subjective     9/10/2020:  No major issues overnight.  Subjectively improved over the past 24 hours.  No new complaints.  9/11/2020:  No major issues overnight.  Subjectively improved over the past 24 hours.  Back at baseline and inquiring about discharge.     Review of Systems   Constitutional: Negative for fever and chills.   Respiratory: Positive for cough. Negative for sputum production, chest tightness, shortness of breath, wheezing and dyspnea on extertion.    Cardiovascular: Negative for chest pain, palpitations and leg swelling.   Gastrointestinal: Negative for nausea and abdominal pain.      I have personally reviewed the following during today's evaluation:  past medical history, ROS, family history, social history, surgical history, current inpatient medications,drug allergies, vital signs over the past 24 hours, results of relevant diagnostic studies and nursing/provider documentation from the past 24 hours.     Objective     VS Temp:  [97.6 °F (36.4 °C)-98.4 °F (36.9 °C)]   Pulse:  []   Resp:  [17-20]   BP: ()/(54-64)   SpO2:  [92 %-95 %]   Ideal body weight: 59.3 kg (130 lb 11.7 oz)  Adjusted ideal body weight: 66 kg (145 lb 6.4 oz)   I/O No intake or output data in the 24 hours ending 09/11/20 1206     Vent SpO2 (!) 92% on 2 LPM   PE Physical Exam   Constitutional: She is oriented to person, place, and time. She appears well-developed and well-nourished. She appears not cachectic. She is cooperative.  Non-toxic appearance. No distress. Nasal cannula in place. She is not obese.   HENT:   Head: Normocephalic.   Right Ear: External ear normal.   Left Ear: External ear normal.   Nose: Nose normal.   Mouth/Throat: Oropharynx is clear and moist. Normal dentition. Mallampati Score: I.   Neck: Normal range of motion. No JVD present. No thyromegaly present.   Cardiovascular: Normal rate, regular rhythm, normal heart sounds and intact distal  pulses.   No murmur heard.  Pulmonary/Chest: Normal expansion, symmetric chest wall expansion and effort normal. No tachypnea. She has no decreased breath sounds. She has no wheezes. She has rhonchi. She has rales. Chest wall is not dull to percussion. She exhibits no tenderness.   Abdominal: Soft. Bowel sounds are normal. She exhibits no distension and no mass. There is no abdominal tenderness.   Musculoskeletal: Normal range of motion.         General: No tenderness or edema.   Lymphadenopathy: No supraclavicular adenopathy is present.     She has no cervical adenopathy.     She has no axillary adenopathy.   Neurological: She is alert and oriented to person, place, and time. No cranial nerve deficit.   Skin: Skin is warm and dry. No rash noted. She is not diaphoretic.   Psychiatric: She has a normal mood and affect. Her behavior is normal. Thought content normal.   Nursing note and vitals reviewed.      Labs I have personally reviewed and interpreted all labs / diagnostic studies obtained over the past 24 hours, and relevant results are as follows:  Recent Labs   Lab 09/11/20  0510   WBC 5.38   RBC 3.63*   HGB 11.9*   HCT 36.4*      *   MCH 32.8*   MCHC 32.7   *   K 3.6   CL 98   CO2 30*   BUN 25*   CREATININE 0.8   MG 2.1      Imaging I have personally reviewed and interpreted the following images and reviewed the associated Radiology report.  I have reviewed and interpreted all pertinent imaging results/findings within the past 24 hours.  No new images     Micro I have personally reviewed and interpreted the available culture data.  Relevant results are as follows.  Blood Culture   Lab Results   Component Value Date    LABBLOO No Growth to date 09/08/2020    LABBLOO No Growth to date 09/08/2020    LABBLOO No Growth to date 09/08/2020   , Sputum Culture   Lab Results   Component Value Date    GSRESP >10 epithelial cells per low power field 08/27/2020    GSRESP Few WBC's 08/27/2020    GSRESP  Many Gram positive cocci 08/27/2020    GSRESP Few Gram positive rods 08/27/2020    GSRESP Rare Gram negative rods 08/27/2020    GSRESP  08/27/2020     Predominance of oropharyngeal jennifer. Please recollect.    GSRESP  08/27/2020     Results called to and read back by: Martha Holloway RN on [facility] by SLOANE    GSRESP 08/27/2020  09:56 08/27/2020    RESPIRATORYC Normal respiratory jennifer 08/26/2020    and Urine Culture    Lab Results   Component Value Date    LABURIN NO GROWTH AFTER 48 HOURS. 04/29/2013      Medications Scheduled    azithromycin  500 mg Oral Daily    buPROPion  300 mg Oral QHS    cholecalciferol (vitamin D3)  5,000 Units Oral Daily    enoxaparin  40 mg Subcutaneous Q24H    fluticasone furoate-vilanteroL  1 puff Inhalation Daily    furosemide (LASIX) IV  20 mg Intravenous Once    gabapentin  300 mg Oral QHS    guaiFENesin  1,200 mg Oral BID    lactobacillus acidophilus & bulgar  1 packet Oral Daily    multivitamin  1 tablet Oral Daily    olopatadine  1 drop Both Eyes BID    oxybutynin  5 mg Oral Daily    pantoprazole  40 mg Oral Before breakfast    potassium chloride  40 mEq Oral Once    tiotropium  1 capsule Inhalation Daily      Continuous Infusions:      PRN   albuterol, albuterol-ipratropium, dextrose 50%, dextrose 50%, glucagon (human recombinant), glucose, glucose, zolpidem        Assessment       Active Hospital Problems    Diagnosis    *Primary atypical pneumonia due to Mycoplasma pneumoniae    Chronic pulmonary aspiration    Bronchiectasis without complication    Elevated brain natriuretic peptide (BNP) level    Chronic obstructive pulmonary disease with acute lower respiratory infection    Pneumonia of both lungs due to infectious organism    Essential hypertension    Acute on chronic respiratory failure with hypoxia      My Impression:  Acute on chronic hypoxemic respiratory failure secondary to mycoplasma pneumonia infection superimposed on bronchiectasis/COPD.    Plan      Pulmonary  · Stable to dishcharge home  · Continue macrolide for 3 more days.  · Continue LABA/LAMA and prn BARBARA at discharge.  · Continue bronchial hygiene techniques at home, but would add nebulized saline.  · Titrate supplemental oxygen to maintain SpO2  > 88%.   · Aspiration needs to be addressed as an outpatient.  · Follow up with Dr. Mccormick.     Ovidio Estrada MD  Pulmonary / Critical Care Medicine  [facility]

## 2020-09-11 NOTE — PLAN OF CARE
09/10/20 1940   Patient Assessment/Suction   Level of Consciousness (AVPU) alert   Respiratory Effort Normal;Unlabored   Expansion/Accessory Muscles/Retractions expansion symmetric;no retractions;no use of accessory muscles   All Lung Fields Breath Sounds diminished;equal bilaterally   PRE-TX-O2   O2 Device (Oxygen Therapy) High Flow nasal Cannula   Flow (L/min) 3   SpO2 (!) 92 %   Pulse Oximetry Type Continuous   $ Pulse Oximetry - Multiple Charge Pulse Oximetry - Multiple   Aerosol Therapy   $ Aerosol Therapy Charges PRN treatment not required   Chest Physiotherapy   $ Chest Physiotherapy Charges Subsequent   Daily Review of Necessity (CPT) completed   Type (CPT) percussion   Method (CPT) mechanical percussor   Patient Position (CPT) HOB elevated   Lung Fields (CPT) RUL (right upper lobe);SAUL (left upper lobe)   Duration per Field (CPT) 10 mins   Post Treatment Assessment (CPT) breath sounds unchanged   Signs of Intolerance (CPT) none   Respiratory Evaluation   $ Care Plan Tech Time 15 min   Evaluation For   (careplan)

## 2020-09-12 ENCOUNTER — CLINICAL SUPPORT (OUTPATIENT)
Dept: CARDIOLOGY | Facility: HOSPITAL | Age: 70
DRG: 193 | End: 2020-09-12
Attending: HOSPITALIST
Payer: MEDICARE

## 2020-09-12 VITALS — BODY MASS INDEX: 26.84 KG/M2 | WEIGHT: 167 LBS | HEIGHT: 66 IN

## 2020-09-12 LAB
ANION GAP SERPL CALC-SCNC: 8 MMOL/L (ref 8–16)
BASOPHILS # BLD AUTO: 0.02 K/UL (ref 0–0.2)
BASOPHILS NFR BLD: 0.5 % (ref 0–1.9)
BNP SERPL-MCNC: 57 PG/ML (ref 0–99)
BUN SERPL-MCNC: 23 MG/DL (ref 8–23)
CALCIUM SERPL-MCNC: 8.8 MG/DL (ref 8.7–10.5)
CHLORIDE SERPL-SCNC: 98 MMOL/L (ref 95–110)
CO2 SERPL-SCNC: 31 MMOL/L (ref 23–29)
CREAT SERPL-MCNC: 0.6 MG/DL (ref 0.5–1.4)
DIFFERENTIAL METHOD: ABNORMAL
EOSINOPHIL # BLD AUTO: 0.5 K/UL (ref 0–0.5)
EOSINOPHIL NFR BLD: 11.4 % (ref 0–8)
ERYTHROCYTE [DISTWIDTH] IN BLOOD BY AUTOMATED COUNT: 12.6 % (ref 11.5–14.5)
EST. GFR  (AFRICAN AMERICAN): >60 ML/MIN/1.73 M^2
EST. GFR  (NON AFRICAN AMERICAN): >60 ML/MIN/1.73 M^2
GLUCOSE SERPL-MCNC: 107 MG/DL (ref 70–110)
GLUCOSE SERPL-MCNC: 114 MG/DL (ref 70–110)
GLUCOSE SERPL-MCNC: 97 MG/DL (ref 70–110)
HCT VFR BLD AUTO: 37.2 % (ref 37–48.5)
HGB BLD-MCNC: 12.1 G/DL (ref 12–16)
IMM GRANULOCYTES # BLD AUTO: 0.01 K/UL (ref 0–0.04)
IMM GRANULOCYTES NFR BLD AUTO: 0.3 % (ref 0–0.5)
LYMPHOCYTES # BLD AUTO: 0.7 K/UL (ref 1–4.8)
LYMPHOCYTES NFR BLD: 18 % (ref 18–48)
MAGNESIUM SERPL-MCNC: 2 MG/DL (ref 1.6–2.6)
MCH RBC QN AUTO: 32.9 PG (ref 27–31)
MCHC RBC AUTO-ENTMCNC: 32.5 G/DL (ref 32–36)
MCV RBC AUTO: 101 FL (ref 82–98)
MONOCYTES # BLD AUTO: 0.3 K/UL (ref 0.3–1)
MONOCYTES NFR BLD: 7.1 % (ref 4–15)
NEUTROPHILS # BLD AUTO: 2.5 K/UL (ref 1.8–7.7)
NEUTROPHILS NFR BLD: 62.7 % (ref 38–73)
NRBC BLD-RTO: 0 /100 WBC
PHOSPHATE SERPL-MCNC: 4.2 MG/DL (ref 2.7–4.5)
PLATELET # BLD AUTO: 261 K/UL (ref 150–350)
PMV BLD AUTO: 8.4 FL (ref 9.2–12.9)
POTASSIUM SERPL-SCNC: 4 MMOL/L (ref 3.5–5.1)
RBC # BLD AUTO: 3.68 M/UL (ref 4–5.4)
SODIUM SERPL-SCNC: 137 MMOL/L (ref 136–145)
WBC # BLD AUTO: 3.94 K/UL (ref 3.9–12.7)

## 2020-09-12 PROCEDURE — 63700000 PHARM REV CODE 250 ALT 637 W/O HCPCS: Performed by: HOSPITALIST

## 2020-09-12 PROCEDURE — 63600175 PHARM REV CODE 636 W HCPCS: Performed by: FAMILY MEDICINE

## 2020-09-12 PROCEDURE — 84100 ASSAY OF PHOSPHORUS: CPT

## 2020-09-12 PROCEDURE — 27000221 HC OXYGEN, UP TO 24 HOURS

## 2020-09-12 PROCEDURE — 99900035 HC TECH TIME PER 15 MIN (STAT)

## 2020-09-12 PROCEDURE — 12000002 HC ACUTE/MED SURGE SEMI-PRIVATE ROOM

## 2020-09-12 PROCEDURE — 93306 TTE W/DOPPLER COMPLETE: CPT

## 2020-09-12 PROCEDURE — 94761 N-INVAS EAR/PLS OXIMETRY MLT: CPT

## 2020-09-12 PROCEDURE — 80048 BASIC METABOLIC PNL TOTAL CA: CPT

## 2020-09-12 PROCEDURE — 94668 MNPJ CHEST WALL SBSQ: CPT

## 2020-09-12 PROCEDURE — 94640 AIRWAY INHALATION TREATMENT: CPT

## 2020-09-12 PROCEDURE — 83880 ASSAY OF NATRIURETIC PEPTIDE: CPT

## 2020-09-12 PROCEDURE — 36415 COLL VENOUS BLD VENIPUNCTURE: CPT

## 2020-09-12 PROCEDURE — 93306 TTE W/DOPPLER COMPLETE: CPT | Mod: 26,,, | Performed by: INTERNAL MEDICINE

## 2020-09-12 PROCEDURE — 83735 ASSAY OF MAGNESIUM: CPT

## 2020-09-12 PROCEDURE — 25000003 PHARM REV CODE 250: Performed by: HOSPITALIST

## 2020-09-12 PROCEDURE — 25000242 PHARM REV CODE 250 ALT 637 W/ HCPCS: Performed by: FAMILY MEDICINE

## 2020-09-12 PROCEDURE — 93306 ECHO (CUPID ONLY): ICD-10-PCS | Mod: 26,,, | Performed by: INTERNAL MEDICINE

## 2020-09-12 PROCEDURE — 25000003 PHARM REV CODE 250: Performed by: FAMILY MEDICINE

## 2020-09-12 PROCEDURE — 25000242 PHARM REV CODE 250 ALT 637 W/ HCPCS: Performed by: HOSPITALIST

## 2020-09-12 PROCEDURE — 85025 COMPLETE CBC W/AUTO DIFF WBC: CPT

## 2020-09-12 RX ORDER — ACETAMINOPHEN 325 MG/1
650 TABLET ORAL EVERY 6 HOURS PRN
Status: DISCONTINUED | OUTPATIENT
Start: 2020-09-12 | End: 2020-09-14 | Stop reason: HOSPADM

## 2020-09-12 RX ORDER — IPRATROPIUM BROMIDE AND ALBUTEROL SULFATE 2.5; .5 MG/3ML; MG/3ML
3 SOLUTION RESPIRATORY (INHALATION) EVERY 6 HOURS
Status: DISCONTINUED | OUTPATIENT
Start: 2020-09-12 | End: 2020-09-14 | Stop reason: HOSPADM

## 2020-09-12 RX ADMIN — GUAIFENESIN 1200 MG: 600 TABLET, EXTENDED RELEASE ORAL at 09:09

## 2020-09-12 RX ADMIN — ACETAMINOPHEN 650 MG: 325 TABLET, FILM COATED ORAL at 12:09

## 2020-09-12 RX ADMIN — OXYBUTYNIN CHLORIDE 5 MG: 5 TABLET, EXTENDED RELEASE ORAL at 11:09

## 2020-09-12 RX ADMIN — AZITHROMYCIN 500 MG: 250 TABLET, FILM COATED ORAL at 11:09

## 2020-09-12 RX ADMIN — THERA TABS 1 TABLET: TAB at 11:09

## 2020-09-12 RX ADMIN — TIOTROPIUM BROMIDE 18 MCG: 18 CAPSULE ORAL; RESPIRATORY (INHALATION) at 07:09

## 2020-09-12 RX ADMIN — IPRATROPIUM BROMIDE AND ALBUTEROL SULFATE 3 ML: .5; 3 SOLUTION RESPIRATORY (INHALATION) at 01:09

## 2020-09-12 RX ADMIN — ALBUTEROL SULFATE 2 PUFF: 90 AEROSOL, METERED RESPIRATORY (INHALATION) at 07:09

## 2020-09-12 RX ADMIN — ZOLPIDEM TARTRATE 5 MG: 5 TABLET ORAL at 09:09

## 2020-09-12 RX ADMIN — GABAPENTIN 300 MG: 300 CAPSULE ORAL at 09:09

## 2020-09-12 RX ADMIN — FLUTICASONE FUROATE AND VILANTEROL TRIFENATATE 1 PUFF: 100; 25 POWDER RESPIRATORY (INHALATION) at 07:09

## 2020-09-12 RX ADMIN — LACTOBACILLUS ACIDOPHILUS / LACTOBACILLUS BULGARICUS 1 EACH: 100 MILLION CFU STRENGTH GRANULES at 11:09

## 2020-09-12 RX ADMIN — GUAIFENESIN 1200 MG: 600 TABLET, EXTENDED RELEASE ORAL at 11:09

## 2020-09-12 RX ADMIN — IPRATROPIUM BROMIDE AND ALBUTEROL SULFATE 3 ML: .5; 3 SOLUTION RESPIRATORY (INHALATION) at 07:09

## 2020-09-12 RX ADMIN — PANTOPRAZOLE SODIUM 40 MG: 40 TABLET, DELAYED RELEASE ORAL at 05:09

## 2020-09-12 RX ADMIN — BUPROPION HYDROCHLORIDE 300 MG: 150 TABLET, FILM COATED, EXTENDED RELEASE ORAL at 09:09

## 2020-09-12 RX ADMIN — Medication 5000 UNITS: at 11:09

## 2020-09-12 NOTE — PLAN OF CARE
09/11/20 2030   Patient Assessment/Suction   Level of Consciousness (AVPU) alert   Respiratory Effort Unlabored   Expansion/Accessory Muscles/Retractions no use of accessory muscles   All Lung Fields Breath Sounds clear   Rhythm/Pattern, Respiratory unlabored   Cough Frequency no cough   PRE-TX-O2   O2 Device (Oxygen Therapy) High Flow nasal Cannula   Flow (L/min) 5   SpO2 96 %   Pulse 102   Resp 18   Chest Physiotherapy   $ Chest Physiotherapy Charges Subsequent   Daily Review of Necessity (CPT) completed   Type (CPT) percussion   Method (CPT) mechanical percussor   Patient Position (CPT) HOB elevated   CPT Total Time (Min) 10   Post Treatment Assessment (CPT) breath sounds unchanged   Signs of Intolerance (CPT) none   Respiratory Evaluation   $ Care Plan Tech Time 15 min   Evaluation For   (care plan)

## 2020-09-12 NOTE — PROGRESS NOTES
[facility] Medicine  Progress Note    Patient name: Venkat Lara  MRN: [id number]  Admit Date: 9/8/2020   LOS: 4 days     SUBJECTIVE:     Principal problem: Primary atypical pneumonia due to Mycoplasma pneumoniae    Interval History:  Patient was seen and examined bedside. Currently breathing comfortably on 3-4 L oxygen via nasal cannula however nursing staff likes to bump it up to 5-7 L via high-flow nasal cannula. Still becomes very hypoxic upon minimal ambulation. No other acute events overnight as per nursing staff.  In good spirit.     Scheduled Meds:   azithromycin  500 mg Oral Daily    buPROPion  300 mg Oral QHS    cholecalciferol (vitamin D3)  5,000 Units Oral Daily    enoxaparin  40 mg Subcutaneous Q24H    fluticasone furoate-vilanteroL  1 puff Inhalation Daily    gabapentin  300 mg Oral QHS    guaiFENesin  1,200 mg Oral BID    lactobacillus acidophilus & bulgar  1 packet Oral Daily    multivitamin  1 tablet Oral Daily    olopatadine  1 drop Both Eyes BID    oxybutynin  5 mg Oral Daily    pantoprazole  40 mg Oral Before breakfast    tiotropium  1 capsule Inhalation Daily     Continuous Infusions:  PRN Meds:acetaminophen, albuterol, albuterol-ipratropium, dextrose 50%, dextrose 50%, glucagon (human recombinant), glucose, glucose, zolpidem    Review of patient's allergies indicates:   Allergen Reactions    Statins-hmg-coa reductase inhibitors Other (See Comments)     Muscle cramps    Doxycycline Other (See Comments)     Stops gallbladder function    Adhesive Other (See Comments)     bruises    Erythromycin Other (See Comments)     Stomach pain       Review of Systems: As per interval history    OBJECTIVE:     Vital Signs (Most Recent)  Temp: 97.9 °F (36.6 °C) (09/12/20 1123)  Pulse: 97 (09/12/20 1123)  Resp: 16 (09/12/20 1123)  BP: 97/61 (09/12/20 1123)  SpO2: 95 % (09/12/20 1123)    Vital Signs Range (Last 24H):  Temp:  [97.7 °F (36.5 °C)-98.9 °F (37.2 °C)]   Pulse:   []   Resp:  [15-20]   BP: ()/(52-70)   SpO2:  [90 %-97 %]     I & O (Last 24H):    Intake/Output Summary (Last 24 hours) at 9/12/2020 1147  Last data filed at 9/12/2020 0700  Gross per 24 hour   Intake 400 ml   Output 350 ml   Net 50 ml       Physical Exam:  General: Patient resting comfortably in no acute distress. Appears as stated age. Calm  Eyes: No conjunctival injection. No scleral icterus.  ENT: Hearing grossly intact. No discharge from ears. No nasal discharge.   Neck: Supple, trachea midline. No JVD  CVS: RRR. No LE edema BL  Lungs:  On 3-5 L oxygen, bilateral crackles noted. No accessory muscle use.  Good air entry bilaterally  Abdomen:  Soft, nontender and nondistended.  No organomegaly  Neuro: AOx3. Moves all extremities. Follows commands. Responds appropriately   Skin:  No rash or erythema noted    Laboratory:  CBC:   Recent Labs   Lab 09/12/20  0636   WBC 3.94   RBC 3.68*   HGB 12.1   HCT 37.2      *   MCH 32.9*   MCHC 32.5     BMP:   Recent Labs   Lab 09/12/20  0636   GLU 97      K 4.0   CL 98   CO2 31*   BUN 23   CREATININE 0.6   CALCIUM 8.8   MG 2.0       Diagnostic Results:  Reviewed all imaging    ASSESSMENT/PLAN:   70-year-old lady who was just discharged from our service when she was admitted for acute on chronic hypoxic respiratory failure likely due to atypical pneumonia, chronic aspiration in the background of bronchiectasis came back with shortness of breath and hypoxia.     Active Hospital Problems    Diagnosis  POA    *Primary atypical pneumonia due to Mycoplasma pneumoniae [J15.7]  Yes    Chronic pulmonary aspiration [T17.908A]  Yes    Bronchiectasis without complication [J47.9]  Yes    Elevated brain natriuretic peptide (BNP) level [R79.89]  Yes    Chronic obstructive pulmonary disease with acute lower respiratory infection [J44.0]  Yes    Pneumonia of both lungs due to infectious organism [J18.9]  Yes    Essential hypertension [I10]  Yes    Acute  on chronic respiratory failure with hypoxia [J96.21]  Yes      Resolved Hospital Problems   No resolved problems to display.         Plan:   Overall presentation is concerning for atypical pneumonia due to mycoplasma pneumonia.  Still significant oxygen requirement upon ambulation    Two-view chest x-ray looks promising today.  Of note we gave her 20 mg IV Lasix 3 days in a row    Follow-up on 2D echo    Continue supplemental oxygen, nebs, chest PT, azithromycin    Wean oxygen as tolerated    Consulted Pulmonary-recs appreciated    Wean oxygen as tolerated-goal SpO2 is 88-90%    Continue current orders    Daily labs    Out of bed to chair    PT, OT    VTE Risk Mitigation (From admission, onward)         Ordered     enoxaparin injection 40 mg  Every 24 hours      09/08/20 1329     IP VTE HIGH RISK PATIENT  Once      09/08/20 1329     Place sequential compression device  Until discontinued      09/08/20 1329                  Department Hospital Medicine  [facility]  Azeem Blum MD  Date of service: 09/12/2020

## 2020-09-12 NOTE — PLAN OF CARE
09/12/20 0727   Patient Assessment/Suction   Level of Consciousness (AVPU) alert   All Lung Fields Breath Sounds coarse;wheezes, expiratory   PRE-TX-O2   O2 Device (Oxygen Therapy) High Flow nasal Cannula   $ Is the patient on Low Flow Oxygen? Yes   Flow (L/min) 5   SpO2 97 %   Pulse Oximetry Type Intermittent   $ Pulse Oximetry - Multiple Charge Pulse Oximetry - Multiple   Pulse 101   Resp 16   Temp 97.7 °F (36.5 °C)   BP (!) 166/70   Inhaler   $ Inhaler Charges MDI (Metered Dose Inahler) Treatment   Daily Review of Necessity (Inhaler) completed   Respiratory Treatment Status (Inhaler) given   Treatment Route (Inhaler) mouthpiece   Patient Position (Inhaler) semi-Rivero's   Post Treatment Assessment (Inhaler) increased aeration   Signs of Intolerance (Inhaler) none   Breath Sounds Post-Respiratory Treatment   Post-treatment Heart Rate (beats/min) 102   Post-treatment Resp Rate (breaths/min) 16   Respiratory Evaluation   $ Care Plan Tech Time 15 min

## 2020-09-13 LAB
ANION GAP SERPL CALC-SCNC: 14 MMOL/L (ref 8–16)
BACTERIA BLD CULT: NORMAL
BACTERIA BLD CULT: NORMAL
BASOPHILS # BLD AUTO: 0.03 K/UL (ref 0–0.2)
BASOPHILS NFR BLD: 0.8 % (ref 0–1.9)
BUN SERPL-MCNC: 13 MG/DL (ref 8–23)
CALCIUM SERPL-MCNC: 9.3 MG/DL (ref 8.7–10.5)
CHLORIDE SERPL-SCNC: 99 MMOL/L (ref 95–110)
CO2 SERPL-SCNC: 25 MMOL/L (ref 23–29)
CREAT SERPL-MCNC: 0.8 MG/DL (ref 0.5–1.4)
DIFFERENTIAL METHOD: ABNORMAL
EOSINOPHIL # BLD AUTO: 0.5 K/UL (ref 0–0.5)
EOSINOPHIL NFR BLD: 12.8 % (ref 0–8)
ERYTHROCYTE [DISTWIDTH] IN BLOOD BY AUTOMATED COUNT: 12.7 % (ref 11.5–14.5)
EST. GFR  (AFRICAN AMERICAN): >60 ML/MIN/1.73 M^2
EST. GFR  (NON AFRICAN AMERICAN): >60 ML/MIN/1.73 M^2
GLUCOSE SERPL-MCNC: 75 MG/DL (ref 70–110)
HCT VFR BLD AUTO: 38.8 % (ref 37–48.5)
HGB BLD-MCNC: 12.6 G/DL (ref 12–16)
IMM GRANULOCYTES # BLD AUTO: 0.03 K/UL (ref 0–0.04)
IMM GRANULOCYTES NFR BLD AUTO: 0.8 % (ref 0–0.5)
LYMPHOCYTES # BLD AUTO: 0.7 K/UL (ref 1–4.8)
LYMPHOCYTES NFR BLD: 18.4 % (ref 18–48)
MAGNESIUM SERPL-MCNC: 1.9 MG/DL (ref 1.6–2.6)
MCH RBC QN AUTO: 32.5 PG (ref 27–31)
MCHC RBC AUTO-ENTMCNC: 32.5 G/DL (ref 32–36)
MCV RBC AUTO: 100 FL (ref 82–98)
MONOCYTES # BLD AUTO: 0.3 K/UL (ref 0.3–1)
MONOCYTES NFR BLD: 6.7 % (ref 4–15)
NEUTROPHILS # BLD AUTO: 2.3 K/UL (ref 1.8–7.7)
NEUTROPHILS NFR BLD: 60.5 % (ref 38–73)
NRBC BLD-RTO: 0 /100 WBC
PHOSPHATE SERPL-MCNC: 2.8 MG/DL (ref 2.7–4.5)
PLATELET # BLD AUTO: 266 K/UL (ref 150–350)
PMV BLD AUTO: 8.9 FL (ref 9.2–12.9)
POTASSIUM SERPL-SCNC: 4.3 MMOL/L (ref 3.5–5.1)
RBC # BLD AUTO: 3.88 M/UL (ref 4–5.4)
SODIUM SERPL-SCNC: 138 MMOL/L (ref 136–145)
WBC # BLD AUTO: 3.75 K/UL (ref 3.9–12.7)

## 2020-09-13 PROCEDURE — 25000003 PHARM REV CODE 250: Performed by: FAMILY MEDICINE

## 2020-09-13 PROCEDURE — 94668 MNPJ CHEST WALL SBSQ: CPT

## 2020-09-13 PROCEDURE — 25000003 PHARM REV CODE 250: Performed by: HOSPITALIST

## 2020-09-13 PROCEDURE — 12000002 HC ACUTE/MED SURGE SEMI-PRIVATE ROOM

## 2020-09-13 PROCEDURE — 94640 AIRWAY INHALATION TREATMENT: CPT

## 2020-09-13 PROCEDURE — 99232 SBSQ HOSP IP/OBS MODERATE 35: CPT | Mod: ,,, | Performed by: INTERNAL MEDICINE

## 2020-09-13 PROCEDURE — 94664 DEMO&/EVAL PT USE INHALER: CPT

## 2020-09-13 PROCEDURE — 83735 ASSAY OF MAGNESIUM: CPT

## 2020-09-13 PROCEDURE — 85025 COMPLETE CBC W/AUTO DIFF WBC: CPT

## 2020-09-13 PROCEDURE — 94761 N-INVAS EAR/PLS OXIMETRY MLT: CPT

## 2020-09-13 PROCEDURE — 63700000 PHARM REV CODE 250 ALT 637 W/O HCPCS: Performed by: HOSPITALIST

## 2020-09-13 PROCEDURE — 80048 BASIC METABOLIC PNL TOTAL CA: CPT

## 2020-09-13 PROCEDURE — 25000242 PHARM REV CODE 250 ALT 637 W/ HCPCS: Performed by: HOSPITALIST

## 2020-09-13 PROCEDURE — 27000221 HC OXYGEN, UP TO 24 HOURS

## 2020-09-13 PROCEDURE — 36415 COLL VENOUS BLD VENIPUNCTURE: CPT

## 2020-09-13 PROCEDURE — 99900035 HC TECH TIME PER 15 MIN (STAT)

## 2020-09-13 PROCEDURE — 84100 ASSAY OF PHOSPHORUS: CPT

## 2020-09-13 PROCEDURE — 97161 PT EVAL LOW COMPLEX 20 MIN: CPT

## 2020-09-13 PROCEDURE — 99232 PR SUBSEQUENT HOSPITAL CARE,LEVL II: ICD-10-PCS | Mod: ,,, | Performed by: INTERNAL MEDICINE

## 2020-09-13 RX ORDER — VERAPAMIL HYDROCHLORIDE 180 MG/1
180 TABLET, FILM COATED, EXTENDED RELEASE ORAL 2 TIMES DAILY
Status: DISCONTINUED | OUTPATIENT
Start: 2020-09-13 | End: 2020-09-14 | Stop reason: HOSPADM

## 2020-09-13 RX ADMIN — Medication 5000 UNITS: at 09:09

## 2020-09-13 RX ADMIN — AZITHROMYCIN 500 MG: 250 TABLET, FILM COATED ORAL at 10:09

## 2020-09-13 RX ADMIN — VERAPAMIL HYDROCHLORIDE 180 MG: 180 TABLET, FILM COATED, EXTENDED RELEASE ORAL at 08:09

## 2020-09-13 RX ADMIN — PANTOPRAZOLE SODIUM 40 MG: 40 TABLET, DELAYED RELEASE ORAL at 06:09

## 2020-09-13 RX ADMIN — OXYBUTYNIN CHLORIDE 5 MG: 5 TABLET, EXTENDED RELEASE ORAL at 10:09

## 2020-09-13 RX ADMIN — TIOTROPIUM BROMIDE 18 MCG: 18 CAPSULE ORAL; RESPIRATORY (INHALATION) at 07:09

## 2020-09-13 RX ADMIN — FLUTICASONE FUROATE AND VILANTEROL TRIFENATATE 1 PUFF: 100; 25 POWDER RESPIRATORY (INHALATION) at 07:09

## 2020-09-13 RX ADMIN — IPRATROPIUM BROMIDE AND ALBUTEROL SULFATE 3 ML: .5; 3 SOLUTION RESPIRATORY (INHALATION) at 07:09

## 2020-09-13 RX ADMIN — IPRATROPIUM BROMIDE AND ALBUTEROL SULFATE 3 ML: .5; 3 SOLUTION RESPIRATORY (INHALATION) at 01:09

## 2020-09-13 RX ADMIN — BUPROPION HYDROCHLORIDE 300 MG: 150 TABLET, FILM COATED, EXTENDED RELEASE ORAL at 08:09

## 2020-09-13 RX ADMIN — GUAIFENESIN 1200 MG: 600 TABLET, EXTENDED RELEASE ORAL at 08:09

## 2020-09-13 RX ADMIN — LACTOBACILLUS ACIDOPHILUS / LACTOBACILLUS BULGARICUS 1 EACH: 100 MILLION CFU STRENGTH GRANULES at 10:09

## 2020-09-13 RX ADMIN — THERA TABS 1 TABLET: TAB at 10:09

## 2020-09-13 RX ADMIN — IPRATROPIUM BROMIDE AND ALBUTEROL SULFATE 3 ML: .5; 3 SOLUTION RESPIRATORY (INHALATION) at 02:09

## 2020-09-13 RX ADMIN — GUAIFENESIN 1200 MG: 600 TABLET, EXTENDED RELEASE ORAL at 10:09

## 2020-09-13 RX ADMIN — GABAPENTIN 300 MG: 300 CAPSULE ORAL at 08:09

## 2020-09-13 NOTE — PLAN OF CARE
09/13/20 0732   Patient Assessment/Suction   Level of Consciousness (AVPU) alert   All Lung Fields Breath Sounds clear;coarse   PRE-TX-O2   O2 Device (Oxygen Therapy) High Flow nasal Cannula   $ Is the patient on Low Flow Oxygen? Yes   Flow (L/min) 7  (decreased to 5)   SpO2 98 %   Pulse Oximetry Type Intermittent   $ Pulse Oximetry - Multiple Charge Pulse Oximetry - Multiple   Pulse 104   Resp 16   Aerosol Therapy   $ Aerosol Therapy Charges Aerosol Treatment   Daily Review of Necessity (SVN) completed   Respiratory Treatment Status (SVN) given   Treatment Route (SVN) mask   Patient Position (SVN) semi-Rivero's   Post Treatment Assessment (SVN) breath sounds improved;increased aeration   Signs of Intolerance (SVN) none   Inhaler   $ Inhaler Charges MDI (Metered Dose Inahler) Treatment   Daily Review of Necessity (Inhaler) completed   Respiratory Treatment Status (Inhaler) given   Treatment Route (Inhaler) mouthpiece   Patient Position (Inhaler) semi-Rivero's   Post Treatment Assessment (Inhaler) congestion decreased;increased aeration   Signs of Intolerance (Inhaler) none   Breath Sounds Post-Respiratory Treatment   Post-treatment Heart Rate (beats/min) 105   Post-treatment Resp Rate (breaths/min) 16   Chest Physiotherapy   $ Chest Physiotherapy Charges Subsequent   Daily Review of Necessity (CPT) completed   Type (CPT) percussion   Method (CPT) mechanical percussor   Signs of Intolerance (CPT) none   Respiratory Evaluation   $ Care Plan Tech Time 15 min

## 2020-09-13 NOTE — PLAN OF CARE
Problem: Physical Therapy Goal  Goal: Physical Therapy Goal  Description: Goals to be met by: 2020    Patient will increase functional independence with mobility by performin. Gait  x 300 feet with [place] using no AD.     Outcome: Ongoing, Progressing

## 2020-09-13 NOTE — PROGRESS NOTES
"[facility]  Adult Nutrition   Progress Note (Initial Assessment)     SUMMARY     Recommendations  Recommendation/Intervention: 1. Continue Cardiac diet and continue to encourage adequate nutrition. 2. Menu  to obtain meal selections and obtain food preferences daily.  Goals: 1. Patient to meet at least 75% of estimated energy and protein needs.  Nutrition Goal Status: new  Communication of RD Recs: reviewed with RN    Dietitian Rounds Brief  Patient assessed 2' LOS. PO intake good. No intervention at this time.    Reason for Assessment  Reason For Assessment: length of stay  Relevant Medical History: COPD; HTN; Primary atypical pneumonia due to Mycoplasma pneumoniae    Nutrition Risk Screen  Nutrition Risk Screen: no indicators present    Nutrition/Diet History  Food Allergies: NKFA  Factors Affecting Nutritional Intake: None identified at this time    Anthropometrics  Temp: 99.3 °F (37.4 °C)  Height Method: Stated  Height: 5' 6" (167.6 cm)  Height (inches): 66 in  Weight Method: Bed Scale  Weight: 75.9 kg (167 lb 6.4 oz)  Weight (lb): 167.4 lb  Ideal Body Weight (IBW), Female: 130 lb  % Ideal Body Weight, Female (lb): 136.92 %  BMI (Calculated): 27  BMI Grade: 25 - 29.9 - overweight       Weight History:  Wt Readings from Last 10 Encounters:   09/09/20 75.9 kg (167 lb 6.4 oz)   09/12/20 75.8 kg (167 lb)   08/25/20 81.6 kg (180 lb)   08/21/20 83.9 kg (185 lb)   12/23/19 83.9 kg (185 lb)   11/12/19 83.9 kg (185 lb)   10/15/19 83.9 kg (185 lb)   09/26/19 83.9 kg (185 lb)   08/29/19 83.9 kg (185 lb)   08/19/19 83.9 kg (185 lb)       Lab/Procedures/Meds: Pertinent Labs Reviewed    Clinical Chemistry:  Recent Labs   Lab 09/08/20  1229  09/11/20  0510 09/12/20  0636 09/13/20  0950   *   < > 135* 137 138   K 4.0   < > 3.6 4.0 4.3   CL 94*   < > 98 98 99   CO2 24   < > 30* 31* 25   *   < > 95 97 75   BUN 18   < > 25* 23 13   CREATININE 0.8   < > 0.8 0.6 0.8   CALCIUM 8.8   < > 8.5* 8.8 9.3 "   PROT 6.3  --   --   --   --    ALBUMIN 3.2*  --   --   --   --    BILITOT 1.0  --   --   --   --    ALKPHOS 68  --   --   --   --    AST 30  --   --   --   --    ALT 25  --   --   --   --    ANIONGAP 13   < > 7* 8 14   ESTGFRAFRICA >60.0   < > >60.0 >60.0 >60.0   EGFRNONAA >60.0   < > >60.0 >60.0 >60.0   MG 1.6   < > 2.1 2.0 1.9   PHOS  --    < > 3.7 4.2 2.8    < > = values in this interval not displayed.       CBC:   Recent Labs   Lab 09/13/20  0718   WBC 3.75*   RBC 3.88*   HGB 12.6   HCT 38.8      *   MCH 32.5*   MCHC 32.5       Cardiac Profile:  Recent Labs   Lab 09/08/20  1229 09/08/20  1423 09/08/20  1920 09/12/20  0633   *  --   --  57     --   --   --    CPKMB 3.6  --   --   --    TROPONINI 0.041* 0.040 <0.030  --        Inflammatory Labs:  Recent Labs   Lab 09/08/20  1229   CRP 13.57*       Diabetes:  Recent Labs   Lab 09/08/20  1423   HGBA1C 5.4       Medications: Pertinent Medications reviewed    Scheduled Meds:   albuterol-ipratropium  3 mL Nebulization Q6H    buPROPion  300 mg Oral QHS    cholecalciferol (vitamin D3)  5,000 Units Oral Daily    enoxaparin  40 mg Subcutaneous Q24H    fluticasone furoate-vilanteroL  1 puff Inhalation Daily    gabapentin  300 mg Oral QHS    guaiFENesin  1,200 mg Oral BID    lactobacillus acidophilus & bulgar  1 packet Oral Daily    multivitamin  1 tablet Oral Daily    olopatadine  1 drop Both Eyes BID    oxybutynin  5 mg Oral Daily    pantoprazole  40 mg Oral Before breakfast    tiotropium  1 capsule Inhalation Daily    verapamiL  180 mg Oral BID       Continuous Infusions:    PRN Meds:.acetaminophen, albuterol, albuterol-ipratropium, dextrose 50%, dextrose 50%, glucagon (human recombinant), glucose, glucose, zolpidem    Estimated/Assessed Needs  Weight Used For Calorie Calculations: 75.8 kg (167 lb 1.7 oz)  Energy Calorie Requirements (kcal): 1895 - 2274 (25 - 30)  Energy Need Method: Kcal/kg  Protein Requirements: 76 - 91 ( 1-  1.2 g/kg)  Weight Used For Protein Calculations: 75.8 kg (167 lb 1.7 oz)     Estimated Fluid Requirement Method: RDA Method  RDA Method (mL): 1895       Nutrition Prescription Ordered  Current Diet Order: Cardiac    Evaluation of Received Nutrient/Fluid Intake  Energy Calories Required: meeting needs  Protein Required: meeting needs  Fluid Required: meeting needs  Tolerance: tolerating     Intake/Output Summary (Last 24 hours) at 9/13/2020 1622  Last data filed at 9/13/2020 0600  Gross per 24 hour   Intake 450 ml   Output --   Net 450 ml      % Intake of Estimated Energy Needs: 75 - 100 %  % Meal Intake: 75 - 100 %    Nutrition Risk  Level of Risk/Frequency of Follow-up: moderate     Monitor and Evaluation  Food and Nutrient Intake: energy intake, food and beverage intake  Food and Nutrient Adminstration: diet order  Physical Activity and Function: nutrition-related ADLs and IADLs, factors affecting access to physical activity  Anthropometric Measurements: weight change, body mass index, weight  Biochemical Data, Medical Tests and Procedures: electrolyte and renal panel, inflammatory profile, gastrointestinal profile, lipid profile, glucose/endocrine profile  Nutrition-Focused Physical Findings: overall appearance     Nutrition Follow-Up  RD Follow-up?: Yes    Oneyda Denny RD 09/13/2020 4:23 PM

## 2020-09-13 NOTE — PROGRESS NOTES
[facility]  Pulmonology  Progress Note    Subjective     9/10/2020:  No major issues overnight.  Subjectively improved over the past 24 hours.  No new complaints.  9/11/2020:  No major issues overnight.  Subjectively improved over the past 24 hours.  Back at baseline and inquiring about discharge.    9/13/2020 - Stable overnight, RA sat 69%, 94% on 7 LPM at rest and desats with exertion.  Still with cough and productive of sputum. No new complaints.  CXR yesterday showed improvements in the infiltrates.  ECHO report pending.     Review of Systems   Constitutional: Negative for fever and chills.   Respiratory: Positive for cough. Negative for sputum production, chest tightness, shortness of breath, wheezing and dyspnea on extertion.    Cardiovascular: Negative for chest pain, palpitations and leg swelling.   Gastrointestinal: Negative for nausea and abdominal pain.      I have personally reviewed the following during today's evaluation:  past medical history, ROS, family history, social history, surgical history, current inpatient medications,drug allergies, vital signs over the past 24 hours, results of relevant diagnostic studies and nursing/provider documentation from the past 24 hours.     Objective     VS Temp:  [97.6 °F (36.4 °C)-99.1 °F (37.3 °C)]   Pulse:  []   Resp:  [16-18]   BP: ()/(55-79)   SpO2:  [91 %-99 %]   Ideal body weight: 59.3 kg (130 lb 11.7 oz)  Adjusted ideal body weight: 66 kg (145 lb 6.4 oz)   I/O   Intake/Output Summary (Last 24 hours) at 9/13/2020 0910  Last data filed at 9/13/2020 0600  Gross per 24 hour   Intake 450 ml   Output --   Net 450 ml        Vent SpO2 98% on 2 LPM   PE Physical Exam   Constitutional: She is oriented to person, place, and time. She appears well-developed and well-nourished. She appears not cachectic. She is cooperative.  Non-toxic appearance. No distress. Nasal cannula in place. She is not obese.   HENT:   Head: Normocephalic.   Right  Ear: External ear normal.   Left Ear: External ear normal.   Nose: Nose normal.   Mouth/Throat: Oropharynx is clear and moist. Normal dentition. Mallampati Score: I.   Neck: Normal range of motion. No JVD present. No thyromegaly present.   Cardiovascular: Normal rate, regular rhythm, normal heart sounds and intact distal pulses.   No murmur heard.  Pulmonary/Chest: Normal expansion, symmetric chest wall expansion and effort normal. No tachypnea. She has no decreased breath sounds. She has no wheezes. She has rhonchi. She has rales. Chest wall is not dull to percussion. She exhibits no tenderness.   Abdominal: Soft. Bowel sounds are normal. She exhibits no distension and no mass. There is no abdominal tenderness.   Musculoskeletal: Normal range of motion.         General: No tenderness or edema.   Lymphadenopathy: No supraclavicular adenopathy is present.     She has no cervical adenopathy.     She has no axillary adenopathy.   Neurological: She is alert and oriented to person, place, and time. No cranial nerve deficit.   Skin: Skin is warm and dry. No rash noted. She is not diaphoretic.   Psychiatric: She has a normal mood and affect. Her behavior is normal. Thought content normal.   Nursing note and vitals reviewed.      Labs I have personally reviewed and interpreted all labs / diagnostic studies obtained over the past 24 hours, and relevant results are as follows:  Recent Labs   Lab 09/13/20  0718   WBC 3.75*   RBC 3.88*   HGB 12.6   HCT 38.8      *   MCH 32.5*   MCHC 32.5      Imaging I have personally reviewed and interpreted the following images and reviewed the associated Radiology report.  I have reviewed and interpreted all pertinent imaging results/findings within the past 24 hours.  No new images     Micro I have personally reviewed and interpreted the available culture data.  Relevant results are as follows.  Blood Culture   Lab Results   Component Value Date    LABBLOO No Growth to date  09/08/2020    LABBLOO No Growth to date 09/08/2020    LABBLOO No Growth to date 09/08/2020    LABBLOO No Growth to date 09/08/2020    LABBLOO No Growth to date 09/08/2020   , Sputum Culture   Lab Results   Component Value Date    GSRESP >10 epithelial cells per low power field 08/27/2020    GSRESP Few WBC's 08/27/2020    GSRESP Many Gram positive cocci 08/27/2020    GSRESP Few Gram positive rods 08/27/2020    GSRESP Rare Gram negative rods 08/27/2020    GSRESP  08/27/2020     Predominance of oropharyngeal jennifer. Please recollect.    GSRESP  08/27/2020     Results called to and read back by: Martha Holloway RN on [facility] by SLOANE    GSRESP 08/27/2020  09:56 08/27/2020    RESPIRATORYC Normal respiratory jennifer 08/26/2020    and Urine Culture    Lab Results   Component Value Date    LABURIN NO GROWTH AFTER 48 HOURS. 04/29/2013      Medications Scheduled    albuterol-ipratropium  3 mL Nebulization Q6H    azithromycin  500 mg Oral Daily    buPROPion  300 mg Oral QHS    cholecalciferol (vitamin D3)  5,000 Units Oral Daily    enoxaparin  40 mg Subcutaneous Q24H    fluticasone furoate-vilanteroL  1 puff Inhalation Daily    gabapentin  300 mg Oral QHS    guaiFENesin  1,200 mg Oral BID    lactobacillus acidophilus & bulgar  1 packet Oral Daily    multivitamin  1 tablet Oral Daily    olopatadine  1 drop Both Eyes BID    oxybutynin  5 mg Oral Daily    pantoprazole  40 mg Oral Before breakfast    tiotropium  1 capsule Inhalation Daily      Continuous Infusions:      PRN   acetaminophen, albuterol, albuterol-ipratropium, dextrose 50%, dextrose 50%, glucagon (human recombinant), glucose, glucose, zolpidem        Assessment       Active Hospital Problems    Diagnosis    *Primary atypical pneumonia due to Mycoplasma pneumoniae    Chronic pulmonary aspiration    Bronchiectasis without complication    Elevated brain natriuretic peptide (BNP) level    Chronic obstructive pulmonary disease with acute lower respiratory  infection    Pneumonia of both lungs due to infectious organism    Essential hypertension    Acute on chronic respiratory failure with hypoxia      My Impression:  Acute on chronic hypoxemic respiratory failure secondary to mycoplasma pneumonia infection superimposed on bronchiectasis/COPD.    Plan     Pulmonary  · Stable to discharge home but would need high flow concentrator (? If she would imporve to lower O2 needs in another day or so)  · Add acapella  · Continue macrolide for 3 more days.  · Continue LABA/LAMA and prn BARBARA at discharge.  · Continue bronchial hygiene techniques at home, but would add nebulized saline.  · Titrate supplemental oxygen to maintain SpO2  > 88%.   · Aspiration needs to be addressed as an outpatient.  · Follow up with Dr. Mccormick.       Danis Mendoza MD  [facility] Pulmonary/Critical Care

## 2020-09-13 NOTE — PLAN OF CARE
09/12/20 1956   Patient Assessment/Suction   Level of Consciousness (AVPU) alert   Respiratory Effort Unlabored   Expansion/Accessory Muscles/Retractions no use of accessory muscles   All Lung Fields Breath Sounds diminished   Rhythm/Pattern, Respiratory unlabored   Cough Frequency no cough   PRE-TX-O2   O2 Device (Oxygen Therapy) High Flow nasal Cannula   Flow (L/min) 6   SpO2 (!) 91 %   Pulse 104   Resp 18   Aerosol Therapy   $ Aerosol Therapy Charges Aerosol Treatment   Daily Review of Necessity (SVN) completed   Respiratory Treatment Status (SVN) given   Treatment Route (SVN) mask   Patient Position (SVN) HOB elevated   Post Treatment Assessment (SVN) breath sounds improved   Signs of Intolerance (SVN) none   Breath Sounds Post-Respiratory Treatment   Throughout All Fields Post-Treatment All Fields   Throughout All Fields Post-Treatment aeration increased   Post-treatment Heart Rate (beats/min) 103   Post-treatment Resp Rate (breaths/min) 18   Chest Physiotherapy   $ Chest Physiotherapy Charges Subsequent   Daily Review of Necessity (CPT) completed   Type (CPT) percussion   Method (CPT) mechanical percussor   Patient Position (CPT) HOB elevated   CPT Total Time (Min) 10   Post Treatment Assessment (CPT) breath sounds improved   Signs of Intolerance (CPT) none   Respiratory Evaluation   $ Care Plan Tech Time 15 min   Evaluation For   (CARE PLAN)

## 2020-09-13 NOTE — PROGRESS NOTES
[facility] Medicine  Progress Note    Patient name: Venkat Lara  MRN: [id number]  Admit Date: 9/8/2020   LOS: 5 days     SUBJECTIVE:     Principal problem: Primary atypical pneumonia due to Mycoplasma pneumoniae    Interval History:  Patient was seen and examined bedside. Currently breathing comfortably on 3-4 L oxygen via nasal cannula however nursing staff likes to bump it up to 5-7 L via high-flow nasal cannula. Still becomes hypoxic upon minimal ambulation. No other acute events overnight as per nursing staff.  In good spirit.     Scheduled Meds:   albuterol-ipratropium  3 mL Nebulization Q6H    buPROPion  300 mg Oral QHS    cholecalciferol (vitamin D3)  5,000 Units Oral Daily    enoxaparin  40 mg Subcutaneous Q24H    fluticasone furoate-vilanteroL  1 puff Inhalation Daily    gabapentin  300 mg Oral QHS    guaiFENesin  1,200 mg Oral BID    lactobacillus acidophilus & bulgar  1 packet Oral Daily    multivitamin  1 tablet Oral Daily    olopatadine  1 drop Both Eyes BID    oxybutynin  5 mg Oral Daily    pantoprazole  40 mg Oral Before breakfast    tiotropium  1 capsule Inhalation Daily     Continuous Infusions:  PRN Meds:acetaminophen, albuterol, albuterol-ipratropium, dextrose 50%, dextrose 50%, glucagon (human recombinant), glucose, glucose, zolpidem    Review of patient's allergies indicates:   Allergen Reactions    Statins-hmg-coa reductase inhibitors Other (See Comments)     Muscle cramps    Doxycycline Other (See Comments)     Stops gallbladder function    Adhesive Other (See Comments)     bruises    Erythromycin Other (See Comments)     Stomach pain       Review of Systems: As per interval history    OBJECTIVE:     Vital Signs (Most Recent)  Temp: 98.6 °F (37 °C) (09/13/20 0732)  Pulse: 104 (09/13/20 0732)  Resp: 16 (09/13/20 0732)  BP: (!) 113/59 (09/13/20 0732)  SpO2: 95 % (09/13/20 0840)    Vital Signs Range (Last 24H):  Temp:  [97.6 °F (36.4 °C)-99.1 °F (37.3 °C)]    Pulse:  []   Resp:  [16-18]   BP: (102-140)/(55-79)   SpO2:  [91 %-99 %]     I & O (Last 24H):    Intake/Output Summary (Last 24 hours) at 9/13/2020 1143  Last data filed at 9/13/2020 0600  Gross per 24 hour   Intake 450 ml   Output --   Net 450 ml       Physical Exam:  General: Patient resting comfortably in no acute distress. Appears as stated age. Calm  Eyes: No conjunctival injection. No scleral icterus.  ENT: Hearing grossly intact. No discharge from ears. No nasal discharge.   Neck: Supple, trachea midline. No JVD  CVS: RRR. No LE edema BL  Lungs:  On 3-5 L oxygen, bilateral crackles noted. No accessory muscle use.  Good air entry bilaterally  Abdomen:  Soft, nontender and nondistended.  No organomegaly  Neuro: AOx3. Moves all extremities. Follows commands. Responds appropriately   Skin:  No rash or erythema noted    Laboratory:  CBC:   Recent Labs   Lab 09/13/20  0718   WBC 3.75*   RBC 3.88*   HGB 12.6   HCT 38.8      *   MCH 32.5*   MCHC 32.5     BMP:   Recent Labs   Lab 09/12/20  0636   GLU 97      K 4.0   CL 98   CO2 31*   BUN 23   CREATININE 0.6   CALCIUM 8.8   MG 2.0       Diagnostic Results:  Reviewed all imaging    ASSESSMENT/PLAN:   70-year-old lady who was just discharged from our service when she was admitted for acute on chronic hypoxic respiratory failure likely due to atypical pneumonia, chronic aspiration in the background of bronchiectasis came back with shortness of breath and hypoxia.     Active Hospital Problems    Diagnosis  POA    *Primary atypical pneumonia due to Mycoplasma pneumoniae [J15.7]  Yes    Chronic pulmonary aspiration [T17.908A]  Yes    Bronchiectasis without complication [J47.9]  Yes    Elevated brain natriuretic peptide (BNP) level [R79.89]  Yes    Chronic obstructive pulmonary disease with acute lower respiratory infection [J44.0]  Yes    Pneumonia of both lungs due to infectious organism [J18.9]  Yes    Essential hypertension [I10]  Yes     Acute on chronic respiratory failure with hypoxia [J96.21]  Yes      Resolved Hospital Problems   No resolved problems to display.         Plan:   Overall presentation is concerning for atypical pneumonia due to mycoplasma pneumonia.  Still significant oxygen requirement upon ambulation    Follow-up on 2D echo-prelim report did not show low EF, Pericardial effusion    Resume low dose Verapamil    Continue supplemental oxygen, nebs, chest PT, azithromycin    Wean oxygen as tolerated    Consulted Pulmonary-recs appreciated    Continue current orders    Daily labs    Out of bed to chair    PT, OT    Wean oxygen as tolerated-goal SpO2 is 88-90%    VTE Risk Mitigation (From admission, onward)         Ordered     enoxaparin injection 40 mg  Every 24 hours      09/08/20 1329     IP VTE HIGH RISK PATIENT  Once      09/08/20 1329     Place sequential compression device  Until discontinued      09/08/20 1329                  Department Hospital Medicine  [facility]  Azeem Blum MD  Date of service: 09/13/2020

## 2020-09-13 NOTE — PT/OT/SLP EVAL
Physical Therapy Evaluation    Patient Name:  Venkat Lara   MRN:  [id number]    Recommendations:     Discharge Recommendations:  home   Discharge Equipment Recommendations: none   Barriers to discharge: None    Assessment:     Venkat Lara is a 70 y.o. female admitted with a medical diagnosis of Primary atypical pneumonia due to Mycoplasma pneumoniae.  She presents with the following impairments/functional limitations:  weakness, impaired endurance, gait instability .  Patient readily agreeable to PT evaluation this morning. Patient presented supine in bed and is independent with all transfers.  Patient also able to ambulate x 500 feet with no AD with O2 at 4L with SBA.  O2 sats and heart rate monitored during treatment as follows: pre gait 95%, 89 BPM, post gait 85% 139 BPM, after 2 minute rest 94%, 110 BPM.  Patient should continue gait training with vitals monitored.    Rehab Prognosis: Good; patient would benefit from acute skilled PT services to address these deficits and reach maximum level of function.    Recent Surgery: * No surgery found *      Plan:     During this hospitalization, patient to be seen 5 x/week to address the identified rehab impairments via gait training, therapeutic activities and progress toward the following goals:    · Plan of Care Expires:  10/11/20    Subjective     Chief Complaint: SOB  Patient/Family Comments/goals: go home  Pain/Comfort:       Patients cultural, spiritual, Orthodoxy conflicts given the current situation:      Living Environment:  Currently lives with family in 1 story home.  Prior to admission, patients level of function was independent.  Equipment used at home: oxygen.  DME owned (not currently used): none.  Upon discharge, patient will have assistance from family.    Objective:     Communicated with nurse prior to session.  Patient found supine with oxygen  upon PT entry to room.    General Precautions: Standard, fall   Orthopedic Precautions:N/A   Braces:        Exams:  · RLE ROM: WFL  · RLE Strength: WNL  · LLE ROM: WFL  · LLE Strength: WNL    Functional Mobility:  · Bed Mobility:     · Supine to Sit: independence  · Sit to Supine: independence  · Transfers:     · Sit to Stand:  independence with no AD  · Gait: x 500 feet with no AD with SBA and O2 at 4L    Therapeutic Activities and Exercises:   none given    AM-PAC 6 CLICK MOBILITY  Total Score:22     Patient left supine with call button in reach and nurse notified.    GOALS:   Multidisciplinary Problems     Physical Therapy Goals        Problem: Physical Therapy Goal    Goal Priority Disciplines Outcome Goal Variances Interventions   Physical Therapy Goal     PT, PT/OT Ongoing, Progressing     Description: Goals to be met by: 2020    Patient will increase functional independence with mobility by performin. Gait  x 300 feet with [place] using no AD.                      History:     Past Medical History:   Diagnosis Date    Colon polyp 2013    COPD, moderate 2010    was told by Dr. 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    Lumbar spinal stenosis     Oxygen dependent 2011    2L NC Nightly    Primary osteoarthritis of first carpometacarpal joints, bilateral     Rheumatoid arthritis     Scoliosis 2014    Spinal stenosis 2014    upper and lowerr back - tingling in left arm at times       Past Surgical History:   Procedure Laterality Date    BREAST SURGERY  2008    Reduction    BUNIONECTOMY Bilateral 1999     CARDIOVASCULAR STRESS TEST  2013    CARPAL TUNNEL RELEASE Left 10/08/2015    CATARACT EXTRACTION BILATERAL W/ ANTERIOR VITRECTOMY  04/2010    CMC ARTHROPLASTY, RIGHT  02/23/2004    COLONOSCOPY  04/18/2013    CORRECTION OF HAMMER TOE Left 8/14/2019    Procedure: CORRECTION, HAMMER TOE;  Surgeon: Jackson Brannon DPM;  Location: [facility] OR;  Service: Podiatry;  Laterality: Left;    ESOPHAGOGASTRODUODENOSCOPY  12/21/2012    2012-with dilation #1, 2013 #2    HAND SURGERY  2003    JOINT REPLACEMENT      REMOVAL OF HARDWARE FROM LOWER EXTREMITY Left 8/14/2019    Procedure: REMOVAL, HARDWARE, LOWER EXTREMITY;  Surgeon: Jackson Brannon DPM;  Location: [facility] OR;  Service: Podiatry;  Laterality: Left;    RHINOPLASTY TIP  02/2010    SURGICAL REMOVAL OF BUNION WITH OSTEOTOMY OF METATARSAL BONE Left 8/14/2019    Procedure: BUNIONECTOMY, WITH METATARSAL OSTEOTOMY;  Surgeon: Jackson Brannon DPM;  Location: [facility] OR;  Service: Podiatry;  Laterality: Left;  Arthrodesis 2nd PIP joint, screw 1st toe, C-arm, synthes-Merrill, 3.0, 4.0, AO modular set MERRILL BRUNSON NOTIFIED    TONSILLECTOMY, ADENOIDECTOMY  1970    TOTAL KNEE ARTHROPLASTY Right 12/2008    TOTAL KNEE ARTHROPLASTY Left 03/2015    TOTAL REDUCTION MAMMOPLASTY  01/2008    TUBAL LIGATION      VAGINAL DELIVERY  1983    x2       Time Tracking:     PT Received On: 09/13/20  PT Start Time: 0919     PT Stop Time: 0940  PT Total Time (min): 21 min     Billable Minutes: Evaluation 21      Chris MeGilligan, PT  09/13/2020

## 2020-09-14 VITALS
BODY MASS INDEX: 26.9 KG/M2 | TEMPERATURE: 98 F | HEART RATE: 89 BPM | RESPIRATION RATE: 18 BRPM | SYSTOLIC BLOOD PRESSURE: 98 MMHG | HEIGHT: 66 IN | WEIGHT: 167.38 LBS | DIASTOLIC BLOOD PRESSURE: 64 MMHG | OXYGEN SATURATION: 94 %

## 2020-09-14 LAB
GLUCOSE SERPL-MCNC: 89 MG/DL (ref 70–110)
GLUCOSE SERPL-MCNC: 99 MG/DL (ref 70–110)

## 2020-09-14 PROCEDURE — 94640 AIRWAY INHALATION TREATMENT: CPT

## 2020-09-14 PROCEDURE — 94664 DEMO&/EVAL PT USE INHALER: CPT

## 2020-09-14 PROCEDURE — 94668 MNPJ CHEST WALL SBSQ: CPT

## 2020-09-14 PROCEDURE — 25000242 PHARM REV CODE 250 ALT 637 W/ HCPCS: Performed by: HOSPITALIST

## 2020-09-14 PROCEDURE — 25000003 PHARM REV CODE 250: Performed by: FAMILY MEDICINE

## 2020-09-14 PROCEDURE — 97116 GAIT TRAINING THERAPY: CPT | Mod: CQ

## 2020-09-14 PROCEDURE — 99233 SBSQ HOSP IP/OBS HIGH 50: CPT | Mod: ,,, | Performed by: INTERNAL MEDICINE

## 2020-09-14 PROCEDURE — 99900035 HC TECH TIME PER 15 MIN (STAT)

## 2020-09-14 PROCEDURE — 94761 N-INVAS EAR/PLS OXIMETRY MLT: CPT

## 2020-09-14 PROCEDURE — 27000221 HC OXYGEN, UP TO 24 HOURS

## 2020-09-14 PROCEDURE — 99233 PR SUBSEQUENT HOSPITAL CARE,LEVL III: ICD-10-PCS | Mod: ,,, | Performed by: INTERNAL MEDICINE

## 2020-09-14 PROCEDURE — 25000003 PHARM REV CODE 250: Performed by: HOSPITALIST

## 2020-09-14 PROCEDURE — 97530 THERAPEUTIC ACTIVITIES: CPT | Mod: CQ

## 2020-09-14 RX ORDER — SODIUM CHLORIDE FOR INHALATION 3 %
4 VIAL, NEBULIZER (ML) INHALATION DAILY PRN
Qty: 120 ML | Refills: 0 | Status: SHIPPED | OUTPATIENT
Start: 2020-09-14 | End: 2020-10-14

## 2020-09-14 RX ORDER — FUROSEMIDE 20 MG/1
20 TABLET ORAL DAILY PRN
Qty: 30 TABLET | Refills: 0 | Status: SHIPPED | OUTPATIENT
Start: 2020-09-14 | End: 2023-03-30

## 2020-09-14 RX ORDER — FUROSEMIDE 20 MG/1
20 TABLET ORAL DAILY PRN
Status: DISCONTINUED | OUTPATIENT
Start: 2020-09-14 | End: 2020-09-14 | Stop reason: HOSPADM

## 2020-09-14 RX ORDER — GUAIFENESIN 600 MG/1
1200 TABLET, EXTENDED RELEASE ORAL 2 TIMES DAILY
Qty: 20 TABLET | Refills: 0 | Status: SHIPPED | OUTPATIENT
Start: 2020-09-14 | End: 2020-09-19

## 2020-09-14 RX ADMIN — IPRATROPIUM BROMIDE AND ALBUTEROL SULFATE 3 ML: .5; 3 SOLUTION RESPIRATORY (INHALATION) at 07:09

## 2020-09-14 RX ADMIN — LACTOBACILLUS ACIDOPHILUS / LACTOBACILLUS BULGARICUS 1 EACH: 100 MILLION CFU STRENGTH GRANULES at 09:09

## 2020-09-14 RX ADMIN — TIOTROPIUM BROMIDE 18 MCG: 18 CAPSULE ORAL; RESPIRATORY (INHALATION) at 08:09

## 2020-09-14 RX ADMIN — GUAIFENESIN 1200 MG: 600 TABLET, EXTENDED RELEASE ORAL at 09:09

## 2020-09-14 RX ADMIN — Medication 5000 UNITS: at 09:09

## 2020-09-14 RX ADMIN — IPRATROPIUM BROMIDE AND ALBUTEROL SULFATE 3 ML: .5; 3 SOLUTION RESPIRATORY (INHALATION) at 01:09

## 2020-09-14 RX ADMIN — VERAPAMIL HYDROCHLORIDE 180 MG: 180 TABLET, FILM COATED, EXTENDED RELEASE ORAL at 09:09

## 2020-09-14 RX ADMIN — FLUTICASONE FUROATE AND VILANTEROL TRIFENATATE 1 PUFF: 100; 25 POWDER RESPIRATORY (INHALATION) at 08:09

## 2020-09-14 RX ADMIN — OXYBUTYNIN CHLORIDE 5 MG: 5 TABLET, EXTENDED RELEASE ORAL at 09:09

## 2020-09-14 RX ADMIN — THERA TABS 1 TABLET: TAB at 09:09

## 2020-09-14 RX ADMIN — PANTOPRAZOLE SODIUM 40 MG: 40 TABLET, DELAYED RELEASE ORAL at 05:09

## 2020-09-14 NOTE — PROGRESS NOTES
[facility]  Pulmonology  Progress Note    Subjective     9/10/2020:  No major issues overnight.  Subjectively improved over the past 24 hours.  No new complaints.  9/11/2020:  No major issues overnight.  Subjectively improved over the past 24 hours.  Back at baseline and inquiring about discharge.  9/13/2020 - Stable overnight, RA sat 69%, 94% on 7 LPM at rest and desats with exertion.  Still with cough and productive of sputum. No new complaints.  CXR yesterday showed improvements in the infiltrates.  ECHO report pending.  9/14/2020:  No major issues overnight.  Still profoundly hypoxemic when off of NC.  Subjectively improved over the weekend.  No new complaints.     Review of Systems   Constitutional: Negative for fever and chills.   Respiratory: Positive for cough. Negative for sputum production, chest tightness, shortness of breath, wheezing and dyspnea on extertion.    Cardiovascular: Negative for chest pain, palpitations and leg swelling.   Gastrointestinal: Negative for nausea and abdominal pain.      I have personally reviewed the following during today's evaluation:  past medical history, ROS, family history, social history, surgical history, current inpatient medications,drug allergies, vital signs over the past 24 hours, results of relevant diagnostic studies and nursing/provider documentation from the past 24 hours.     Objective     VS Temp:  [98.1 °F (36.7 °C)-99.3 °F (37.4 °C)]   Pulse:  []   Resp:  [15-18]   BP: ()/(60-76)   SpO2:  [92 %-98 %]   Ideal body weight: 59.3 kg (130 lb 11.7 oz)  Adjusted ideal body weight: 66 kg (145 lb 6.4 oz)   I/O No intake or output data in the 24 hours ending 09/14/20 1001     Vent SpO2 (!) 94% on 3 LPM during my exam   PE Physical Exam   Constitutional: She is oriented to person, place, and time. She appears well-developed and well-nourished. She appears not cachectic. She is cooperative.  Non-toxic appearance. No distress. Nasal cannula  in place. She is not obese.   HENT:   Head: Normocephalic.   Right Ear: External ear normal.   Left Ear: External ear normal.   Nose: Nose normal.   Mouth/Throat: Oropharynx is clear and moist. Normal dentition. Mallampati Score: I.   Neck: Normal range of motion. No JVD present. No thyromegaly present.   Cardiovascular: Normal rate, regular rhythm, normal heart sounds and intact distal pulses.   No murmur heard.  Pulmonary/Chest: Normal expansion, symmetric chest wall expansion and effort normal. No tachypnea. She has no decreased breath sounds. She has no wheezes. She has rhonchi. She has rales. Chest wall is not dull to percussion. She exhibits no tenderness.   Abdominal: Soft. Bowel sounds are normal. She exhibits no distension and no mass. There is no abdominal tenderness.   Musculoskeletal: Normal range of motion.         General: No tenderness or edema.   Lymphadenopathy: No supraclavicular adenopathy is present.     She has no cervical adenopathy.     She has no axillary adenopathy.   Neurological: She is alert and oriented to person, place, and time. No cranial nerve deficit.   Skin: Skin is warm and dry. No rash noted. She is not diaphoretic.   Psychiatric: She has a normal mood and affect. Her behavior is normal. Thought content normal.   Nursing note and vitals reviewed.      Labs I have personally reviewed and interpreted all labs / diagnostic studies obtained over the past 24 hours, and relevant results are as follows:  No results for input(s): WBC, RBC, HGB, HCT, PLT, MCV, MCH, MCHC, GLUCOSE, NA, K, CL, CO2, BUN, CREATININE, MG, ALT, AST, ALKPHOS, BILITOT, PROT, ALBUMIN, PH, PCO2, PO2, HCO3, POCSATURATED, BE, PT, INR, APTT, CPK, CPKMB, TROPONINI, MB in the last 24 hours.    Invalid input(s):  CALCIUM   Imaging I have personally reviewed and interpreted the following images and reviewed the associated Radiology report.  I have reviewed and interpreted all pertinent imaging results/findings within the  past 24 hours.  No new images     Micro I have personally reviewed and interpreted the available culture data.  Relevant results are as follows.  Blood Culture   9/8/2020:  NG x 5 days    Sputum Culture   Lab Results   Component Value Date    GSRESP >10 epithelial cells per low power field 08/27/2020    GSRESP Few WBC's 08/27/2020    GSRESP Many Gram positive cocci 08/27/2020    GSRESP Few Gram positive rods 08/27/2020    GSRESP Rare Gram negative rods 08/27/2020    GSRESP  08/27/2020     Predominance of oropharyngeal jennifer. Please recollect.    GSRESP  08/27/2020     Results called to and read back by: Martha Holloway RN on [facility] by SLOANE    GSRESP 08/27/2020  09:56 08/27/2020    RESPIRATORYC Normal respiratory jennifer 08/26/2020    and Urine Culture    Lab Results   Component Value Date    LABURIN NO GROWTH AFTER 48 HOURS. 04/29/2013      Medications Scheduled    albuterol-ipratropium  3 mL Nebulization Q6H    buPROPion  300 mg Oral QHS    cholecalciferol (vitamin D3)  5,000 Units Oral Daily    enoxaparin  40 mg Subcutaneous Q24H    fluticasone furoate-vilanteroL  1 puff Inhalation Daily    gabapentin  300 mg Oral QHS    guaiFENesin  1,200 mg Oral BID    lactobacillus acidophilus & bulgar  1 packet Oral Daily    multivitamin  1 tablet Oral Daily    olopatadine  1 drop Both Eyes BID    oxybutynin  5 mg Oral Daily    pantoprazole  40 mg Oral Before breakfast    tiotropium  1 capsule Inhalation Daily    verapamiL  180 mg Oral BID      Continuous Infusions:      PRN   acetaminophen, albuterol, albuterol-ipratropium, dextrose 50%, dextrose 50%, glucagon (human recombinant), glucose, glucose, zolpidem        Assessment       Active Hospital Problems    Diagnosis    *Primary atypical pneumonia due to Mycoplasma pneumoniae    Chronic pulmonary aspiration    Bronchiectasis without complication    Elevated brain natriuretic peptide (BNP) level    Chronic obstructive pulmonary disease with acute lower  respiratory infection    Pneumonia of both lungs due to infectious organism    Essential hypertension    Acute on chronic respiratory failure with hypoxia      My Impression:  Acute on chronic hypoxemic respiratory failure secondary to mycoplasma pneumonia infection superimposed on bronchiectasis/COPD.    Plan     Pulmonary  · Stable to discharge home but will probably need high flow concentrator.  · Repeat walking SpO2 today.  · Completed a 5 day course of macrolide.  · Continue LABA/LAMA and prn BARBARA at discharge.  · Continue bronchial hygiene techniques at home, but would add nebulized saline.  · Titrate supplemental oxygen to maintain SpO2  > 88%.   · Aspiration needs to be addressed as an outpatient.  · Follow up with Dr. Mccormick.     Ovidio Estrada MD  [facility]  09/14/2020  10:04 AM

## 2020-09-14 NOTE — PLAN OF CARE
Problem: Physical Therapy Goal  Goal: Physical Therapy Goal  Description: Goals to be met by: 2020    Patient will increase functional independence with mobility by performin. Gait  x 300 feet with [place] using no AD.     Outcome: Ongoing, Progressing   Pt continues to progress towards goals.

## 2020-09-14 NOTE — PLAN OF CARE
09/14/20 0757   Patient Assessment/Suction   Level of Consciousness (AVPU) alert   Respiratory Effort Normal;Unlabored   All Lung Fields Breath Sounds diminished   PRE-TX-O2   O2 Device (Oxygen Therapy) High Flow nasal Cannula   $ Is the patient on Low Flow Oxygen? Yes   Flow (L/min) 4   SpO2 (!) 94 %   Pulse Oximetry Type Intermittent   $ Pulse Oximetry - Multiple Charge Pulse Oximetry - Multiple   Pulse 102   Resp 18   Aerosol Therapy   $ Aerosol Therapy Charges Aerosol Treatment   Daily Review of Necessity (SVN) completed   Respiratory Treatment Status (SVN) given   Treatment Route (SVN) mask;oxygen   Patient Position (SVN) HOB elevated   Post Treatment Assessment (SVN) breath sounds improved   Signs of Intolerance (SVN) none   Inhaler   $ Inhaler Charges MDI (Metered Dose Inahler) Treatment   Daily Review of Necessity (Inhaler) completed   Respiratory Treatment Status (Inhaler) given;mouth rinsed post treatment   Treatment Route (Inhaler) mouthpiece   Patient Position (Inhaler) semi-Rivero's   Post Treatment Assessment (Inhaler) breath sounds improved   Signs of Intolerance (Inhaler) none   Vibratory PEP Therapy   $ Vibratory PEP Charges Acapella Therapy   $ Vibratory PEP Tech Time Charges 15 min   Type (PEP Therapy) vibratory/oscillatory   Device (PEP Therapy) flutter   Route (PEP Therapy) mouthpiece   Breaths per Cycle (PEP Therapy) 10   Cycles (PEP Therapy) 1   Post Treatment Assessment (PEP) breath sounds improved   Signs of Intolerance (PEP Therapy) none   Respiratory Evaluation   $ Care Plan Tech Time 15 min

## 2020-09-14 NOTE — PLAN OF CARE
09/13/20 1938   Patient Assessment/Suction   Level of Consciousness (AVPU) alert   Respiratory Effort Unlabored   Expansion/Accessory Muscles/Retractions no use of accessory muscles   All Lung Fields Breath Sounds clear   Rhythm/Pattern, Respiratory unlabored   Cough Frequency no cough   PRE-TX-O2   O2 Device (Oxygen Therapy) High Flow nasal Cannula   Flow (L/min) 4   SpO2 96 %   Pulse 91   Resp 18   Aerosol Therapy   $ Aerosol Therapy Charges Aerosol Treatment   Daily Review of Necessity (SVN) completed   Respiratory Treatment Status (SVN) given   Treatment Route (SVN) mask   Patient Position (SVN) HOB elevated   Post Treatment Assessment (SVN) breath sounds improved   Signs of Intolerance (SVN) none   Breath Sounds Post-Respiratory Treatment   Throughout All Fields Post-Treatment All Fields   Throughout All Fields Post-Treatment aeration increased   Post-treatment Heart Rate (beats/min) 90   Post-treatment Resp Rate (breaths/min) 18   Vibratory PEP Therapy   $ Vibratory PEP Charges Acapella Therapy   $ Vibratory PEP Equipment Aerobika Equipment   $ Vibratory PEP Tech Time Charges 15 min   Daily Review of Necessity (PEP Therapy) completed   Type (PEP Therapy) vibratory/oscillatory   Route (PEP Therapy) mouthpiece   Breaths per Cycle (PEP Therapy) 10   Chest Physiotherapy   $ Chest Physiotherapy Charges Subsequent   Daily Review of Necessity (CPT) completed   Type (CPT) percussion   Method (CPT) mechanical percussor   Patient Position (CPT) HOB elevated   CPT Total Time (Min) 10   Post Treatment Assessment (CPT) breath sounds improved   Signs of Intolerance (CPT) none   Respiratory Evaluation   $ Care Plan Tech Time 15 min   Evaluation For   (care plan)

## 2020-09-14 NOTE — CARE UPDATE
09/14/20 1202   Home Oxygen Qualification   Room Air SpO2 At Rest (!) 85 %   SpO2 During Exertion on O2 90 %   Heart Rate on O2 108 bpm   Exertion O2 LPM 3 LPM   SpO2 on Recovery 94 %   Recovery Heart Rate 105 bpm   Recovery O2 LPM 3 LPM   Home O2 Eval Comments pt qualifies for 3lpm at home

## 2020-09-14 NOTE — PLAN OF CARE
09/14/20 1604   Final Note   Assessment Type Final Discharge Note   Anticipated Discharge Disposition Home   Patient is discharging home. She has oxygen at home but she only uses it at night. She doesn't have a portable. She uses [facility] for Oxygen so talked to Smita at [phone] and sent over the proper paperwork since now she needs oxygen all the time. Called back Nathaniel and talked to Deepthi who said someone will deliver the portable tank to the hospital in 15 minutes and she already has concentrator at home.

## 2020-09-14 NOTE — DISCHARGE SUMMARY
[facility] Medicine  Discharge Summary      Patient Name: Venkat Lara  MRN: [id number]  Admission Date: 9/8/2020  Hospital Length of Stay: 6 days  Discharge Date and Time:  09/14/2020 5:51 PM  Attending Physician: No att. providers found   Discharging Provider: Azeem Blum MD  Primary Care Provider: Soha Rowan NP      HPI:   69 yo CF with PMH of aspiration pneumonia, moderate COPD, nocturnal hypoxemia, bronchiectasis presents with SOB.  Onset since discharge on 8/28/2020  She reports she hasn't improved since discharge  Held off on coming back to the ED due to labor day weekend  However she has become progressively more hypoxemic at home during the night  For the past 2 days, SaO2 60s per pt report with episodes of AMS  Has been wearing home O2 without improvement  +dry cough  No chest pain    * No surgery found *      Hospital Course:   During her hospital stay patient was treated for acute hypoxic respiratory failure likely due to atypical pneumonia from mycoplasma.  She was evaluated by Pulmonary who recommended aggressive pulmonary toilet, nebs, antibiotics with azithromycin.  Patient improved remarkably in last 24-48 hours.  Today her formal O2 evaluation was performed and she required 3 L oxygen upon ambulation.  Patient also has severe acid reflux and chronic aspiration that might be playing some role in her frequent lower respiratory infections.  She was advised to follow-up with GI who can consider for Renato fundoplication?  I discussed case with Pulmonary before discharge-recommended saline nebs with aman Castro.r.n. Lasix.  She was advised to follow-up with her PCP and Pulmonary.  We arranged 3 L home O2 with portable tank.     Physical Exam:  General: Patient resting comfortably in no acute distress. Appears as stated age. Calm  Eyes: No conjunctival injection. No scleral icterus.  ENT: Hearing grossly intact. No discharge from ears. No nasal discharge.   Neck:  Supple, trachea midline. No JVD  CVS: RRR. No LE edema BL  Lungs:  On 3L oxygen, very faint bilateral crackles noted. No accessory muscle use.  Good air entry bilaterally  Abdomen:  Soft, nontender and nondistended.  No organomegaly  Neuro: AOx3. Moves all extremities. Follows commands. Responds appropriately   Skin:  No rash or erythema noted    Vital signs reviewed.  Nursing notes reviewed.     Consults:   Consults (From admission, onward)        Status Ordering Provider     Inpatient consult to Hospitalist  Once     Provider:  Katie Magallon MD    Acknowledged MARIXA MORALES     Inpatient consult to Pulmonology  Once     Provider:  Katie Magallon MD    Completed MARIXA MORALES     Inpatient consult to   Once     Provider:  (Not yet assigned)    Completed JARON ESTEVEZ          No new Assessment & Plan notes have been filed under this hospital service since the last note was generated.  Service: Hospital Medicine    Final Active Diagnoses:    Diagnosis Date Noted POA    PRINCIPAL PROBLEM:  Primary atypical pneumonia due to Mycoplasma pneumoniae [J15.7] 09/09/2020 Yes    Chronic pulmonary aspiration [T17.908A] 09/09/2020 Yes    Bronchiectasis without complication [J47.9] 09/09/2020 Yes    Elevated brain natriuretic peptide (BNP) level [R79.89] 09/09/2020 Yes    Chronic obstructive pulmonary disease with acute lower respiratory infection [J44.0] 09/09/2020 Yes    Pneumonia of both lungs due to infectious organism [J18.9] 09/08/2020 Yes    Essential hypertension [I10] 08/25/2020 Yes    Acute on chronic respiratory failure with hypoxia [J96.21] 08/25/2020 Yes      Problems Resolved During this Admission:       Discharged Condition: good    Disposition: Home or Self Care    Follow Up:  Follow-up Information     Soha Rowan NP In 2 weeks.    Specialty: Family Medicine  Contact information:  [address]  [facility] [address] [phone]        "      Ovidio Estrada MD In 2 weeks.    Specialties: Hospitalist, Pulmonary Disease  Contact information:  [address] [facility]  [address]  [place] [address]  [phone]             Mega Wang MD In 1 week.    Specialty: Gastroenterology  Contact information:  [address] [facility]  [address]  [place] [address]  [phone]                 Patient Instructions:      OXYGEN FOR HOME USE     Order Specific Question Answer Comments   Liter Flow 3    Duration Continuous    Qualifying SpO2: 85    Testing done at: Rest    Route nasal cannula    Portable mode: continuous    Device home concentrator with portable unit    Length of need (in months): 99 mos    Patient condition with qualifying saturation COPD    Height: 5' 6" (1.676 m)    Weight: 75.9 kg (167 lb 6.4 oz)    Does patient have medical equipment at home? oxygen    Alternative treatment measures have been tried or considered and deemed clinically ineffective. Yes      Diet Cardiac     Notify your health care provider if you experience any of the following:  temperature >100.4     Notify your health care provider if you experience any of the following:  difficulty breathing or increased cough     Activity as tolerated       Significant Diagnostic Studies: Labs:   BMP:   Recent Labs   Lab 09/13/20  0950   GLU 75      K 4.3   CL 99   CO2 25   BUN 13   CREATININE 0.8   CALCIUM 9.3   MG 1.9    and CBC   Recent Labs   Lab 09/13/20  0718   WBC 3.75*   HGB 12.6   HCT 38.8          Pending Diagnostic Studies:     Procedure Component Value Units Date/Time    Echo Color Flow Doppler? Yes [[id number]] Resulted: 09/12/20 1207    Order Status: Sent Lab Status: In process Updated: 09/12/20 1207     BSA 1.88 m2      TDI SEPTAL 0.07 m/s      LV LATERAL E/E' RATIO 5.80 m/s      LV SEPTAL E/E' RATIO 8.29 m/s      AORTIC VALVE CUSP SEPERATION 2.08 cm      TDI LATERAL 0.10 m/s      PV PEAK VELOCITY 89.22 cm/s      LVIDD 4.20 cm      IVS 0.70 cm      PW 0.70 cm      Ao root " annulus 2.98 cm      LVIDS 2.85 cm      FS 32 %      LV mass 85.07 g      RVDD 262.00 cm      Left Ventricle Relative Wall Thickness 0.33 cm      AV mean gradient 5 mmHg      AV valve area 3.39 cm2      AV Velocity Ratio 86.43     AV index (prosthetic) 0.97     E/A ratio 0.82     Mean e' 0.09 m/s      E wave decelartion time 260.39 msec      IVRT 120.17 msec      LVOT diameter 2.11 cm      LVOT area 3.5 cm2      LVOT peak brendon 133.96 m/s      LVOT peak VTI 21.28 cm      Ao peak brendon 1.55 m/s      Ao VTI 21.97 cm      LVOT stroke volume 74.37 cm3      AV peak gradient 10 mmHg      E/E' ratio 6.82 m/s      MV Peak E Brendon 0.58 m/s      TR Max Brendon 2.21 m/s      MV Peak A Brendon 0.71 m/s      LV Mass Index 46 g/m2      Triscuspid Valve Regurgitation Peak Gradient 20 mmHg          Medications:  Reconciled Home Medications:      Medication List      START taking these medications    furosemide 20 MG tablet  Commonly known as: LASIX  Take 1 tablet (20 mg total) by mouth daily as needed (Worsening shortness of breath or weight gain).     guaiFENesin 600 mg 12 hr tablet  Commonly known as: MUCINEX  Take 2 tablets (1,200 mg total) by mouth 2 (two) times daily. for 5 days     sodium chloride 3% 3 % nebulizer solution  Take 4 mLs by nebulization daily as needed for Other or Cough. 30 vials        CONTINUE taking these medications    buPROPion 300 MG 24 hr tablet  Commonly known as: WELLBUTRIN XL  Take 300 mg by mouth every evening.     co-enzyme Q-10 30 mg capsule  Take 100 mg by mouth once daily.     eszopiclone 2 MG Tab  Commonly known as: LUNESTA  Take 2 mg by mouth every evening.     garlic 1,000 mg Cap  Take 1,000 mg by mouth once daily.     ibuprofen 200 mg Cap  ibuprofen 200 mg capsule   Take 1 capsule every 6 hours by oral route as needed.     lactobacillus acidophilus & bulgar 100 million cell packet  Commonly known as: LACTINEX  Take 1 tablet by mouth once daily.     multivitamin with minerals tablet  Take 1 tablet by mouth  once daily.     MYRBETRIQ 25 mg Tb24 ER tablet  Generic drug: mirabegron  Take 25 mg by mouth every evening.     olopatadine 0.1 % ophthalmic solution  Commonly known as: PATANOL  Place 1 drop into both eyes 2 (two) times daily.     omeprazole 40 MG capsule  Commonly known as: PRILOSEC  Take 40 mg by mouth every morning.     * PROAIR HFA 90 mcg/actuation inhaler  Generic drug: albuterol  Inhale 2 puffs into the lungs every 6 (six) hours as needed.     * albuterol 2.5 mg /3 mL (0.083 %) nebulizer solution  Commonly known as: PROVENTIL  Take 2.5 mg by nebulization every 4 (four) hours as needed for Wheezing. Rescue     STIOLTO RESPIMAT 2.5-2.5 mcg/actuation Mist  Generic drug: tiotropium-olodateroL  Inhale 1 puff into the lungs once daily. Controller     telmisartan 40 MG Tab  Commonly known as: MICARDIS  Take 1 tablet (40 mg total) by mouth once daily.     verapamiL 240 MG C24p  Commonly known as: VERELAN  Take 240 mg by mouth 2 (two) times daily.     VITAMIN B-12 1000 MCG tablet  Generic drug: cyanocobalamin  Take 100 mcg by mouth once daily.     vitamin D 1000 units Tab  Commonly known as: VITAMIN D3  Take 5,000 Units by mouth once daily.         * This list has 2 medication(s) that are the same as other medications prescribed for you. Read the directions carefully, and ask your doctor or other care provider to review them with you.                Indwelling Lines/Drains at time of discharge:   Lines/Drains/Airways     None                 Time spent on the discharge of patient: 39 minutes  Patient was seen and examined on the date of discharge and determined to be suitable for discharge.         Azeem Blum MD  Department of Hospital Medicine  [facility]

## 2020-09-14 NOTE — PT/OT/SLP PROGRESS
Physical Therapy Treatment    Patient Name:  Venkat Lara   MRN:  [id number]    Recommendations:     Discharge Recommendations:  home   Discharge Equipment Recommendations: none   Barriers to discharge: None    Assessment:     Venkat Lara is a 70 y.o. female admitted with a medical diagnosis of Primary atypical pneumonia due to Mycoplasma pneumoniae.  She presents with the following impairments/functional limitations:  weakness, impaired endurance, gait instability. Patient presents resting in bed with HOB elevated; agreeable to PT treatment. Patient progressing with gait and mobility today, increasing gait distance without LOB noted. Pt did exhibit some SOB at end of gait training on 3L O2. O2 sats noted to be in the 80s but recovered quickly with seated rest and verbal cues for pursed lip breathing technique. Patient with good tolerance to ambulation. Continue with PT and POC.    Rehab Prognosis: Good; patient would benefit from acute skilled PT services to address these deficits and reach maximum level of function.    Recent Surgery: * No surgery found *      Plan:     During this hospitalization, patient to be seen 5 x/week to address the identified rehab impairments via gait training, therapeutic activities and progress toward the following goals:    · Plan of Care Expires:  10/11/20    Subjective     Chief Complaint: No complaints  Patient/Family Comments/goals:   Pain/Comfort:  · Pain Rating 1: 0/10      Objective:     Communicated with RN prior to session.  Patient found HOB elevated with oxygen upon PT entry to room.     General Precautions: Standard, fall   Orthopedic Precautions:N/A   Braces:       Functional Mobility:  · Bed Mobility:     · Supine to Sit: independence  · Sit to Supine: independence  · Transfers:  Sit to Stand:  independence with no AD  · Gait: 1,000ft without AD and supervision      AM-PAC 6 CLICK MOBILITY          Therapeutic Activities and Exercises:   bed mobility; sitting EOB for trunk  control and midline orientation; sit <> stands; transfer training; energy conservation education; pursed lip breathing education    Patient left HOB elevated with all lines intact and call button in reach..    GOALS:   Multidisciplinary Problems     Physical Therapy Goals        Problem: Physical Therapy Goal    Goal Priority Disciplines Outcome Goal Variances Interventions   Physical Therapy Goal     PT, PT/OT Ongoing, Progressing     Description: Goals to be met by: 2020    Patient will increase functional independence with mobility by performin. Gait  x 300 feet with [place] using no AD.                      Time Tracking:     PT Received On: 20  PT Start Time: 1132     PT Stop Time: 1155  PT Total Time (min): 23 min     Billable Minutes: Gait Training 13 and Therapeutic Activity 10    Treatment Type: Treatment  PT/PTA: PTA     PTA Visit Number: 1     Maya Forrester PTA  2020

## 2020-09-14 NOTE — HOSPITAL COURSE
During her hospital stay patient was treated for acute hypoxic respiratory failure likely due to atypical pneumonia from mycoplasma.  She was evaluated by Pulmonary who recommended aggressive pulmonary toilet, nebs, antibiotics with azithromycin.  Patient improved remarkably in last 24-48 hours.  Today her formal O2 evaluation was performed and she required 3 L oxygen upon ambulation.  Patient also has severe acid reflux and chronic aspiration that might be playing some role in her frequent lower respiratory infections.  She was advised to follow-up with GI who can consider for Renato fundoplication?  I discussed case with Pulmonary before discharge-recommended saline nebs with Acapella, p.r.n. Lasix.  She was advised to follow-up with her PCP and Pulmonary.  We arranged 3 L home O2 with portable tank.     Physical Exam:  General: Patient resting comfortably in no acute distress. Appears as stated age. Calm  Eyes: No conjunctival injection. No scleral icterus.  ENT: Hearing grossly intact. No discharge from ears. No nasal discharge.   Neck: Supple, trachea midline. No JVD  CVS: RRR. No LE edema BL  Lungs:  On 3L oxygen, very faint bilateral crackles noted. No accessory muscle use.  Good air entry bilaterally  Abdomen:  Soft, nontender and nondistended.  No organomegaly  Neuro: AOx3. Moves all extremities. Follows commands. Responds appropriately   Skin:  No rash or erythema noted    Vital signs reviewed.  Nursing notes reviewed.

## 2020-09-17 LAB
AORTIC ROOT ANNULUS: 2.98 CM
AORTIC VALVE CUSP SEPERATION: 2.08 CM
AV INDEX (PROSTH): 0.97
AV MEAN GRADIENT: 5 MMHG
AV PEAK GRADIENT: 10 MMHG
AV VALVE AREA: 3.39 CM2
AV VELOCITY RATIO: 86.43
BSA FOR ECHO PROCEDURE: 1.88 M2
CV ECHO LV RWT: 0.33 CM
DOP CALC AO PEAK VEL: 1.55 M/S
DOP CALC AO VTI: 21.97 CM
DOP CALC LVOT AREA: 3.5 CM2
DOP CALC LVOT DIAMETER: 2.11 CM
DOP CALC LVOT PEAK VEL: 133.96 M/S
DOP CALC LVOT STROKE VOLUME: 74.37 CM3
DOP CALCLVOT PEAK VEL VTI: 21.28 CM
E WAVE DECELERATION TIME: 260.39 MSEC
E/A RATIO: 0.82
E/E' RATIO: 6.82 M/S
ECHO LV POSTERIOR WALL: 0.7 CM (ref 0.6–1.1)
FRACTIONAL SHORTENING: 32 % (ref 28–44)
INTERVENTRICULAR SEPTUM: 0.7 CM (ref 0.6–1.1)
IVRT: 120.17 MSEC
LEFT INTERNAL DIMENSION IN SYSTOLE: 2.85 CM (ref 2.1–4)
LEFT VENTRICLE MASS INDEX: 46 G/M2
LEFT VENTRICULAR INTERNAL DIMENSION IN DIASTOLE: 4.2 CM (ref 3.5–6)
LEFT VENTRICULAR MASS: 85.07 G
LV LATERAL E/E' RATIO: 5.8 M/S
LV SEPTAL E/E' RATIO: 8.29 M/S
MV PEAK A VEL: 0.71 M/S
MV PEAK E VEL: 0.58 M/S
PISA TR MAX VEL: 2.21 M/S
PV PEAK VELOCITY: 89.22 CM/S
RA PRESSURE: 3 MMHG
RIGHT VENTRICULAR END-DIASTOLIC DIMENSION: 262 CM
TDI LATERAL: 0.1 M/S
TDI SEPTAL: 0.07 M/S
TDI: 0.09 M/S
TR MAX PG: 20 MMHG
TV REST PULMONARY ARTERY PRESSURE: 23 MMHG

## 2020-09-28 DIAGNOSIS — J69.0 ASPIRATION PNEUMONITIS: Primary | ICD-10-CM

## 2020-09-28 DIAGNOSIS — R13.10 DYSPHAGIA: ICD-10-CM

## 2020-09-29 DIAGNOSIS — J69.0 ASPIRATION PNEUMONIA: ICD-10-CM

## 2020-09-29 DIAGNOSIS — R13.10 PROBLEMS WITH SWALLOWING AND MASTICATION: Primary | ICD-10-CM

## 2020-09-29 DIAGNOSIS — R13.10 DYSPHAGIA, UNSPECIFIED: ICD-10-CM

## 2020-10-27 ENCOUNTER — HOSPITAL ENCOUNTER (OUTPATIENT)
Dept: RADIOLOGY | Facility: HOSPITAL | Age: 70
Discharge: HOME | End: 2020-10-27
Attending: INTERNAL MEDICINE
Payer: MEDICARE

## 2020-10-27 ENCOUNTER — CLINICAL SUPPORT (OUTPATIENT)
Dept: REHABILITATION | Facility: HOSPITAL | Age: 70
End: 2020-10-27
Attending: INTERNAL MEDICINE
Payer: MEDICARE

## 2020-10-27 DIAGNOSIS — J69.0 ASPIRATION PNEUMONITIS: ICD-10-CM

## 2020-10-27 DIAGNOSIS — J69.0 ASPIRATION PNEUMONIA: ICD-10-CM

## 2020-10-27 DIAGNOSIS — R13.10 DYSPHAGIA: ICD-10-CM

## 2020-10-27 DIAGNOSIS — R13.14 PHARYNGOESOPHAGEAL DYSPHAGIA: ICD-10-CM

## 2020-10-27 PROCEDURE — 71046 X-RAY EXAM CHEST 2 VIEWS: CPT | Mod: TC,FY

## 2020-10-27 PROCEDURE — 92611 MOTION FLUOROSCOPY/SWALLOW: CPT | Mod: PN

## 2020-10-27 PROCEDURE — 74230 X-RAY XM SWLNG FUNCJ C+: CPT | Mod: TC

## 2020-10-27 PROCEDURE — 74230 FL MODIFIED BARIUM SWALLOW SPEECH STUDY: ICD-10-PCS | Mod: 26,,, | Performed by: RADIOLOGY

## 2020-10-27 PROCEDURE — 92610 EVALUATE SWALLOWING FUNCTION: CPT | Mod: PN

## 2020-10-27 PROCEDURE — 92526 ORAL FUNCTION THERAPY: CPT | Mod: PN

## 2020-10-27 PROCEDURE — 74230 X-RAY XM SWLNG FUNCJ C+: CPT | Mod: 26,,, | Performed by: RADIOLOGY

## 2020-10-27 PROCEDURE — 92611 MOTION FLUOROSCOPY/SWALLOW: CPT

## 2020-10-27 PROCEDURE — 71046 XR CHEST PA AND LATERAL: ICD-10-PCS | Mod: 26,,, | Performed by: RADIOLOGY

## 2020-10-27 PROCEDURE — 71046 X-RAY EXAM CHEST 2 VIEWS: CPT | Mod: 26,,, | Performed by: RADIOLOGY

## 2020-10-27 NOTE — PLAN OF CARE
Outpatient Neurological Rehabilitation  MODIFIED BARIUM SWALLOW STUDY      Date: 10/27/2020     Name: Venkat Lara   MRN: [id number]    Therapy Diagnosis: mild pharyngeal-esophageal dysphagia    Physician: Gregory Mccormick MD  Physician Orders: SLP video swallow  Medical Diagnosis from Referral: Dysphagia, unspecified [R13.10], Aspiration pneumonia [J69.0]    Date of Evaluation:  10/27/2020    Time In:  1300  Time Out:  1340  Total Billable Time: 40     Procedure Min.   Fl Modified Barium Swallow Speech  15   Swallow and Oral Function Evaluation   15     Precautions:Standard and Fall    Subjective   Date of Onset: August 2020    Past Medical History: Venkat Lara  has a past medical history of Colon polyp (01/01/2013), COPD, moderate (01/01/2010), Degeneration of cervical intervertebral disc, Depression with anxiety, GERD (gastroesophageal reflux disease) (01/01/2008), Hammertoe of second toe of left foot (08/07/2019), Headaches, cluster (01/01/1997), Heart valve regurgitation (2014), Hemorrhoids (01/01/1983), Hiatal hernia (01/01/1997), Hyperlipidemia (01/01/1995), Hypertension, Legally blind (01/01/1976), Lumbar spinal stenosis, Oxygen dependent (01/01/2011), Primary osteoarthritis of first carpometacarpal joints, bilateral, Rheumatoid arthritis, Scoliosis (01/01/2014), and Spinal stenosis (01/01/2014).  Venkat Lara  has a past surgical history that includes TONSILLECTOMY, ADENOIDECTOMY (1970); Bunionectomy (Bilateral, 01/1999); Joint replacement; Tubal ligation; Cataract extraction bilateral w/ anterior vitrectomy (04/2010); Rhinoplasty tip (02/2010); Total Reduction Mammoplasty (01/2008); [facility] ARTHROPLASTY, RIGHT (02/23/2004); Total knee arthroplasty (Right, 12/2008); Total knee arthroplasty (Left, 03/2015); Breast surgery (2008); Colonoscopy (04/18/2013); Esophagogastroduodenoscopy (12/21/2012); Hand surgery (2003); Vaginal delivery (1983); Carpal tunnel release (Left, 10/08/2015); Cardiovascular stress test  (2013); Surgical removal of bunion with osteotomy of metatarsal bone (Left, 8/14/2019); Removal of hardware from lower extremity (Left, 8/14/2019); and Correction of hammer toe (Left, 8/14/2019).    The patient is a 70 y.o. female who complains of coughing, difficulty swallowing solids and history of pneumonia .     The Pt gave the following history:   -Current diet at home: Unrestricted (IDDSI 0/7)   -Feeding Method: independent in self-feeding  -Neuro: Pt denied any neurological diagnoses.  -GI: Pt endorsed GI diagnosis of GERD and Reflux. Pt on daily medication, Prilosec. Pt denied all other GI diagnoses.   -Pulmonary: Pt endorsed pulmonary diagnosis of recent pneumonia (August and September 2020) and COPD. Pt denied all other pulmonary diagnoses.  -Surgery: Pt denied any throat or neck surgery.  -Respiratory Status: room air    The following observations were made:   -Mental status: Alert and Cooperative  -Factors affecting performance: no difficulties participating in the study    Medical Hx and Allergies:    Review of patient's allergies indicates:   Allergen Reactions    Statins-hmg-coa reductase inhibitors Other (See Comments)     Muscle cramps    Doxycycline Other (See Comments)     Stops gallbladder function    Adhesive Other (See Comments)     bruises    Erythromycin Other (See Comments)     Stomach pain     Pain Scale:  0/10 on VAS currently.   Pain Location: n/a    Objective     Modified Barium Swallow Study  A modified barium swallow study was ordered to objectively evaluate the safety and efficiency of the patients swallowing and to rule out aspiration.      The patient was seen in radiology seated upright in a video imaging chair for lateral views of the larynx and an A/P view. She tolerated the procedure well.     A cranial nerve examination revealed the following:  Trigeminal Nerve (V) Jaw Posture at rest: Closed  Mandible Elevation: WFL  Mandible Depression: WFL  Mandible lateralization:  WFL  Facial Pain: None noted  Facial Sensation: intact for all three branches  Abnormal movement: absent   Facial Nerve (VII) Facial Symmetry: Symmetrical  Able to wrinkle forehead bilaterally: yes  Able to close eyes tightly: yes  Labial lateralization: WFL  Labial protrusion: WFL  Labial seal: WFL  Abnormal Movement: absent   Glossopharyngeal Nerve (IX) and Vagus Nerve (X) Palatal symmetry: Symmetrical  Palatal movement with short production of ah: Symmetrical  Resonance: Normal  Voice Prior to PO intake: clear  Volitional Cough: Weak  Abnormal movement: absent   Hypoglossal Nerve (XII) Tongue at rest: WFL  Lingual protrusion: WFL  Lingual Lateralization: WFL  Lingual retraction: WFL  Abnormal movement: absent   Volitional Swallow: Able to palpate laryngeal rise  Mucosal Quality: adequate  Secretion Management: Adequate  Dentition: Present and in good condition    CONSISTENCIES ADMINISTERED:  Thin Liquids (IDDSI 0):   Mode and volume administered: 5ml x2, 10 ml x3, self-regulated cup sip x2, self-regulated straw sip x2, rapid consecutive cup sip x1   Oral Residue: none     Vallecular Residue: none to trace    Pyriform Sinus Residue: none     Rosenbeck's 8-Point Penetration-Aspiration Scale:   o Best: (1) Material does not enter the airway  o Worst: (5) Material enters the airway, contacts the vocal folds, and is not ejected from the airway  - Penetration of a trace ammount of contrast occurred during the swallow with thin liquids via 10ml sip x1, Unable to replicate.   Strategies attempted: Chin tuck as pt reported it was recommended by several doctors to improve her swallow resulted in deep transient penetration    Nectar Thick Liquids (IDDSI 2):   Mode and volume administered: 5ml x2, 10 ml x2   Oral Residue: none    Vallecular Residue: none    Pyriform Sinus Residue: none    Rosenbeck's 8-Point Penetration-Aspiration Scale: (1) Material does not enter the airway    Puree (IDDSI 4):   Mode and  volume administered: 5ml x2, 10 ml x1   Oral Residue: none    Vallecular Residue: none    Pyriform Sinus Residue: none    Rosenbeck's 8-Point Penetration-Aspiration Scale: (1) Material does not enter the airway    Mixed Consistency Bolus (IDDSI 6 with IDDSI 0):   Mode and volume administered: 2 peaches in juice x2   Oral Residue: mild    Vallecular Residue: none    Pyriform Sinus Residue: none    Rosenbeck's 8-Point Penetration-Aspiration Scale: (1) Material does not enter the airway    Regular (IDDSI 7):   Mode and volume administered: 1/3 Jaclyn doone cookie in barium pudding x2   Oral Residue: none to trace   Vallecular Residue: none    Pyriform Sinus Residue: none    Rosenbeck's 8-Point Penetration-Aspiration Scale: (1) Material does not enter the airway    Treatment   Treatment Time In: 1330  Treatment Time Out: 1340  Total Treatment Time: 10 minutes  Pt educated regarding results and recommendations of the evaluation. See the recommendations section below.    Education: Plan of Care, role of SLP in care and aspiration precautions  Patient expressed understanding.     Assessment     Venkat Lara is a 70 y.o. female referred for Modified Barium Swallow Study with a medical diagnosis of  Dysphagia, unspecified and  Aspiration pneumonia.     Oral Phase: Lip closure was reduced with interlabial escape.  Bolus prep and mastication was timely and efficient. Lingual motion was brisk for adequate bolus transport. There was no significant oral residue.. The swallow was initiated when the head of the bolus entered the vallecula.    Pharyngeal Phase:  The soft palate elevated for complete closure of the velopharyngeal port. Tongue base retraction was complete. Partial epiglottic inversion was noted. Anterior hyoid excursion was complete. Laryngeal elevation was complete. Penetration occured on thin liquids. See thin liquids section above for details. There was no aspiration observed in this study.  Pharyngeal  stripping wave appeared present and complete. The PES opening was complete.  There was no significant pharyngeal residue.    Esophageal Phase: On esophageal screen, delayed esophageal emptying, aerophagia, retrograde flow below the pharyngo-esophageal segment  and retrograde flow through the pharyngo-esophageal segment  were noted. Increased esophageal clearance and reduction of retrograde flow were achieved on flouro with use of effortful swallow maneuver.     Impressions: The patient presents with mild pharyngeal-esophageal dysphagia, but swallow is functional for present diet.    Recommendations:      Continue thin liquids (IDDSI 0) and Regular consistencies (IDDSI 7).    Medications should be taken Whole in thin liquids.   Dysphagia therapy is recommended including effortful swallow as confirmed with study. Pt should complete outpatient speech therapy to train maneuver and to be educated  on swallowing mechanism.   Follow swallow guidelines including sit upright for all PO intake and use good oral hygiene .   Follow up with Dr. Wang and Dr. Mccormick as recommended.    Please contact [facility] at [phone] if there are questions re: the above or if we can be of additional service to this patient.    Therapist's Name:   Jane Neal M.A. CF-SLP  Speech Language Pathologist    Date: 10/27/2020

## 2020-10-27 NOTE — PROGRESS NOTES
See Modified Barium Swallow Study results in Plan of Care.    Jane Neal M.A. CF-SLP  Speech Language Pathologist  10/27/2020

## 2021-05-06 ENCOUNTER — PATIENT MESSAGE (OUTPATIENT)
Dept: RESEARCH | Facility: HOSPITAL | Age: 71
End: 2021-05-06

## 2021-07-15 DIAGNOSIS — G47.33 OSA (OBSTRUCTIVE SLEEP APNEA): Primary | ICD-10-CM

## 2021-07-15 DIAGNOSIS — R53.83 LOSS OF ENERGY: ICD-10-CM

## 2021-07-15 DIAGNOSIS — I10 HYPERTENSION: ICD-10-CM

## 2021-07-15 DIAGNOSIS — J44.9 COPD (CHRONIC OBSTRUCTIVE PULMONARY DISEASE): ICD-10-CM

## 2021-07-15 DIAGNOSIS — R06.83 SNORING: ICD-10-CM

## 2021-07-22 ENCOUNTER — PROCEDURE VISIT (OUTPATIENT)
Dept: SLEEP MEDICINE | Facility: HOSPITAL | Age: 71
End: 2021-07-22
Attending: INTERNAL MEDICINE
Payer: MEDICARE

## 2021-07-22 DIAGNOSIS — R06.83 SNORING: ICD-10-CM

## 2021-07-22 DIAGNOSIS — G47.33 OSA (OBSTRUCTIVE SLEEP APNEA): ICD-10-CM

## 2021-07-22 DIAGNOSIS — J44.9 COPD (CHRONIC OBSTRUCTIVE PULMONARY DISEASE): ICD-10-CM

## 2021-07-22 DIAGNOSIS — R53.83 LOSS OF ENERGY: ICD-10-CM

## 2021-07-22 DIAGNOSIS — I10 HYPERTENSION: ICD-10-CM

## 2021-07-22 PROCEDURE — 95806 SLEEP STUDY UNATT&RESP EFFT: CPT

## 2021-08-13 DIAGNOSIS — R06.83 SNORING: ICD-10-CM

## 2021-08-13 DIAGNOSIS — Z01.818 OTHER SPECIFIED PRE-OPERATIVE EXAMINATION: ICD-10-CM

## 2021-08-13 DIAGNOSIS — I10 HYPERTENSION: ICD-10-CM

## 2021-08-13 DIAGNOSIS — G47.33 OSA (OBSTRUCTIVE SLEEP APNEA): Primary | ICD-10-CM

## 2021-08-13 DIAGNOSIS — G47.19 EXCESSIVE DAYTIME SLEEPINESS: ICD-10-CM

## 2021-08-13 DIAGNOSIS — R53.83 LOSS OF ENERGY: ICD-10-CM

## 2021-08-13 DIAGNOSIS — J44.9 COPD (CHRONIC OBSTRUCTIVE PULMONARY DISEASE): ICD-10-CM

## 2021-08-25 ENCOUNTER — HOSPITAL ENCOUNTER (OUTPATIENT)
Dept: PREADMISSION TESTING | Facility: HOSPITAL | Age: 71
Discharge: HOME | End: 2021-08-25
Attending: INTERNAL MEDICINE
Payer: MEDICARE

## 2021-08-25 DIAGNOSIS — Z01.818 OTHER SPECIFIED PRE-OPERATIVE EXAMINATION: ICD-10-CM

## 2021-08-25 LAB — SARS-COV-2 RDRP RESP QL NAA+PROBE: NEGATIVE

## 2021-08-25 PROCEDURE — U0002 COVID-19 LAB TEST NON-CDC: HCPCS | Performed by: INTERNAL MEDICINE

## 2021-08-27 ENCOUNTER — PROCEDURE VISIT (OUTPATIENT)
Dept: SLEEP MEDICINE | Facility: HOSPITAL | Age: 71
End: 2021-08-27
Attending: INTERNAL MEDICINE
Payer: MEDICARE

## 2021-08-27 DIAGNOSIS — I10 HYPERTENSION: ICD-10-CM

## 2021-08-27 DIAGNOSIS — G47.33 OSA (OBSTRUCTIVE SLEEP APNEA): ICD-10-CM

## 2021-08-27 DIAGNOSIS — G47.19 EXCESSIVE DAYTIME SLEEPINESS: ICD-10-CM

## 2021-08-27 DIAGNOSIS — R53.83 LOSS OF ENERGY: ICD-10-CM

## 2021-08-27 DIAGNOSIS — J44.9 COPD (CHRONIC OBSTRUCTIVE PULMONARY DISEASE): ICD-10-CM

## 2021-08-27 DIAGNOSIS — R06.83 SNORING: ICD-10-CM

## 2021-08-27 PROCEDURE — 95811 POLYSOM 6/>YRS CPAP 4/> PARM: CPT

## 2021-11-04 DIAGNOSIS — Z12.31 OTHER SCREENING MAMMOGRAM: Primary | ICD-10-CM

## 2021-11-04 DIAGNOSIS — Z12.31 SCREENING MAMMOGRAM FOR HIGH-RISK PATIENT: Primary | ICD-10-CM

## 2021-11-12 ENCOUNTER — HOSPITAL ENCOUNTER (OUTPATIENT)
Dept: RADIOLOGY | Facility: HOSPITAL | Age: 71
Discharge: HOME | End: 2021-11-12
Attending: NURSE PRACTITIONER
Payer: MEDICARE

## 2021-11-12 DIAGNOSIS — Z12.31 OTHER SCREENING MAMMOGRAM: ICD-10-CM

## 2021-11-12 PROCEDURE — 77067 SCR MAMMO BI INCL CAD: CPT | Mod: TC

## 2021-11-12 PROCEDURE — 77067 SCR MAMMO BI INCL CAD: CPT | Mod: 26,,, | Performed by: RADIOLOGY

## 2021-11-12 PROCEDURE — 77063 MAMMO DIGITAL SCREENING BILAT WITH TOMO: ICD-10-PCS | Mod: 26,,, | Performed by: RADIOLOGY

## 2021-11-12 PROCEDURE — 77063 BREAST TOMOSYNTHESIS BI: CPT | Mod: 26,,, | Performed by: RADIOLOGY

## 2021-11-12 PROCEDURE — 77067 MAMMO DIGITAL SCREENING BILAT WITH TOMO: ICD-10-PCS | Mod: 26,,, | Performed by: RADIOLOGY

## 2023-03-16 DIAGNOSIS — Z12.31 VISIT FOR SCREENING MAMMOGRAM: ICD-10-CM

## 2023-03-16 DIAGNOSIS — Z13.820 SPECIAL SCREENING FOR OSTEOPOROSIS: ICD-10-CM

## 2023-03-16 DIAGNOSIS — Z78.0 POST-MENOPAUSAL: ICD-10-CM

## 2023-03-16 DIAGNOSIS — Z12.31 ENCOUNTER FOR SCREENING MAMMOGRAM FOR MALIGNANT NEOPLASM OF BREAST: ICD-10-CM

## 2023-03-20 ENCOUNTER — HOSPITAL ENCOUNTER (OUTPATIENT)
Dept: RADIOLOGY | Facility: HOSPITAL | Age: 73
Discharge: HOME | End: 2023-03-20
Attending: NURSE PRACTITIONER
Payer: MEDICARE

## 2023-03-20 DIAGNOSIS — Z12.31 ENCOUNTER FOR SCREENING MAMMOGRAM FOR MALIGNANT NEOPLASM OF BREAST: ICD-10-CM

## 2023-03-20 PROCEDURE — 77067 MAMMO DIGITAL SCREENING BILAT WITH TOMO: ICD-10-PCS | Mod: 26,,, | Performed by: RADIOLOGY

## 2023-03-20 PROCEDURE — 77063 MAMMO DIGITAL SCREENING BILAT WITH TOMO: ICD-10-PCS | Mod: 26,,, | Performed by: RADIOLOGY

## 2023-03-20 PROCEDURE — 77063 BREAST TOMOSYNTHESIS BI: CPT | Mod: 26,,, | Performed by: RADIOLOGY

## 2023-03-20 PROCEDURE — 77067 SCR MAMMO BI INCL CAD: CPT | Mod: 26,,, | Performed by: RADIOLOGY

## 2023-03-20 PROCEDURE — 77067 SCR MAMMO BI INCL CAD: CPT | Mod: TC

## 2023-03-25 ENCOUNTER — OFFICE VISIT (OUTPATIENT)
Dept: URGENT CARE | Facility: CLINIC | Age: 73
End: 2023-03-25
Payer: MEDICARE

## 2023-03-25 VITALS
RESPIRATION RATE: 20 BRPM | WEIGHT: 192 LBS | SYSTOLIC BLOOD PRESSURE: 135 MMHG | OXYGEN SATURATION: 96 % | BODY MASS INDEX: 32.78 KG/M2 | TEMPERATURE: 98 F | HEART RATE: 90 BPM | HEIGHT: 64 IN | DIASTOLIC BLOOD PRESSURE: 79 MMHG

## 2023-03-25 DIAGNOSIS — R60.9 PAROTID SWELLING: Primary | ICD-10-CM

## 2023-03-25 PROCEDURE — 99214 OFFICE O/P EST MOD 30 MIN: CPT | Mod: S$GLB,,, | Performed by: NURSE PRACTITIONER

## 2023-03-25 PROCEDURE — 99214 PR OFFICE/OUTPT VISIT, EST, LEVL IV, 30-39 MIN: ICD-10-PCS | Mod: S$GLB,,, | Performed by: NURSE PRACTITIONER

## 2023-03-25 NOTE — PROGRESS NOTES
"Subjective:       Patient ID: Venkat Lara is a 72 y.o. female.    Vitals:  height is 5' 4" (1.626 m) and weight is 87.1 kg (192 lb). Her temperature is 98.1 °F (36.7 °C). Her blood pressure is 135/79 and her pulse is 90. Her respiration is 20 and oxygen saturation is 96%.     Chief Complaint: Insect Bite    Teeth cleaning Tuesday. Late Thursday noticed a red spot on her left jaw area, has swollen and become more tender. No fever or chills.    Facial Pain  The current episode started in the past 7 days. The problem has been waxing and waning. Pertinent negatives include no chills, diaphoresis, fatigue, fever or sore throat.     Constitution: Negative. Negative for chills, sweating, fatigue and fever.   HENT:  Positive for facial swelling. Negative for ear pain, foreign body in nose, postnasal drip, sinus pressure, sore throat and trouble swallowing.    Respiratory: Negative.     Gastrointestinal: Negative.    Neurological: Negative.      Objective:      Physical Exam   Constitutional: She is oriented to person, place, and time.   HENT:   Head: Normocephalic.       Nose: Nose normal. No congestion.   Eyes: Pupils are equal, round, and reactive to light.   Cardiovascular: Normal rate.   Pulmonary/Chest: Effort normal. No respiratory distress.   Neurological: She is alert and oriented to person, place, and time.   Psychiatric: Her behavior is normal. Mood normal.       Assessment:       1. Parotid swelling          Plan:       Patient has full prescription of Augmentin at home, advised to start and take twice daily, scheduled to see ENT Thursday, aware of date time and location of appointment, strict ED precautions noted.  Parotid swelling  -     Ambulatory referral/consult to ENT                     "

## 2023-03-30 ENCOUNTER — LAB VISIT (OUTPATIENT)
Dept: LAB | Facility: HOSPITAL | Age: 73
End: 2023-03-30
Attending: OTOLARYNGOLOGY
Payer: MEDICARE

## 2023-03-30 ENCOUNTER — OFFICE VISIT (OUTPATIENT)
Dept: OTOLARYNGOLOGY | Facility: CLINIC | Age: 73
End: 2023-03-30
Payer: MEDICARE

## 2023-03-30 VITALS
HEART RATE: 90 BPM | DIASTOLIC BLOOD PRESSURE: 79 MMHG | SYSTOLIC BLOOD PRESSURE: 129 MMHG | WEIGHT: 195.31 LBS | BODY MASS INDEX: 33.34 KG/M2 | HEIGHT: 64 IN

## 2023-03-30 DIAGNOSIS — K11.21 ACUTE PAROTITIS: ICD-10-CM

## 2023-03-30 DIAGNOSIS — K11.7 XEROSTOMIA: ICD-10-CM

## 2023-03-30 DIAGNOSIS — K11.21 ACUTE PAROTITIS: Primary | ICD-10-CM

## 2023-03-30 LAB
CREAT SERPL-MCNC: 1 MG/DL (ref 0.5–1.4)
EST. GFR  (NO RACE VARIABLE): 60 ML/MIN/1.73 M^2

## 2023-03-30 PROCEDURE — 82565 ASSAY OF CREATININE: CPT | Performed by: OTOLARYNGOLOGY

## 2023-03-30 PROCEDURE — 99204 OFFICE O/P NEW MOD 45 MIN: CPT | Mod: S$PBB,,, | Performed by: OTOLARYNGOLOGY

## 2023-03-30 PROCEDURE — 99999 PR PBB SHADOW E&M-EST. PATIENT-LVL V: ICD-10-PCS | Mod: PBBFAC,,, | Performed by: OTOLARYNGOLOGY

## 2023-03-30 PROCEDURE — 99999 PR PBB SHADOW E&M-EST. PATIENT-LVL V: CPT | Mod: PBBFAC,,, | Performed by: OTOLARYNGOLOGY

## 2023-03-30 PROCEDURE — 36415 COLL VENOUS BLD VENIPUNCTURE: CPT | Performed by: OTOLARYNGOLOGY

## 2023-03-30 PROCEDURE — 99215 OFFICE O/P EST HI 40 MIN: CPT | Mod: PBBFAC,PO | Performed by: OTOLARYNGOLOGY

## 2023-03-30 PROCEDURE — 99204 PR OFFICE/OUTPT VISIT, NEW, LEVL IV, 45-59 MIN: ICD-10-PCS | Mod: S$PBB,,, | Performed by: OTOLARYNGOLOGY

## 2023-03-30 RX ORDER — AMOXICILLIN AND CLAVULANATE POTASSIUM 875; 125 MG/1; MG/1
1 TABLET, FILM COATED ORAL EVERY 12 HOURS
Status: ON HOLD | COMMUNITY
Start: 2022-11-16 | End: 2023-08-23 | Stop reason: HOSPADM

## 2023-03-30 RX ORDER — TELMISARTAN 80 MG/1
80 TABLET ORAL DAILY
COMMUNITY
Start: 2023-02-15

## 2023-03-30 RX ORDER — VITAMIN A 3000 MCG
10000 CAPSULE ORAL
COMMUNITY

## 2023-03-30 RX ORDER — MIRABEGRON 50 MG/1
1 TABLET, FILM COATED, EXTENDED RELEASE ORAL DAILY
COMMUNITY
Start: 2023-02-04

## 2023-03-30 RX ORDER — GLUCOSAM/CHONDRO/HERB 149/HYAL 750-100 MG
1 TABLET ORAL DAILY
COMMUNITY

## 2023-03-30 RX ORDER — MAGNESIUM GLUCONATE 27.5 (500)
500 TABLET ORAL DAILY
COMMUNITY

## 2023-03-30 RX ORDER — ESZOPICLONE 3 MG/1
3 TABLET, FILM COATED ORAL NIGHTLY
COMMUNITY
Start: 2023-02-27

## 2023-03-30 RX ORDER — UBIDECARENONE 50 MG
2 CAPSULE ORAL DAILY
COMMUNITY

## 2023-03-30 RX ORDER — CYCLOSPORINE 0.5 MG/ML
1 EMULSION OPHTHALMIC 2 TIMES DAILY
COMMUNITY
Start: 2022-12-28

## 2023-03-30 RX ORDER — CYCLOBENZAPRINE HCL 10 MG
10 TABLET ORAL NIGHTLY
COMMUNITY
Start: 2023-02-02

## 2023-03-30 NOTE — PROGRESS NOTES
"  [facility]    Subjective:      Patient: Venkat Lara Patient PCP: Soha Rowan NP         :  1950     Sex:  female      MRN:  [id number]          Date of Visit: 2023      Chief Complaint: Parotiditis (Right side parotiditis. Went to Urgent Care this weekend, was told to follow up with ENT. States that right of jaw under ear started swelling up last Thursday night. States has Bronchiectasis and takes Augment for 7 days every 4th week, started that Friday. Has helped the swelling in the area.  States area was the "size of an egg" when it came up. Area is still painful to touch.)      Patient ID: Venkat Lara is a 72 y.o. female former smoker with a history of bronchiectasis/COPD (no steroids) and, HTN (no longer on Lasix 20 mg) but no history of diabetes or immune suppression referred to me by Kristy Bentley in consultation for left parotitis treated with Augmentin.  No imaging.  No prior episodes.  No active dental concerns.  No history of skin cancer. Did have a dental cleaning prior.  Started Saturday.  Had a similar episode years ago associated with a root canal.  Suffers from chronic dry mouth.  Uses Biotene.    Review of Systems     Past Medical History  She has a past medical history of Colon polyp, COPD, moderate, Degeneration of cervical intervertebral disc, Depression with anxiety, GERD (gastroesophageal reflux disease), Hammertoe of second toe of left foot, Headaches, cluster, Heart valve regurgitation, Hemorrhoids, Hiatal hernia, Hyperlipidemia, Hypertension, Legally blind, Lumbar spinal stenosis, Oxygen dependent, Primary osteoarthritis of first carpometacarpal joints, bilateral, Rheumatoid arthritis, Scoliosis, and Spinal stenosis.    Family / Surgical / Social History  Her family history includes Cancer in her father.    Past Surgical History:   Procedure Laterality Date    BREAST SURGERY  2008    Reduction    BUNIONECTOMY Bilateral 1999    CARDIOVASCULAR STRESS TEST  2013 "    CARPAL TUNNEL RELEASE Left 10/08/2015    CATARACT EXTRACTION BILATERAL W/ ANTERIOR VITRECTOMY  2010    [facility] ARTHROPLASTY, RIGHT  2004    COLONOSCOPY  2013    CORRECTION OF HAMMER TOE Left 2019    Procedure: CORRECTION, HAMMER TOE;  Surgeon: Jackson Brannon DPM;  Location: [facility] OR;  Service: Podiatry;  Laterality: Left;    ESOPHAGOGASTRODUODENOSCOPY  2012    2012-with dilation #1, 2013 #2    HAND SURGERY  2003    JOINT REPLACEMENT      REMOVAL OF HARDWARE FROM LOWER EXTREMITY Left 2019    Procedure: REMOVAL, HARDWARE, LOWER EXTREMITY;  Surgeon: Jackson Brannon DPM;  Location: [facility] OR;  Service: Podiatry;  Laterality: Left;    RHINOPLASTY TIP  2010    SURGICAL REMOVAL OF BUNION WITH OSTEOTOMY OF METATARSAL BONE Left 2019    Procedure: BUNIONECTOMY, WITH METATARSAL OSTEOTOMY;  Surgeon: Jackson Brannon DPM;  Location: [facility] OR;  Service: Podiatry;  Laterality: Left;  Arthrodesis 2nd PIP joint, screw 1st toe, C-arm, synthes-Merrill, 3.0, 4.0, AO modular set MERRILL BRUNSON NOTIFIED    TONSILLECTOMY, ADENOIDECTOMY  1970    TOTAL KNEE ARTHROPLASTY Right 2008    TOTAL KNEE ARTHROPLASTY Left 2015    TOTAL REDUCTION MAMMOPLASTY  2008    TUBAL LIGATION      VAGINAL DELIVERY  1983    x2       Social History     Tobacco Use    Smoking status: Former     Packs/day: 2.00     Years: 33.00     Pack years: 66.00     Types: Cigarettes     Quit date: 1997     Years since quittin.8    Smokeless tobacco: Former     Quit date: 1997   Substance and Sexual Activity    Alcohol use: Yes     Alcohol/week: 4.0 standard drinks     Types: 4 Glasses of wine per week     Comment: monthly    Drug use: Never    Sexual activity: Not on file       Medications  She has a current medication list which includes the following prescription(s): albuterol, albuterol, amoxicillin-clavulanate 875-125mg, bupropion, co-enzyme q-10, cyanocobalamin, cyclobenzaprine, eszopiclone, furosemide, garlic,  ibuprofen, lactobacillus acidophilus & bulgar, magnesium gluconate, multivitamin with minerals, myrbetriq, olopatadine, omega 3-dha-epa-fish oil, omeprazole, red yeast rice, restasis, telmisartan, tiotropium-olodaterol, verapamil, vitamin a, and vitamin d.      Allergies  Review of patient's allergies indicates:   Allergen Reactions    Statins-hmg-coa reductase inhibitors Other (See Comments)     Muscle cramps    Doxycycline Other (See Comments)     Stops gallbladder function    Adhesive Other (See Comments)     bruises    Erythromycin Other (See Comments)     Stomach pain       All medications, allergies, and past history have been reviewed.    Objective:      Vitals:  Vitals - 1 value per visit 3/25/2023 3/30/2023 3/30/2023   SYSTOLIC 135 - -   DIASTOLIC 79 - -   Pulse 90 - -   Temp 98.1 - -   Resp 20 - -   SPO2 96 - -   Weight (lb) 192 - 195.33   Weight (kg) 87.091 - 88.6   Height 64 - 64   BMI (Calculated) 32.9 - 33.5   VISIT REPORT - - -   Pain Score  - 8 -       Body surface area is 2 meters squared.    Physical Exam:    GENERAL  APPEARANCE -  alert, appears stated age, and cooperative  BARRIER(S) TO COMMUNICATION -  none VOICE - appropriate for age and gender    INTEGUMENTARY  no suspicious head and neck lesions    HEENT  HEAD: Normocephalic, without obvious abnormality, atraumatic  FACE: INSPECTION - Symmetric, no signs of trauma, no suspicious lesion(s)  PALPATION -  Firm not hard fullness right inferior parotid lateral to angle NOT below and not to the zygoma, prominence of (pea sized palpable) bilateral ?accessory parotid tissue over the coronoid processes but no palpable stone or other mass. SALIVARY GLANDS - dry bilaterally, no expressed puss from Right Nahum's STRENGTH - facial symmetry  NECK/THYROID: normal atraumatic, no neck masses, normal thyroid, no jvd    EYES  Normal occular alignment and mobility with no visible nystagmus at rest    EARS/NOSE/MOUTH/THROAT  EARS  PINNAE AND EXTERNAL EARS - no  suspicious lesion OTOSCOPIC EXAM (surgical microscopy was not used for visualization/instrumentation): EAR EXAM - Normal ear canals, tympanic membranes and mobility, and middle ear spaces bilaterally.  HEARING - grossly intact to voice/finger rub    NOSE AND SINUSES  EXTERNAL NOSE - Grossly normal for age/sex  SEPTUM - normal/no obstruction on anterior exam without decongestion TURBINATES - within normal limits MUCOSA - within normal limits     MOUTH AND THROAT   ORAL CAVITY, LIPS, TEETH, GUMS & TONGUE - no trismus, dry, no mass or drainage  OROPHARYNX /TONSILS/PHARYNGEAL WALLS/HYPOPHARYNX - no erythema or exudates  NASOPHARYNX - limited mirror exam - unable to visualize due to anatomy/gag  LARYNX -  - limited mirror exam - unable to visualize due to anatomy/gag      CHEST AND LUNG   INSPECTION & AUSCULTATION - normal effort, no stridor    CARDIOVASCULAR  AUSCULTATION & PERIPHERAL VASCULAR - regular rate and rhythm.    NEUROLOGIC  MENTAL STATUS - alert, interactive CRANIAL NERVES - normal    LYMPHATIC  HEAD AND NECK - non-palpable      Procedure(s):  None    Labs:  WBC   Date Value Ref Range Status   09/13/2020 3.75 (L) 3.90 - 12.70 K/uL Final     Hemoglobin   Date Value Ref Range Status   09/13/2020 12.6 12.0 - 16.0 g/dL Final     Platelets   Date Value Ref Range Status   09/13/2020 266 150 - 350 K/uL Final     Creatinine   Date Value Ref Range Status   09/13/2020 0.8 0.5 - 1.4 mg/dL Final     Glucose   Date Value Ref Range Status   09/13/2020 75 70 - 110 mg/dL Final     Hemoglobin A1C   Date Value Ref Range Status   09/08/2020 5.4 4.5 - 6.2 % Final     Comment:     According to ADA guidelines, hemoglobin A1C <7.0% represents  optimal control in non-pregnant diabetic patients.  Different  metrics may apply to specific populations.   Standards of Medical Care in Diabetes - 2016.  For the purpose of screening for the presence of diabetes:  <5.7%     Consistent with the absence of diabetes  5.7-6.4%  Consistent with  increasing risk for diabetes   (prediabetes)  >or=6.5%  Consistent with diabetes  Currently no consensus exists for use of hemoglobin A1C  for diagnosis of diabetes for children.           Assessment:      Problem List Items Addressed This Visit    None  Visit Diagnoses       Acute parotitis    -  Primary    Xerostomia                     Plan:      Salivary care as discussed which is currently being done but outlined below for reminder.  CT scanning with contrast to rule out mass in the area of the accessory parotid which feel prominent versus stone disease.  Complete the full course of the amoxicillin/clavulanate (Augmentin) antibiotic.  Barring any abnormality on the CT scan no need for follow-up.  Follow up as needed in 4 weeks to discuss further if desired.

## 2023-03-30 NOTE — PATIENT INSTRUCTIONS
Salivary care as discussed which is currently being done but outlined below for reminder.  CT scanning with contrast to rule out mass in the area of the accessory parotid which feel prominent versus stone disease.  Complete the full course of the amoxicillin/clavulanate (Augmentin) antibiotic.  Barring any abnormality on the CT scan no need for follow-up.  Follow up as needed in 4 weeks to discuss further if desired.    SALIVARY CARE    Drink plenty of fluids suck on sour candies and massage the affected gland routinely to maintain flow of saliva and prevent buildup and swelling and pain.  Heat to the affected gland may also be beneficial when needed. If swelling and pain persist for several hours to a day or more antibiotics may prove necessary.      Return with any worsening of symptoms, failure to improve, or any other concerns for further evaluation and treatment.

## 2023-03-31 ENCOUNTER — HOSPITAL ENCOUNTER (OUTPATIENT)
Dept: RADIOLOGY | Facility: HOSPITAL | Age: 73
Discharge: HOME | End: 2023-03-31
Attending: OTOLARYNGOLOGY
Payer: MEDICARE

## 2023-03-31 DIAGNOSIS — K11.7 XEROSTOMIA: ICD-10-CM

## 2023-03-31 DIAGNOSIS — K11.21 ACUTE PAROTITIS: ICD-10-CM

## 2023-03-31 PROCEDURE — 70491 CT SOFT TISSUE NECK W/DYE: CPT | Mod: TC

## 2023-03-31 PROCEDURE — 25500020 PHARM REV CODE 255: Performed by: OTOLARYNGOLOGY

## 2023-03-31 RX ADMIN — IOHEXOL 100 ML: 350 INJECTION, SOLUTION INTRAVENOUS at 01:03

## 2023-04-16 ENCOUNTER — OFFICE VISIT (OUTPATIENT)
Dept: URGENT CARE | Facility: CLINIC | Age: 73
End: 2023-04-16
Payer: MEDICARE

## 2023-04-16 VITALS
SYSTOLIC BLOOD PRESSURE: 120 MMHG | RESPIRATION RATE: 20 BRPM | WEIGHT: 192 LBS | HEIGHT: 64 IN | BODY MASS INDEX: 32.78 KG/M2 | TEMPERATURE: 97 F | OXYGEN SATURATION: 94 % | DIASTOLIC BLOOD PRESSURE: 69 MMHG | HEART RATE: 86 BPM

## 2023-04-16 DIAGNOSIS — J18.9 PNEUMONIA DUE TO INFECTIOUS ORGANISM, UNSPECIFIED LATERALITY, UNSPECIFIED PART OF LUNG: ICD-10-CM

## 2023-04-16 DIAGNOSIS — R05.9 COUGH, UNSPECIFIED TYPE: Primary | ICD-10-CM

## 2023-04-16 PROCEDURE — 71046 XR CHEST PA AND LATERAL: ICD-10-PCS | Mod: S$GLB,,, | Performed by: RADIOLOGY

## 2023-04-16 PROCEDURE — 99214 PR OFFICE/OUTPT VISIT, EST, LEVL IV, 30-39 MIN: ICD-10-PCS | Mod: S$GLB,,, | Performed by: NURSE PRACTITIONER

## 2023-04-16 PROCEDURE — 71046 X-RAY EXAM CHEST 2 VIEWS: CPT | Mod: S$GLB,,, | Performed by: RADIOLOGY

## 2023-04-16 PROCEDURE — 99214 OFFICE O/P EST MOD 30 MIN: CPT | Mod: S$GLB,,, | Performed by: NURSE PRACTITIONER

## 2023-04-16 RX ORDER — LEVOFLOXACIN 750 MG/1
750 TABLET ORAL DAILY
Qty: 7 TABLET | Refills: 0 | Status: SHIPPED | OUTPATIENT
Start: 2023-04-16 | End: 2023-04-23

## 2023-04-16 RX ORDER — GUAIFENESIN 1200 MG/1
1200 TABLET, EXTENDED RELEASE ORAL 2 TIMES DAILY
Qty: 20 TABLET | Refills: 0 | Status: SHIPPED | OUTPATIENT
Start: 2023-04-16 | End: 2023-04-26

## 2023-04-16 NOTE — PROGRESS NOTES
"Subjective:      Patient ID: Venkat Lara is a 72 y.o. female.    Vitals:  height is 5' 4" (1.626 m) and weight is 87.1 kg (192 lb). Her temperature is 97.2 °F (36.2 °C). Her blood pressure is 120/69 and her pulse is 86. Her respiration is 20 and oxygen saturation is 94% (abnormal).     Chief Complaint: Breathing Problem    Pt states last Tuesday she was sick with a fever of 102.4. she began getting better. Her oxygen levels keep dropping. She was tested for covid at [facility] on Friday and that came back negative. Currently she is having a cough that is productive which is not unusual and low o2. Diagnosed with bronchiectasis .  Patient had aspiration pneumonia in 2020, had ongoing copd and bronchtiectasis. Uses oxygen at night and has nebulizer at home which she is using.       Constitution: Positive for chills, fatigue and fever.   HENT:  Positive for postnasal drip, sinus pain and sore throat.    Neck: neck negative.   Cardiovascular: Negative.    Respiratory:  Positive for cough, sputum production and COPD.    Gastrointestinal: Negative.    Neurological: Negative.     Objective:     Physical Exam   Constitutional: She is oriented to person, place, and time.  Non-toxic appearance. No distress.   HENT:   Head: Normocephalic and atraumatic.   Ears:   Right Ear: Tympanic membrane normal.   Left Ear: Tympanic membrane normal.   Nose: Rhinorrhea present.   Mouth/Throat: Posterior oropharyngeal erythema present.   Eyes: Pupils are equal, round, and reactive to light.   Cardiovascular: Normal rate and regular rhythm.   No murmur heard.  Pulmonary/Chest: Effort normal. She has no wheezes. She has rhonchi.   Abdominal: Normal appearance and bowel sounds are normal. There is no abdominal tenderness.   Neurological: She is alert and oriented to person, place, and time.   Psychiatric: Her behavior is normal. Mood normal.     Assessment:     1. Cough, unspecified type    2. Pneumonia due to infectious organism, unspecified " laterality, unspecified part of lung        Plan:   Xray shows left atelectasis vs infiltrate. Discussed with patient.  Treat with levaquin, mucinex, patient to use ome nebulizer every 6-8 hours, uses supplemental oxygen as needed. Strict Ed precuaitons. See PCP or follow up here in 72 hours for recheck, Follow up follow up immediately for new or worsening symptoms, ED precautions discussed.      Cough, unspecified type  -     XR CHEST PA AND LATERAL; Future; Expected date: 04/16/2023  -     levoFLOXacin (LEVAQUIN) 750 MG tablet; Take 1 tablet (750 mg total) by mouth once daily. for 7 days  Dispense: 7 tablet; Refill: 0  -     guaiFENesin (MUCINEX) 1,200 mg Ta12; Take 1,200 mg by mouth 2 (two) times a day. for 10 days  Dispense: 20 tablet; Refill: 0    Pneumonia due to infectious organism, unspecified laterality, unspecified part of lung  -     levoFLOXacin (LEVAQUIN) 750 MG tablet; Take 1 tablet (750 mg total) by mouth once daily. for 7 days  Dispense: 7 tablet; Refill: 0  -     guaiFENesin (MUCINEX) 1,200 mg Ta12; Take 1,200 mg by mouth 2 (two) times a day. for 10 days  Dispense: 20 tablet; Refill: 0             Additional MDM:     Heart Failure Score:   COPD = Yes

## 2023-04-26 ENCOUNTER — OFFICE VISIT (OUTPATIENT)
Dept: OTOLARYNGOLOGY | Facility: CLINIC | Age: 73
End: 2023-04-26
Payer: MEDICARE

## 2023-04-26 VITALS — BODY MASS INDEX: 31.88 KG/M2 | TEMPERATURE: 98 F | WEIGHT: 186.75 LBS | HEIGHT: 64 IN

## 2023-04-26 DIAGNOSIS — L43.8 NONEROSIVE LICHEN PLANUS OF ORAL MUCOSA: ICD-10-CM

## 2023-04-26 DIAGNOSIS — K11.7 XEROSTOMIA: ICD-10-CM

## 2023-04-26 DIAGNOSIS — K11.21 ACUTE PAROTITIS: Primary | ICD-10-CM

## 2023-04-26 PROCEDURE — 99214 OFFICE O/P EST MOD 30 MIN: CPT | Mod: S$PBB,,, | Performed by: OTOLARYNGOLOGY

## 2023-04-26 PROCEDURE — 99213 OFFICE O/P EST LOW 20 MIN: CPT | Mod: PBBFAC,PO | Performed by: OTOLARYNGOLOGY

## 2023-04-26 PROCEDURE — 99999 PR PBB SHADOW E&M-EST. PATIENT-LVL III: CPT | Mod: PBBFAC,,, | Performed by: OTOLARYNGOLOGY

## 2023-04-26 PROCEDURE — 99999 PR PBB SHADOW E&M-EST. PATIENT-LVL III: ICD-10-PCS | Mod: PBBFAC,,, | Performed by: OTOLARYNGOLOGY

## 2023-04-26 PROCEDURE — 99214 PR OFFICE/OUTPT VISIT, EST, LEVL IV, 30-39 MIN: ICD-10-PCS | Mod: S$PBB,,, | Performed by: OTOLARYNGOLOGY

## 2023-04-26 NOTE — PROGRESS NOTES
[facility]    Subjective:      Patient: Venkat Lara Patient PCP: Soha Rowan NP         :  1950     Sex:  female      MRN:  [id number]          Date of Visit: 2023      Chief Complaint: Follow-up        2023 established patient visit: Venkat Lara is a 72 y.o. female former smoker with a history of bronchiectasis/COPD (no steroids) and, HTN (no longer on Lasix 20 mg) but no history of diabetes or immune suppression referred to me by Kristy Bentley in consultation for left parotitis treated with Augmentin seen 4 weeks ago who returns today with CT completed 4 weeks ago showing a phlegmonous fluid collection possible abscess deep within the parotid gland itself on .  Quite a bit of artifact from dental restorations precludes complete exclusion of any stone but no stone is appreciated.  Patient has felt complete resolution of symptoms of pain and swelling.  She did get an upper respiratory infection which seem to progress to pneumonia and she was treated with Levaquin for 5 days through urgent care.  She is had no foul drainage trismus or any other concerns.  She feels no mass.  She does have a longstanding history of recurrent oral aphthous ulcers including specifically the area of the buccal mucosa.  No concern has been voice by her dentist.  She takes acidophilus as supplement and feels this resolve the situation.  She does have chronic dry mouth.  No vaginal lesions.        2023 CT NECK WITH IV CONTRAST. 340 images obtained. Axial CT images of the soft tissue of the neck were obtained from the skull base to the thoracic inlet after the uneventful administration of IV contrast. Sagittal and coronal reformatted images are available for review. This examination does not assess for ligamentous injury or stability.     COMPARISON:  None available.     FINDINGS:  Skull base: The visualized globes and orbits, paranasal sinuses, mastoid air cells and skull base are within  normal limits.     Aerodigestive: Evaluation of the aerodigestive tract demonstrates no exophytic mass, nor areas of focal mass effect.  The parapharyngeal fat triangle is clear. The prevertebral soft tissues are within normal limits.     Lymph nodes: The cervical lymph nodes show several small rounded lymph nodes on the right side of the neck, the largest for example is a jugulodigastric node measuring 8 mm in diameter, likely reactive/inflammatory.     Glands: There is asymmetric enlargement and heterogeneous enhancement with fat stranding along the right parotid gland. In addition there is a rim-enhancing hypoechoic structure measuring 12 x 10 x 7 mm (CC X AP X TR) (axial image 35) projecting to the deep portion of the superficial parotid, approximately 1.5 cm from the skin surface. No ductal dilation or radiopaque stone is seen and the left parotid gland appears within normal limits. The submandibular glands and thyroid gland are grossly unremarkable.     Osseous: There is reversal of the normal lordotic curvature of the cervical spine with moderate disc height loss at C4-C5, moderate to severe disc height loss at C5-C6, severe disc height loss at C6-C7 and scattered facet arthropathy throughout the cervical spine with varying degrees of spinal canal/neural foraminal stenosis (for which a study is not tailored to assess). There is minimal anterior superior endplate compression at T4 and T5, with less than 25% height loss of the anterior column and no retropulsion.     Vasculature: Grossly normal in course and caliber with no large vessel occlusion or high-grade stenosis identified.     Lung apices: Moderate emphysematous lung disease is seen at the apices.     IMPRESSION:  1. Asymmetric enlargement and heterogeneity of the right parotid gland with adjacent fat stranding compatible with parotiditis.     2. Hypoattenuating rim-enhancing structure in the right parotid gland compatible with abscess/phlegmon  formation. Consider correlation with the symptoms, history and interval follow-up after treatment as a more aggressive process is not entirely excluded this age group.     3. Numerous small right-sided cervical lymph nodes, likely reactive/inflammatory.          03/30/2023 initial patient consultation: Venkat Lara is a 72 y.o. female former smoker with a history of bronchiectasis/COPD (no steroids) and, HTN (no longer on Lasix 20 mg) but no history of diabetes or immune suppression referred to me by Kristy Bentley in consultation for left parotitis treated with Augmentin.  No imaging.  No prior episodes.  No active dental concerns.  No history of skin cancer. Did have a dental cleaning prior.  Started Saturday.  Had a similar episode years ago associated with a root canal.  Suffers from chronic dry mouth.  Uses Biotene.    Review of Systems     Past Medical History  She has a past medical history of Colon polyp, COPD, moderate, Degeneration of cervical intervertebral disc, Depression with anxiety, GERD (gastroesophageal reflux disease), Hammertoe of second toe of left foot, Headaches, cluster, Heart valve regurgitation, Hemorrhoids, Hiatal hernia, Hyperlipidemia, Hypertension, Legally blind, Lumbar spinal stenosis, Oxygen dependent, Primary osteoarthritis of first carpometacarpal joints, bilateral, Rheumatoid arthritis, Scoliosis, and Spinal stenosis.    Family / Surgical / Social History  Her family history includes Cancer in her father.    Past Surgical History:   Procedure Laterality Date    BREAST SURGERY  2008    Reduction    BUNIONECTOMY Bilateral 01/1999    CARDIOVASCULAR STRESS TEST  2013    CARPAL TUNNEL RELEASE Left 10/08/2015    CATARACT EXTRACTION BILATERAL W/ ANTERIOR VITRECTOMY  04/2010    [facility] ARTHROPLASTY, RIGHT  02/23/2004    COLONOSCOPY  04/18/2013    CORRECTION OF HAMMER TOE Left 8/14/2019    Procedure: CORRECTION, HAMMER TOE;  Surgeon: Jackson Brannon DPM;  Location: [facility] OR;  Service:  Podiatry;  Laterality: Left;    ESOPHAGOGASTRODUODENOSCOPY  2012    2012-with dilation #1, 2013 #2    HAND SURGERY  2003    JOINT REPLACEMENT      REMOVAL OF HARDWARE FROM LOWER EXTREMITY Left 2019    Procedure: REMOVAL, HARDWARE, LOWER EXTREMITY;  Surgeon: Jackson Brannon DPM;  Location: [facility] OR;  Service: Podiatry;  Laterality: Left;    RHINOPLASTY TIP  2010    SURGICAL REMOVAL OF BUNION WITH OSTEOTOMY OF METATARSAL BONE Left 2019    Procedure: BUNIONECTOMY, WITH METATARSAL OSTEOTOMY;  Surgeon: Jackson Brannon DPM;  Location: [facility] OR;  Service: Podiatry;  Laterality: Left;  Arthrodesis 2nd PIP joint, screw 1st toe, C-arm, synthes-Merrill, 3.0, 4.0, AO modular set MERRILL BOY NOTIFIED    TONSILLECTOMY, ADENOIDECTOMY  1970    TOTAL KNEE ARTHROPLASTY Right 2008    TOTAL KNEE ARTHROPLASTY Left 2015    TOTAL REDUCTION MAMMOPLASTY  2008    TUBAL LIGATION      VAGINAL DELIVERY  1983    x2       Social History     Tobacco Use    Smoking status: Former     Packs/day: 2.00     Years: 33.00     Pack years: 66.00     Types: Cigarettes     Quit date: 1997     Years since quittin.9    Smokeless tobacco: Former     Quit date: 1997   Substance and Sexual Activity    Alcohol use: Yes     Alcohol/week: 4.0 standard drinks     Types: 4 Glasses of wine per week     Comment: monthly    Drug use: Never    Sexual activity: Not on file       Medications  She has a current medication list which includes the following prescription(s): albuterol, albuterol, amoxicillin-clavulanate 875-125mg, bupropion, co-enzyme q-10, cyanocobalamin, cyclobenzaprine, eszopiclone, garlic, mucinex, ibuprofen, lactobacillus acidophilus & bulgar, magnesium gluconate, multivitamin with minerals, myrbetriq, olopatadine, omega 3-dha-epa-fish oil, omeprazole, red yeast rice, restasis, telmisartan, tiotropium-olodaterol, verapamil, vitamin a, and vitamin d.      Allergies  Review of patient's allergies indicates:    Allergen Reactions    Statins-hmg-coa reductase inhibitors Other (See Comments)     Muscle cramps    Doxycycline Other (See Comments)     Stops gallbladder function    Adhesive Other (See Comments)     bruises    Erythromycin Other (See Comments)     Stomach pain       All medications, allergies, and past history have been reviewed.    Objective:      Vitals:  Vitals - 1 value per visit 4/16/2023 4/26/2023 4/26/2023   SYSTOLIC 120 - -   DIASTOLIC 69 - -   Pulse 86 - -   Temp 97.2 - 98.2   Resp 20 - -   SPO2 94 - -   Weight (lb) 192 - 186.73   Weight (kg) 87.091 - 84.7   Height 64 - 64   BMI (Calculated) 32.9 - 32   VISIT REPORT - - -   Pain Score  - 0 -       Body surface area is 1.96 meters squared.    Physical Exam:    GENERAL  APPEARANCE -  alert, appears stated age, and cooperative  BARRIER(S) TO COMMUNICATION -  none VOICE - appropriate for age and gender    INTEGUMENTARY  no suspicious head and neck lesions    HEENT  HEAD: Normocephalic, without obvious abnormality, atraumatic  FACE: INSPECTION - Symmetric, no signs of trauma, no suspicious lesion(s)  PALPATION -  no mass or asymmetry.  Previously palpable area in the accessory parotid no longer palpable SALIVARY GLANDS - no palpable mass, mucosa dry little to no Stensen's output bilaterally STRENGTH - facial symmetry  NECK/THYROID: normal atraumatic, no neck masses, normal thyroid, no jvd    EYES  Normal occular alignment and mobility with no visible nystagmus at rest    EARS/NOSE/MOUTH/THROAT  EARS  PINNAE AND EXTERNAL EARS - no suspicious lesion OTOSCOPIC EXAM (surgical microscopy was not used for visualization/instrumentation): EAR EXAM - Normal ear canals, tympanic membranes and mobility, and middle ear spaces bilaterally.  HEARING - grossly intact to voice/finger rub    NOSE AND SINUSES  EXTERNAL NOSE - Grossly normal for age/sex  SEPTUM - normal/no obstruction on anterior exam without decongestion TURBINATES - within normal limits MUCOSA - within  normal limits     MOUTH AND THROAT   ORAL CAVITY, LIPS, TEETH, GUMS & TONGUE - no trismus, dry, both buccal mucosal areas exhibit a lacy widening without ulceration most consistent with lichen planus.  No dense leukoplakia  OROPHARYNX /TONSILS/PHARYNGEAL WALLS/HYPOPHARYNX - no erythema or exudates  NASOPHARYNX - limited mirror exam - unable to visualize due to anatomy/gag  LARYNX -  - limited mirror exam - unable to visualize due to anatomy/gag      CHEST AND LUNG   INSPECTION & AUSCULTATION - normal effort, no stridor    CARDIOVASCULAR  AUSCULTATION & PERIPHERAL VASCULAR - regular rate and rhythm.    NEUROLOGIC  MENTAL STATUS - alert, interactive CRANIAL NERVES - normal    LYMPHATIC  HEAD AND NECK - non-palpable      Procedure(s):  None    Labs:  WBC   Date Value Ref Range Status   09/13/2020 3.75 (L) 3.90 - 12.70 K/uL Final     Hemoglobin   Date Value Ref Range Status   09/13/2020 12.6 12.0 - 16.0 g/dL Final     Platelets   Date Value Ref Range Status   09/13/2020 266 150 - 350 K/uL Final     Creatinine   Date Value Ref Range Status   03/30/2023 1.0 0.5 - 1.4 mg/dL Final     Glucose   Date Value Ref Range Status   09/13/2020 75 70 - 110 mg/dL Final     Hemoglobin A1C   Date Value Ref Range Status   09/08/2020 5.4 4.5 - 6.2 % Final     Comment:     According to ADA guidelines, hemoglobin A1C <7.0% represents  optimal control in non-pregnant diabetic patients.  Different  metrics may apply to specific populations.   Standards of Medical Care in Diabetes - 2016.  For the purpose of screening for the presence of diabetes:  <5.7%     Consistent with the absence of diabetes  5.7-6.4%  Consistent with increasing risk for diabetes   (prediabetes)  >or=6.5%  Consistent with diabetes  Currently no consensus exists for use of hemoglobin A1C  for diagnosis of diabetes for children.           Assessment:      Problem List Items Addressed This Visit    None  Visit Diagnoses       Acute parotitis    -  Primary    Xerostomia         Nonerosive lichen planus of oral mucosa                       Plan:      No evidence of any mass by palpation and given prior symptoms and CT findings this area was likely a small abscess which seems to have resolved.  I do not recommend repeat imaging at this time.  There is no evidence of a stone.  Continue with hydration and salivary massage as outlined.  I would also like to recheck the cheeks given the findings of what appears to be lichen planus in the area which is likely unrelated though does not exclude relationship to this parotid infection and previous oral ulcerations are likely related.  Oral care with rinsing the mouth with baking soda (1 tsp and 1 cup of water 4 times daily after meals and before bedtime) avoiding trauma is recommended.

## 2023-04-26 NOTE — PATIENT INSTRUCTIONS
No evidence of any mass by palpation and given prior symptoms and CT findings this area was likely a small abscess which seems to have resolved.  I do not recommend repeat imaging at this time.  There is no evidence of a stone.  Continue with hydration and salivary massage as outlined.  I would also like to recheck the cheeks given the findings of what appears to be lichen planus in the area which is likely unrelated though does not exclude relationship to this parotid infection and previous oral ulcerations are likely related.  Oral care with rinsing the mouth with baking soda (1 tsp and 1 cup of water 4 times daily after meals and before bedtime) avoiding trauma is recommended.    SALIVARY CARE    Drink plenty of fluids suck on sour candies and massage the affected gland routinely to maintain flow of saliva and prevent buildup and swelling and pain.  Heat to the affected gland may also be beneficial when needed. If swelling and pain persist for several hours to a day or more antibiotics may prove necessary.      GARGLING INSTRUCTIONS    Gargle/ rinse with one cup of water mixed with 1 tsp of baking soda in and 1/4 tsp of salt ideally sea salt or kosher.  Alternatively just plain water can be used or mixed 50/50 with peroxide to clear debris/exudates.

## 2023-07-07 NOTE — PLAN OF CARE
Important Message from [employer] was sign, explained and given to patient/caregiver on 09/14/2020 at 8:48am   Received patient in triage with mom for c/o SOB and chest pain that radiates to her back. Pt stated that she was laying in bed last night when it started.       Idalia Williamson RN  07/07/23 [id number]

## 2023-07-11 ENCOUNTER — TELEPHONE (OUTPATIENT)
Dept: OTOLARYNGOLOGY | Facility: CLINIC | Age: 73
End: 2023-07-11
Payer: MEDICARE

## 2023-07-11 NOTE — TELEPHONE ENCOUNTER
Left message on voicemail for pt to call back. Dr. Marroquin will not be in clinic 7/26/23, we need to reschedule her 3 mo follow up visit. Thanks, Martina

## 2023-08-21 ENCOUNTER — HOSPITAL ENCOUNTER (INPATIENT)
Facility: HOSPITAL | Age: 73
LOS: 2 days | Discharge: NURSING, REHAB OR LONG-TERM CARE | DRG: 481 | End: 2023-08-23
Attending: EMERGENCY MEDICINE | Admitting: INTERNAL MEDICINE
Payer: MEDICARE

## 2023-08-21 DIAGNOSIS — S72.002A CLOSED FRACTURE OF LEFT HIP, INITIAL ENCOUNTER: Primary | ICD-10-CM
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
## 2023-08-21 DIAGNOSIS — M25.559 HIP PAIN: ICD-10-CM

## 2023-08-21 LAB
ALBUMIN SERPL BCP-MCNC: 4.2 G/DL (ref 3.5–5.2)
ALP SERPL-CCNC: 73 U/L (ref 55–135)
ALT SERPL W/O P-5'-P-CCNC: 55 U/L (ref 10–44)
ANION GAP SERPL CALC-SCNC: 8 MMOL/L (ref 8–16)
AST SERPL-CCNC: 39 U/L (ref 10–40)
BASOPHILS # BLD AUTO: 0.02 K/UL (ref 0–0.2)
BASOPHILS NFR BLD: 0.2 % (ref 0–1.9)
BILIRUB SERPL-MCNC: 0.9 MG/DL (ref 0.1–1)
BILIRUB UR QL STRIP: NEGATIVE
BUN SERPL-MCNC: 30 MG/DL (ref 8–23)
CALCIUM SERPL-MCNC: 9.8 MG/DL (ref 8.7–10.5)
CHLORIDE SERPL-SCNC: 104 MMOL/L (ref 95–110)
CLARITY UR: CLEAR
CO2 SERPL-SCNC: 25 MMOL/L (ref 23–29)
COLOR UR: YELLOW
CREAT SERPL-MCNC: 0.8 MG/DL (ref 0.5–1.4)
DIFFERENTIAL METHOD: ABNORMAL
EOSINOPHIL # BLD AUTO: 0.1 K/UL (ref 0–0.5)
EOSINOPHIL NFR BLD: 0.6 % (ref 0–8)
ERYTHROCYTE [DISTWIDTH] IN BLOOD BY AUTOMATED COUNT: 13.6 % (ref 11.5–14.5)
EST. GFR  (NO RACE VARIABLE): >60 ML/MIN/1.73 M^2
GLUCOSE SERPL-MCNC: 95 MG/DL (ref 70–110)
GLUCOSE UR QL STRIP: NEGATIVE
HCT VFR BLD AUTO: 40.3 % (ref 37–48.5)
HGB BLD-MCNC: 13.3 G/DL (ref 12–16)
HGB UR QL STRIP: NEGATIVE
IMM GRANULOCYTES # BLD AUTO: 0.03 K/UL (ref 0–0.04)
IMM GRANULOCYTES NFR BLD AUTO: 0.3 % (ref 0–0.5)
KETONES UR QL STRIP: ABNORMAL
LEUKOCYTE ESTERASE UR QL STRIP: NEGATIVE
LYMPHOCYTES # BLD AUTO: 1.2 K/UL (ref 1–4.8)
LYMPHOCYTES NFR BLD: 13.6 % (ref 18–48)
MCH RBC QN AUTO: 32.7 PG (ref 27–31)
MCHC RBC AUTO-ENTMCNC: 33 G/DL (ref 32–36)
MCV RBC AUTO: 99 FL (ref 82–98)
MONOCYTES # BLD AUTO: 0.5 K/UL (ref 0.3–1)
MONOCYTES NFR BLD: 5.8 % (ref 4–15)
NEUTROPHILS # BLD AUTO: 6.9 K/UL (ref 1.8–7.7)
NEUTROPHILS NFR BLD: 79.5 % (ref 38–73)
NITRITE UR QL STRIP: NEGATIVE
NRBC BLD-RTO: 0 /100 WBC
PH UR STRIP: 6 [PH] (ref 5–8)
PLATELET # BLD AUTO: 202 K/UL (ref 150–450)
PMV BLD AUTO: 8.8 FL (ref 9.2–12.9)
POTASSIUM SERPL-SCNC: 4.6 MMOL/L (ref 3.5–5.1)
PROT SERPL-MCNC: 7.4 G/DL (ref 6–8.4)
PROT UR QL STRIP: NEGATIVE
RBC # BLD AUTO: 4.07 M/UL (ref 4–5.4)
SODIUM SERPL-SCNC: 137 MMOL/L (ref 136–145)
SP GR UR STRIP: 1.02 (ref 1–1.03)
URN SPEC COLLECT METH UR: ABNORMAL
UROBILINOGEN UR STRIP-ACNC: NEGATIVE EU/DL
WBC # BLD AUTO: 8.72 K/UL (ref 3.9–12.7)

## 2023-08-21 PROCEDURE — 63600175 PHARM REV CODE 636 W HCPCS: Performed by: EMERGENCY MEDICINE

## 2023-08-21 PROCEDURE — 80053 COMPREHEN METABOLIC PANEL: CPT | Performed by: EMERGENCY MEDICINE

## 2023-08-21 PROCEDURE — 25000003 PHARM REV CODE 250: Performed by: EMERGENCY MEDICINE

## 2023-08-21 PROCEDURE — 94761 N-INVAS EAR/PLS OXIMETRY MLT: CPT

## 2023-08-21 PROCEDURE — 63600175 PHARM REV CODE 636 W HCPCS: Performed by: INTERNAL MEDICINE

## 2023-08-21 PROCEDURE — 85025 COMPLETE CBC W/AUTO DIFF WBC: CPT | Performed by: EMERGENCY MEDICINE

## 2023-08-21 PROCEDURE — 93005 ELECTROCARDIOGRAM TRACING: CPT | Performed by: SPECIALIST

## 2023-08-21 PROCEDURE — 12000002 HC ACUTE/MED SURGE SEMI-PRIVATE ROOM

## 2023-08-21 PROCEDURE — 99285 EMERGENCY DEPT VISIT HI MDM: CPT | Mod: 25

## 2023-08-21 PROCEDURE — 27000221 HC OXYGEN, UP TO 24 HOURS

## 2023-08-21 PROCEDURE — 93010 ELECTROCARDIOGRAM REPORT: CPT | Mod: ,,, | Performed by: SPECIALIST

## 2023-08-21 PROCEDURE — 93010 EKG 12-LEAD: ICD-10-PCS | Mod: ,,, | Performed by: SPECIALIST

## 2023-08-21 PROCEDURE — 81003 URINALYSIS AUTO W/O SCOPE: CPT | Performed by: INTERNAL MEDICINE

## 2023-08-21 PROCEDURE — 94799 UNLISTED PULMONARY SVC/PX: CPT

## 2023-08-21 PROCEDURE — 99900035 HC TECH TIME PER 15 MIN (STAT)

## 2023-08-21 PROCEDURE — 25000003 PHARM REV CODE 250: Performed by: INTERNAL MEDICINE

## 2023-08-21 RX ORDER — GABAPENTIN 400 MG/1
400 CAPSULE ORAL 2 TIMES DAILY
COMMUNITY
Start: 2023-07-25

## 2023-08-21 RX ORDER — SODIUM CHLORIDE 0.9 % (FLUSH) 0.9 %
10 SYRINGE (ML) INJECTION
Status: DISCONTINUED | OUTPATIENT
Start: 2023-08-21 | End: 2023-08-23 | Stop reason: HOSPADM

## 2023-08-21 RX ORDER — ALBUTEROL SULFATE 0.83 MG/ML
2.5 SOLUTION RESPIRATORY (INHALATION) EVERY 4 HOURS PRN
Status: DISCONTINUED | OUTPATIENT
Start: 2023-08-21 | End: 2023-08-23 | Stop reason: HOSPADM

## 2023-08-21 RX ORDER — ONDANSETRON 4 MG/1
4 TABLET, ORALLY DISINTEGRATING ORAL
Status: COMPLETED | OUTPATIENT
Start: 2023-08-21 | End: 2023-08-21

## 2023-08-21 RX ORDER — CYCLOBENZAPRINE HCL 10 MG
10 TABLET ORAL 3 TIMES DAILY PRN
Status: DISCONTINUED | OUTPATIENT
Start: 2023-08-21 | End: 2023-08-23 | Stop reason: HOSPADM

## 2023-08-21 RX ORDER — MORPHINE SULFATE 2 MG/ML
2 INJECTION, SOLUTION INTRAMUSCULAR; INTRAVENOUS EVERY 4 HOURS PRN
Status: DISCONTINUED | OUTPATIENT
Start: 2023-08-21 | End: 2023-08-23 | Stop reason: HOSPADM

## 2023-08-21 RX ORDER — TALC
6 POWDER (GRAM) TOPICAL NIGHTLY PRN
Status: DISCONTINUED | OUTPATIENT
Start: 2023-08-21 | End: 2023-08-23 | Stop reason: HOSPADM

## 2023-08-21 RX ORDER — ACETAMINOPHEN 500 MG
5000 TABLET ORAL DAILY
Status: DISCONTINUED | OUTPATIENT
Start: 2023-08-22 | End: 2023-08-23 | Stop reason: HOSPADM

## 2023-08-21 RX ORDER — INDOMETHACIN 50 MG/1
50 CAPSULE ORAL 2 TIMES DAILY WITH MEALS
COMMUNITY
Start: 2023-06-27

## 2023-08-21 RX ORDER — HYDROCODONE BITARTRATE AND ACETAMINOPHEN 5; 325 MG/1; MG/1
1 TABLET ORAL EVERY 4 HOURS PRN
Status: DISCONTINUED | OUTPATIENT
Start: 2023-08-21 | End: 2023-08-23 | Stop reason: HOSPADM

## 2023-08-21 RX ORDER — PANTOPRAZOLE SODIUM 40 MG/1
40 TABLET, DELAYED RELEASE ORAL DAILY
Status: DISCONTINUED | OUTPATIENT
Start: 2023-08-22 | End: 2023-08-23 | Stop reason: HOSPADM

## 2023-08-21 RX ORDER — BENZONATATE 200 MG/1
200 CAPSULE ORAL 2 TIMES DAILY PRN
COMMUNITY
Start: 2023-08-01

## 2023-08-21 RX ORDER — BUPROPION HYDROCHLORIDE 150 MG/1
300 TABLET ORAL NIGHTLY
Status: DISCONTINUED | OUTPATIENT
Start: 2023-08-21 | End: 2023-08-23 | Stop reason: HOSPADM

## 2023-08-21 RX ORDER — VERAPAMIL HYDROCHLORIDE 240 MG/1
240 TABLET, FILM COATED, EXTENDED RELEASE ORAL DAILY
Status: DISCONTINUED | OUTPATIENT
Start: 2023-08-22 | End: 2023-08-23 | Stop reason: HOSPADM

## 2023-08-21 RX ORDER — ACETAMINOPHEN 325 MG/1
650 TABLET ORAL EVERY 8 HOURS PRN
Status: DISCONTINUED | OUTPATIENT
Start: 2023-08-21 | End: 2023-08-23 | Stop reason: HOSPADM

## 2023-08-21 RX ORDER — MORPHINE SULFATE 4 MG/ML
4 INJECTION, SOLUTION INTRAMUSCULAR; INTRAVENOUS
Status: COMPLETED | OUTPATIENT
Start: 2023-08-21 | End: 2023-08-21

## 2023-08-21 RX ORDER — CEFUROXIME AXETIL 500 MG/1
500 TABLET ORAL 2 TIMES DAILY
Status: ON HOLD | COMMUNITY
End: 2023-08-23 | Stop reason: HOSPADM

## 2023-08-21 RX ADMIN — ONDANSETRON 4 MG: 4 TABLET, ORALLY DISINTEGRATING ORAL at 04:08

## 2023-08-21 RX ADMIN — GABAPENTIN 400 MG: 300 CAPSULE ORAL at 08:08

## 2023-08-21 RX ADMIN — MORPHINE SULFATE 4 MG: 4 INJECTION, SOLUTION INTRAMUSCULAR; INTRAVENOUS at 04:08

## 2023-08-21 RX ADMIN — Medication 6 MG: at 08:08

## 2023-08-21 RX ADMIN — BUPROPION HYDROCHLORIDE 300 MG: 150 TABLET, FILM COATED, EXTENDED RELEASE ORAL at 08:08

## 2023-08-21 RX ADMIN — MORPHINE SULFATE 2 MG: 2 INJECTION, SOLUTION INTRAMUSCULAR; INTRAVENOUS at 08:08

## 2023-08-21 NOTE — SUBJECTIVE & OBJECTIVE
Past Medical History:   Diagnosis Date    Colon polyp 01/01/2013    COPD, moderate 01/01/2010    was told by Dr. 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    Lumbar spinal stenosis     Oxygen dependent 01/01/2011    2L NC Nightly    Primary osteoarthritis of first carpometacarpal joints, bilateral     Rheumatoid arthritis     Scoliosis 01/01/2014    Spinal stenosis 01/01/2014    upper and lowerr back - tingling in left arm at times       Past Surgical History:   Procedure Laterality Date    BREAST SURGERY  2008    Reduction    BUNIONECTOMY Bilateral 01/1999    CARDIOVASCULAR STRESS TEST  2013    CARPAL TUNNEL RELEASE Left 10/08/2015    CATARACT EXTRACTION BILATERAL W/ ANTERIOR VITRECTOMY  04/2010    [facility] ARTHROPLASTY, RIGHT  02/23/2004    COLONOSCOPY  04/18/2013    CORRECTION OF HAMMER TOE Left 8/14/2019    Procedure: CORRECTION, HAMMER TOE;  Surgeon: Jackson Truong DPM;  Location: [facility] OR;  Service: Podiatry;  Laterality: Left;    ESOPHAGOGASTRODUODENOSCOPY  12/21/2012    2012-with dilation #1, 2013 #2    HAND SURGERY  2003    JOINT REPLACEMENT      REMOVAL OF HARDWARE FROM LOWER EXTREMITY Left 8/14/2019    Procedure: REMOVAL, HARDWARE, LOWER EXTREMITY;  Surgeon: Jackson Truong DPM;  Location: [facility] OR;  Service: Podiatry;  Laterality: Left;    RHINOPLASTY TIP  02/2010    SURGICAL REMOVAL OF BUNION WITH OSTEOTOMY OF METATARSAL BONE Left 8/14/2019    Procedure: BUNIONECTOMY, WITH METATARSAL OSTEOTOMY;  Surgeon: Jackson Truong DPM;  Location: [facility] OR;  Service:  Podiatry;  Laterality: Left;  Arthrodesis 2nd PIP joint, screw 1st toe, C-arm, synthes-Gaby, 3.0, 4.0, AO modular set GABY BRUNSON NOTIFIED    TONSILLECTOMY, ADENOIDECTOMY  1970    TOTAL KNEE ARTHROPLASTY Right 12/2008    TOTAL KNEE ARTHROPLASTY Left 03/2015    TOTAL REDUCTION MAMMOPLASTY  01/2008    TUBAL LIGATION      VAGINAL DELIVERY  1983    x2       Review of patient's allergies indicates:   Allergen Reactions    Statins-hmg-coa reductase inhibitors Other (See Comments)     Muscle cramps    Doxycycline Other (See Comments)     Stops gallbladder function    Adhesive Other (See Comments)     bruises    Erythromycin Other (See Comments)     Stomach pain       Current Facility-Administered Medications   Medication    acetaminophen tablet 650 mg    albuterol nebulizer solution 2.5 mg    buPROPion TB24 tablet 300 mg    [START ON 8/22/2023] cholecalciferol (vitamin D3) 125 mcg (5,000 unit) tablet 5,000 Units    cyclobenzaprine tablet 10 mg    gabapentin capsule 400 mg    HYDROcodone-acetaminophen 5-325 mg per tablet 1 tablet    melatonin tablet 6 mg    morphine injection 2 mg    [START ON 8/22/2023] pantoprazole EC tablet 40 mg    sodium chloride 0.9% flush 10 mL    [START ON 8/22/2023] verapamiL CR tablet 240 mg     Current Outpatient Medications   Medication Sig    albuterol (PROVENTIL/VENTOLIN HFA) 90 mcg/actuation inhaler Inhale 2 puffs into the lungs every 6 (six) hours as needed.    amoxicillin-clavulanate 875-125mg (AUGMENTIN) 875-125 mg per tablet Take 1 tablet by mouth every 12 (twelve) hours.    benzonatate (TESSALON) 200 MG capsule Take 200 mg by mouth 2 (two) times daily as needed.    buPROPion (WELLBUTRIN XL) 300 MG 24 hr tablet Take 300 mg by mouth every evening.     cefUROXime (CEFTIN) 500 MG tablet Take 500 mg by mouth 2 (two) times daily.    co-enzyme Q-10 30 mg capsule Take 100 mg by mouth once daily.    cyanocobalamin (VITAMIN B-12) 1000 MCG tablet Take 100 mcg by mouth once daily.    cyclobenzaprine  (FLEXERIL) 10 MG tablet Take 10 mg by mouth every evening.    eszopiclone (LUNESTA) 3 mg Tab Take 3 mg by mouth every evening.    gabapentin (NEURONTIN) 400 MG capsule Take 400 mg by mouth 2 (two) times daily.    garlic 1,000 mg Cap Take 1,000 mg by mouth once daily.    ibuprofen 200 mg Cap Take 1 tablet by mouth every 6 (six) hours as needed.    indomethacin (INDOCIN) 50 MG capsule Take 50 mg by mouth 2 (two) times daily with meals.    lactobacillus acidophilus & bulgar (LACTINEX) 100 million cell packet Take 1 tablet by mouth once daily.    magnesium gluconate 27.5 mg magne- sium (500 mg) Tab Take 500 mg by mouth once daily.    multivitamin with minerals tablet Take 1 tablet by mouth once daily.    MYRBETRIQ 50 mg Tb24 Take 1 tablet by mouth once daily.    omega 3-dha-epa-fish oil 1,000 mg (120 mg-180 mg) Cap Take 1 capsule by mouth once daily.    omeprazole (PRILOSEC) 40 MG capsule Take 40 mg by mouth every morning.     red yeast rice 600 mg Tab Take 2 tablets by mouth once daily.    RESTASIS 0.05 % ophthalmic emulsion Place 1 drop into both eyes 2 (two) times daily.    telmisartan (MICARDIS) 80 MG Tab Take 80 mg by mouth once daily.    tiotropium-olodateroL (STIOLTO RESPIMAT) 2.5-2.5 mcg/actuation Mist Inhale 1 puff into the lungs once daily. Controller     verapamil (VERELAN) 240 MG C24P Take 240 mg by mouth 2 (two) times daily.     vitamin A 01354 UNIT capsule Take 10,000 Units by mouth.    vitamin D (VITAMIN D3) 1000 units Tab Take 5,000 Units by mouth once daily.    albuterol (PROVENTIL) 2.5 mg /3 mL (0.083 %) nebulizer solution Take 2.5 mg by nebulization every 4 (four) hours as needed for Wheezing. Rescue     Family History       Problem Relation (Age of Onset)    Cancer Father          Tobacco Use    Smoking status: Former     Current packs/day: 0.00     Average packs/day: 2.0 packs/day for 33.0 years (66.0 ttl pk-yrs)     Types: Cigarettes     Start date: 5/27/1964     Quit date: 5/27/1997     Years  "since quittin.2    Smokeless tobacco: Former     Quit date: 1997   Substance and Sexual Activity    Alcohol use: Yes     Alcohol/week: 4.0 standard drinks of alcohol     Types: 4 Glasses of wine per week     Comment: monthly    Drug use: Never    Sexual activity: Not on file     Review of Systems   Musculoskeletal:  Positive for joint pain.     Objective:     Vital Signs (Most Recent):  Pulse: 85 (23 1700)  Resp: (!) 23 (23 1700)  BP: (!) 109/56 (23 1700)  SpO2: (!) 94 % (23 1634) Vital Signs (24h Range):  Pulse:  [85-91] 85  Resp:  [18-23] 23  SpO2:  [91 %-94 %] 94 %  BP: (109-121)/(56-67) 109/56     Weight: 86.2 kg (190 lb)  Height: 5' 4" (162.6 cm)  Body mass index is 32.61 kg/m².    No intake or output data in the 24 hours ending 23 1708     General    Nursing note and vitals reviewed.  Constitutional: She is oriented to person, place, and time. She appears well-developed and well-nourished.   Neurological: She is alert and oriented to person, place, and time.   Psychiatric: She has a normal mood and affect. Her behavior is normal.         Left Hip Exam     Inspection   Deformity of hip.    Comments:  Generalized tenderness to palpation about the left hip.  Pain with gentle attempts at range of motion.  Patient's leg was shortened and externally rotated.  Calf was soft nontender, distally neurovascularly intact.          Vascular Exam       Left Pulses  Dorsalis Pedis:      2+  Posterior Tibial:      2+           Significant Labs: BMP: No results for input(s): "GLU", "NA", "K", "CL", "CO2", "BUN", "CREATININE", "CALCIUM", "MG" in the last 48 hours.  CBC:   Recent Labs   Lab 23  1626   WBC 8.72   HGB 13.3   HCT 40.3        CMP: No results for input(s): "NA", "K", "CL", "CO2", "GLU", "BUN", "CREATININE", "CALCIUM", "PROT", "ALBUMIN", "BILITOT", "ALKPHOS", "AST", "ALT", "ANIONGAP", "EGFRNONAA" in the last 48 hours.    Invalid input(s): "ESTGFAFRICA"  Coagulation: " "No results for input(s): "LABPROT", "INR", "APTT" in the last 48 hours.  CRP: No results for input(s): "CRP" in the last 48 hours.  Urine Culture: No results for input(s): "LABURIN" in the last 48 hours.  Urine Studies: No results for input(s): "COLORU", "APPEARANCEUA", "PHUR", "SPECGRAV", "PROTEINUA", "GLUCUA", "KETONESU", "BILIRUBINUA", "OCCULTUA", "NITRITE", "UROBILINOGEN", "LEUKOCYTESUR", "RBCUA", "WBCUA", "BACTERIA", "SQUAMEPITHEL", "HYALINECASTS" in the last 48 hours.    Invalid input(s): "WRIGHTSUR"  Wound Culture: No results for input(s): "LABAERO" in the last 48 hours.  All pertinent labs within the past 24 hours have been reviewed.    Significant Imaging: I have reviewed all pertinent imaging results/findings.  "

## 2023-08-21 NOTE — ED PROVIDER NOTES
Encounter Date: 8/21/2023       History     Chief Complaint   Patient presents with    Hip Pain     Pt arrived by ems from home, slip and fall, left hip pain, lower back pain. +rotation and shortening, -loc - blood thinners     73-year-old female presenting via EMS with acute traumatic left hip pain.  Patient says that she tripped and fell and landed on her left hip.  Pain is severe and she has been unable to bear weight.  She denies any other traumatic injuries or any other acute symptoms.    The history is provided by the patient and the EMS personnel.     Review of patient's allergies indicates:   Allergen Reactions    Statins-hmg-coa reductase inhibitors Other (See Comments)     Muscle cramps    Doxycycline Other (See Comments)     Stops gallbladder function    Adhesive Other (See Comments)     bruises    Erythromycin Other (See Comments)     Stomach pain     Past Medical History:   Diagnosis Date    Colon polyp 01/01/2013    COPD, moderate 01/01/2010    was told by Dr. 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    Lumbar spinal stenosis     Oxygen dependent 01/01/2011    2L NC Nightly    Primary osteoarthritis of first carpometacarpal joints, bilateral     Rheumatoid arthritis     Scoliosis 01/01/2014    Spinal stenosis 01/01/2014    upper and lowerr back - tingling in left arm at times     Past Surgical History:   Procedure Laterality Date    BREAST SURGERY  2008    Reduction    BUNIONECTOMY Bilateral 01/1999    CARDIOVASCULAR STRESS TEST  2013    CARPAL TUNNEL RELEASE  Left 10/08/2015    CATARACT EXTRACTION BILATERAL W/ ANTERIOR VITRECTOMY  2010    [facility] ARTHROPLASTY, RIGHT  2004    COLONOSCOPY  2013    CORRECTION OF HAMMER TOE Left 2019    Procedure: CORRECTION, HAMMER TOE;  Surgeon: Jackson Brannon DPM;  Location: [facility] OR;  Service: Podiatry;  Laterality: Left;    ESOPHAGOGASTRODUODENOSCOPY  2012    2012-with dilation #1, 2013 #2    HAND SURGERY      JOINT REPLACEMENT      REMOVAL OF HARDWARE FROM LOWER EXTREMITY Left 2019    Procedure: REMOVAL, HARDWARE, LOWER EXTREMITY;  Surgeon: Jackson Brannon DPM;  Location: [facility] OR;  Service: Podiatry;  Laterality: Left;    RHINOPLASTY TIP  2010    SURGICAL REMOVAL OF BUNION WITH OSTEOTOMY OF METATARSAL BONE Left 2019    Procedure: BUNIONECTOMY, WITH METATARSAL OSTEOTOMY;  Surgeon: Jackson Brannon DPM;  Location: [facility] OR;  Service: Podiatry;  Laterality: Left;  Arthrodesis 2nd PIP joint, screw 1st toe, C-arm, synthes-Merrill, 3.0, 4.0, AO modular set MERRILL BOY NOTIFIED    TONSILLECTOMY, ADENOIDECTOMY  1970    TOTAL KNEE ARTHROPLASTY Right 2008    TOTAL KNEE ARTHROPLASTY Left 2015    TOTAL REDUCTION MAMMOPLASTY  2008    TUBAL LIGATION      VAGINAL DELIVERY  1983    x2     Family History   Problem Relation Age of Onset    Cancer Father         colon     Social History     Tobacco Use    Smoking status: Former     Current packs/day: 0.00     Average packs/day: 2.0 packs/day for 33.0 years (66.0 ttl pk-yrs)     Types: Cigarettes     Start date: 1964     Quit date: 1997     Years since quittin.2    Smokeless tobacco: Former     Quit date: 1997   Substance Use Topics    Alcohol use: Yes     Alcohol/week: 4.0 standard drinks of alcohol     Types: 4 Glasses of wine per week     Comment: monthly    Drug use: Never     Review of Systems   Musculoskeletal:  Positive for arthralgias.   All other systems reviewed and are negative.      Physical Exam     Initial Vitals  [08/21/23 1337]   BP Pulse Resp Temp SpO2   121/67 91 18 -- (!) 94 %      MAP       --         Physical Exam    Nursing note and vitals reviewed.  Constitutional: She appears well-developed and well-nourished. She is not diaphoretic. No distress.   HENT:   Head: Normocephalic.   Eyes: Conjunctivae are normal.   Neck: Neck supple.   Normal range of motion.  Cardiovascular:  Normal rate.           Pulmonary/Chest: No respiratory distress.   Abdominal: She exhibits no distension.   Musculoskeletal:         General: Tenderness present. No edema.      Cervical back: Normal range of motion and neck supple.      Comments: Left leg is shortened and rotated.  Left lateral hip tenderness to palpation.  1+ DP pulses bilaterally.     Neurological: She is alert. She has normal strength. GCS eye subscore is 4. GCS verbal subscore is 5. GCS motor subscore is 6.   Skin: Skin is warm and dry.   Psychiatric: She has a normal mood and affect.         ED Course   Procedures  Labs Reviewed   CBC W/ AUTO DIFFERENTIAL   COMPREHENSIVE METABOLIC PANEL   URINALYSIS, REFLEX TO URINE CULTURE        ECG Results              EKG 12-lead (In process)  Result time 08/21/23 16:05:50      In process by Interface, Lab In [facility] (08/21/23 16:05:50)                   Narrative:    Test Reason : W19.XXXA,    Vent. Rate : 087 BPM     Atrial Rate : 087 BPM     P-R Int : 218 ms          QRS Dur : 074 ms      QT Int : 386 ms       P-R-T Axes : 072 037 061 degrees     QTc Int : 464 ms    Sinus rhythm with 1st degree A-V block  Low voltage QRS  Borderline Abnormal ECG  When compared with ECG of 11-SEP-2020 14:45,  MI interval has increased    Referred By: [email or web address]   SELF           Confirmed By:                                   Imaging Results              X-Ray Chest AP Portable (Final result)  Result time 08/21/23 15:06:10      Final result by Juan A Oquendo MD (08/21/23 15:06:10)                   Narrative:    Reason: Pain.    FINDINGS:    Portable  chest with comparison chest x-ray September 12, 2020 show normal cardiomediastinal silhouette.  Lungs are clear. Pulmonary vasculature is normal. No acute osseous abnormality.    IMPRESSION:    No acute cardiopulmonary abnormality.    Electronically signed by:  Juan A Oquendo DO  8/21/2023 3:06 PM CDT Workstation: [id number]                                     X-Ray Hip 2 or 3 views Left (with Pelvis when performed) (Final result)  Result time 08/21/23 15:05:27      Final result by Juan A Oquendo MD (08/21/23 15:05:27)                   Narrative:    Reason: Left hip pain.    FINDINGS:    AP pelvis and 2 views of the left hip  demonstrates an acute comminuted intertrochanteric fracture of the left femur with varus angulation. Bilateral hip joint space narrowing. Soft tissues are unremarkable.    IMPRESSION:    Acute comminuted intertrochanteric fracture of the left femur with varus angulation.    Electronically signed by:  Juan A Oquendo DO  8/21/2023 3:05 PM CDT Workstation: [id number]                                     Medications   morphine injection 4 mg (has no administration in time range)   ondansetron disintegrating tablet 4 mg (has no administration in time range)     Medical Decision Making  Hip x-ray does show a left intertrochanteric hip fracture.  Patient was treated with IV morphine.  Medical clearance labs were ordered.  I discussed case with Orthopedics, Dr. Romo.  Hospitalist service has agreed to admit the patient.    Amount and/or Complexity of Data Reviewed  Labs: ordered.  Radiology: ordered and independent interpretation performed.    Risk  Prescription drug management.  Decision regarding hospitalization.                               Clinical Impression:   Final diagnoses:  [M25.559] Hip pain  [W19.XXXA] Fall  [S72.002A] Closed fracture of left hip, initial encounter (Primary)        ED Disposition Condition    Admit Stable                Juan Peña MD  08/21/23 8059

## 2023-08-21 NOTE — H&P
[facility] Medicine   History & Physical   Patient Name: Venkat Lara  MRN: [id number]  Admission Date: 8/21/2023  1:38 PM  Attending Physician: Stone Trimble MD   Primary Care Provider: [facility], Primary Doctor  Face-to-Face encounter date: 08/21/2023    Patient information was obtained from patient, past medical records, ER physician, and ER records.     HISTORY OF PRESENT ILLNESS:     Venkat Lara is a 73 y.o. White female   With PMH of COPD, Depression, Anxiety, GERD. HTN, HLD, osteoarthritis    who presents with mechanical fall and left hip fracture     Patient twisted her leg and fell on the ground while trying to make a coffee. She states she was too fast to try to get the creamer. Denies any SOB, chest pain or loosing consciousness. Denies any head injury. Patient fell on her left knee.     In the ED, she was hemodynamically stable. Labs are still pending. XR shows left hip fracture. Ortho was consulted and patient was admitted.       REVIEW OF SYSTEMS:     All systems reviewed and are negative except as noted per above.    PAST MEDICAL HISTORY:     Past Medical History:   Diagnosis Date    Colon polyp 01/01/2013    COPD, moderate 01/01/2010    was told by Dr. 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    Lumbar spinal stenosis     Oxygen dependent 01/01/2011    2L NC Nightly    Primary osteoarthritis of first carpometacarpal joints, bilateral     Rheumatoid arthritis     Scoliosis 01/01/2014    Spinal stenosis 01/01/2014    upper and lowerr back -  tingling in left arm at times       PAST SURGICAL HISTORY:     Past Surgical History:   Procedure Laterality Date    BREAST SURGERY  2008    Reduction    BUNIONECTOMY Bilateral 01/1999    CARDIOVASCULAR STRESS TEST  2013    CARPAL TUNNEL RELEASE Left 10/08/2015    CATARACT EXTRACTION BILATERAL W/ ANTERIOR VITRECTOMY  04/2010    [facility] ARTHROPLASTY, RIGHT  02/23/2004    COLONOSCOPY  04/18/2013    CORRECTION OF HAMMER TOE Left 8/14/2019    Procedure: CORRECTION, HAMMER TOE;  Surgeon: Jackson Brannon DPM;  Location: [facility];  Service: Podiatry;  Laterality: Left;    ESOPHAGOGASTRODUODENOSCOPY  12/21/2012    2012-with dilation #1, 2013 #2    HAND SURGERY  2003    JOINT REPLACEMENT      REMOVAL OF HARDWARE FROM LOWER EXTREMITY Left 8/14/2019    Procedure: REMOVAL, HARDWARE, LOWER EXTREMITY;  Surgeon: Jackson Brannon DPM;  Location: [facility] OR;  Service: Podiatry;  Laterality: Left;    RHINOPLASTY TIP  02/2010    SURGICAL REMOVAL OF BUNION WITH OSTEOTOMY OF METATARSAL BONE Left 8/14/2019    Procedure: BUNIONECTOMY, WITH METATARSAL OSTEOTOMY;  Surgeon: Jackson Brannon DPM;  Location: [facility] OR;  Service: Podiatry;  Laterality: Left;  Arthrodesis 2nd PIP joint, screw 1st toe, C-arm, Nettle-Merrill, 3.0, 4.0, AO modular set MERRILL BRUNSON NOTIFIED    TONSILLECTOMY, ADENOIDECTOMY  1970    TOTAL KNEE ARTHROPLASTY Right 12/2008    TOTAL KNEE ARTHROPLASTY Left 03/2015    TOTAL REDUCTION MAMMOPLASTY  01/2008    TUBAL LIGATION      VAGINAL DELIVERY  1983    x2       ALLERGIES:   Statins-hmg-coa reductase inhibitors, Doxycycline, Adhesive, and Erythromycin    FAMILY HISTORY:     Family History   Problem Relation Age of Onset    Cancer Father         colon       SOCIAL HISTORY:     Social History     Tobacco Use    Smoking status: Former     Current packs/day: 0.00     Average packs/day: 2.0 packs/day for 33.0 years (66.0 ttl pk-yrs)     Types: Cigarettes     Start date: 5/27/1964     Quit date: 5/27/1997     Years since quitting:  26.2    Smokeless tobacco: Former     Quit date: 1/1/1997   Substance Use Topics    Alcohol use: Yes     Alcohol/week: 4.0 standard drinks of alcohol     Types: 4 Glasses of wine per week     Comment: monthly        Social History     Substance and Sexual Activity   Sexual Activity Not on file        HOME MEDICATIONS:     Prior to Admission medications    Medication Sig Start Date End Date Taking? Authorizing Provider   albuterol (PROVENTIL/VENTOLIN HFA) 90 mcg/actuation inhaler Inhale 2 puffs into the lungs every 6 (six) hours as needed.   Yes Provider, Historical   amoxicillin-clavulanate 875-125mg (AUGMENTIN) 875-125 mg per tablet Take 1 tablet by mouth every 12 (twelve) hours. 11/16/22  Yes Provider, Historical   cefUROXime (CEFTIN) 500 MG tablet Take 500 mg by mouth 2 (two) times daily.   Yes Provider, Historical   albuterol (PROVENTIL) 2.5 mg /3 mL (0.083 %) nebulizer solution Take 2.5 mg by nebulization every 4 (four) hours as needed for Wheezing. Rescue    Provider, Historical   benzonatate (TESSALON) 200 MG capsule Take 200 mg by mouth 2 (two) times daily as needed. 8/1/23   Provider, Historical   buPROPion (WELLBUTRIN XL) 300 MG 24 hr tablet Take 300 mg by mouth every evening.     Provider, Historical   co-enzyme Q-10 30 mg capsule Take 100 mg by mouth once daily.    Provider, Historical   cyanocobalamin (VITAMIN B-12) 1000 MCG tablet Take 100 mcg by mouth once daily.    Provider, Historical   cyclobenzaprine (FLEXERIL) 10 MG tablet  2/2/23   Provider, Historical   eszopiclone (LUNESTA) 3 mg Tab  2/27/23   Provider, Historical   gabapentin (NEURONTIN) 400 MG capsule Take by mouth. 7/25/23   Provider, Historical   garlic 1,000 mg Cap Take 1,000 mg by mouth once daily.    Provider, Historical   ibuprofen 200 mg Cap ibuprofen 200 mg capsule   Take 1 capsule every 6 hours by oral route as needed.    Provider, Historical   indomethacin (INDOCIN) 50 MG capsule Take by mouth. 6/27/23   Provider, Historical  "  lactobacillus acidophilus & bulgar (LACTINEX) 100 million cell packet Take 1 tablet by mouth once daily.    Provider, Historical   magnesium gluconate 27.5 mg magne- sium (500 mg) Tab Take 500 mg by mouth.    Provider, Historical   multivitamin with minerals tablet Take 1 tablet by mouth once daily.    Provider, Historical   MYRBETRIQ 50 mg Tb24  2/4/23   Provider, Historical   olopatadine (PATANOL) 0.1 % ophthalmic solution Place 1 drop into both eyes 2 (two) times daily.  4/5/19   Provider, Historical   omega 3-dha-epa-fish oil 1,000 mg (120 mg-180 mg) Cap Take 1 capsule by mouth.    Provider, Historical   omeprazole (PRILOSEC) 40 MG capsule Take 40 mg by mouth every morning.  4/5/19   Provider, Historical   red yeast rice 600 mg Tab Take 2 tablets by mouth.    Provider, Historical   RESTASIS 0.05 % ophthalmic emulsion  12/28/22   Provider, Historical   telmisartan (MICARDIS) 80 MG Tab  2/15/23   Provider, Historical   tiotropium-olodateroL (STIOLTO RESPIMAT) 2.5-2.5 mcg/actuation Mist Inhale 1 puff into the lungs once daily. Controller     Provider, Historical   verapamil (VERELAN) 240 MG C24P Take 240 mg by mouth 2 (two) times daily.  4/5/19   Provider, Historical   vitamin A 07407 UNIT capsule Take 10,000 Units by mouth.    Provider, Historical   vitamin D (VITAMIN D3) 1000 units Tab Take 5,000 Units by mouth once daily.    Provider, Historical       PHYSICAL EXAM:     /67   Pulse 91   Resp 18   Ht 5' 4" (1.626 m)   Wt 86.2 kg (190 lb)   SpO2 (!) 94%   BMI 32.61 kg/m²     Gen: Awake and alert, good historian, accompanied by   HEENT: Eyes with no icterus, not injected.  External ears with no lesions  Nares patent  Mouth moist mucous membranes,  CV: Regular rate and rhythm, no murmurs, no edema.  Capillary refill < 2 seconds.  Lungs:  Clear to auscultation bilaterally, no tachypnea or increased work of breathing.  Abdomen:  Soft with active bowel sounds, no tenderness to palpation, no " distention.  Musculoskeletal:  Moves all extremities with good strength.  No clubbing. Left leg is externally rotated and short.   Skin:  Warm and dry, no obvious wounds or rashes.    Neuro:  No focal deficits.  No numbness or tingling.  Psych:  Calm and cooperative, awake alert and oriented.    LABS AND IMAGING:     Labs Reviewed   CBC W/ AUTO DIFFERENTIAL   COMPREHENSIVE METABOLIC PANEL   URINALYSIS, REFLEX TO URINE CULTURE       X-Ray Chest AP Portable   Final Result      X-Ray Hip 2 or 3 views Left (with Pelvis when performed)   Final Result          ASSESSMENT & PLAN:   Venkat Lara is a 73 y.o. female admitted for    Active Hospital Problems    Diagnosis  POA    Closed fracture of left hip [S72.002A]  Yes      Resolved Hospital Problems   No resolved problems to display.        Mechanical fall   Acute comminuted intertrochanteric fracture of the left femur  - Pain control with Tylenol, Norco and IV morphine  - Ortho consulted  - NPO after midnight   - DVT prophylaxis post op  - PT, OT post op     0 points on revised cardiac risk index 3.6% risk of 30 day MI or cardiac arrest    Depression  COPD not in exacerbation   GERD   HLD   HTN  - resume home medications.     Peter Trimble MD   [facility] Hospitalist  08/21/2023  3:49 PM

## 2023-08-21 NOTE — CONSULTS
Former[facility] - Emergency Dept  Orthopedics  Consult Note    Patient Name: Venkat Lara  MRN: [id number]  Admission Date: 8/21/2023  Hospital Length of Stay: 0 days  Attending Provider: Peter Trimble MD  Primary Care Provider: [facility]    Patient information was obtained from patient, past medical records, ER records and primary team.     Consults  Subjective:     Principal Problem:<principal problem not specified>    Chief Complaint:   Chief Complaint   Patient presents with    Hip Pain     Pt arrived by ems from home, slip and fall, left hip pain, lower back pain. +rotation and shortening, -loc - blood thinners        HPI: 73-year-old female who presented to the emergency department via EMS with acute traumatic left hip pain.  Patient says that she tripped and fell and landed on her left hip.  Pain is severe and she has been unable to bear weight.  She denies any other traumatic injuries or any other acute symptoms.  Patient was noted to have an obvious deformity of the left hip which was short and externally rotated.      Past Medical History:   Diagnosis Date    Colon polyp 01/01/2013    COPD, moderate 01/01/2010    was told by Dr. Mccormick she has 60% lung capacity    Degeneration of cervical intervertebral disc     Depression with anxiety     GERD (gastroesophageal reflux disease) 01/01/2008    Hammertoe of second toe of left foot 08/07/2019    dr truong    Headaches, cluster 01/01/1997    Heart valve regurgitation 2014    dr verde    Hemorrhoids 01/01/1983    Hiatal hernia 01/01/1997    Hyperlipidemia 01/01/1995    Hypertension     on meds    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/1976    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    Lumbar spinal stenosis     Oxygen dependent 01/01/2011    2L NC Nightly    Primary osteoarthritis of first carpometacarpal joints, bilateral     Rheumatoid arthritis     Scoliosis 01/01/2014    Spinal stenosis 01/01/2014    upper and lowerr back - tingling  in left arm at times       Past Surgical History:   Procedure Laterality Date    BREAST SURGERY  2008    Reduction    BUNIONECTOMY Bilateral 01/1999    CARDIOVASCULAR STRESS TEST  2013    CARPAL TUNNEL RELEASE Left 10/08/2015    CATARACT EXTRACTION BILATERAL W/ ANTERIOR VITRECTOMY  04/2010    CMC ARTHROPLASTY, RIGHT  02/23/2004    COLONOSCOPY  04/18/2013    CORRECTION OF HAMMER TOE Left 8/14/2019    Procedure: CORRECTION, HAMMER TOE;  Surgeon: Jackson Brannon DPM;  Location: [facility] OR;  Service: Podiatry;  Laterality: Left;    ESOPHAGOGASTRODUODENOSCOPY  12/21/2012    2012-with dilation #1, 2013 #2    HAND SURGERY  2003    JOINT REPLACEMENT      REMOVAL OF HARDWARE FROM LOWER EXTREMITY Left 8/14/2019    Procedure: REMOVAL, HARDWARE, LOWER EXTREMITY;  Surgeon: Jackson Brannon DPM;  Location: [facility] OR;  Service: Podiatry;  Laterality: Left;    RHINOPLASTY TIP  02/2010    SURGICAL REMOVAL OF BUNION WITH OSTEOTOMY OF METATARSAL BONE Left 8/14/2019    Procedure: BUNIONECTOMY, WITH METATARSAL OSTEOTOMY;  Surgeon: Jackson Brannon DPM;  Location: [facility] OR;  Service: Podiatry;  Laterality: Left;  Arthrodesis 2nd PIP joint, screw 1st toe, C-arm, Kromatid, 3.0, 4.0, AO modular set GABY BRUNSON NOTIFIED    TONSILLECTOMY, ADENOIDECTOMY  1970    TOTAL KNEE ARTHROPLASTY Right 12/2008    TOTAL KNEE ARTHROPLASTY Left 03/2015    TOTAL REDUCTION MAMMOPLASTY  01/2008    TUBAL LIGATION      VAGINAL DELIVERY  1983    x2       Review of patient's allergies indicates:   Allergen Reactions    Statins-hmg-coa reductase inhibitors Other (See Comments)     Muscle cramps    Doxycycline Other (See Comments)     Stops gallbladder function    Adhesive Other (See Comments)     bruises    Erythromycin Other (See Comments)     Stomach pain       Current Facility-Administered Medications   Medication    acetaminophen tablet 650 mg    albuterol nebulizer solution 2.5 mg    buPROPion TB24 tablet 300 mg    [START ON  8/22/2023] cholecalciferol (vitamin D3) 125 mcg (5,000 unit) tablet 5,000 Units    cyclobenzaprine tablet 10 mg    gabapentin capsule 400 mg    HYDROcodone-acetaminophen 5-325 mg per tablet 1 tablet    melatonin tablet 6 mg    morphine injection 2 mg    [START ON 8/22/2023] pantoprazole EC tablet 40 mg    sodium chloride 0.9% flush 10 mL    [START ON 8/22/2023] verapamiL CR tablet 240 mg     Current Outpatient Medications   Medication Sig    albuterol (PROVENTIL/VENTOLIN HFA) 90 mcg/actuation inhaler Inhale 2 puffs into the lungs every 6 (six) hours as needed.    amoxicillin-clavulanate 875-125mg (AUGMENTIN) 875-125 mg per tablet Take 1 tablet by mouth every 12 (twelve) hours.    benzonatate (TESSALON) 200 MG capsule Take 200 mg by mouth 2 (two) times daily as needed.    buPROPion (WELLBUTRIN XL) 300 MG 24 hr tablet Take 300 mg by mouth every evening.     cefUROXime (CEFTIN) 500 MG tablet Take 500 mg by mouth 2 (two) times daily.    co-enzyme Q-10 30 mg capsule Take 100 mg by mouth once daily.    cyanocobalamin (VITAMIN B-12) 1000 MCG tablet Take 100 mcg by mouth once daily.    cyclobenzaprine (FLEXERIL) 10 MG tablet Take 10 mg by mouth every evening.    eszopiclone (LUNESTA) 3 mg Tab Take 3 mg by mouth every evening.    gabapentin (NEURONTIN) 400 MG capsule Take 400 mg by mouth 2 (two) times daily.    garlic 1,000 mg Cap Take 1,000 mg by mouth once daily.    ibuprofen 200 mg Cap Take 1 tablet by mouth every 6 (six) hours as needed.    indomethacin (INDOCIN) 50 MG capsule Take 50 mg by mouth 2 (two) times daily with meals.    lactobacillus acidophilus & bulgar (LACTINEX) 100 million cell packet Take 1 tablet by mouth once daily.    magnesium gluconate 27.5 mg magne- sium (500 mg) Tab Take 500 mg by mouth once daily.    multivitamin with minerals tablet Take 1 tablet by mouth once daily.    MYRBETRIQ 50 mg Tb24 Take 1 tablet by mouth once daily.    omega 3-dha-epa-fish oil 1,000 mg (120  "mg-180 mg) Cap Take 1 capsule by mouth once daily.    omeprazole (PRILOSEC) 40 MG capsule Take 40 mg by mouth every morning.     red yeast rice 600 mg Tab Take 2 tablets by mouth once daily.    RESTASIS 0.05 % ophthalmic emulsion Place 1 drop into both eyes 2 (two) times daily.    telmisartan (MICARDIS) 80 MG Tab Take 80 mg by mouth once daily.    tiotropium-olodateroL (STIOLTO RESPIMAT) 2.5-2.5 mcg/actuation Mist Inhale 1 puff into the lungs once daily. Controller     verapamil (VERELAN) 240 MG C24P Take 240 mg by mouth 2 (two) times daily.     vitamin A 05821 UNIT capsule Take 10,000 Units by mouth.    vitamin D (VITAMIN D3) 1000 units Tab Take 5,000 Units by mouth once daily.    albuterol (PROVENTIL) 2.5 mg /3 mL (0.083 %) nebulizer solution Take 2.5 mg by nebulization every 4 (four) hours as needed for Wheezing. Rescue     Family History       Problem Relation (Age of Onset)    Cancer Father          Tobacco Use    Smoking status: Former     Current packs/day: 0.00     Average packs/day: 2.0 packs/day for 33.0 years (66.0 ttl pk-yrs)     Types: Cigarettes     Start date: 1964     Quit date: 1997     Years since quittin.2    Smokeless tobacco: Former     Quit date: 1997   Substance and Sexual Activity    Alcohol use: Yes     Alcohol/week: 4.0 standard drinks of alcohol     Types: 4 Glasses of wine per week     Comment: monthly    Drug use: Never    Sexual activity: Not on file     Review of Systems   Musculoskeletal:  Positive for joint pain.     Objective:     Vital Signs (Most Recent):  Pulse: 85 (23 1700)  Resp: (!) 23 (23 1700)  BP: (!) 109/56 (23 1700)  SpO2: (!) 94 % (23 1634) Vital Signs (24h Range):  Pulse:  [85-91] 85  Resp:  [18-23] 23  SpO2:  [91 %-94 %] 94 %  BP: (109-121)/(56-67) 109/56     Weight: 86.2 kg (190 lb)  Height: 5' 4" (162.6 cm)  Body mass index is 32.61 kg/m².    No intake or output data in the 24 hours ending 23 1708   " "  General    Nursing note and vitals reviewed.  Constitutional: She is oriented to person, place, and time. She appears well-developed and well-nourished.   Neurological: She is alert and oriented to person, place, and time.   Psychiatric: She has a normal mood and affect. Her behavior is normal.         Left Hip Exam     Inspection   Deformity of hip.    Comments:  Generalized tenderness to palpation about the left hip.  Pain with gentle attempts at range of motion.  Patient's leg was shortened and externally rotated.  Calf was soft nontender, distally neurovascularly intact.          Vascular Exam       Left Pulses  Dorsalis Pedis:      2+  Posterior Tibial:      2+           Significant Labs: BMP: No results for input(s): "GLU", "NA", "K", "CL", "CO2", "BUN", "CREATININE", "CALCIUM", "MG" in the last 48 hours.  CBC:   Recent Labs   Lab 08/21/23  1626   WBC 8.72   HGB 13.3   HCT 40.3        CMP: No results for input(s): "NA", "K", "CL", "CO2", "GLU", "BUN", "CREATININE", "CALCIUM", "PROT", "ALBUMIN", "BILITOT", "ALKPHOS", "AST", "ALT", "ANIONGAP", "EGFRNONAA" in the last 48 hours.    Invalid input(s): "ESTGFAFRICA"  Coagulation: No results for input(s): "LABPROT", "INR", "APTT" in the last 48 hours.  CRP: No results for input(s): "CRP" in the last 48 hours.  Urine Culture: No results for input(s): "LABURIN" in the last 48 hours.  Urine Studies: No results for input(s): "COLORU", "APPEARANCEUA", "PHUR", "SPECGRAV", "PROTEINUA", "GLUCUA", "KETONESU", "BILIRUBINUA", "OCCULTUA", "NITRITE", "UROBILINOGEN", "LEUKOCYTESUR", "RBCUA", "WBCUA", "BACTERIA", "SQUAMEPITHEL", "HYALINECASTS" in the last 48 hours.    Invalid input(s): "WRIGHTSUR"  Wound Culture: No results for input(s): "LABAERO" in the last 48 hours.  All pertinent labs within the past 24 hours have been reviewed.    Significant Imaging: I have reviewed all pertinent imaging results/findings.    Assessment/Plan:     Closed fracture of left hip  Status " post fall with Acute comminuted intertrochanteric fracture of the left femur with varus angulation.    1. Nonweightbearing  2. Plan for surgical fixation tomorrow afternoon or Wednesday.            Thank you for your consult. I will follow-up with patient. Please contact us if you have any additional questions.    ELVA Castellon  Orthopedics  [facility] - Emergency Dept

## 2023-08-21 NOTE — ASSESSMENT & PLAN NOTE
Status post fall with Acute comminuted intertrochanteric fracture of the left femur with varus angulation.    1. Nonweightbearing  2. Plan for surgical fixation tomorrow afternoon or Wednesday.

## 2023-08-21 NOTE — HPI
73-year-old female who presented to the emergency department via EMS with acute traumatic left hip pain.  Patient says that she tripped and fell and landed on her left hip.  Pain is severe and she has been unable to bear weight.  She denies any other traumatic injuries or any other acute symptoms.  Patient was noted to have an obvious deformity of the left hip which was short and externally rotated.

## 2023-08-21 NOTE — ED NOTES
Adult Physical Assessment  LOC: Venkat Lara, 73 y.o. female verified via two identifiers.  The patient is awake, alert, oriented and speaking appropriately at this time.  APPEARANCE: Patient resting and appears to be in pain at this time. Patient is clean and well groomed, patient's clothing is properly fastened.  SKIN:The skin is warm and dry, color consistent with ethnicity, patient has normal skin turgor and moist mucus membranes  MUSCULOSKELETAL: Patient moving all extremities well except left leg, no obvious swelling, deformities noted to left leg with rotation and shortening.  RESPIRATORY: Airway is open and patent, respirations are spontaneous, patient has a normal effort and rate, no accessory muscle use noted.  CARDIAC: Patient has a normal rate and rhythm, no periphreal edema noted in any extremity, capillary refill < 3 seconds in all extremities  ABDOMEN: Soft and non tender to palpation, no abdominal distention noted.   NEUROLOGIC: Eyes open spontaneously, behavior appropriate to situation, follows commands

## 2023-08-21 NOTE — HOSPITAL COURSE
Postop day 1.:     Patient complains of pain over the lateral left hip.  Medications seem to be controlling the pain well.    Hospital day 1.:    Patient rested well overnight.  Pain controlled with medication and truly just by remaining still.  Very pleasant.     Hospital day 0:    Patient was medicated in the emergency department.  This improved her pain.  Pain was quite well controlled when I examined her.

## 2023-08-21 NOTE — PHARMACY MED REC
"Admission Medication History     The home medication history was taken by Phil Kendrick.    You may go to "Admission" then "Reconcile Home Medications" tabs to review and/or act upon these items.     The home medication list has been updated by the Pharmacy department.   Please read ALL comments highlighted in yellow.   Please address this information as you see fit.    Feel free to contact us if you have any questions or require assistance.      The medications listed below were removed from the home medication list. Please reorder if appropriate:  Patient reports no longer taking the following medication(s):  Olopatadine 0.1        Medications listed below were obtained from: Patient/family and Analytic software- [employer]  No current facility-administered medications on file prior to encounter.     Current Outpatient Medications on File Prior to Encounter   Medication Sig Dispense Refill    albuterol (PROVENTIL/VENTOLIN HFA) 90 mcg/actuation inhaler Inhale 2 puffs into the lungs every 6 (six) hours as needed.      amoxicillin-clavulanate 875-125mg (AUGMENTIN) 875-125 mg per tablet Take 1 tablet by mouth every 12 (twelve) hours.      benzonatate (TESSALON) 200 MG capsule Take 200 mg by mouth 2 (two) times daily as needed.      buPROPion (WELLBUTRIN XL) 300 MG 24 hr tablet Take 300 mg by mouth every evening.       cefUROXime (CEFTIN) 500 MG tablet Take 500 mg by mouth 2 (two) times daily.      co-enzyme Q-10 30 mg capsule Take 100 mg by mouth once daily.      cyanocobalamin (VITAMIN B-12) 1000 MCG tablet Take 100 mcg by mouth once daily.      cyclobenzaprine (FLEXERIL) 10 MG tablet Take 10 mg by mouth every evening.      eszopiclone (LUNESTA) 3 mg Tab Take 3 mg by mouth every evening.      gabapentin (NEURONTIN) 400 MG capsule Take 400 mg by mouth 2 (two) times daily.      garlic 1,000 mg Cap Take 1,000 mg by mouth once daily.      ibuprofen 200 mg Cap Take 1 tablet by mouth every 6 (six) hours as needed. "      indomethacin (INDOCIN) 50 MG capsule Take 50 mg by mouth 2 (two) times daily with meals.      lactobacillus acidophilus & bulgar (LACTINEX) 100 million cell packet Take 1 tablet by mouth once daily.      magnesium gluconate 27.5 mg magne- sium (500 mg) Tab Take 500 mg by mouth once daily.      multivitamin with minerals tablet Take 1 tablet by mouth once daily.      MYRBETRIQ 50 mg Tb24 Take 1 tablet by mouth once daily.      omega 3-dha-epa-fish oil 1,000 mg (120 mg-180 mg) Cap Take 1 capsule by mouth once daily.      omeprazole (PRILOSEC) 40 MG capsule Take 40 mg by mouth every morning.       red yeast rice 600 mg Tab Take 2 tablets by mouth once daily.      RESTASIS 0.05 % ophthalmic emulsion Place 1 drop into both eyes 2 (two) times daily.      telmisartan (MICARDIS) 80 MG Tab Take 80 mg by mouth once daily.      tiotropium-olodateroL (STIOLTO RESPIMAT) 2.5-2.5 mcg/actuation Mist Inhale 1 puff into the lungs once daily. Controller       verapamil (VERELAN) 240 MG C24P Take 240 mg by mouth 2 (two) times daily.       vitamin A 54005 UNIT capsule Take 10,000 Units by mouth.      vitamin D (VITAMIN D3) 1000 units Tab Take 5,000 Units by mouth once daily.      albuterol (PROVENTIL) 2.5 mg /3 mL (0.083 %) nebulizer solution Take 2.5 mg by nebulization every 4 (four) hours as needed for Wheezing. Rescue      [DISCONTINUED] olopatadine (PATANOL) 0.1 % ophthalmic solution Place 1 drop into both eyes 2 (two) times daily.          Potential issues to be addressed PRIOR TO DISCHARGE  Please discuss with the patient barriers to adherence with medication treatment plans  Patient requires education regarding drug therapies     Phil Kendrick  [id number]            .

## 2023-08-22 ENCOUNTER — ANESTHESIA EVENT (OUTPATIENT)
Dept: SURGERY | Facility: HOSPITAL | Age: 73
DRG: 481 | End: 2023-08-22
Payer: MEDICARE

## 2023-08-22 ENCOUNTER — ANESTHESIA (OUTPATIENT)
Dept: SURGERY | Facility: HOSPITAL | Age: 73
DRG: 481 | End: 2023-08-22
Payer: MEDICARE

## 2023-08-22 PROCEDURE — 63600175 PHARM REV CODE 636 W HCPCS: Performed by: ORTHOPAEDIC SURGERY

## 2023-08-22 PROCEDURE — 25000003 PHARM REV CODE 250: Performed by: INTERNAL MEDICINE

## 2023-08-22 PROCEDURE — D9220A PRA ANESTHESIA: Mod: CRNA,,, | Performed by: NURSE ANESTHETIST, CERTIFIED REGISTERED

## 2023-08-22 PROCEDURE — 99900035 HC TECH TIME PER 15 MIN (STAT)

## 2023-08-22 PROCEDURE — 37000008 HC ANESTHESIA 1ST 15 MINUTES: Performed by: ORTHOPAEDIC SURGERY

## 2023-08-22 PROCEDURE — 25000003 PHARM REV CODE 250: Performed by: ORTHOPAEDIC SURGERY

## 2023-08-22 PROCEDURE — 36000711: Performed by: ORTHOPAEDIC SURGERY

## 2023-08-22 PROCEDURE — 27201423 OPTIME MED/SURG SUP & DEVICES STERILE SUPPLY: Performed by: ORTHOPAEDIC SURGERY

## 2023-08-22 PROCEDURE — D9220A PRA ANESTHESIA: ICD-10-PCS | Mod: CRNA,,, | Performed by: NURSE ANESTHETIST, CERTIFIED REGISTERED

## 2023-08-22 PROCEDURE — C1769 GUIDE WIRE: HCPCS | Performed by: ORTHOPAEDIC SURGERY

## 2023-08-22 PROCEDURE — 27244 PR TREAT INTER/SUBTROCH FX,W/PLATE/SCREW: ICD-10-PCS | Mod: LT,,, | Performed by: ORTHOPAEDIC SURGERY

## 2023-08-22 PROCEDURE — 63600175 PHARM REV CODE 636 W HCPCS: Performed by: NURSE ANESTHETIST, CERTIFIED REGISTERED

## 2023-08-22 PROCEDURE — 25000003 PHARM REV CODE 250: Performed by: NURSE ANESTHETIST, CERTIFIED REGISTERED

## 2023-08-22 PROCEDURE — 63600175 PHARM REV CODE 636 W HCPCS: Performed by: INTERNAL MEDICINE

## 2023-08-22 PROCEDURE — 27000221 HC OXYGEN, UP TO 24 HOURS

## 2023-08-22 PROCEDURE — 71000033 HC RECOVERY, INTIAL HOUR: Performed by: ORTHOPAEDIC SURGERY

## 2023-08-22 PROCEDURE — 37000009 HC ANESTHESIA EA ADD 15 MINS: Performed by: ORTHOPAEDIC SURGERY

## 2023-08-22 PROCEDURE — 12000002 HC ACUTE/MED SURGE SEMI-PRIVATE ROOM

## 2023-08-22 PROCEDURE — 25000003 PHARM REV CODE 250: Performed by: STUDENT IN AN ORGANIZED HEALTH CARE EDUCATION/TRAINING PROGRAM

## 2023-08-22 PROCEDURE — D9220A PRA ANESTHESIA: Mod: ANES,,, | Performed by: STUDENT IN AN ORGANIZED HEALTH CARE EDUCATION/TRAINING PROGRAM

## 2023-08-22 PROCEDURE — C1713 ANCHOR/SCREW BN/BN,TIS/BN: HCPCS | Performed by: ORTHOPAEDIC SURGERY

## 2023-08-22 PROCEDURE — 27244 TREAT THIGH FRACTURE: CPT | Mod: LT,,, | Performed by: ORTHOPAEDIC SURGERY

## 2023-08-22 PROCEDURE — 94761 N-INVAS EAR/PLS OXIMETRY MLT: CPT

## 2023-08-22 PROCEDURE — 63600175 PHARM REV CODE 636 W HCPCS: Performed by: STUDENT IN AN ORGANIZED HEALTH CARE EDUCATION/TRAINING PROGRAM

## 2023-08-22 PROCEDURE — 36000710: Performed by: ORTHOPAEDIC SURGERY

## 2023-08-22 PROCEDURE — D9220A PRA ANESTHESIA: ICD-10-PCS | Mod: ANES,,, | Performed by: STUDENT IN AN ORGANIZED HEALTH CARE EDUCATION/TRAINING PROGRAM

## 2023-08-22 PROCEDURE — 71000039 HC RECOVERY, EACH ADD'L HOUR: Performed by: ORTHOPAEDIC SURGERY

## 2023-08-22 DEVICE — IMPLANTABLE DEVICE: Type: IMPLANTABLE DEVICE | Site: HIP | Status: FUNCTIONAL

## 2023-08-22 RX ORDER — SODIUM CHLORIDE 0.9 % (FLUSH) 0.9 %
5 SYRINGE (ML) INJECTION
Status: DISCONTINUED | OUTPATIENT
Start: 2023-08-22 | End: 2023-08-23 | Stop reason: HOSPADM

## 2023-08-22 RX ORDER — OXYCODONE HYDROCHLORIDE 5 MG/1
5 TABLET ORAL
Status: DISCONTINUED | OUTPATIENT
Start: 2023-08-22 | End: 2023-08-22 | Stop reason: HOSPADM

## 2023-08-22 RX ORDER — ROCURONIUM BROMIDE 10 MG/ML
INJECTION, SOLUTION INTRAVENOUS
Status: DISCONTINUED | OUTPATIENT
Start: 2023-08-22 | End: 2023-08-22

## 2023-08-22 RX ORDER — MIDAZOLAM HYDROCHLORIDE 1 MG/ML
INJECTION INTRAMUSCULAR; INTRAVENOUS
Status: DISCONTINUED | OUTPATIENT
Start: 2023-08-22 | End: 2023-08-22

## 2023-08-22 RX ORDER — ONDANSETRON 2 MG/ML
INJECTION INTRAMUSCULAR; INTRAVENOUS
Status: DISCONTINUED | OUTPATIENT
Start: 2023-08-22 | End: 2023-08-22

## 2023-08-22 RX ORDER — HYDROMORPHONE HYDROCHLORIDE 1 MG/ML
0.2 INJECTION, SOLUTION INTRAMUSCULAR; INTRAVENOUS; SUBCUTANEOUS EVERY 5 MIN PRN
Status: DISCONTINUED | OUTPATIENT
Start: 2023-08-22 | End: 2023-08-22 | Stop reason: HOSPADM

## 2023-08-22 RX ORDER — DIPHENHYDRAMINE HYDROCHLORIDE 50 MG/ML
12.5 INJECTION INTRAMUSCULAR; INTRAVENOUS
Status: DISCONTINUED | OUTPATIENT
Start: 2023-08-22 | End: 2023-08-22 | Stop reason: HOSPADM

## 2023-08-22 RX ORDER — PROPOFOL 10 MG/ML
VIAL (ML) INTRAVENOUS
Status: DISCONTINUED | OUTPATIENT
Start: 2023-08-22 | End: 2023-08-22

## 2023-08-22 RX ORDER — ONDANSETRON 2 MG/ML
4 INJECTION INTRAMUSCULAR; INTRAVENOUS DAILY PRN
Status: DISCONTINUED | OUTPATIENT
Start: 2023-08-22 | End: 2023-08-22 | Stop reason: HOSPADM

## 2023-08-22 RX ORDER — LIDOCAINE HYDROCHLORIDE 20 MG/ML
INJECTION, SOLUTION EPIDURAL; INFILTRATION; INTRACAUDAL; PERINEURAL
Status: DISCONTINUED | OUTPATIENT
Start: 2023-08-22 | End: 2023-08-22

## 2023-08-22 RX ORDER — NAPROXEN SODIUM 220 MG/1
81 TABLET, FILM COATED ORAL 2 TIMES DAILY
Status: DISCONTINUED | OUTPATIENT
Start: 2023-08-22 | End: 2023-08-23 | Stop reason: HOSPADM

## 2023-08-22 RX ORDER — FAMOTIDINE 20 MG/1
20 TABLET, FILM COATED ORAL 2 TIMES DAILY
Status: DISCONTINUED | OUTPATIENT
Start: 2023-08-22 | End: 2023-08-22

## 2023-08-22 RX ORDER — DEXAMETHASONE SODIUM PHOSPHATE 4 MG/ML
INJECTION, SOLUTION INTRA-ARTICULAR; INTRALESIONAL; INTRAMUSCULAR; INTRAVENOUS; SOFT TISSUE
Status: DISCONTINUED | OUTPATIENT
Start: 2023-08-22 | End: 2023-08-22

## 2023-08-22 RX ORDER — LIDOCAINE HYDROCHLORIDE 20 MG/ML
JELLY TOPICAL
Status: DISCONTINUED | OUTPATIENT
Start: 2023-08-22 | End: 2023-08-22

## 2023-08-22 RX ADMIN — MORPHINE SULFATE 2 MG: 2 INJECTION, SOLUTION INTRAMUSCULAR; INTRAVENOUS at 07:08

## 2023-08-22 RX ADMIN — VERAPAMIL HYDROCHLORIDE 240 MG: 240 TABLET, FILM COATED, EXTENDED RELEASE ORAL at 09:08

## 2023-08-22 RX ADMIN — DEXAMETHASONE SODIUM PHOSPHATE 8 MG: 4 INJECTION, SOLUTION INTRAMUSCULAR; INTRAVENOUS at 04:08

## 2023-08-22 RX ADMIN — MEPERIDINE HYDROCHLORIDE 12.5 MG: 50 INJECTION, SOLUTION INTRAMUSCULAR; INTRAVENOUS; SUBCUTANEOUS at 05:08

## 2023-08-22 RX ADMIN — PROPOFOL 150 MG: 10 INJECTION, EMULSION INTRAVENOUS at 04:08

## 2023-08-22 RX ADMIN — CHOLECALCIFEROL TAB 125 MCG (5000 UNIT) 5000 UNITS: 125 TAB at 09:08

## 2023-08-22 RX ADMIN — MORPHINE SULFATE 2 MG: 2 INJECTION, SOLUTION INTRAMUSCULAR; INTRAVENOUS at 03:08

## 2023-08-22 RX ADMIN — HYDROMORPHONE HYDROCHLORIDE 0.2 MG: 1 INJECTION, SOLUTION INTRAMUSCULAR; INTRAVENOUS; SUBCUTANEOUS at 05:08

## 2023-08-22 RX ADMIN — ONDANSETRON 4 MG: 2 INJECTION INTRAMUSCULAR; INTRAVENOUS at 03:08

## 2023-08-22 RX ADMIN — HYDROCODONE BITARTRATE AND ACETAMINOPHEN 1 TABLET: 5; 325 TABLET ORAL at 09:08

## 2023-08-22 RX ADMIN — PANTOPRAZOLE SODIUM 40 MG: 40 TABLET, DELAYED RELEASE ORAL at 05:08

## 2023-08-22 RX ADMIN — OXYCODONE HYDROCHLORIDE 5 MG: 5 TABLET ORAL at 05:08

## 2023-08-22 RX ADMIN — SODIUM CHLORIDE, SODIUM LACTATE, POTASSIUM CHLORIDE, AND CALCIUM CHLORIDE: .6; .31; .03; .02 INJECTION, SOLUTION INTRAVENOUS at 03:08

## 2023-08-22 RX ADMIN — ROCURONIUM BROMIDE 50 MG: 10 INJECTION, SOLUTION INTRAVENOUS at 04:08

## 2023-08-22 RX ADMIN — MIDAZOLAM HYDROCHLORIDE 1 MG: 1 INJECTION, SOLUTION INTRAMUSCULAR; INTRAVENOUS at 03:08

## 2023-08-22 RX ADMIN — HYDROCODONE BITARTRATE AND ACETAMINOPHEN 1 TABLET: 5; 325 TABLET ORAL at 11:08

## 2023-08-22 RX ADMIN — LIDOCAINE HYDROCHLORIDE 60 MG: 20 INJECTION, SOLUTION INTRAVENOUS at 04:08

## 2023-08-22 RX ADMIN — GABAPENTIN 400 MG: 300 CAPSULE ORAL at 08:08

## 2023-08-22 RX ADMIN — GABAPENTIN 400 MG: 300 CAPSULE ORAL at 09:08

## 2023-08-22 RX ADMIN — MORPHINE SULFATE 2 MG: 2 INJECTION, SOLUTION INTRAMUSCULAR; INTRAVENOUS at 01:08

## 2023-08-22 RX ADMIN — HYDROCODONE BITARTRATE AND ACETAMINOPHEN 1 TABLET: 5; 325 TABLET ORAL at 05:08

## 2023-08-22 RX ADMIN — ASPIRIN 81 MG 81 MG: 81 TABLET ORAL at 08:08

## 2023-08-22 RX ADMIN — BUPROPION HYDROCHLORIDE 300 MG: 150 TABLET, FILM COATED, EXTENDED RELEASE ORAL at 08:08

## 2023-08-22 RX ADMIN — Medication 6 MG: at 08:08

## 2023-08-22 RX ADMIN — LIDOCAINE HYDROCHLORIDE 1 EACH: 20 JELLY TOPICAL at 04:08

## 2023-08-22 NOTE — TRANSFER OF CARE
"Anesthesia Transfer of Care Note    Patient: Venkat Lara    Procedure(s) Performed: Procedure(s) (LRB):  ORIF, HIP, USING DYNAMIC HIP SCREW (Left)    Patient location: PACU    Anesthesia Type: general    Transport from OR: Transported from OR on room air with adequate spontaneous ventilation    Post pain: adequate analgesia    Post assessment: no apparent anesthetic complications and tolerated procedure well    Post vital signs: stable    Level of consciousness: responds to stimulation    Nausea/Vomiting: no nausea/vomiting    Complications: none    Transfer of care protocol was followedComments: SV, exchanging well.  To PACU, VSS upon arrival. Report to RN       Last vitals:   Visit Vitals  /68   Pulse 87   Temp 36.8 °C (98.3 °F) (Oral)   Resp 18   Ht 5' 4" (1.626 m)   Wt 89.6 kg (197 lb 8.5 oz)   SpO2 96%   BMI 33.91 kg/m²     "

## 2023-08-22 NOTE — PHYSICIAN QUERY
PT Name: Venkat Lara  MR #: [id number]     DOCUMENTATION CLARIFICATION     CDS/: Reina Marquez               Contact information:[phone] or through epic messenger  This form is a permanent document in the medical record.     Query Date: August 22, 2023    By submitting this query, we are merely seeking further clarification of documentation.  Please utilize your independent clinical judgment when addressing the question(s) below.  The Medical Record contains the following   Indicators   Supporting Clinical Findings Location in Medical Record    RR         O2 sat         O2 use PRE-TX-O2   Device (Oxygen Therapy) nasal cannula with humidification   $ Is the patient on Low Flow Oxygen? Yes   Flow (L/min) 2   SpO2 (!) 93 %   Pulse Oximetry Type Intermittent    Care Update RT 8/22    Home O2, Oxygen Dependence Home Oxygen   Has Home Oxygen? Yes   Liter Flow 2   Duration with sleep    Care Update RT 8/22    Acute/Chronic Illness COPD, PMH smoker     Other Oxygen dependent ER Prov Note, HP       The clinical guidelines noted are only a system guideline. It does not replace the providers clinical judgment.      [facility] Approved Diagnostic Criteria         Chronic Respiratory Failure   Hypoxic: Continuous home oxygen    AND/OR   Hypercapnic: Normal pH with high CO2 (ex. COPD)             Provider, is there an associated diagnosis that can be documented to better reflect the pt's respiratory status?   [  x  ] Chronic Respiratory Failure with Hypoxia   [    ] Chronic Respiratory Failure with Hypoxia and Hypercapnia   [    ] Chronic Respiratory Failure, unspecified whether with hypoxia or hypercapnia   [    ] Other Respiratory Diagnosis (please specify)     [    ] NO correlating respiratory diagnosis, insignificant clinical findings       Please document in your progress notes daily for the duration of treatment until resolved and include in your discharge summary.

## 2023-08-22 NOTE — SUBJECTIVE & OBJECTIVE
"Principal Problem:<principal problem not specified>    Principal Orthopedic Problem:  Left hip fracture    Interval History:  NPO, plan for surgical intervention today    Review of patient's allergies indicates:   Allergen Reactions    Statins-hmg-coa reductase inhibitors Other (See Comments)     Muscle cramps    Doxycycline Other (See Comments)     Stops gallbladder function    Adhesive Other (See Comments)     bruises    Erythromycin Other (See Comments)     Stomach pain       Current Facility-Administered Medications   Medication    acetaminophen tablet 650 mg    albuterol nebulizer solution 2.5 mg    buPROPion TB24 tablet 300 mg    cholecalciferol (vitamin D3) 125 mcg (5,000 unit) tablet 5,000 Units    cyclobenzaprine tablet 10 mg    gabapentin capsule 400 mg    HYDROcodone-acetaminophen 5-325 mg per tablet 1 tablet    melatonin tablet 6 mg    morphine injection 2 mg    pantoprazole EC tablet 40 mg    sodium chloride 0.9% flush 10 mL    verapamiL CR tablet 240 mg     Objective:     Vital Signs (Most Recent):  Temp: 98.3 °F (36.8 °C) (08/22/23 0421)  Pulse: 87 (08/22/23 0421)  Resp: 19 (08/22/23 0531)  BP: (!) 151/74 (08/22/23 0421)  SpO2: 98 % (08/22/23 0421) Vital Signs (24h Range):  Temp:  [98.1 °F (36.7 °C)-98.5 °F (36.9 °C)] 98.3 °F (36.8 °C)  Pulse:  [85-91] 87  Resp:  [18-23] 19  SpO2:  [91 %-98 %] 98 %  BP: (109-151)/(56-82) 151/74     Weight: 89.6 kg (197 lb 8.5 oz)  Height: 5' 4" (162.6 cm)  Body mass index is 33.91 kg/m².    No intake or output data in the 24 hours ending 08/22/23 0646     General    Nursing note and vitals reviewed.  Constitutional: She is oriented to person, place, and time. She appears well-developed and well-nourished. No distress.   Neurological: She is alert and oriented to person, place, and time.   Psychiatric: She has a normal mood and affect. Her behavior is normal.         Left Hip Exam     Inspection   Deformity of hip.    Other   Sensation: normal    Comments:  Lying in " "bed, head of bed proximally 30°.  Resting comfortably.  Left leg is shortened externally rotated.  Complains of pain around the left hip.          Vascular Exam       Left Pulses  Dorsalis Pedis:      2+  Posterior Tibial:      2+        Capillary Refill  Left Hip: brisk         Significant Labs: BMP:   Recent Labs   Lab 08/21/23  1626   GLU 95      K 4.6      CO2 25   BUN 30*   CREATININE 0.8   CALCIUM 9.8     CBC:   Recent Labs   Lab 08/21/23  1626   WBC 8.72   HGB 13.3   HCT 40.3        CMP:   Recent Labs   Lab 08/21/23  1626      K 4.6      CO2 25   GLU 95   BUN 30*   CREATININE 0.8   CALCIUM 9.8   PROT 7.4   ALBUMIN 4.2   BILITOT 0.9   ALKPHOS 73   AST 39   ALT 55*   ANIONGAP 8     Coagulation: No results for input(s): "LABPROT", "INR", "APTT" in the last 48 hours.  CRP: No results for input(s): "CRP" in the last 48 hours.  Urine Culture: No results for input(s): "LABURIN" in the last 48 hours.  Urine Studies:   Recent Labs   Lab 08/21/23  2102   COLORU Yellow   APPEARANCEUA Clear   PHUR 6.0   SPECGRAV 1.020   PROTEINUA Negative   GLUCUA Negative   KETONESU 1+*   BILIRUBINUA Negative   OCCULTUA Negative   NITRITE Negative   UROBILINOGEN Negative   LEUKOCYTESUR Negative     All pertinent labs within the past 24 hours have been reviewed.    Significant Imaging: None  "

## 2023-08-22 NOTE — ANESTHESIA POSTPROCEDURE EVALUATION
Anesthesia Post Evaluation    Patient: Venkat Lara    Procedure(s) Performed: Procedure(s) (LRB):  ORIF, HIP, USING DYNAMIC HIP SCREW (Left)    Final Anesthesia Type: general      Patient location during evaluation: PACU  Patient participation: Yes- Able to Participate  Level of consciousness: awake and alert  Post-procedure vital signs: reviewed and stable  Pain management: adequate  Airway patency: patent    PONV status at discharge: No PONV  Anesthetic complications: no      Cardiovascular status: hemodynamically stable  Respiratory status: unassisted, spontaneous ventilation and room air  Hydration status: euvolemic  Follow-up not needed.          Vitals Value Taken Time   /62 08/22/23 1800   Temp 36.6 °C (97.9 °F) 08/22/23 1745   Pulse 82 08/22/23 1800   Resp 15 08/22/23 1800   SpO2 97 % 08/22/23 1800   Vitals shown include unvalidated device data.      Event Time   Out of Recovery 08/22/2023 18:02:05         Pain/Anabelle Score: Pain Rating Prior to Med Admin: 7 (8/22/2023  5:26 PM)  Pain Rating Post Med Admin: 3 (8/22/2023 10:34 AM)  Anabelle Score: 10 (8/22/2023  5:45 PM)

## 2023-08-22 NOTE — NURSING
Nurses Note -- 4 Eyes      8/21/2023   7:05 PM      Skin assessed during: Admit      [x] No Altered Skin Integrity Present    [x]Prevention Measures Documented      [] Yes- Altered Skin Integrity Present or Discovered   [] LDA Added if Not in Epic (Describe Wound)   [] New Altered Skin Integrity was Present on Admit and Documented in LDA   [] Wound Image Taken    Wound Care Consulted? No    Attending Nurse:  FRANKLIN Gómez RN     Second RN/Staff Member:   [id number]

## 2023-08-22 NOTE — OP NOTE
[facility]  Surgery Department  Operative Note    SUMMARY     Date of Procedure: 8/22/2023     Procedure:  Open reduction internal fixation of left intertrochanteric hip fracture    Surgeon(s) and Role:     * Hussein Romo MD - Primary    Assisting Surgeon:  Florentino    Pre-Operative Diagnosis: Closed fracture of left hip, initial encounter [S72.002A]    Post-Operative Diagnosis: Post-Op Diagnosis Codes:     * Closed fracture of left hip, initial encounter [S72.002A]    Anesthesia: General    Intraoperative Findings:  Displaced intra-articular intertrochanteric hip fracture    Description of the Findings of the Procedure:  Patient was placed on the operative table in supine position.  She was then placed into traction on the fracture arm.  C-arm was utilized and the fracture  was anatomically reduced.  She was now prepped and draped in the usual sterile manner for surgery.  Incision was made from the greater trochanter distally it was carried down sharply through the skin.  The deep fascia was incised in line with its fibers.  The vastus lateralis was elevated up off the femur.  A guidewire was advanced up into the femoral head.  It was noted to be central in the AP and lateral planes.  We now over measured to 90 mm.  It was now over reamed.  We placed a 90 mm lag screw up into the femoral head and obtained an excellent bite.  At this point the plate was slid up over the lag screw and secured to the femur using 4 screws.  Outstanding bites were obtained.  The hardware was in ideal position the fracture was anatomically reduced on multiple C-arm views.  We copiously irrigated.  The vastus lateralis was simply allowed to lie back over the femur.  We closed the deep fascia with a running 1. Vicryl.  We closed the subcu with 2-0 Vicryl and the skin with 4-0 Monocryl.  Sterile dressings were applied and the patient was noted to be in stable condition.    Complications: No    Estimated Blood Loss (EBL): * No  values recorded between 8/22/2023  4:21 PM and 8/22/2023  4:45 PM *           Implants:   Implant Name Type Inv. Item Serial No.  Lot No. LRB No. Used Action   LOG [id number] - SYN[employer] DHS 1 STEP BASIC - 1           LOG [id number] - [employer] IMPLANTS  SET - 1           4.5mm cortex screws self-tapping, 36mm    [employer]  Left 1 Implanted   4.5mm cortex screws self-tapping, 38mm    [employer]  Left 2 Implanted   4.5mm cortex screws self-tapping, 42mm    [employer]  Left 1 Implanted   DHS/DCS one-step lag screws 12.7mm diameter, 90mm    [employer]  Left 1 Implanted       Specimens:   Specimen (24h ago, onward)      None                    Condition: Good    Disposition: PACU - hemodynamically stable.     Complications: No    Estimated Blood Loss (EBL): * No values recorded between 8/22/2023  4:21 PM and 8/22/2023  4:44 PM *           Implants:   Implant Name Type Inv. Item Serial No.  Lot No. LRB No. Used Action   LOG [id number] - [employer] 1 STEP BASIC - 1           LOG [id number] - [employer] IMPLANTS  SET - 1           4.5mm cortex screws self-tapping, 36mm    [employer]  Left 1 Implanted   4.5mm cortex screws self-tapping, 38mm    [employer]  Left 2 Implanted   4.5mm cortex screws self-tapping, 42mm    [employer]  Left 1 Implanted   DHS/DCS one-step lag screws 12.7mm diameter, 90mm    [employer]  Left 1 Implanted          1 Implanted       Specimens:   Specimen (24h ago, onward)      None                    Condition: Good    Disposition: PACU - hemodynamically stable.

## 2023-08-22 NOTE — CARE UPDATE
08/22/23 0809   Patient Assessment/Suction   Level of Consciousness (AVPU) alert   Respiratory Effort Normal;Unlabored   All Lung Fields Breath Sounds clear;diminished   Rhythm/Pattern, Respiratory pattern regular;unlabored   PRE-TX-O2   Device (Oxygen Therapy) nasal cannula with humidification   $ Is the patient on Low Flow Oxygen? Yes   Flow (L/min) 2   SpO2 (!) 93 %   Pulse Oximetry Type Intermittent   $ Pulse Oximetry - Multiple Charge Pulse Oximetry - Multiple   Pulse 90   Resp 16   Aerosol Therapy   $ Aerosol Therapy Charges PRN treatment not required   Daily Review of Necessity (SVN) completed   Respiratory Evaluation   $ Care Plan Tech Time 15 min   Home Oxygen   Has Home Oxygen? Yes   Liter Flow 2   Duration with sleep

## 2023-08-22 NOTE — ASSESSMENT & PLAN NOTE
Hospital day 1:    Status post fall with Acute comminuted intertrochanteric fracture of the left femur with varus angulation.    1. Nonweightbearing  2. Plan for surgical fixation today, the surgical plan and time was discussed with the patient.  3. Patient NPO since midnight

## 2023-08-22 NOTE — PLAN OF CARE
[facility]  Initial Discharge Assessment       Primary Care Provider: Emerald, Primary Doctor    Admission Diagnosis: Closed fracture of left hip, initial encounter [S72.002A]    Admission Date: 8/21/2023  Expected Discharge Date: 8/24/2023    Transition of Care Barriers: None     met with Pt at bedside to complete discharge assessment. Pt AAOx4s. Demographics, PCP, and insurance verified. No home health. No dialysis. Pt reports ability to complete ADLs without assistance. DME listed below. Pt on 2L of Oxygen at home through [employer]. Pt requested walker at discharge. Pt verbalized plan to discharge home via family transport. Pt has no other needs to be addressed at this time.     Payor: MEDICARE / Plan: MEDICARE PART A & B / Product Type: Government /     Extended Emergency Contact Information  Primary Emergency Contact: Warren Lara  Address: [address] [place] [address]  Home Phone: [phone]  Mobile Phone: [phone]  Relation: Spouse    Discharge Plan A: Home with family  Discharge Plan B: Home with family      [facility] - [address] [address]  Phone: [phone] Fax: [phone]    [id number] [place] - [address]  AT [facility] [place] [facility]  [address] [place] [address]  Phone: [phone] Fax: [phone]    [id number] - [place] - [address] AT [facility] [address]  [address]  [place] [address]  Phone: [phone] Fax: [phone]    [facility] - [place] - [address]  [address] [place] [address]  Phone: [phone] Fax: [phone]    [id number] - [place] - [address] AT [facility] [address]  [address] [place] [address]  Phone: [phone] Fax: [phone]      Initial Assessment  (most recent)       Adult Discharge Assessment - 08/22/23 1205          Discharge Assessment    Assessment Type Discharge Planning Assessment     Confirmed/corrected address, phone number and insurance Yes     Confirmed Demographics Correct on Facesheet     Source of Information patient     Communicated GENI with patient/caregiver Date not available/Unable to determine     Reason For Admission Hip fracture     People in Home spouse     Facility Arrived From: home     Do you expect to return to your current living situation? Yes     Do you have help at home or someone to help you manage your care at home? Yes     Who are your caregiver(s) and their phone number(s)? Warren Lara (Spouse)   [phone] (Mobile)     Prior to hospitilization cognitive status: Unable to Assess     Current cognitive status: Alert/Oriented     Equipment Currently Used at Home CPAP;BIPAP;oxygen;nebulizer     Readmission within 30 days? No     Patient currently being followed by outpatient case management? No     Do you currently have service(s) that help you manage your care at home? No     Do you take prescription medications? Yes     Do you have prescription coverage? Yes     Coverage MEDICARE - MEDICARE PART A & B     Do you have any problems affording any of your prescribed medications? No     Is the patient taking medications as prescribed? yes     Who is going to help you get home at discharge? Warren Lara (Spouse)   [phone] (Mobile)     How do you get to doctors appointments? car, drives self     Are you on dialysis? No     Do you take coumadin? No     DME Needed Upon Discharge  walker, standard     Discharge Plan discussed with: Patient     Transition of Care Barriers None     Discharge Plan A Home with family     Discharge Plan B Home with family

## 2023-08-22 NOTE — PROGRESS NOTES
[facility] Medicine  Progress Note    Patient name: Venkat Lara  MRN: [id number]  Admit Date: 8/21/2023   LOS: 1 day     SUBJECTIVE:     Principal problem: left hip fracture     HPI  Venkat Lara is a 73 y.o. White female   With PMH of COPD, Depression, Anxiety, GERD. HTN, HLD, osteoarthritis    who presents with mechanical fall and left hip fracture      Patient twisted her leg and fell on the ground while trying to make a coffee. She states she was too fast to try to get the creamer. Denies any SOB, chest pain or loosing consciousness. Denies any head injury. Patient fell on her left knee.      In the ED, she was hemodynamically stable. Labs are still pending. XR shows left hip fracture. Ortho was consulted and patient was admitted.     Interval History:  Patient states that she did not sleep well due to lot of interruptions last night. Pain is fairly controlled except when they are moving her. Patient is scheduled for surgery this afternoon.     Scheduled Meds:   buPROPion  300 mg Oral QHS    vitamin D  5,000 Units Oral Daily    gabapentin  400 mg Oral TID    pantoprazole  40 mg Oral Daily    verapamiL  240 mg Oral Daily     Continuous Infusions:  PRN Meds:acetaminophen, albuterol, cyclobenzaprine, HYDROcodone-acetaminophen, melatonin, morphine, sodium chloride 0.9%    Review of patient's allergies indicates:   Allergen Reactions    Statins-hmg-coa reductase inhibitors Other (See Comments)     Muscle cramps    Doxycycline Other (See Comments)     Stops gallbladder function    Adhesive Other (See Comments)     bruises    Erythromycin Other (See Comments)     Stomach pain       Review of Systems  As per subjective    OBJECTIVE:     Vital Signs (Most Recent)  Temp: 98.5 °F (36.9 °C) (08/22/23 0701)  Pulse: 90 (08/22/23 0809)  Resp: 16 (08/22/23 0934)  BP: 135/69 (08/22/23 0701)  SpO2: (!) 93 % (08/22/23 0809)    Vital Signs Range (Last 24H):  Temp:  [98.1 °F (36.7 °C)-98.5 °F (36.9 °C)]    Pulse:  [85-91]   Resp:  [16-23]   BP: (109-151)/(56-82)   SpO2:  [91 %-98 %]     I & O (Last 24H):  Intake/Output Summary (Last 24 hours) at 8/22/2023 1038  Last data filed at 8/22/2023 0844  Gross per 24 hour   Intake 0 ml   Output --   Net 0 ml       Physical Exam:  General: Patient resting comfortably in no acute distress. Appears as stated age. Calm  Eyes: EOM intact. No conjunctivae injection. No scleral icterus.  ENT: Hearing grossly intact. No discharge from ears. No nasal discharge.   CVS: RRR. No LE edema BL.  Lungs: CTA BL, no wheezing or crackles. Good breath sounds. No accessory muscle use. No acute respiratory distress  Neuro: Alert. GCS 15. Cranial nerves grossly intact. Moves all extremities equally. Follows commands. Responds appropriately     Laboratory:  All pertinent labs within the past 24 hours have been reviewed.  CBC:   Recent Labs   Lab 08/21/23  1626   WBC 8.72   HGB 13.3   HCT 40.3        CMP:   Recent Labs   Lab 08/21/23  1626      K 4.6      CO2 25   GLU 95   BUN 30*   CREATININE 0.8   CALCIUM 9.8   PROT 7.4   ALBUMIN 4.2   BILITOT 0.9   ALKPHOS 73   AST 39   ALT 55*   ANIONGAP 8       Microbiology Results (last 7 days)       ** No results found for the last 168 hours. **             Diagnostic Results:      ASSESSMENT/PLAN:     Mechanical fall   Acute comminuted intertrochanteric fracture of the left femur  - Pain control with Tylenol, Norco and IV morphine  - Ortho consulted >> ORIF later today   - DVT prophylaxis post op  - PT, OT post op      Depression  COPD not in exacerbation with nocturnal hypoxemia, 2 L at night    GERD   HLD   HTN  - resume home medications. Holding ARB until post op       VTE Risk Mitigation (From admission, onward)           Ordered     IP VTE HIGH RISK PATIENT  Once         08/21/23 1641     Place sequential compression device  Until discontinued         08/21/23 1641                    Department Hospital Medicine  [facility]  [facility]  Date of service: 08/22/2023

## 2023-08-22 NOTE — RESPIRATORY THERAPY
08/21/23 2025   Patient Assessment/Suction   Level of Consciousness (AVPU) alert   Respiratory Effort Normal;Unlabored   Expansion/Accessory Muscles/Retractions no retractions;no use of accessory muscles   All Lung Fields Breath Sounds Anterior:;Posterior:;Lateral:;equal bilaterally;clear   Rhythm/Pattern, Respiratory depth regular;pattern regular;unlabored   Skin Integrity   $ Wound Care Tech Time 15 min   Area Observed Right;Behind ear;Left;Cheek;Bridge of nose;Lower lip;Nares   Skin Appearance without discoloration   PRE-TX-O2   Device (Oxygen Therapy) nasal cannula   $ Is the patient on Low Flow Oxygen? Yes   Flow (L/min) 2   SpO2 96 %   Pulse Oximetry Type Intermittent   $ Pulse Oximetry - Multiple Charge Pulse Oximetry - Multiple   Pulse 87   Resp 19   Aerosol Therapy   $ Aerosol Therapy Charges PRN treatment not required   Daily Review of Necessity (SVN) completed   Respiratory Treatment Status (SVN) PRN treatment not required   Respiratory Evaluation   $ Care Plan Tech Time 15 min   $ Eval/Re-eval Charges Evaluation   Evaluation For New Orders   Admitting Diagnosis hip fracture   Pulmonary Diagnosis copd   Home Oxygen   Has Home Oxygen? Yes   Liter Flow 2   Duration with sleep   Route nasal cannula   Mode continuous   Device home concentrator   Home Aerosol, MDI, DPI, and Other Treatments/Therapies   Home Respiratory Therapy Per Patient/Review of Chart Yes   MDI Home Meds/Freq Albuterol PRN   DPI Home Meds/Freq stiolto daily

## 2023-08-22 NOTE — PROGRESS NOTES
"[facility]  Orthopedics  Progress Note    Patient Name: Venkat Lara  MRN: [id number]  Admission Date: 8/21/2023  Hospital Length of Stay: 1 days  Attending Provider: Peter Trimble MD  Primary Care Provider: Emerald, Primary Doctor    Subjective:     Principal Problem:<principal problem not specified>    Principal Orthopedic Problem:  Left hip fracture    Interval History:  NPO, plan for surgical intervention today    Review of patient's allergies indicates:   Allergen Reactions    Statins-hmg-coa reductase inhibitors Other (See Comments)     Muscle cramps    Doxycycline Other (See Comments)     Stops gallbladder function    Adhesive Other (See Comments)     bruises    Erythromycin Other (See Comments)     Stomach pain       Current Facility-Administered Medications   Medication    acetaminophen tablet 650 mg    albuterol nebulizer solution 2.5 mg    buPROPion TB24 tablet 300 mg    cholecalciferol (vitamin D3) 125 mcg (5,000 unit) tablet 5,000 Units    cyclobenzaprine tablet 10 mg    gabapentin capsule 400 mg    HYDROcodone-acetaminophen 5-325 mg per tablet 1 tablet    melatonin tablet 6 mg    morphine injection 2 mg    pantoprazole EC tablet 40 mg    sodium chloride 0.9% flush 10 mL    verapamiL CR tablet 240 mg     Objective:     Vital Signs (Most Recent):  Temp: 98.3 °F (36.8 °C) (08/22/23 0421)  Pulse: 87 (08/22/23 0421)  Resp: 19 (08/22/23 0531)  BP: (!) 151/74 (08/22/23 0421)  SpO2: 98 % (08/22/23 0421) Vital Signs (24h Range):  Temp:  [98.1 °F (36.7 °C)-98.5 °F (36.9 °C)] 98.3 °F (36.8 °C)  Pulse:  [85-91] 87  Resp:  [18-23] 19  SpO2:  [91 %-98 %] 98 %  BP: (109-151)/(56-82) 151/74     Weight: 89.6 kg (197 lb 8.5 oz)  Height: 5' 4" (162.6 cm)  Body mass index is 33.91 kg/m².    No intake or output data in the 24 hours ending 08/22/23 0646     General    Nursing note and vitals reviewed.  Constitutional: She is oriented to person, place, and time. She appears well-developed and " "well-nourished. No distress.   Neurological: She is alert and oriented to person, place, and time.   Psychiatric: She has a normal mood and affect. Her behavior is normal.         Left Hip Exam     Inspection   Deformity of hip.    Other   Sensation: normal    Comments:  Lying in bed, head of bed proximally 30°.  Resting comfortably.  Left leg is shortened externally rotated.  Complains of pain around the left hip.          Vascular Exam       Left Pulses  Dorsalis Pedis:      2+  Posterior Tibial:      2+        Capillary Refill  Left Hip: brisk         Significant Labs: BMP:   Recent Labs   Lab 08/21/23  1626   GLU 95      K 4.6      CO2 25   BUN 30*   CREATININE 0.8   CALCIUM 9.8     CBC:   Recent Labs   Lab 08/21/23  1626   WBC 8.72   HGB 13.3   HCT 40.3        CMP:   Recent Labs   Lab 08/21/23  1626      K 4.6      CO2 25   GLU 95   BUN 30*   CREATININE 0.8   CALCIUM 9.8   PROT 7.4   ALBUMIN 4.2   BILITOT 0.9   ALKPHOS 73   AST 39   ALT 55*   ANIONGAP 8     Coagulation: No results for input(s): "LABPROT", "INR", "APTT" in the last 48 hours.  CRP: No results for input(s): "CRP" in the last 48 hours.  Urine Culture: No results for input(s): "LABURIN" in the last 48 hours.  Urine Studies:   Recent Labs   Lab 08/21/23 2102   COLORU Yellow   APPEARANCEUA Clear   PHUR 6.0   SPECGRAV 1.020   PROTEINUA Negative   GLUCUA Negative   KETONESU 1+*   BILIRUBINUA Negative   OCCULTUA Negative   NITRITE Negative   UROBILINOGEN Negative   LEUKOCYTESUR Negative     All pertinent labs within the past 24 hours have been reviewed.    Significant Imaging: None    Assessment/Plan:     Closed fracture of left hip  Hospital day 1:    Status post fall with Acute comminuted intertrochanteric fracture of the left femur with varus angulation.    1. Nonweightbearing  2. Plan for surgical fixation today, the surgical plan and time was discussed with the patient.  3. Patient NPO since midnight             Subhash " ELVA Alcala  Orthopedics  [facility]

## 2023-08-22 NOTE — ANESTHESIA PREPROCEDURE EVALUATION
08/22/2023  Venkat Lara is a 73 y.o., female.    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    Osteoarthritis of midtarsal joint of left foot - Left Foot    Hallux valgus of left foot    Hammer toe of left foot    Ingrown toenail of both feet - Left Foot    Onycholysis of toenail - Left Foot    Acute on chronic respiratory failure with hypoxia    Aspiration pneumonia    Essential hypertension    Pneumonia of both lungs due to infectious organism    Primary atypical pneumonia due to Mycoplasma pneumoniae    Chronic pulmonary aspiration    Bronchiectasis without complication    Elevated brain natriuretic peptide (BNP) level    Chronic obstructive pulmonary disease with acute lower respiratory infection    Closed fracture of left hip     Lab Results   Component Value Date    WBC 8.72 08/21/2023    HGB 13.3 08/21/2023    HCT 40.3 08/21/2023    MCV 99 (H) 08/21/2023     08/21/2023     BMP  Lab Results   Component Value Date     08/21/2023    K 4.6 08/21/2023     08/21/2023    CO2 25 08/21/2023    BUN 30 (H) 08/21/2023    CREATININE 0.8 08/21/2023    CALCIUM 9.8 08/21/2023    ANIONGAP 8 08/21/2023    ESTGFRAFRICA >60.0 09/13/2020    EGFRNONAA >60.0 09/13/2020       Anesthesia Evaluation    I have reviewed the Patient Summary Reports.    I have reviewed the Nursing Notes. I have reviewed the NPO Status.   I have reviewed the Medications.     Review of Systems  Anesthesia Hx:  No problems with previous Anesthesia  History of prior surgery of interest to airway management or planning: Previous anesthesia: General, MAC Denies Family Hx of Anesthesia complications.   Denies Personal Hx of Anesthesia complications.   Social:  Former Smoker, Social Alcohol Use   "  Cardiovascular:   Hypertension, well controlled Valvular problems/Murmurs (hx of "leaky valves") Dysrhythmias (hx of tachycardia) ECG has been reviewed.    Pulmonary:   COPD (home O2), moderate Sleep Apnea (no CPAP)    Hepatic/GI:   Hiatal Hernia, GERD, well controlled    Musculoskeletal:   Arthritis (cervical degenerative changes, bilateral feet)   Spine Disorders: lumbar stenosis.    Neurological:   Neuromuscular Disease, Headaches    Psych:   Psychiatric History (ADHD) anxiety          Physical Exam  General:  Well nourished      Airway/Jaw/Neck:  Airway Findings: Mouth Opening: Normal   Tongue: Normal   General Airway Assessment: Adult Mallampati: II  Improves to II with phonation.  TM Distance: Normal, at least 6 cm   Jaw/Neck Findings:  Neck ROM: Normal ROM       Dental:  Dental Findings: upper incisors bridge.   Chest/Lungs:  Chest/Lungs Findings: Clear to auscultation, Normal Respiratory Rate      Heart/Vascular:  Heart Findings: Rate: Normal  Rhythm: Regular Rhythm  Sounds: Normal     Abdomen:  Abdomen Findings: Normal    Musculoskeletal:  Musculoskeletal Findings: Normal   Skin:  Skin Findings: Normal    Mental Status:  Mental Status Findings:  Cooperative, Alert and Oriented         Anesthesia Plan  Type of Anesthesia, risks & benefits discussed:  Anesthesia Type:  general    Patient's Preference:   Plan Factors:          Intra-op Monitoring Plan: standard ASA monitors  Intra-op Monitoring Plan Comments:   Post Op Pain Control Plan: multimodal analgesia  Post Op Pain Control Plan Comments:     Induction:   IV  Beta Blocker:  Patient is not currently on a Beta-Blocker (No further documentation required).       Informed Consent: Informed consent signed with the Patient and all parties understand the risks and agree with anesthesia plan.  All questions answered.  Anesthesia consent signed with patient.  ASA Score: 2     Day of Surgery Review of History & Physical: I have interviewed and examined the " patient. I have reviewed the patient's H&P dated:  There are no significant changes.      Anesthesia Plan Notes: GETA  Ofirmev intraop  Zofran/Decadron        Ready For Surgery From Anesthesia Perspective.           Physical Exam  General: Well nourished    Airway:  Mallampati: II / II  Mouth Opening: Normal  TM Distance: Normal, at least 6 cm  Tongue: Normal  Neck ROM: Normal ROM    Chest/Lungs:  Clear to auscultation, Normal Respiratory Rate    Heart:  Rate: Normal  Rhythm: Regular Rhythm  Sounds: Normal          Anesthesia Plan  Type of Anesthesia, risks & benefits discussed:    Anesthesia Type: general  Intra-op Monitoring Plan: standard ASA monitors  Post Op Pain Control Plan: multimodal analgesia  Induction:  IV  Informed Consent: Informed consent signed with the Patient and all parties understand the risks and agree with anesthesia plan.  All questions answered.   ASA Score: 2  Day of Surgery Review of History & Physical: I have interviewed and examined the patient. I have reviewed the patient's H&P dated:   Anesthesia Plan Notes: MACK  Ofirmev intraop  Zofran/Decadron    Ready For Surgery From Anesthesia Perspective.       .

## 2023-08-22 NOTE — PLAN OF CARE
Problem: Adult Inpatient Plan of Care  Goal: Plan of Care Review  8/22/2023 0746 by Debi Shore RN  Outcome: Ongoing, Progressing  8/21/2023 1822 by Debi Shore RN  Outcome: Ongoing, Progressing  Goal: Patient-Specific Goal (Individualized)  8/22/2023 0746 by Debi Shore RN  Outcome: Ongoing, Progressing  8/21/2023 1822 by Debi Shore RN  Outcome: Ongoing, Progressing  Goal: Absence of Hospital-Acquired Illness or Injury  8/22/2023 0746 by Debi Shore RN  Outcome: Ongoing, Progressing  8/21/2023 1822 by Debi Shore RN  Outcome: Ongoing, Progressing  Goal: Optimal Comfort and Wellbeing  8/22/2023 0746 by Debi Shore RN  Outcome: Ongoing, Progressing  8/21/2023 1822 by Debi Shore RN  Outcome: Ongoing, Progressing  Goal: Readiness for Transition of Care  8/22/2023 0746 by Debi Shore RN  Outcome: Ongoing, Progressing  8/21/2023 1822 by Debi Shore RN  Outcome: Ongoing, Progressing     Problem: Fluid Imbalance (Pneumonia)  Goal: Fluid Balance  8/22/2023 0746 by Debi Shore RN  Outcome: Ongoing, Progressing  8/21/2023 1822 by Debi Shore RN  Outcome: Ongoing, Progressing

## 2023-08-23 VITALS
RESPIRATION RATE: 16 BRPM | BODY MASS INDEX: 33.73 KG/M2 | OXYGEN SATURATION: 96 % | DIASTOLIC BLOOD PRESSURE: 62 MMHG | TEMPERATURE: 97 F | WEIGHT: 197.56 LBS | SYSTOLIC BLOOD PRESSURE: 123 MMHG | HEIGHT: 64 IN | HEART RATE: 88 BPM

## 2023-08-23 LAB
ERYTHROCYTE [DISTWIDTH] IN BLOOD BY AUTOMATED COUNT: 13.3 % (ref 11.5–14.5)
HCT VFR BLD AUTO: 39 % (ref 37–48.5)
HGB BLD-MCNC: 12.7 G/DL (ref 12–16)
MCH RBC QN AUTO: 32.8 PG (ref 27–31)
MCHC RBC AUTO-ENTMCNC: 32.6 G/DL (ref 32–36)
MCV RBC AUTO: 101 FL (ref 82–98)
PLATELET # BLD AUTO: 197 K/UL (ref 150–450)
PMV BLD AUTO: 9.1 FL (ref 9.2–12.9)
RBC # BLD AUTO: 3.87 M/UL (ref 4–5.4)
WBC # BLD AUTO: 6.66 K/UL (ref 3.9–12.7)

## 2023-08-23 PROCEDURE — 27000221 HC OXYGEN, UP TO 24 HOURS

## 2023-08-23 PROCEDURE — 97166 OT EVAL MOD COMPLEX 45 MIN: CPT

## 2023-08-23 PROCEDURE — 99900035 HC TECH TIME PER 15 MIN (STAT)

## 2023-08-23 PROCEDURE — 97110 THERAPEUTIC EXERCISES: CPT

## 2023-08-23 PROCEDURE — 63600175 PHARM REV CODE 636 W HCPCS: Performed by: ORTHOPAEDIC SURGERY

## 2023-08-23 PROCEDURE — 97535 SELF CARE MNGMENT TRAINING: CPT

## 2023-08-23 PROCEDURE — 85027 COMPLETE CBC AUTOMATED: CPT | Performed by: INTERNAL MEDICINE

## 2023-08-23 PROCEDURE — 94761 N-INVAS EAR/PLS OXIMETRY MLT: CPT

## 2023-08-23 PROCEDURE — 25000003 PHARM REV CODE 250: Performed by: ORTHOPAEDIC SURGERY

## 2023-08-23 PROCEDURE — 99222 1ST HOSP IP/OBS MODERATE 55: CPT | Mod: ,,, | Performed by: PHYSICAL MEDICINE & REHABILITATION

## 2023-08-23 PROCEDURE — S5010 5% DEXTROSE AND 0.45% SALINE: HCPCS | Performed by: ORTHOPAEDIC SURGERY

## 2023-08-23 PROCEDURE — 99222 PR INITIAL HOSPITAL CARE,LEVL II: ICD-10-PCS | Mod: ,,, | Performed by: PHYSICAL MEDICINE & REHABILITATION

## 2023-08-23 PROCEDURE — 97161 PT EVAL LOW COMPLEX 20 MIN: CPT

## 2023-08-23 PROCEDURE — 36415 COLL VENOUS BLD VENIPUNCTURE: CPT | Performed by: INTERNAL MEDICINE

## 2023-08-23 PROCEDURE — 97116 GAIT TRAINING THERAPY: CPT

## 2023-08-23 RX ORDER — ONDANSETRON 4 MG/1
8 TABLET, ORALLY DISINTEGRATING ORAL EVERY 8 HOURS PRN
Status: DISCONTINUED | OUTPATIENT
Start: 2023-08-23 | End: 2023-08-23 | Stop reason: HOSPADM

## 2023-08-23 RX ORDER — CELECOXIB 100 MG/1
200 CAPSULE ORAL 2 TIMES DAILY
Status: DISCONTINUED | OUTPATIENT
Start: 2023-08-23 | End: 2023-08-23 | Stop reason: HOSPADM

## 2023-08-23 RX ORDER — OXYCODONE HYDROCHLORIDE 5 MG/1
10 TABLET ORAL EVERY 4 HOURS PRN
Status: DISCONTINUED | OUTPATIENT
Start: 2023-08-23 | End: 2023-08-23 | Stop reason: HOSPADM

## 2023-08-23 RX ORDER — ZOLPIDEM TARTRATE 5 MG/1
5 TABLET ORAL NIGHTLY PRN
Status: DISCONTINUED | OUTPATIENT
Start: 2023-08-23 | End: 2023-08-23 | Stop reason: HOSPADM

## 2023-08-23 RX ORDER — POLYETHYLENE GLYCOL 3350 17 G/17G
17 POWDER, FOR SOLUTION ORAL DAILY
Status: DISCONTINUED | OUTPATIENT
Start: 2023-08-23 | End: 2023-08-23 | Stop reason: HOSPADM

## 2023-08-23 RX ORDER — BISACODYL 10 MG
10 SUPPOSITORY, RECTAL RECTAL DAILY
Status: DISCONTINUED | OUTPATIENT
Start: 2023-08-23 | End: 2023-08-23 | Stop reason: HOSPADM

## 2023-08-23 RX ORDER — MORPHINE SULFATE 2 MG/ML
2 INJECTION, SOLUTION INTRAMUSCULAR; INTRAVENOUS EVERY 4 HOURS PRN
Status: DISCONTINUED | OUTPATIENT
Start: 2023-08-23 | End: 2023-08-23 | Stop reason: HOSPADM

## 2023-08-23 RX ORDER — CEFAZOLIN SODIUM 2 G/50ML
2 SOLUTION INTRAVENOUS
Status: COMPLETED | OUTPATIENT
Start: 2023-08-23 | End: 2023-08-23

## 2023-08-23 RX ORDER — NAPROXEN SODIUM 220 MG/1
81 TABLET, FILM COATED ORAL 2 TIMES DAILY
Qty: 70 TABLET | Refills: 0 | Status: SHIPPED | OUTPATIENT
Start: 2023-08-23 | End: 2023-09-27

## 2023-08-23 RX ORDER — DEXTROSE MONOHYDRATE AND SODIUM CHLORIDE 5; .45 G/100ML; G/100ML
INJECTION, SOLUTION INTRAVENOUS CONTINUOUS
Status: DISCONTINUED | OUTPATIENT
Start: 2023-08-23 | End: 2023-08-23

## 2023-08-23 RX ADMIN — ASPIRIN 81 MG 81 MG: 81 TABLET ORAL at 08:08

## 2023-08-23 RX ADMIN — VERAPAMIL HYDROCHLORIDE 240 MG: 240 TABLET, FILM COATED, EXTENDED RELEASE ORAL at 08:08

## 2023-08-23 RX ADMIN — POLYETHYLENE GLYCOL 3350 17 G: 17 POWDER, FOR SOLUTION ORAL at 08:08

## 2023-08-23 RX ADMIN — BISACODYL 10 MG: 10 SUPPOSITORY RECTAL at 08:08

## 2023-08-23 RX ADMIN — GABAPENTIN 400 MG: 300 CAPSULE ORAL at 02:08

## 2023-08-23 RX ADMIN — HYDROCODONE BITARTRATE AND ACETAMINOPHEN 1 TABLET: 5; 325 TABLET ORAL at 01:08

## 2023-08-23 RX ADMIN — CEFAZOLIN SODIUM 2 G: 2 SOLUTION INTRAVENOUS at 01:08

## 2023-08-23 RX ADMIN — CEFAZOLIN SODIUM 2 G: 2 SOLUTION INTRAVENOUS at 06:08

## 2023-08-23 RX ADMIN — PANTOPRAZOLE SODIUM 40 MG: 40 TABLET, DELAYED RELEASE ORAL at 06:08

## 2023-08-23 RX ADMIN — GABAPENTIN 400 MG: 300 CAPSULE ORAL at 08:08

## 2023-08-23 RX ADMIN — HYDROCODONE BITARTRATE AND ACETAMINOPHEN 1 TABLET: 5; 325 TABLET ORAL at 08:08

## 2023-08-23 RX ADMIN — DEXTROSE AND SODIUM CHLORIDE: 5; .45 INJECTION, SOLUTION INTRAVENOUS at 06:08

## 2023-08-23 RX ADMIN — CELECOXIB 200 MG: 100 CAPSULE ORAL at 08:08

## 2023-08-23 RX ADMIN — CHOLECALCIFEROL TAB 125 MCG (5000 UNIT) 5000 UNITS: 125 TAB at 08:08

## 2023-08-23 RX ADMIN — OXYCODONE HYDROCHLORIDE 10 MG: 5 TABLET ORAL at 06:08

## 2023-08-23 NOTE — PLAN OF CARE
Goals to be met by: 23     Patient will increase functional independence with mobility by performin. Supine to sit with Supervision  2. Sit to stand transfer with Supervision  3. Bed to chair transfer with Supervision using Rolling Walker  4. Gait  x 150 feet with Supervision using Rolling Walker.

## 2023-08-23 NOTE — PROGRESS NOTES
Critical [facility] hospital  Orthopedics  Progress Note    Patient Name: Venkat Lara  MRN: [id number]  Admission Date: 8/21/2023  Hospital Length of Stay: 2 days  Attending Provider: Peter Trimble MD  Primary Care Provider: [facility], Primary Doctor  Follow-up For: Procedure(s) (LRB):  ORIF, HIP, USING DYNAMIC HIP SCREW (Left)    Post-Operative Day: 1 Day Post-Op  Subjective:     Principal Problem:<principal problem not specified>    Principal Orthopedic Problem:  Left hip fracture    Interval History:  Open reduction internal fixation left hip fracture    Review of patient's allergies indicates:   Allergen Reactions    Statins-hmg-coa reductase inhibitors Other (See Comments)     Muscle cramps    Doxycycline Other (See Comments)     Stops gallbladder function    Adhesive Other (See Comments)     bruises    Erythromycin Other (See Comments)     Stomach pain       Current Facility-Administered Medications   Medication    acetaminophen tablet 650 mg    albuterol nebulizer solution 2.5 mg    aspirin chewable tablet 81 mg    bisacodyL suppository 10 mg    buPROPion TB24 tablet 300 mg    cefazolin (ANCEF) 2 gram in dextrose 5% 50 mL IVPB (premix)    celecoxib capsule 200 mg    cholecalciferol (vitamin D3) 125 mcg (5,000 unit) tablet 5,000 Units    cyclobenzaprine tablet 10 mg    dextrose 5 % and 0.45 % NaCl infusion    gabapentin capsule 400 mg    HYDROcodone-acetaminophen 5-325 mg per tablet 1 tablet    melatonin tablet 6 mg    morphine injection 2 mg    morphine injection 2 mg    ondansetron disintegrating tablet 8 mg    oxyCODONE immediate release tablet 10 mg    pantoprazole EC tablet 40 mg    polyethylene glycol packet 17 g    sodium chloride 0.9% flush 10 mL    sodium chloride 0.9% flush 5 mL    verapamiL CR tablet 240 mg    zolpidem tablet 5 mg     Objective:     Vital Signs (Most Recent):  Temp: 97.7 °F (36.5 °C) (08/23/23 0300)  Pulse: 89 (08/23/23 0300)  Resp: 19 (08/23/23 0603)  BP: (!)  "115/56 (08/23/23 0300)  SpO2: 96 % (08/23/23 0300) Vital Signs (24h Range):  Temp:  [97.4 °F (36.3 °C)-98.5 °F (36.9 °C)] 97.7 °F (36.5 °C)  Pulse:  [78-92] 89  Resp:  [13-39] 19  SpO2:  [93 %-99 %] 96 %  BP: (114-145)/(56-91) 115/56     Weight: 89.6 kg (197 lb 8.5 oz)  Height: 5' 4" (162.6 cm)  Body mass index is 33.91 kg/m².      Intake/Output Summary (Last 24 hours) at 8/23/2023 0657  Last data filed at 8/22/2023 2351  Gross per 24 hour   Intake 655 ml   Output 850 ml   Net -195 ml        General    Nursing note and vitals reviewed.  Constitutional: She is oriented to person, place, and time. She appears well-developed and well-nourished. No distress.   Neurological: She is alert and oriented to person, place, and time.   Psychiatric: She has a normal mood and affect. Her behavior is normal.         Left Hip Exam     Other   Sensation: normal    Comments:  Surgical dressing over the lateral left hip, clean dry and intact.    Tender to palpation over the left hip, no erythema or ecchymosis no signs symptoms of infection.    Distally neurovascularly intact          Vascular Exam       Left Pulses  Dorsalis Pedis:      2+  Posterior Tibial:      2+        Capillary Refill  Left Hip: brisk         Significant Labs: BMP:   Recent Labs   Lab 08/21/23  1626   GLU 95      K 4.6      CO2 25   BUN 30*   CREATININE 0.8   CALCIUM 9.8     CBC:   Recent Labs   Lab 08/21/23  1626   WBC 8.72   HGB 13.3   HCT 40.3        CMP:   Recent Labs   Lab 08/21/23  1626      K 4.6      CO2 25   GLU 95   BUN 30*   CREATININE 0.8   CALCIUM 9.8   PROT 7.4   ALBUMIN 4.2   BILITOT 0.9   ALKPHOS 73   AST 39   ALT 55*   ANIONGAP 8     Coagulation: No results for input(s): "LABPROT", "INR", "APTT" in the last 48 hours.  Urine Culture: No results for input(s): "LABURIN" in the last 48 hours.  Urine Studies:   Recent Labs   Lab 08/21/23 2102   COLORU Yellow   APPEARANCEUA Clear   PHUR 6.0   SPECGRAV 1.020   PROTEINUA " Negative   GLUCUA Negative   KETONESU 1+*   BILIRUBINUA Negative   OCCULTUA Negative   NITRITE Negative   UROBILINOGEN Negative   LEUKOCYTESUR Negative     All pertinent labs within the past 24 hours have been reviewed.    Significant Imaging: None    Assessment/Plan:     Closed fracture of left hip  Postop day 1:    Status post open reduction internal fixation of a left hip fracture using dynamic hip screw.  History of fall with Acute comminuted intertrochanteric fracture of the left femur with varus angulation.    1. Weightbearing as tolerated  2. Out of bed with nursing and PT.  3. Monitor progress with therapy, plan would be to discharge home when medically stable.  4. Stable from an orthopedic standpoint.              ELVA Castellon  Orthopedics  Critical access hospital

## 2023-08-23 NOTE — PLAN OF CARE
Weight bearing as tolerated noted in orthopedic note. Therapy ordered for discharge planning. CM awaiting therapy recommendations for discharge - secure chat sent this AM       08/23/23 9016   Discharge Reassessment   Assessment Type Discharge Planning Reassessment   Did the patient's condition or plan change since previous assessment? Yes   Discharge Plan discussed with: Patient   Discharge Plan A Rehab   Discharge Plan B Home with family;Home Health   DME Needed Upon Discharge  walker, rolling   Why the patient remains in the hospital Requires continued medical care

## 2023-08-23 NOTE — PLAN OF CARE
Problem: Occupational Therapy  Goal: Occupational Therapy Goal  Description: Goals to be met by: 9/20/2023     Patient will increase functional independence with ADLs by performing:    LE Dressing with Stand-by Assistance and Assistive Devices as needed.  Grooming while seated at sink with Stand-by Assistance.  Toileting from toilet with Stand-by Assistance for hygiene and clothing management.   Supine to sit with Stand-by Assistance.  Toilet transfer to toilet with Stand-by Assistance.    Outcome: Ongoing, Progressing

## 2023-08-23 NOTE — CARE UPDATE
08/22/23 2012   Patient Assessment/Suction   Level of Consciousness (AVPU) alert   Respiratory Effort Normal;Unlabored   Expansion/Accessory Muscles/Retractions no use of accessory muscles   All Lung Fields Breath Sounds clear   PRE-TX-O2   Device (Oxygen Therapy) nasal cannula   $ Is the patient on Low Flow Oxygen? Yes   Flow (L/min) 2   SpO2 97 %   Pulse Oximetry Type Intermittent   $ Pulse Oximetry - Multiple Charge Pulse Oximetry - Multiple   Pulse 84   Resp 18   Aerosol Therapy   $ Aerosol Therapy Charges PRN treatment not required   Respiratory Evaluation   $ Care Plan Tech Time 15 min

## 2023-08-23 NOTE — PT/OT/SLP EVAL
Physical Therapy Evaluation    Patient Name:  Venkat Lara   MRN:  [id number]    Recommendations:     Discharge Recommendations: rehabilitation facility   Discharge Equipment Recommendations:  (TBD)   Barriers to discharge: Decreased caregiver support    Assessment:     Venkat Lara is a 73 y.o. female admitted with a medical diagnosis of Closed fracture of left hip.  She presents with the following impairments/functional limitations: weakness, impaired endurance, impaired functional mobility, gait instability, impaired balance, decreased lower extremity function, pain, decreased ROM, impaired cardiopulmonary response to activity, orthopedic precautions.    Pt found up in chair approximately 1 hour after OT session and pt agreeable to working with PT. Pt A & O x  4 and has the following co-morbidities: HTN.  Pt tolerated session fairly well and required moderate to minimal A for safe mobilization during session today. The pt is very motivated to return to her usual level of exercise and activity and is a great candidate for inpatient rehab.  Pt would benefit from acute PT during hospitalization to increase strength, endurance and safety with mobility and would benefit from IRF prior to discharge home.      Rehab Prognosis: Good; patient would benefit from acute skilled PT services to address these deficits and reach maximum level of function.    Recent Surgery: Procedure(s) (LRB):  ORIF, HIP, USING DYNAMIC HIP SCREW (Left) 1 Day Post-Op    Plan:     During this hospitalization, patient to be seen daily to address the identified rehab impairments via gait training, therapeutic activities, therapeutic exercises, neuromuscular re-education and progress toward the following goals:    Plan of Care Expires:  09/20/23    Subjective     Chief Complaint: L knee pain with ROM and B LE muscle soreness. Pt reported she has used her CBD salve while sitting in the chair, which has helped her pain.  Patient/Family Comments/goals:  IRF  Pain/Comfort:  Pain Rating 1:  (not rated)  Location - Side 1: Left  Location 1: leg  Pain Addressed 1: Pre-medicate for activity, Reposition, Distraction, Cessation of Activity, Nurse notified    Patients cultural, spiritual, Bahai conflicts given the current situation:      Living Environment:  Pt lives with her spouse in a [facility] with sunken living area.  Prior to admission, patients level of function was independent, driving and exercising regularly for the last 3 years.  Equipment used at home: CPAP, oxygen.  DME owned (not currently used): none.  Upon discharge, patient will have assistance from facility staff and her spouse..    Objective:     Communicated with LEMUEL Carrera prior to session.  Patient found up in chair with chair check, oxygen, PureWick, peripheral IV, telemetry  upon PT entry to room.    General Precautions: Standard, fall  Orthopedic Precautions:LLE weight bearing as tolerated   Braces: N/A  Respiratory Status: Nasal cannula, flow 2 L/min    Exams:  Cognitive Exam:  Patient is oriented to Person, Place, Time, and Situation  RLE ROM: WFL  RLE Strength: grossly 4+/5  LLE ROM: WFL AAROM, except pt limited in L knee flexion due to pain from fall.  LLE Strength: NT    Functional Mobility:  Bed Mobility:     Scooting: minimum assistance and moderate assistance  Sit to Supine: moderate assistance and vc  Transfers:     Sit to Stand:  moderate assistance and vc with rolling walker  Gait: x 35' with RW and minimal A to steady, L LE WBAT and pt needing one standing rest break. Pt on RA for gait trial with SPO2 dropping to 86%.       AM-PAC 6 CLICK MOBILITY  Total Score:14       Treatment & Education:  Pt performed B LE there ex (AROM R & AAROM L hip/knee) sitting x 10 reps with vc for proper execution and joint protection: ap, laq, marching.    Pt was educated on the following: call light use, importance of OOB activity and functional mobility to negate the negative effects of prolonged bed  rest during this hospitalization, safe transfers/ambulation and discharge planning recommendations/options.        Patient left HOB elevated with all lines intact, call button in reach, bed alarm on, and RN notified.    GOALS:   Multidisciplinary Problems       Physical Therapy Goals          Problem: Physical Therapy    Goal Priority Disciplines Outcome Goal Variances Interventions   Physical Therapy Goal     PT, PT/OT      Description: Goals to be met by: 23     Patient will increase functional independence with mobility by performin. Supine to sit with Supervision  2. Sit to stand transfer with Supervision  3. Bed to chair transfer with Supervision using Rolling Walker  4. Gait  x 150 feet with Supervision using Rolling Walker.                              History:     Past Medical History:   Diagnosis Date    Colon polyp 2013    COPD, moderate 2010    was told by Dr. 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    Lumbar spinal stenosis     Oxygen dependent 2011    2L NC Nightly    Primary osteoarthritis of first carpometacarpal joints, bilateral     Rheumatoid arthritis     Scoliosis 2014    Spinal stenosis 2014    upper and lowerr back - tingling in left arm at times       Past Surgical History:   Procedure Laterality Date    BREAST SURGERY  2008    Reduction    BUNIONECTOMY Bilateral 1999    CARDIOVASCULAR STRESS TEST  2013    CARPAL TUNNEL RELEASE Left 10/08/2015    CATARACT EXTRACTION BILATERAL W/ ANTERIOR VITRECTOMY  2010     [facility] ARTHROPLASTY, RIGHT  02/23/2004    COLONOSCOPY  04/18/2013    CORRECTION OF HAMMER TOE Left 8/14/2019    Procedure: CORRECTION, HAMMER TOE;  Surgeon: Jackson Brannon DPM;  Location: [facility] OR;  Service: Podiatry;  Laterality: Left;    ESOPHAGOGASTRODUODENOSCOPY  12/21/2012    2012-with dilation #1, 2013 #2    HAND SURGERY  2003    JOINT REPLACEMENT      REMOVAL OF HARDWARE FROM LOWER EXTREMITY Left 8/14/2019    Procedure: REMOVAL, HARDWARE, LOWER EXTREMITY;  Surgeon: Jackson Brannon DPM;  Location: [facility] OR;  Service: Podiatry;  Laterality: Left;    RHINOPLASTY TIP  02/2010    SURGICAL REMOVAL OF BUNION WITH OSTEOTOMY OF METATARSAL BONE Left 8/14/2019    Procedure: BUNIONECTOMY, WITH METATARSAL OSTEOTOMY;  Surgeon: Jackson Brannon DPM;  Location: [facility] OR;  Service: Podiatry;  Laterality: Left;  Arthrodesis 2nd PIP joint, screw 1st toe, C-arm, [employer], 3.0, 4.0, AO modular set MERRILL BRUNSON NOTIFIED    TONSILLECTOMY, ADENOIDECTOMY  1970    TOTAL KNEE ARTHROPLASTY Right 12/2008    TOTAL KNEE ARTHROPLASTY Left 03/2015    TOTAL REDUCTION MAMMOPLASTY  01/2008    TUBAL LIGATION      VAGINAL DELIVERY  1983    x2       Time Tracking:     PT Received On: 08/23/23  PT Start Time: 1018     PT Stop Time: 1100  PT Total Time (min): 42 min     Billable Minutes: Evaluation 10, Gait Training 15, and Therapeutic Exercise 17      08/23/2023

## 2023-08-23 NOTE — PLAN OF CARE
Problem: Fluid Imbalance (Pneumonia)  Goal: Fluid Balance  Outcome: Ongoing, Progressing     Problem: Infection (Pneumonia)  Goal: Resolution of Infection Signs and Symptoms  Outcome: Ongoing, Progressing     Problem: Adult Inpatient Plan of Care  Goal: Plan of Care Review  Outcome: Ongoing, Progressing  Goal: Patient-Specific Goal (Individualized)  Outcome: Ongoing, Progressing  Goal: Absence of Hospital-Acquired Illness or Injury  Outcome: Ongoing, Progressing  Goal: Optimal Comfort and Wellbeing  Outcome: Ongoing, Progressing  Goal: Readiness for Transition of Care  Outcome: Ongoing, Progressing

## 2023-08-23 NOTE — SUBJECTIVE & OBJECTIVE
"Principal Problem:<principal problem not specified>    Principal Orthopedic Problem:  Left hip fracture    Interval History:  Open reduction internal fixation left hip fracture    Review of patient's allergies indicates:   Allergen Reactions    Statins-hmg-coa reductase inhibitors Other (See Comments)     Muscle cramps    Doxycycline Other (See Comments)     Stops gallbladder function    Adhesive Other (See Comments)     bruises    Erythromycin Other (See Comments)     Stomach pain       Current Facility-Administered Medications   Medication    acetaminophen tablet 650 mg    albuterol nebulizer solution 2.5 mg    aspirin chewable tablet 81 mg    bisacodyL suppository 10 mg    buPROPion TB24 tablet 300 mg    cefazolin (ANCEF) 2 gram in dextrose 5% 50 mL IVPB (premix)    celecoxib capsule 200 mg    cholecalciferol (vitamin D3) 125 mcg (5,000 unit) tablet 5,000 Units    cyclobenzaprine tablet 10 mg    dextrose 5 % and 0.45 % NaCl infusion    gabapentin capsule 400 mg    HYDROcodone-acetaminophen 5-325 mg per tablet 1 tablet    melatonin tablet 6 mg    morphine injection 2 mg    morphine injection 2 mg    ondansetron disintegrating tablet 8 mg    oxyCODONE immediate release tablet 10 mg    pantoprazole EC tablet 40 mg    polyethylene glycol packet 17 g    sodium chloride 0.9% flush 10 mL    sodium chloride 0.9% flush 5 mL    verapamiL CR tablet 240 mg    zolpidem tablet 5 mg     Objective:     Vital Signs (Most Recent):  Temp: 97.7 °F (36.5 °C) (08/23/23 0300)  Pulse: 89 (08/23/23 0300)  Resp: 19 (08/23/23 0603)  BP: (!) 115/56 (08/23/23 0300)  SpO2: 96 % (08/23/23 0300) Vital Signs (24h Range):  Temp:  [97.4 °F (36.3 °C)-98.5 °F (36.9 °C)] 97.7 °F (36.5 °C)  Pulse:  [78-92] 89  Resp:  [13-39] 19  SpO2:  [93 %-99 %] 96 %  BP: (114-145)/(56-91) 115/56     Weight: 89.6 kg (197 lb 8.5 oz)  Height: 5' 4" (162.6 cm)  Body mass index is 33.91 kg/m².      Intake/Output Summary (Last 24 hours) at 8/23/2023 0657  Last data filed " "at 8/22/2023 2351  Gross per 24 hour   Intake 655 ml   Output 850 ml   Net -195 ml        General    Nursing note and vitals reviewed.  Constitutional: She is oriented to person, place, and time. She appears well-developed and well-nourished. No distress.   Neurological: She is alert and oriented to person, place, and time.   Psychiatric: She has a normal mood and affect. Her behavior is normal.         Left Hip Exam     Other   Sensation: normal    Comments:  Surgical dressing over the lateral left hip, clean dry and intact.    Tender to palpation over the left hip, no erythema or ecchymosis no signs symptoms of infection.    Distally neurovascularly intact          Vascular Exam       Left Pulses  Dorsalis Pedis:      2+  Posterior Tibial:      2+        Capillary Refill  Left Hip: brisk         Significant Labs: BMP:   Recent Labs   Lab 08/21/23  1626   GLU 95      K 4.6      CO2 25   BUN 30*   CREATININE 0.8   CALCIUM 9.8     CBC:   Recent Labs   Lab 08/21/23  1626   WBC 8.72   HGB 13.3   HCT 40.3        CMP:   Recent Labs   Lab 08/21/23  1626      K 4.6      CO2 25   GLU 95   BUN 30*   CREATININE 0.8   CALCIUM 9.8   PROT 7.4   ALBUMIN 4.2   BILITOT 0.9   ALKPHOS 73   AST 39   ALT 55*   ANIONGAP 8     Coagulation: No results for input(s): "LABPROT", "INR", "APTT" in the last 48 hours.  Urine Culture: No results for input(s): "LABURIN" in the last 48 hours.  Urine Studies:   Recent Labs   Lab 08/21/23  2102   COLORU Yellow   APPEARANCEUA Clear   PHUR 6.0   SPECGRAV 1.020   PROTEINUA Negative   GLUCUA Negative   KETONESU 1+*   BILIRUBINUA Negative   OCCULTUA Negative   NITRITE Negative   UROBILINOGEN Negative   LEUKOCYTESUR Negative     All pertinent labs within the past 24 hours have been reviewed.    Significant Imaging: None  "

## 2023-08-23 NOTE — DISCHARGE SUMMARY
[facility]  Discharge Summary      Patient Name: Venkat Lara  MRN: [id number]  MELLY: [id number]  Patient Class: IP- Inpatient  Admission Date: 8/21/2023  Hospital Length of Stay: 2 days  Discharge Date and Time:  08/23/2023 12:44 PM  Attending Physician: Peter Trimble MD   Discharging Provider: Peter Trimble MD  Primary Care Provider: Emerald, Primary Doctor    Primary Care Team: Networked reference to record PCT     HPI:   Venkat Lara is a 73 y.o. White female   With PMH of COPD, Depression, Anxiety, GERD. HTN, HLD, osteoarthritis    who presents with mechanical fall and left hip fracture      Patient twisted her leg and fell on the ground while trying to make a coffee. She states she was too fast to try to get the creamer. Denies any SOB, chest pain or loosing consciousness. Denies any head injury. Patient fell on her left knee.      In the ED, she was hemodynamically stable. Labs are still pending. XR shows left hip fracture. Ortho was consulted and patient was admitted.     Procedure(s) (LRB):  ORIF, HIP, USING DYNAMIC HIP SCREW (Left)      Hospital Course:   Patient admitted with left hip fracture following a mechanical fall. She underwent ORIF 8/22. Procedure uncomplicated. Post op she worked with PT, OT. Pain controlled and DC to SNF.        Goals of Care Treatment Preferences:  Code Status: Full Code    Physical Exam  Constitutional:       General: She is not in acute distress.  HENT:      Mouth/Throat:      Mouth: Mucous membranes are moist.      Pharynx: Oropharynx is clear.   Eyes:      Conjunctiva/sclera: Conjunctivae normal.      Pupils: Pupils are equal, round, and reactive to light.   Cardiovascular:      Rate and Rhythm: Normal rate and regular rhythm.      Heart sounds: Normal heart sounds. No murmur heard.     No gallop.   Pulmonary:      Effort: No respiratory distress.      Breath sounds: Normal breath sounds. No wheezing or rales.   Abdominal:       General: Bowel sounds are normal. There is no distension.      Tenderness: There is no abdominal tenderness.   Musculoskeletal:         General: No swelling or tenderness.      Comments: There is the surgical dressing on the left hip, no surrounding erythema or swelling.    Skin:     General: Skin is warm.      Coloration: Skin is not jaundiced or pale.   Neurological:      General: No focal deficit present.      Mental Status: She is alert.      Cranial Nerves: No cranial nerve deficit.      Motor: No weakness.         Consults:   Consults (From admission, onward)        Status Ordering Provider     Inpatient consult to   Once        Provider:  (Not yet assigned)    Acknowledged CRISTAL TAMAYO     Inpatient consult to Orthopedic Surgery  Once        Provider:  Subhash Lemus PA    Acknowledged CRISTAL TAMAYO          Assessment & Plan  Mechanical fall   Acute comminuted intertrochanteric fracture of the left femur  S/p ORIF  DC to SNF to continue rehab   Aspirin 81 mg BID for 35 days post op      Depression  COPD not in exacerbation with nocturnal hypoxemia, 2 L at night    GERD   HLD   HTN  - resume home medications    Service: Hospital Medicine    Final Active Diagnoses:    Diagnosis Date Noted POA    PRINCIPAL PROBLEM:  Closed fracture of left hip [S72.002A] 08/21/2023 Yes      Problems Resolved During this Admission:       Discharged Condition: good    Disposition: Skilled Nursing Facility    Follow Up:   Follow-up Information     No, Primary Doctor Follow up in 1 week(s).                     Patient Instructions:   No discharge procedures on file.    Significant Diagnostic Studies: Labs:   BMP:   Recent Labs   Lab 08/21/23  1626   GLU 95      K 4.6      CO2 25   BUN 30*   CREATININE 0.8   CALCIUM 9.8    and CMP   Recent Labs   Lab 08/21/23  1626      K 4.6      CO2 25   GLU 95   BUN 30*   CREATININE 0.8   CALCIUM 9.8   PROT 7.4   ALBUMIN 4.2   BILITOT 0.9   ALKPHOS  73   AST 39   ALT 55*   ANIONGAP 8       Pending Diagnostic Studies:     None         Medications:  Reconciled Home Medications:      Medication List      START taking these medications    aspirin 81 MG Chew  Take 1 tablet (81 mg total) by mouth 2 (two) times daily.        CONTINUE taking these medications    * albuterol 90 mcg/actuation inhaler  Commonly known as: PROVENTIL/VENTOLIN HFA  Inhale 2 puffs into the lungs every 6 (six) hours as needed.     * albuterol 2.5 mg /3 mL (0.083 %) nebulizer solution  Commonly known as: PROVENTIL  Take 2.5 mg by nebulization every 4 (four) hours as needed for Wheezing. Rescue     benzonatate 200 MG capsule  Commonly known as: TESSALON  Take 200 mg by mouth 2 (two) times daily as needed.     buPROPion 300 MG 24 hr tablet  Commonly known as: WELLBUTRIN XL  Take 300 mg by mouth every evening.     co-enzyme Q-10 30 mg capsule  Take 100 mg by mouth once daily.     cyanocobalamin 1000 MCG tablet  Commonly known as: VITAMIN B-12  Take 100 mcg by mouth once daily.     cyclobenzaprine 10 MG tablet  Commonly known as: FLEXERIL  Take 10 mg by mouth every evening.     eszopiclone 3 mg Tab  Commonly known as: LUNESTA  Take 3 mg by mouth every evening.     gabapentin 400 MG capsule  Commonly known as: NEURONTIN  Take 400 mg by mouth 2 (two) times daily.     garlic 1,000 mg Cap  Take 1,000 mg by mouth once daily.     ibuprofen 200 mg Cap  Take 1 tablet by mouth every 6 (six) hours as needed.     indomethacin 50 MG capsule  Commonly known as: INDOCIN  Take 50 mg by mouth 2 (two) times daily with meals.     lactobacillus acidophilus & bulgar 100 million cell packet  Commonly known as: LACTINEX  Take 1 tablet by mouth once daily.     magnesium gluconate 27.5 mg magne- sium (500 mg) Tab  Take 500 mg by mouth once daily.     multivitamin with minerals tablet  Take 1 tablet by mouth once daily.     MYRBETRIQ 50 mg Tb24  Generic drug: mirabegron  Take 1 tablet by mouth once daily.     omega  3-dha-epa-fish oil 1,000 mg (120 mg-180 mg) Cap  Take 1 capsule by mouth once daily.     omeprazole 40 MG capsule  Commonly known as: PRILOSEC  Take 40 mg by mouth every morning.     red yeast rice 600 mg Tab  Take 2 tablets by mouth once daily.     RESTASIS 0.05 % ophthalmic emulsion  Generic drug: cycloSPORINE  Place 1 drop into both eyes 2 (two) times daily.     telmisartan 80 MG Tab  Commonly known as: MICARDIS  Take 80 mg by mouth once daily.     tiotropium-olodateroL 2.5-2.5 mcg/actuation Mist  Commonly known as: STIOLTO RESPIMAT  Inhale 1 puff into the lungs once daily. Controller     verapamiL 240 MG C24p  Commonly known as: VERELAN  Take 240 mg by mouth 2 (two) times daily.     vitamin A 61448 UNIT capsule  Take 10,000 Units by mouth.     vitamin D 1000 units Tab  Commonly known as: VITAMIN D3  Take 5,000 Units by mouth once daily.         * This list has 2 medication(s) that are the same as other medications prescribed for you. Read the directions carefully, and ask your doctor or other care provider to review them with you.            STOP taking these medications    amoxicillin-clavulanate 875-125mg 875-125 mg per tablet  Commonly known as: AUGMENTIN     cefUROXime 500 MG tablet  Commonly known as: CEFTIN            Indwelling Lines/Drains at time of discharge:   Lines/Drains/Airways     Drain  Duration           Female External Urinary Catheter 08/22/23 1813 <1 day                Time spent on the discharge of patient: 40 minutes         Peter Trimble MD  Department of Hospital Medicine  [facility]

## 2023-08-23 NOTE — HOSPITAL COURSE
Patient admitted with left hip fracture following a mechanical fall. She underwent ORIF 8/22. Procedure uncomplicated. Post op she worked with PT, OT. Pain controlled and DC to SNF.

## 2023-08-23 NOTE — CARE UPDATE
08/23/23 0908   Patient Assessment/Suction   Level of Consciousness (AVPU) alert   Respiratory Effort Normal;Unlabored   PRE-TX-O2   Device (Oxygen Therapy) nasal cannula   $ Is the patient on Low Flow Oxygen? Yes   Flow (L/min) 2   SpO2 95 %   Pulse Oximetry Type Intermittent   $ Pulse Oximetry - Multiple Charge Pulse Oximetry - Multiple   Pulse 91   Resp 16   Aerosol Therapy   $ Aerosol Therapy Charges PRN treatment not required   Respiratory Evaluation   $ Care Plan Tech Time 15 min

## 2023-08-23 NOTE — PT/OT/SLP EVAL
Occupational Therapy   Evaluation    Name: Venkat Lara  MRN: [id number]  Admitting Diagnosis: Closed fracture of left hip  Recent Surgery: Procedure(s) (LRB):  ORIF, HIP, USING DYNAMIC HIP SCREW (Left) 1 Day Post-Op    Recommendations:     Discharge Recommendations: rehabilitation facility  Discharge Equipment Recommendations:  to be determined by next level of care  Barriers to discharge:  None    Assessment:     Venkat Lara is a 73 y.o. female with a medical diagnosis of Closed fracture of left hip.  She presents with c/o minimal LLE pain at rest and with mobility. Patient reported difficulty bending L knee with moving supine>sit and when taking steps from bed>chair. Patient able to perform grooming tasks in chair requiring Supervision and set up. Performance deficits affecting function: weakness, impaired endurance, impaired self care skills, impaired functional mobility, gait instability, impaired balance, decreased lower extremity function, decreased coordination, pain, decreased ROM, orthopedic precautions.      Rehab Prognosis: Good; patient would benefit from acute skilled OT services to address these deficits and reach maximum level of function.       Plan:     Patient to be seen 6 x/week to address the above listed problems via self-care/home management, therapeutic activities, therapeutic exercises  Plan of Care Expires: 09/20/23  Plan of Care Reviewed with: patient    Subjective     Chief Complaint: L posterior thigh  Patient/Family Comments/goals: none    Occupational Profile:  Living Environment: Patient lives with  and daughter.   Previous level of function: Patient was independent with ADLs and mobility.   Equipment Used at Home: CPAP, BIPAP, oxygen  Assistance upon Discharge: Patient will receive assistance from rehab staff.     Pain/Comfort:  Pain Rating 1: 3/10  Location - Side 1: Left  Location - Orientation 1: posterior  Location 1: thigh  Pain Addressed 1: Reposition, Distraction  Pain  Rating Post-Intervention 1: 3/10    Patients cultural, spiritual, Zoroastrian conflicts given the current situation: no    Objective:     Communicated with: nurse Parson prior to session.  Patient found HOB elevated with bed alarm, oxygen, peripheral IV upon OT entry to room.    General Precautions: Standard, fall  Orthopedic Precautions: LLE weight bearing as tolerated  Braces: N/A  Respiratory Status: Nasal cannula, flow 2 L/min    Occupational Performance:    Bed Mobility:    Patient completed Scooting/Bridging with contact guard assistance  Patient completed Supine to Sit with minimum assistance with getting LLE out of bed    Functional Mobility/Transfers:  Patient completed Sit <> Stand Transfer with moderate assistance  with  rolling walker   Patient completed Bed <> Chair Transfer using Stand Pivot technique with minimum assistance with rolling walker    Activities of Daily Living:  Grooming: stand by assistance with oral and facial hygiene while seated in chair  Lower Body Dressing: maximal assistance to don socks while supine in bed  Toileting: dependence with Purewick in place    Cognitive/Visual Perceptual:  Cognitive/Psychosocial Skills:     -       Oriented to: x4   -       Follows Commands/attention:Follows multistep  commands  -       Communication: clear/fluent  -       Safety awareness/insight to disability: intact   -       Mood/Affect/Coping skills/emotional control: Appropriate to situation and Cooperative  Visual/Perceptual:      -Intact     Physical Exam:  Postural examination/scapula alignment:    -       Rounded shoulders  -       Forward head  Upper Extremity Range of Motion:     -       Right Upper Extremity: WFL  -       Left Upper Extremity: WFL  Upper Extremity Strength:    -       Right Upper Extremity: WFL  -       Left Upper Extremity: WFL   Strength:    -       Right Upper Extremity: WFL  -       Left Upper Extremity: WFL  Fine Motor Coordination:    -       Intact  Gross motor  coordination:   WFL in BUE    [facility] 6 Click ADL:  [facility] Total Score: 16    Treatment & Education:  OT ed pt on OT role & POC as well as discharge recommendations.  OT ed patient on safety with walker use for functional mobility with cues for hand placement & sequencing.       Patient left up in chair with all lines intact, call button in reach, and bed alarm on    GOALS:   Multidisciplinary Problems       Occupational Therapy Goals          Problem: Occupational Therapy    Goal Priority Disciplines Outcome Interventions   Occupational Therapy Goal     OT, PT/OT Ongoing, Progressing    Description: Goals to be met by: 9/20/2023     Patient will increase functional independence with ADLs by performing:    LE Dressing with Stand-by Assistance and Assistive Devices as needed.  Grooming while seated at sink with Stand-by Assistance.  Toileting from toilet with Stand-by Assistance for hygiene and clothing management.   Supine to sit with Stand-by Assistance.  Toilet transfer to toilet with Stand-by Assistance.                         History:     Past Medical History:   Diagnosis Date    Colon polyp 01/01/2013    COPD, moderate 01/01/2010    was told by Dr. 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    Lumbar spinal stenosis     Oxygen dependent 01/01/2011    2L NC Nightly    Primary osteoarthritis of first carpometacarpal joints, bilateral     Rheumatoid arthritis     Scoliosis 01/01/2014    Spinal stenosis 01/01/2014    upper and lowerr back - tingling in left arm at times          Past Surgical History:   Procedure Laterality Date    BREAST SURGERY  2008    Reduction    BUNIONECTOMY Bilateral 01/1999    CARDIOVASCULAR STRESS TEST  2013    CARPAL TUNNEL RELEASE Left 10/08/2015    CATARACT EXTRACTION BILATERAL W/ ANTERIOR VITRECTOMY  04/2010    [facility] ARTHROPLASTY, RIGHT  02/23/2004    COLONOSCOPY  04/18/2013    CORRECTION OF HAMMER TOE Left 8/14/2019    Procedure: CORRECTION, HAMMER TOE;  Surgeon: Jackson Brannon DPM;  Location: [facility] OR;  Service: Podiatry;  Laterality: Left;    ESOPHAGOGASTRODUODENOSCOPY  12/21/2012    2012-with dilation #1, 2013 #2    HAND SURGERY  2003    JOINT REPLACEMENT      REMOVAL OF HARDWARE FROM LOWER EXTREMITY Left 8/14/2019    Procedure: REMOVAL, HARDWARE, LOWER EXTREMITY;  Surgeon: Jackson Brannon DPM;  Location: [facility] OR;  Service: Podiatry;  Laterality: Left;    RHINOPLASTY TIP  02/2010    SURGICAL REMOVAL OF BUNION WITH OSTEOTOMY OF METATARSAL BONE Left 8/14/2019    Procedure: BUNIONECTOMY, WITH METATARSAL OSTEOTOMY;  Surgeon: Jackson Brannon DPM;  Location: [facility] OR;  Service: Podiatry;  Laterality: Left;  Arthrodesis 2nd PIP joint, screw 1st toe, C-arm, synthes-Merrill, 3.0, 4.0, AO modular set MERRILL BRUNSON NOTIFIED    TONSILLECTOMY, ADENOIDECTOMY  1970    TOTAL KNEE ARTHROPLASTY Right 12/2008    TOTAL KNEE ARTHROPLASTY Left 03/2015    TOTAL REDUCTION MAMMOPLASTY  01/2008    TUBAL LIGATION      VAGINAL DELIVERY  1983    x2       Time Tracking:     OT Date of Treatment: 08/23/23  OT Start Time: 0916  OT Stop Time: 0950  OT Total Time (min): 34 min    Billable Minutes:Evaluation 10  Self Care/Home Management 24 8/23/2023

## 2023-08-23 NOTE — PLAN OF CARE
"Discharge orders and chart reviewed. No other discharge needs noted at this time. Pt is clear for discharge from case management. Pt is discharging to [facility].    Per Lizzie, "call report in about 20 minutes to [phone]" Lizzie has transport set for 1530.       08/23/23 1300   Final Note   Assessment Type Final Discharge Note   Anticipated Discharge Disposition Rehab   What phone number can be called within the next 1-3 days to see how you are doing after discharge? [phone]   Hospital Resources/Appts/Education Provided Post-Acute resouces added to AVS   Post-Acute Status   Post-Acute Authorization Placement   Post-Acute Placement Status Set-up Complete/Auth obtained   Discharge Delays (!) Ambulance Transport/Facility Transport          "

## 2023-08-23 NOTE — PLAN OF CARE
Per PASTOR, patient agreeable to discharge to [facility] on hospital discharge. Ortho cleared patient for discharge. Per Lizzie, bed available for today. Dr. Trimble notified of above. CM requested discharge orders.        08/23/23 1229   Post-Acute Status   Post-Acute Authorization Placement   Post-Acute Placement Status Set-up Complete/Auth obtained

## 2023-08-23 NOTE — ASSESSMENT & PLAN NOTE
Postop day 1:    Status post open reduction internal fixation of a left hip fracture using dynamic hip screw.  History of fall with Acute comminuted intertrochanteric fracture of the left femur with varus angulation.    1. Weightbearing as tolerated  2. Out of bed with nursing and PT.  3. Monitor progress with therapy, plan would be to discharge home when medically stable.  4. Stable from an orthopedic standpoint.

## 2023-08-29 ENCOUNTER — HOSPITAL ENCOUNTER (OUTPATIENT)
Dept: RADIOLOGY | Facility: HOSPITAL | Age: 73
Discharge: HOME | End: 2023-08-29
Attending: PHYSICAL MEDICINE & REHABILITATION
Payer: MEDICARE

## 2023-08-29 PROCEDURE — 73560 XR KNEE 1 OR 2 VIEW LEFT: ICD-10-PCS | Mod: 26,LT,, | Performed by: RADIOLOGY

## 2023-08-29 PROCEDURE — 73501 X-RAY EXAM HIP UNI 1 VIEW: CPT | Mod: 26,LT,, | Performed by: RADIOLOGY

## 2023-08-29 PROCEDURE — 73501 XR HIP 1 VIEW LEFT (WITH PELVIS WHEN PERFORMED): ICD-10-PCS | Mod: 26,LT,, | Performed by: RADIOLOGY

## 2023-08-29 PROCEDURE — 73560 X-RAY EXAM OF KNEE 1 OR 2: CPT | Mod: 26,LT,, | Performed by: RADIOLOGY

## 2023-09-08 ENCOUNTER — OFFICE VISIT (OUTPATIENT)
Dept: ORTHOPEDICS | Facility: CLINIC | Age: 73
End: 2023-09-08
Payer: MEDICARE

## 2023-09-08 VITALS — BODY MASS INDEX: 32.27 KG/M2 | HEIGHT: 64 IN | WEIGHT: 189 LBS

## 2023-09-08 DIAGNOSIS — Z98.890 STATUS POST HIP SURGERY: ICD-10-CM

## 2023-09-08 DIAGNOSIS — Z87.81 HISTORY OF FRACTURE OF LEFT HIP: Primary | ICD-10-CM

## 2023-09-08 PROCEDURE — 99024 POSTOP FOLLOW-UP VISIT: CPT | Mod: S$GLB,POP,, | Performed by: PHYSICIAN ASSISTANT

## 2023-09-08 PROCEDURE — 99024 PR POST-OP FOLLOW-UP VISIT: ICD-10-PCS | Mod: S$GLB,POP,, | Performed by: PHYSICIAN ASSISTANT

## 2023-09-08 RX ORDER — HYDROCODONE BITARTRATE AND ACETAMINOPHEN 5; 325 MG/1; MG/1
1 TABLET ORAL EVERY 6 HOURS PRN
COMMUNITY
Start: 2023-09-05 | End: 2023-10-10 | Stop reason: SDUPTHER

## 2023-09-08 RX ORDER — NIACIN (INOSITOL NIACINATE) 400(500MG)
100 CAPSULE ORAL
COMMUNITY
Start: 2023-09-06 | End: 2023-09-08

## 2023-09-08 NOTE — PROGRESS NOTES
[facility]  [address]  [place] [address]  Phone: [phone]   [phone]    Patient's PCP:No, Primary Doctor  Referring Provider: No ref. provider found    POST-OP Note:    Subjective:        Chief Complaint:   Chief Complaint   Patient presents with    Left Hip - Pain, Post-op Evaluation     Patient is here for a PO f/up Left Hip ORIF 8.22.23 & suture removal, states her pain is a little better.        Past Medical History:   Diagnosis Date    Colon polyp 01/01/2013    COPD, moderate 01/01/2010    was told by Dr. 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    Lumbar spinal stenosis     Oxygen dependent 01/01/2011    2L NC Nightly    Primary osteoarthritis of first carpometacarpal joints, bilateral     Rheumatoid arthritis     Scoliosis 01/01/2014    Spinal stenosis 01/01/2014    upper and lowerr back - tingling in left arm at times       Past Surgical History:   Procedure Laterality Date    BREAST SURGERY  2008    Reduction    BUNIONECTOMY Bilateral 01/1999    CARDIOVASCULAR STRESS TEST  2013    CARPAL TUNNEL RELEASE Left 10/08/2015    CATARACT EXTRACTION BILATERAL W/ ANTERIOR VITRECTOMY  04/2010    [facility] ARTHROPLASTY, RIGHT  02/23/2004    COLONOSCOPY  04/18/2013    CORRECTION OF HAMMER TOE Left 8/14/2019    Procedure: CORRECTION, HAMMER TOE;  Surgeon: Jackson Truong DPM;  [facility];  Service: Podiatry;  Laterality: Left;    ESOPHAGOGASTRODUODENOSCOPY  12/21/2012    2012-with dilation #1, 2013 #2    HAND SURGERY  2003    JOINT  REPLACEMENT      OPEN REDUCTION AND INTERNAL FIXATION (ORIF) OF FRACTURE OF FEMUR USING DYNAMIC HIP SCREW Left 8/22/2023    Procedure: ORIF, HIP, USING DYNAMIC HIP SCREW;  Surgeon: Hussein Romo MD;  Location: [facility] OR;  Service: Orthopedics;  Laterality: Left;    REMOVAL OF HARDWARE FROM LOWER EXTREMITY Left 8/14/2019    Procedure: REMOVAL, HARDWARE, LOWER EXTREMITY;  Surgeon: Jackson Brannon DPM;  Location: [facility] OR;  Service: Podiatry;  Laterality: Left;    RHINOPLASTY TIP  02/2010    SURGICAL REMOVAL OF BUNION WITH OSTEOTOMY OF METATARSAL BONE Left 8/14/2019    Procedure: BUNIONECTOMY, WITH METATARSAL OSTEOTOMY;  Surgeon: Jackson Brannon DPM;  Location: [facility] OR;  Service: Podiatry;  Laterality: Left;  Arthrodesis 2nd PIP joint, screw 1st toe, C-arm, synthes-Gaby, 3.0, 4.0, AO modular set GABY BOY NOTIFIED    TONSILLECTOMY, ADENOIDECTOMY  1970    TOTAL KNEE ARTHROPLASTY Right 12/2008    TOTAL KNEE ARTHROPLASTY Left 03/2015    TOTAL REDUCTION MAMMOPLASTY  01/2008    TUBAL LIGATION      VAGINAL DELIVERY  1983    x2       Current Outpatient Medications   Medication Sig    albuterol (PROVENTIL) 2.5 mg /3 mL (0.083 %) nebulizer solution Take 2.5 mg by nebulization every 4 (four) hours as needed for Wheezing. Rescue    albuterol (PROVENTIL/VENTOLIN HFA) 90 mcg/actuation inhaler Inhale 2 puffs into the lungs every 6 (six) hours as needed.    aspirin 81 MG Chew Take 1 tablet (81 mg total) by mouth 2 (two) times daily.    benzonatate (TESSALON) 200 MG capsule Take 200 mg by mouth 2 (two) times daily as needed.    buPROPion (WELLBUTRIN XL) 300 MG 24 hr tablet Take 300 mg by mouth every evening.     co-enzyme Q-10 30 mg capsule Take 100 mg by mouth once daily.    cyanocobalamin (VITAMIN B-12) 1000 MCG tablet Take 100 mcg by mouth once daily.    cyclobenzaprine (FLEXERIL) 10 MG tablet Take 10 mg by mouth every evening.    eszopiclone (LUNESTA) 3 mg Tab Take 3 mg by mouth every evening.    gabapentin (NEURONTIN) 400  MG capsule Take 400 mg by mouth 2 (two) times daily.    garlic 1,000 mg Cap Take 1,000 mg by mouth once daily.    HYDROcodone-acetaminophen (NORCO) 5-325 mg per tablet Take 1 tablet by mouth every 6 (six) hours as needed.    ibuprofen 200 mg Cap Take 1 tablet by mouth every 6 (six) hours as needed.    indomethacin (INDOCIN) 50 MG capsule Take 50 mg by mouth 2 (two) times daily with meals.    Lactobacillus rhamnosus GG (CULTURELLE) 15 billion cell capsule Take 1 capsule by mouth every morning.    magnesium gluconate 27.5 mg magne- sium (500 mg) Tab Take 500 mg by mouth once daily.    multivitamin with minerals tablet Take 1 tablet by mouth once daily.    MYRBETRIQ 50 mg Tb24 Take 1 tablet by mouth once daily.    omega 3-dha-epa-fish oil 1,000 mg (120 mg-180 mg) Cap Take 1 capsule by mouth once daily.    omeprazole (PRILOSEC) 40 MG capsule Take 40 mg by mouth every morning.     red yeast rice 600 mg Tab Take 2 tablets by mouth once daily.    RESTASIS 0.05 % ophthalmic emulsion Place 1 drop into both eyes 2 (two) times daily.    telmisartan (MICARDIS) 80 MG Tab Take 80 mg by mouth once daily.    tiotropium-olodateroL (STIOLTO RESPIMAT) 2.5-2.5 mcg/actuation Mist Inhale 1 puff into the lungs once daily. Controller     verapamil (VERELAN) 240 MG C24P Take 240 mg by mouth 2 (two) times daily.     vitamin A 60314 UNIT capsule Take 10,000 Units by mouth.    vitamin D (VITAMIN D3) 1000 units Tab Take 5,000 Units by mouth once daily.     No current facility-administered medications for this visit.       Review of patient's allergies indicates:   Allergen Reactions    Statins-hmg-coa reductase inhibitors Other (See Comments)     Muscle cramps    Doxycycline Other (See Comments)     Stops gallbladder function    Adhesive Other (See Comments)     bruises    Erythromycin Other (See Comments)     Stomach pain       Family History   Problem Relation Age of Onset    Cancer Father         colon       Social History     Socioeconomic  History    Marital status:    Tobacco Use    Smoking status: Former     Current packs/day: 0.00     Average packs/day: 2.0 packs/day for 33.0 years (66.0 ttl pk-yrs)     Types: Cigarettes     Start date: 1964     Quit date: 1997     Years since quittin.3    Smokeless tobacco: Former     Quit date: 1997   Substance and Sexual Activity    Alcohol use: Yes     Alcohol/week: 4.0 standard drinks of alcohol     Types: 4 Glasses of wine per week     Comment: monthly    Drug use: Never   Social History Narrative    ** Merged History Encounter **            History of present illness:  73-year-old female, returns to clinic today status post open reduction internal fixation of a left hip fracture with dynamic hip screw done on .  Here today for wound check and suture removal.      Review of Systems:    Musculoskeletal:  See HPI       Objective:        Physical Examination:    Vital Signs: There were no vitals filed for this visit.    Body mass index is 32.44 kg/m².    This a well-developed, well nourished patient in no acute distress.  They are alert and oriented and cooperative to examination.        Examination of the left hip, the skin is dry and intact, no erythema ecchymosis, no signs symptoms of infection.  Subcuticular sutures were noted, the ends were clipped today.  Wounds appear to be healing as expected.  No hematomas are appreciated.    Pertinent New Results:     XRAY Report / Interpretation:  AP left hip with dynamic hip screw, no evidence of hardware failure or loosening.  There has been some collapse of the fracture fragments but no evidence of hardware failure or loosening again.       Assessment:       1. History of fracture of left hip    2. Status post hip surgery      Plan:     History of fracture of left hip  -     X-Ray Hip 1 View Left (with Pelvis when performed)    Status post hip surgery  Comments:  ORIF left hip with dynamic hip screw system        Follow up in  about 4 weeks (around 10/6/2023) for Re-check symptoms, //XR//, Tues/Thurs with Dr. Romo.    Stable status post open reduction internal fixation of a left hip fracture with a dynamic hip screw system.  Patient is ambulating with a rolling walker.  She is doing well.  Wounds are healing as expected.  We removed her sutures today.  I reviewed her images with Dr. Romo as she has had some collapse of the femoral neck.  This can be seen with the [facility] system.  Patient was discharged from rehab yesterday, follow-up in 1 month for repeat x-rays.      GUIDO Martinez, PA-C    This note was created using NextInput voice recognition software that occasionally misinterprets words or phrases.

## 2023-09-15 NOTE — PLAN OF CARE
09/08/20 1329   Patient Assessment/Suction   Level of Consciousness (AVPU) alert   Respiratory Effort Unlabored;Normal   Expansion/Accessory Muscles/Retractions no use of accessory muscles   All Lung Fields Breath Sounds aeration increased   Rhythm/Pattern, Respiratory unlabored;pattern regular;depth regular   Cough Frequency infrequent   Cough Type nonproductive   PRE-TX-O2   O2 Device (Oxygen Therapy) nasal cannula   $ Is the patient on Low Flow Oxygen? Yes   Flow (L/min) 4   Oxygen Concentration (%) 0.36   SpO2 (!) 91 %   Pulse 81   Resp (!) 26   Labs   $ Was an ABG obtained? Arterial Puncture   $ Labs Tech Time 15 min   Critical Value Communication   Date Result Received 09/08/20   Time Result Received 1329   Resulting Department of Critical Value Respiratory   Who communicated critical value from resulting department? NIEVES Cordon   Critical Test #1 PaO2   Critical Test #1 Result 55   Name of Notified Physician/Designee Dr. Schwartz   Date Notified 09/08/20   Time Notified 1331   Read Back Verification Yes   Physician Directive Vapotherm      no

## 2023-10-10 ENCOUNTER — OFFICE VISIT (OUTPATIENT)
Dept: ORTHOPEDICS | Facility: CLINIC | Age: 73
End: 2023-10-10
Payer: MEDICARE

## 2023-10-10 VITALS — BODY MASS INDEX: 32.27 KG/M2 | WEIGHT: 189 LBS | HEIGHT: 64 IN

## 2023-10-10 DIAGNOSIS — Z98.890 STATUS POST OPEN REDUCTION AND INTERNAL FIXATION (ORIF) OF FRACTURE: Primary | ICD-10-CM

## 2023-10-10 DIAGNOSIS — Z87.81 STATUS POST OPEN REDUCTION AND INTERNAL FIXATION (ORIF) OF FRACTURE: Primary | ICD-10-CM

## 2023-10-10 PROCEDURE — 99024 POSTOP FOLLOW-UP VISIT: CPT | Mod: S$GLB,POP,, | Performed by: ORTHOPAEDIC SURGERY

## 2023-10-10 PROCEDURE — 99024 PR POST-OP FOLLOW-UP VISIT: ICD-10-PCS | Mod: S$GLB,POP,, | Performed by: ORTHOPAEDIC SURGERY

## 2023-10-10 RX ORDER — HYDROCODONE BITARTRATE AND ACETAMINOPHEN 5; 325 MG/1; MG/1
1 TABLET ORAL EVERY 8 HOURS PRN
Qty: 21 TABLET | Refills: 0 | Status: SHIPPED | OUTPATIENT
Start: 2023-10-10 | End: 2023-10-17

## 2023-10-10 NOTE — PROGRESS NOTES
[facility] POST-OP NOTE    Subjective:           Chief Complaint:   Chief Complaint   Patient presents with    Left Hip - Post-op Evaluation     PO Left hip ORIF 08/22/23. States she is progressing well       Past Medical History:   Diagnosis Date    Colon polyp 01/01/2013    COPD, moderate 01/01/2010    was told by Dr. 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    Lumbar spinal stenosis     Oxygen dependent 01/01/2011    2L NC Nightly    Primary osteoarthritis of first carpometacarpal joints, bilateral     Rheumatoid arthritis     Scoliosis 01/01/2014    Spinal stenosis 01/01/2014    upper and lowerr back - tingling in left arm at times       Past Surgical History:   Procedure Laterality Date    BREAST SURGERY  2008    Reduction    BUNIONECTOMY Bilateral 01/1999    CARDIOVASCULAR STRESS TEST  2013    CARPAL TUNNEL RELEASE Left 10/08/2015    CATARACT EXTRACTION BILATERAL W/ ANTERIOR VITRECTOMY  04/2010    [facility] ARTHROPLASTY, RIGHT  02/23/2004    COLONOSCOPY  04/18/2013    CORRECTION OF HAMMER TOE Left 8/14/2019    Procedure: CORRECTION, HAMMER TOE;  Surgeon: Jackson Truong DPM;  Location: [facility] OR;  Service: Podiatry;  Laterality: Left;    ESOPHAGOGASTRODUODENOSCOPY  12/21/2012    2012-with dilation #1, 2013 #2    HAND SURGERY  2003    JOINT REPLACEMENT      OPEN REDUCTION AND INTERNAL FIXATION (ORIF) OF FRACTURE OF FEMUR USING DYNAMIC HIP SCREW Left 8/22/2023    Procedure: ORIF, HIP, USING DYNAMIC HIP SCREW;  Surgeon: Hussein Romo MD;  Location: [facility] OR;   Service: Orthopedics;  Laterality: Left;    REMOVAL OF HARDWARE FROM LOWER EXTREMITY Left 8/14/2019    Procedure: REMOVAL, HARDWARE, LOWER EXTREMITY;  Surgeon: Jackson Brannon DPM;  Location: [facility] OR;  Service: Podiatry;  Laterality: Left;    RHINOPLASTY TIP  02/2010    SURGICAL REMOVAL OF BUNION WITH OSTEOTOMY OF METATARSAL BONE Left 8/14/2019    Procedure: BUNIONECTOMY, WITH METATARSAL OSTEOTOMY;  Surgeon: Jackson Brannon DPM;  Location: [facility] OR;  Service: Podiatry;  Laterality: Left;  Arthrodesis 2nd PIP joint, screw 1st toe, C-arm, synthes-Merrill, 3.0, 4.0, AO modular set MERRILL BRUNSON NOTIFIED    TONSILLECTOMY, ADENOIDECTOMY  1970    TOTAL KNEE ARTHROPLASTY Right 12/2008    TOTAL KNEE ARTHROPLASTY Left 03/2015    TOTAL REDUCTION MAMMOPLASTY  01/2008    TUBAL LIGATION      VAGINAL DELIVERY  1983    x2       Current Outpatient Medications   Medication Sig    albuterol (PROVENTIL) 2.5 mg /3 mL (0.083 %) nebulizer solution Take 2.5 mg by nebulization every 4 (four) hours as needed for Wheezing. Rescue    albuterol (PROVENTIL/VENTOLIN HFA) 90 mcg/actuation inhaler Inhale 2 puffs into the lungs every 6 (six) hours as needed.    aspirin 81 MG Chew Take 1 tablet (81 mg total) by mouth 2 (two) times daily.    benzonatate (TESSALON) 200 MG capsule Take 200 mg by mouth 2 (two) times daily as needed.    buPROPion (WELLBUTRIN XL) 300 MG 24 hr tablet Take 300 mg by mouth every evening.     co-enzyme Q-10 30 mg capsule Take 100 mg by mouth once daily.    cyanocobalamin (VITAMIN B-12) 1000 MCG tablet Take 100 mcg by mouth once daily.    cyclobenzaprine (FLEXERIL) 10 MG tablet Take 10 mg by mouth every evening.    eszopiclone (LUNESTA) 3 mg Tab Take 3 mg by mouth every evening.    gabapentin (NEURONTIN) 400 MG capsule Take 400 mg by mouth 2 (two) times daily.    garlic 1,000 mg Cap Take 1,000 mg by mouth once daily.    HYDROcodone-acetaminophen (NORCO) 5-325 mg per tablet Take 1 tablet by mouth every 6 (six) hours as needed.     ibuprofen 200 mg Cap Take 1 tablet by mouth every 6 (six) hours as needed.    indomethacin (INDOCIN) 50 MG capsule Take 50 mg by mouth 2 (two) times daily with meals.    Lactobacillus rhamnosus GG (CULTURELLE) 15 billion cell capsule Take 1 capsule by mouth every morning.    magnesium gluconate 27.5 mg magne- sium (500 mg) Tab Take 500 mg by mouth once daily.    multivitamin with minerals tablet Take 1 tablet by mouth once daily.    MYRBETRIQ 50 mg Tb24 Take 1 tablet by mouth once daily.    omega 3-dha-epa-fish oil 1,000 mg (120 mg-180 mg) Cap Take 1 capsule by mouth once daily.    omeprazole (PRILOSEC) 40 MG capsule Take 40 mg by mouth every morning.     red yeast rice 600 mg Tab Take 2 tablets by mouth once daily.    RESTASIS 0.05 % ophthalmic emulsion Place 1 drop into both eyes 2 (two) times daily.    telmisartan (MICARDIS) 80 MG Tab Take 80 mg by mouth once daily.    tiotropium-olodateroL (STIOLTO RESPIMAT) 2.5-2.5 mcg/actuation Mist Inhale 1 puff into the lungs once daily. Controller     verapamil (VERELAN) 240 MG C24P Take 240 mg by mouth 2 (two) times daily.     vitamin A 11809 UNIT capsule Take 10,000 Units by mouth.    vitamin D (VITAMIN D3) 1000 units Tab Take 5,000 Units by mouth once daily.     No current facility-administered medications for this visit.       Review of patient's allergies indicates:   Allergen Reactions    Statins-hmg-coa reductase inhibitors Other (See Comments)     Muscle cramps    Doxycycline Other (See Comments)     Stops gallbladder function    Adhesive Other (See Comments)     bruises    Erythromycin Other (See Comments)     Stomach pain       Family History   Problem Relation Age of Onset    Cancer Father         colon       Social History     Socioeconomic History    Marital status:    Tobacco Use    Smoking status: Former     Current packs/day: 0.00     Average packs/day: 2.0 packs/day for 33.0 years (66.0 ttl pk-yrs)     Types: Cigarettes     Start date: 5/27/1964  "    Quit date: 1997     Years since quittin.3    Smokeless tobacco: Former     Quit date: 1997   Substance and Sexual Activity    Alcohol use: Yes     Alcohol/week: 4.0 standard drinks of alcohol     Types: 4 Glasses of wine per week     Comment: monthly    Drug use: Never   Social History Narrative    ** Merged History Encounter **            History of present illness: 73-year-old female, returns to clinic today status post open reduction internal fixation of a left hip fracture with dynamic hip screw done on .  Here today for wound check, follow up.      Review of Systems:    Musculoskeletal:  See HPI      Objective:        Physical Examination:    Vital Signs:  There were no vitals filed for this visit.    Body mass index is 32.44 kg/m².    This a well-developed, well nourished patient in no acute distress.  They are alert and oriented and cooperative to examination.        Examination left hip, skin is dry and intact, no erythema ecchymosis, no signs symptoms of infection.  Well-healed surgical incision no evidence of wound failure dehiscence.  She has some mild discomfort and decreased range of motion as to be expected postoperatively.  Pertinent New Results:    XRAY Report / Interpretation:   AP left hip taken today in the office with dynamic hip screw system, no evidence of hardware failure or loosening.  Appears to be healing as expected.    Assessment/Plan:      Stable status post ORIF left hip for fracture, dynamic hip screw system.  X-rays appear to be healing as expected.  She is still ambulating with a rolling walker, she will work with physical therapy.  She still has some pain or discomfort but she is getting better.  Will check her back in 6 weeks continue physical therapy.    Subhash Lemus, Physician Assistant, served in the capacity as a "scribe" for this patient encounter.  A "face-to-face" encounter occurred with Dr. Hussein Romo on this date.  The treatment plan and " medical decision-making is outlined above. Patient was seen and examined with a chaperone.     This note was created using Dragon voice recognition software that occasionally misinterpreted phrases or words.

## 2023-11-21 ENCOUNTER — OFFICE VISIT (OUTPATIENT)
Dept: ORTHOPEDICS | Facility: CLINIC | Age: 73
End: 2023-11-21
Payer: MEDICARE

## 2023-11-21 VITALS — BODY MASS INDEX: 32.27 KG/M2 | HEIGHT: 64 IN | WEIGHT: 189 LBS

## 2023-11-21 DIAGNOSIS — Z98.890 STATUS POST OPEN REDUCTION AND INTERNAL FIXATION (ORIF) OF FRACTURE: Primary | ICD-10-CM

## 2023-11-21 DIAGNOSIS — Z87.81 STATUS POST OPEN REDUCTION AND INTERNAL FIXATION (ORIF) OF FRACTURE: Primary | ICD-10-CM

## 2023-11-21 PROCEDURE — 99024 PR POST-OP FOLLOW-UP VISIT: ICD-10-PCS | Mod: S$GLB,POP,, | Performed by: ORTHOPAEDIC SURGERY

## 2023-11-21 PROCEDURE — 99024 POSTOP FOLLOW-UP VISIT: CPT | Mod: S$GLB,POP,, | Performed by: ORTHOPAEDIC SURGERY

## 2023-11-21 NOTE — PROGRESS NOTES
[facility] POST-OP NOTE    Subjective:           Chief Complaint:   Chief Complaint   Patient presents with    Left Hip - Post-op Evaluation     PO Left hip ORIF 08/22/23. States that she is progressing well.       Past Medical History:   Diagnosis Date    Colon polyp 01/01/2013    COPD, moderate 01/01/2010    was told by Dr. 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    Lumbar spinal stenosis     Oxygen dependent 01/01/2011    2L NC Nightly    Primary osteoarthritis of first carpometacarpal joints, bilateral     Rheumatoid arthritis     Scoliosis 01/01/2014    Spinal stenosis 01/01/2014    upper and lowerr back - tingling in left arm at times       Past Surgical History:   Procedure Laterality Date    BREAST SURGERY  2008    Reduction    BUNIONECTOMY Bilateral 01/1999    CARDIOVASCULAR STRESS TEST  2013    CARPAL TUNNEL RELEASE Left 10/08/2015    CATARACT EXTRACTION BILATERAL W/ ANTERIOR VITRECTOMY  04/2010    [facility] ARTHROPLASTY, RIGHT  02/23/2004    COLONOSCOPY  04/18/2013    CORRECTION OF HAMMER TOE Left 8/14/2019    Procedure: CORRECTION, HAMMER TOE;  Surgeon: Jackson Truong DPM;  Location: [facility] OR;  Service: Podiatry;  Laterality: Left;    ESOPHAGOGASTRODUODENOSCOPY  12/21/2012    2012-with dilation #1, 2013 #2    HAND SURGERY  2003    JOINT REPLACEMENT      OPEN REDUCTION AND INTERNAL FIXATION (ORIF) OF FRACTURE OF FEMUR USING DYNAMIC HIP SCREW Left 8/22/2023    Procedure: ORIF, HIP, USING DYNAMIC HIP SCREW;  Surgeon: Hussein Romo MD;  Location: [facility] OR;   Service: Orthopedics;  Laterality: Left;    REMOVAL OF HARDWARE FROM LOWER EXTREMITY Left 8/14/2019    Procedure: REMOVAL, HARDWARE, LOWER EXTREMITY;  Surgeon: Jackson Brannon DPM;  Location: [facility] OR;  Service: Podiatry;  Laterality: Left;    RHINOPLASTY TIP  02/2010    SURGICAL REMOVAL OF BUNION WITH OSTEOTOMY OF METATARSAL BONE Left 8/14/2019    Procedure: BUNIONECTOMY, WITH METATARSAL OSTEOTOMY;  Surgeon: Jackson Brannon DPM;  Location: [facility] OR;  Service: Podiatry;  Laterality: Left;  Arthrodesis 2nd PIP joint, screw 1st toe, C-arm, synthes-Merrill, 3.0, 4.0, AO modular set MERRILL BRUNSON NOTIFIED    TONSILLECTOMY, ADENOIDECTOMY  1970    TOTAL KNEE ARTHROPLASTY Right 12/2008    TOTAL KNEE ARTHROPLASTY Left 03/2015    TOTAL REDUCTION MAMMOPLASTY  01/2008    TUBAL LIGATION      VAGINAL DELIVERY  1983    x2       Current Outpatient Medications   Medication Sig    albuterol (PROVENTIL) 2.5 mg /3 mL (0.083 %) nebulizer solution Take 2.5 mg by nebulization every 4 (four) hours as needed for Wheezing. Rescue    albuterol (PROVENTIL/VENTOLIN HFA) 90 mcg/actuation inhaler Inhale 2 puffs into the lungs every 6 (six) hours as needed.    benzonatate (TESSALON) 200 MG capsule Take 200 mg by mouth 2 (two) times daily as needed.    buPROPion (WELLBUTRIN XL) 300 MG 24 hr tablet Take 300 mg by mouth every evening.     co-enzyme Q-10 30 mg capsule Take 100 mg by mouth once daily.    cyanocobalamin (VITAMIN B-12) 1000 MCG tablet Take 100 mcg by mouth once daily.    cyclobenzaprine (FLEXERIL) 10 MG tablet Take 10 mg by mouth every evening.    eszopiclone (LUNESTA) 3 mg Tab Take 3 mg by mouth every evening.    gabapentin (NEURONTIN) 400 MG capsule Take 400 mg by mouth 2 (two) times daily.    garlic 1,000 mg Cap Take 1,000 mg by mouth once daily.    ibuprofen 200 mg Cap Take 1 tablet by mouth every 6 (six) hours as needed.    indomethacin (INDOCIN) 50 MG capsule Take 50 mg by mouth 2 (two) times daily with meals.    Lactobacillus  rhamnosus GG (CULTURELLE) 15 billion cell capsule Take 1 capsule by mouth every morning.    magnesium gluconate 27.5 mg magne- sium (500 mg) Tab Take 500 mg by mouth once daily.    multivitamin with minerals tablet Take 1 tablet by mouth once daily.    MYRBETRIQ 50 mg Tb24 Take 1 tablet by mouth once daily.    omega 3-dha-epa-fish oil 1,000 mg (120 mg-180 mg) Cap Take 1 capsule by mouth once daily.    omeprazole (PRILOSEC) 40 MG capsule Take 40 mg by mouth every morning.     red yeast rice 600 mg Tab Take 2 tablets by mouth once daily.    RESTASIS 0.05 % ophthalmic emulsion Place 1 drop into both eyes 2 (two) times daily.    telmisartan (MICARDIS) 80 MG Tab Take 80 mg by mouth once daily.    tiotropium-olodateroL (STIOLTO RESPIMAT) 2.5-2.5 mcg/actuation Mist Inhale 1 puff into the lungs once daily. Controller     verapamil (VERELAN) 240 MG C24P Take 240 mg by mouth 2 (two) times daily.     vitamin A 27350 UNIT capsule Take 10,000 Units by mouth.    vitamin D (VITAMIN D3) 1000 units Tab Take 5,000 Units by mouth once daily.    aspirin 81 MG Chew Take 1 tablet (81 mg total) by mouth 2 (two) times daily.     No current facility-administered medications for this visit.       Review of patient's allergies indicates:   Allergen Reactions    Statins-hmg-coa reductase inhibitors Other (See Comments)     Muscle cramps    Doxycycline Other (See Comments)     Stops gallbladder function    Adhesive Other (See Comments)     bruises    Erythromycin Other (See Comments)     Stomach pain       Family History   Problem Relation Age of Onset    Cancer Father         colon       Social History     Socioeconomic History    Marital status:    Tobacco Use    Smoking status: Former     Current packs/day: 0.00     Average packs/day: 2.0 packs/day for 33.0 years (66.0 ttl pk-yrs)     Types: Cigarettes     Start date: 1964     Quit date: 1997     Years since quittin.5    Smokeless tobacco: Former     Quit date: 1997  "  Substance and Sexual Activity    Alcohol use: Yes     Alcohol/week: 4.0 standard drinks of alcohol     Types: 4 Glasses of wine per week     Comment: monthly    Drug use: Never   Social History Narrative    ** Merged History Encounter **            History of present illness: 73-year-old female, returns to clinic today status post open reduction internal fixation of a left hip fracture with dynamic hip screw done on August 22nd.  Here today for wound check, follow up.       Review of Systems:    Musculoskeletal:  See HPI      Objective:        Physical Examination:    Vital Signs:  There were no vitals filed for this visit.    Body mass index is 32.44 kg/m².    This a well-developed, well nourished patient in no acute distress.  They are alert and oriented and cooperative to examination.        Examination left hip, skin is dry and intact, no erythema ecchymosis, no signs symptoms of infection.  Well-healed surgical incision no evidence of wound failure dehiscence.  She still has some mild discomfort and decreased range of motion.  She does have some leg length discrepancy now.  She ambulates with a single-point cane.    Pertinent New Results:    XRAY Report / Interpretation:   AP left hip taken today in the office with dynamic hip screw system, no evidence of hardware failure or loosening.  Appears to be healing as expected.  There has been some collapse.    Assessment/Plan:      Stable status post ORIF left hip for fracture, dynamic hip screw system.  X-rays appear to be healing as expected.  She is still ambulating with a cane due to instability and "wobbling, she will work with physical therapy.  She still has some pain or discomfort but she is getting better.  Will check her back 2 months.  Ultimately she may need a total hip.     Subhash Lemus, Physician Assistant, served in the capacity as a "scribe" for this patient encounter.  A "face-to-face" encounter occurred with Dr. Hussein Romo on this date.  The " treatment plan and medical decision-making is outlined above. Patient was seen and examined with a chaperone.        This note was created using Dragon voice recognition software that occasionally misinterpreted phrases or words.

## 2023-12-23 NOTE — PROGRESS NOTES
[facility]  Pulmonology  Progress Note    Subjective     9/10/2020:  No major issues overnight.  Subjectively improved over the past 24 hours.  No new complaints.     Review of Systems   Constitutional: Negative for fever and chills.   Respiratory: Positive for chest tightness and dyspnea on extertion. Negative for sputum production, shortness of breath and wheezing.    Cardiovascular: Negative for chest pain, palpitations and leg swelling.   Gastrointestinal: Negative for nausea and abdominal pain.      I have personally reviewed the following during today's evaluation:  past medical history, ROS, family history, social history, surgical history, current inpatient medications,drug allergies, vital signs over the past 24 hours, results of relevant diagnostic studies and nursing/provider documentation from the past 24 hours.     Objective     VS Temp:  [97.6 °F (36.4 °C)-98.2 °F (36.8 °C)]   Pulse:  [80-96]   Resp:  [16-21]   BP: ()/(50-67)   SpO2:  [87 %-97 %]   Ideal body weight: 59.3 kg (130 lb 11.7 oz)  Adjusted ideal body weight: 66 kg (145 lb 6.4 oz)   I/O No intake or output data in the 24 hours ending 09/10/20 1830     Vent SpO2 95% on 1 LPM NC during my exam   PE Physical Exam   Constitutional: She is oriented to person, place, and time. She appears well-developed and well-nourished. She appears not cachectic. She is cooperative.  Non-toxic appearance. No distress. Nasal cannula in place. She is not obese.   HENT:   Head: Normocephalic.   Right Ear: External ear normal.   Left Ear: External ear normal.   Nose: Nose normal.   Mouth/Throat: Oropharynx is clear and moist. Normal dentition. Mallampati Score: I.   Neck: Normal range of motion. No JVD present. No thyromegaly present.   Cardiovascular: Normal rate, regular rhythm, normal heart sounds and intact distal pulses.   No murmur heard.  Pulmonary/Chest: Normal expansion, symmetric chest wall expansion and effort normal. No tachypnea.  She has no decreased breath sounds. She has no wheezes. She has no rhonchi. She has rales. Chest wall is not dull to percussion. She exhibits no tenderness.   Abdominal: Soft. Bowel sounds are normal. She exhibits no distension and no mass. There is no abdominal tenderness.   Musculoskeletal: Normal range of motion.         General: No tenderness or edema.   Lymphadenopathy: No supraclavicular adenopathy is present.     She has no cervical adenopathy.     She has no axillary adenopathy.   Neurological: She is alert and oriented to person, place, and time. No cranial nerve deficit.   Skin: Skin is warm and dry. No rash noted. She is not diaphoretic.   Psychiatric: She has a normal mood and affect. Her behavior is normal. Thought content normal.   Nursing note and vitals reviewed.      Labs I have personally reviewed and interpreted all labs / diagnostic studies obtained over the past 24 hours, and relevant results are as follows:  Recent Labs   Lab 09/10/20  0529   WBC 8.42   RBC 3.83*   HGB 12.6   HCT 38.0      MCV 99*   MCH 32.9*   MCHC 33.2      K 3.9   CL 97   CO2 28   BUN 25*   CREATININE 0.8   MG 2.2      Imaging I have personally reviewed and interpreted the following images and reviewed the associated Radiology report.  I have reviewed and interpreted all pertinent imaging results/findings within the past 24 hours.  No new images     Micro I have personally reviewed and interpreted the available culture data.  Relevant results are as follows.  Blood Culture   Lab Results   Component Value Date    LABBLOO No Growth to date 09/08/2020    LABBLOO No Growth to date 09/08/2020    LABBLOO No Growth to date 09/08/2020   , Sputum Culture   Lab Results   Component Value Date    GSRESP >10 epithelial cells per low power field 08/27/2020    GSRESP Few WBC's 08/27/2020    GSRESP Many Gram positive cocci 08/27/2020    GSRESP Few Gram positive rods 08/27/2020    GSRESP Rare Gram negative rods 08/27/2020     GSRESP  08/27/2020     Predominance of oropharyngeal jennifer. Please recollect.    GSRESP  08/27/2020     Results called to and read back by: Martha Holloway RN on [facility] by SLOANE    GSRESP 08/27/2020  09:56 08/27/2020    RESPIRATORYC Normal respiratory jennifer 08/26/2020    and Urine Culture    Lab Results   Component Value Date    LABURIN NO GROWTH AFTER 48 HOURS. 04/29/2013      Medications Scheduled    azithromycin  500 mg Intravenous Q24H    buPROPion  300 mg Oral QHS    cholecalciferol (vitamin D3)  5,000 Units Oral Daily    enoxaparin  40 mg Subcutaneous Q24H    fluticasone furoate-vilanteroL  1 puff Inhalation Daily    gabapentin  300 mg Oral QHS    guaiFENesin  1,200 mg Oral BID    lactobacillus acidophilus & bulgar  1 packet Oral Daily    multivitamin  1 tablet Oral Daily    olopatadine  1 drop Both Eyes BID    oxybutynin  5 mg Oral Daily    pantoprazole  40 mg Oral Before breakfast    telmisartan  40 mg Oral Daily    tiotropium  1 capsule Inhalation Daily    verapamiL  240 mg Oral BID      Continuous Infusions:      PRN   albuterol, albuterol-ipratropium, dextrose 50%, dextrose 50%, glucagon (human recombinant), glucose, glucose, zolpidem        Assessment       Active Hospital Problems    Diagnosis    *Primary atypical pneumonia due to Mycoplasma pneumoniae    Chronic pulmonary aspiration    Bronchiectasis without complication    Elevated brain natriuretic peptide (BNP) level    Chronic obstructive pulmonary disease with acute lower respiratory infection    Pneumonia of both lungs due to infectious organism    Essential hypertension    Acute on chronic respiratory failure with hypoxia      My Impression:  Acute on chronic hypoxemic respiratory failure secondary to mycoplasma pneumonia infection superimposed on bronchiectasis/COPD.    Plan     Pulmonary  · Continue macrolide.  · Diuresis.  · Echo.  · Continue frequent aerosolized BDs.  · Titrate supplemental oxygen to maintain SpO2  >  88%.   · Aspiration needs to be addressed as an outpatient.     Ovidio Estrada MD  Pulmonary / Critical Care Medicine  [facility]     complains of pain/discomfort

## 2024-01-02 ENCOUNTER — OFFICE VISIT (OUTPATIENT)
Dept: ORTHOPEDICS | Facility: CLINIC | Age: 74
End: 2024-01-02
Payer: MEDICARE

## 2024-01-02 VITALS — HEIGHT: 64 IN | WEIGHT: 178 LBS | BODY MASS INDEX: 30.39 KG/M2

## 2024-01-02 DIAGNOSIS — Z87.81 STATUS POST OPEN REDUCTION AND INTERNAL FIXATION (ORIF) OF FRACTURE: Primary | ICD-10-CM

## 2024-01-02 DIAGNOSIS — Z98.890 STATUS POST OPEN REDUCTION AND INTERNAL FIXATION (ORIF) OF FRACTURE: Primary | ICD-10-CM

## 2024-01-02 PROCEDURE — 99213 OFFICE O/P EST LOW 20 MIN: CPT | Mod: S$GLB,,, | Performed by: PHYSICIAN ASSISTANT

## 2024-01-02 NOTE — PROGRESS NOTES
[facility]  [address]  [place] [address]  Phone: [phone]   [phone]    Patient's PCP: No, Primary Doctor  Referring Provider: No ref. provider found    Subjective:      Chief Complaint:   Chief Complaint   Patient presents with    Left Hip - Pain     S/p Left Hip ORIF on 8/22/23, better with her meds, pain is comes and goes,        Past Medical History:   Diagnosis Date    Colon polyp 01/01/2013    COPD, moderate 01/01/2010    was told by Dr. 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    Lumbar spinal stenosis     Oxygen dependent 01/01/2011    2L NC Nightly    Primary osteoarthritis of first carpometacarpal joints, bilateral     Rheumatoid arthritis     Scoliosis 01/01/2014    Spinal stenosis 01/01/2014    upper and lowerr back - tingling in left arm at times       Past Surgical History:   Procedure Laterality Date    BREAST SURGERY  2008    Reduction    BUNIONECTOMY Bilateral 01/1999    CARDIOVASCULAR STRESS TEST  2013    CARPAL TUNNEL RELEASE Left 10/08/2015    CATARACT EXTRACTION BILATERAL W/ ANTERIOR VITRECTOMY  04/2010    CMC ARTHROPLASTY, RIGHT  02/23/2004    COLONOSCOPY  04/18/2013    CORRECTION OF HAMMER TOE Left 8/14/2019    Procedure: CORRECTION, HAMMER TOE;  Surgeon: Jackson Truong DPM;  Location: [facility];  Service: Podiatry;  Laterality: Left;    ESOPHAGOGASTRODUODENOSCOPY  12/21/2012    2012-with dilation #1, 2013 #2    HAND SURGERY  2003    JOINT REPLACEMENT      OPEN REDUCTION AND INTERNAL FIXATION (ORIF) OF FRACTURE  OF FEMUR USING DYNAMIC HIP SCREW Left 8/22/2023    Procedure: ORIF, HIP, USING DYNAMIC HIP SCREW;  Surgeon: Hussein Romo MD;  Location: [facility] OR;  Service: Orthopedics;  Laterality: Left;    REMOVAL OF HARDWARE FROM LOWER EXTREMITY Left 8/14/2019    Procedure: REMOVAL, HARDWARE, LOWER EXTREMITY;  Surgeon: Jackson Brannon DPM;  Location: [facility] OR;  Service: Podiatry;  Laterality: Left;    RHINOPLASTY TIP  02/2010    SURGICAL REMOVAL OF BUNION WITH OSTEOTOMY OF METATARSAL BONE Left 8/14/2019    Procedure: BUNIONECTOMY, WITH METATARSAL OSTEOTOMY;  Surgeon: Jackson Brannon DPM;  Location: [facility] OR;  Service: Podiatry;  Laterality: Left;  Arthrodesis 2nd PIP joint, screw 1st toe, C-arm, synthes-Merrill, 3.0, 4.0, AO modular set MERRILL BRUNSON NOTIFIED    TONSILLECTOMY, ADENOIDECTOMY  1970    TOTAL KNEE ARTHROPLASTY Right 12/2008    TOTAL KNEE ARTHROPLASTY Left 03/2015    TOTAL REDUCTION MAMMOPLASTY  01/2008    TUBAL LIGATION      VAGINAL DELIVERY  1983    x2       Current Outpatient Medications   Medication Sig    albuterol (PROVENTIL) 2.5 mg /3 mL (0.083 %) nebulizer solution Take 2.5 mg by nebulization every 4 (four) hours as needed for Wheezing. Rescue    albuterol (PROVENTIL/VENTOLIN HFA) 90 mcg/actuation inhaler Inhale 2 puffs into the lungs every 6 (six) hours as needed.    benzonatate (TESSALON) 200 MG capsule Take 200 mg by mouth 2 (two) times daily as needed.    buPROPion (WELLBUTRIN XL) 300 MG 24 hr tablet Take 300 mg by mouth every evening.     co-enzyme Q-10 30 mg capsule Take 100 mg by mouth once daily.    cyanocobalamin (VITAMIN B-12) 1000 MCG tablet Take 100 mcg by mouth once daily.    cyclobenzaprine (FLEXERIL) 10 MG tablet Take 10 mg by mouth every evening.    eszopiclone (LUNESTA) 3 mg Tab Take 3 mg by mouth every evening.    gabapentin (NEURONTIN) 400 MG capsule Take 400 mg by mouth 2 (two) times daily.    garlic 1,000 mg Cap Take 1,000 mg by mouth once daily.    ibuprofen 200 mg Cap Take 1 tablet by  mouth every 6 (six) hours as needed.    indomethacin (INDOCIN) 50 MG capsule Take 50 mg by mouth 2 (two) times daily with meals.    Lactobacillus rhamnosus GG (CULTURELLE) 15 billion cell capsule Take 1 capsule by mouth every morning.    magnesium gluconate 27.5 mg magne- sium (500 mg) Tab Take 500 mg by mouth once daily.    multivitamin with minerals tablet Take 1 tablet by mouth once daily.    MYRBETRIQ 50 mg Tb24 Take 1 tablet by mouth once daily.    omega 3-dha-epa-fish oil 1,000 mg (120 mg-180 mg) Cap Take 1 capsule by mouth once daily.    omeprazole (PRILOSEC) 40 MG capsule Take 40 mg by mouth every morning.     red yeast rice 600 mg Tab Take 2 tablets by mouth once daily.    RESTASIS 0.05 % ophthalmic emulsion Place 1 drop into both eyes 2 (two) times daily.    telmisartan (MICARDIS) 80 MG Tab Take 80 mg by mouth once daily.    tiotropium-olodateroL (STIOLTO RESPIMAT) 2.5-2.5 mcg/actuation Mist Inhale 1 puff into the lungs once daily. Controller     verapamil (VERELAN) 240 MG C24P Take 240 mg by mouth 2 (two) times daily.     vitamin A 97829 UNIT capsule Take 10,000 Units by mouth.    vitamin D (VITAMIN D3) 1000 units Tab Take 5,000 Units by mouth once daily.    aspirin 81 MG Chew Take 1 tablet (81 mg total) by mouth 2 (two) times daily.     No current facility-administered medications for this visit.       Review of patient's allergies indicates:   Allergen Reactions    Statins-hmg-coa reductase inhibitors Other (See Comments)     Muscle cramps    Doxycycline Other (See Comments)     Stops gallbladder function    Adhesive Other (See Comments)     bruises    Erythromycin Other (See Comments)     Stomach pain       Family History   Problem Relation Age of Onset    Cancer Father         colon       Social History     Socioeconomic History    Marital status:    Tobacco Use    Smoking status: Former     Current packs/day: 0.00     Average packs/day: 2.0 packs/day for 33.0 years (66.0 ttl pk-yrs)     Types:  Cigarettes     Start date: 1964     Quit date: 1997     Years since quittin.6    Smokeless tobacco: Former     Quit date: 1997   Substance and Sexual Activity    Alcohol use: Yes     Alcohol/week: 4.0 standard drinks of alcohol     Types: 4 Glasses of wine per week     Comment: monthly    Drug use: Never   Social History Narrative    ** Merged History Encounter **            Prior to meeting with the patient I reviewed the medical chart in [facility]. This included reviewing the previous progress notes from our office, review of the patient's last appointment with their primary care provider, review of any visits to the emergency room, and review of any pain management appointments or procedures.    History of present illness: 73-year-old female, returns to clinic today status post open reduction internal fixation of a left hip fracture with dynamic hip screw done on .       Review of Systems:    Constitutional: Negative for chills, fever and weight loss.   HENT: Negative for congestion.    Eyes: Negative for discharge and redness.   Respiratory: Negative for cough and shortness of breath.    Cardiovascular: Negative for chest pain.   Gastrointestinal: Negative for nausea and vomiting.   Musculoskeletal: See HPI.   Skin: Negative for rash.   Neurological: Negative for headaches.   Endo/Heme/Allergies: Does not bruise/bleed easily.   Psychiatric/Behavioral: The patient is not nervous/anxious.    All other systems reviewed and are negative.       Objective:      Physical Examination:    Vital Signs:  There were no vitals filed for this visit.    Body mass index is 30.55 kg/m².    This a well-developed, well nourished patient in no acute distress.  They are alert and oriented and cooperative to examination.     Examination left hip, she still has some mild discomfort and decreased range of motion.  She does have some leg length discrepancy now.  She continues to ambulate with a single-point  "cane.  She has a history of a left total knee arthroplasty.  Straight leg raise is negative.  Calf soft nontender.      Pertinent New Results:        XRAY Report / Interpretation:   AP left hip taken today in the office with dynamic hip screw system, no evidence of hardware failure or loosening.  Appears to be healing as expected.  There was some collapse but this appears stable.           Assessment:       1. Status post open reduction and internal fixation (ORIF) of fracture      Plan:     Status post open reduction and internal fixation (ORIF) of fracture  Comments:  Left Hip  Orders:  -     X-Ray Hip 1 View Left (with Pelvis when performed)        Follow up if symptoms worsen or fail to improve.    Stable status post ORIF left hip for fracture, dynamic hip screw system.  X-rays appear to be healing as expected.  She is still ambulating with a cane due to instability and "wobbling, she will work with physical therapy.  She still has some pain or discomfort but she is getting better.  She has not ready to proceed with any further surgery at this time.  Ultimately she may need a total hip.       [unfilled]  GUIDO Martinez PA-C    This note was created using [employer] voice recognition software that occasionally misinterprets words or phrases.  "

## 2024-07-24 ENCOUNTER — HOSPITAL ENCOUNTER (INPATIENT)
Facility: HOSPITAL | Age: 74
LOS: 1 days | Discharge: HOME | DRG: 189 | End: 2024-07-25
Attending: EMERGENCY MEDICINE | Admitting: INTERNAL MEDICINE
Payer: MEDICARE

## 2024-07-24 ENCOUNTER — OFFICE VISIT (OUTPATIENT)
Dept: URGENT CARE | Facility: CLINIC | Age: 74
End: 2024-07-24
Payer: MEDICARE

## 2024-07-24 VITALS
HEART RATE: 114 BPM | HEIGHT: 64 IN | BODY MASS INDEX: 30.39 KG/M2 | TEMPERATURE: 100 F | RESPIRATION RATE: 22 BRPM | SYSTOLIC BLOOD PRESSURE: 132 MMHG | DIASTOLIC BLOOD PRESSURE: 79 MMHG | WEIGHT: 178 LBS

## 2024-07-24 DIAGNOSIS — J44.1 COPD WITH ACUTE EXACERBATION: ICD-10-CM

## 2024-07-24 DIAGNOSIS — J96.21 ACUTE ON CHRONIC RESPIRATORY FAILURE WITH HYPOXIA: ICD-10-CM

## 2024-07-24 DIAGNOSIS — J47.1 BRONCHIECTASIS WITH (ACUTE) EXACERBATION: Primary | ICD-10-CM

## 2024-07-24 DIAGNOSIS — Z20.822 EXPOSURE TO COVID-19 VIRUS: ICD-10-CM

## 2024-07-24 DIAGNOSIS — R09.02 HYPOXIA: ICD-10-CM

## 2024-07-24 DIAGNOSIS — R07.9 CHEST PAIN: ICD-10-CM

## 2024-07-24 DIAGNOSIS — R06.00 DYSPNEA, UNSPECIFIED TYPE: Primary | ICD-10-CM

## 2024-07-24 LAB
ALBUMIN SERPL BCP-MCNC: 4.4 G/DL (ref 3.5–5.2)
ALP SERPL-CCNC: 78 U/L (ref 55–135)
ALT SERPL W/O P-5'-P-CCNC: 24 U/L (ref 10–44)
ANION GAP SERPL CALC-SCNC: 8 MMOL/L (ref 8–16)
AST SERPL-CCNC: 25 U/L (ref 10–40)
BASOPHILS # BLD AUTO: 0.02 K/UL (ref 0–0.2)
BASOPHILS NFR BLD: 0.3 % (ref 0–1.9)
BILIRUB SERPL-MCNC: 0.6 MG/DL (ref 0.1–1)
BILIRUB UR QL STRIP: NEGATIVE
BNP SERPL-MCNC: 50 PG/ML (ref 0–99)
BUN SERPL-MCNC: 26 MG/DL (ref 8–23)
CALCIUM SERPL-MCNC: 9.4 MG/DL (ref 8.7–10.5)
CHLORIDE SERPL-SCNC: 104 MMOL/L (ref 95–110)
CLARITY UR: CLEAR
CO2 SERPL-SCNC: 22 MMOL/L (ref 23–29)
COLOR UR: YELLOW
CREAT SERPL-MCNC: 0.8 MG/DL (ref 0.5–1.4)
CTP QC/QA: YES
DIFFERENTIAL METHOD BLD: ABNORMAL
EOSINOPHIL # BLD AUTO: 0.1 K/UL (ref 0–0.5)
EOSINOPHIL NFR BLD: 0.8 % (ref 0–8)
ERYTHROCYTE [DISTWIDTH] IN BLOOD BY AUTOMATED COUNT: 12.9 % (ref 11.5–14.5)
EST. GFR  (NO RACE VARIABLE): >60 ML/MIN/1.73 M^2
GLUCOSE SERPL-MCNC: 129 MG/DL (ref 70–110)
GLUCOSE UR QL STRIP: NEGATIVE
HCT VFR BLD AUTO: 42.2 % (ref 37–48.5)
HGB BLD-MCNC: 13.9 G/DL (ref 12–16)
HGB UR QL STRIP: NEGATIVE
IMM GRANULOCYTES # BLD AUTO: 0.02 K/UL (ref 0–0.04)
IMM GRANULOCYTES NFR BLD AUTO: 0.3 % (ref 0–0.5)
KETONES UR QL STRIP: NEGATIVE
LACTATE SERPL-SCNC: 1.6 MMOL/L (ref 0.5–1.9)
LDH SERPL L TO P-CCNC: 0.88 MMOL/L (ref 0.5–2.2)
LEUKOCYTE ESTERASE UR QL STRIP: NEGATIVE
LYMPHOCYTES # BLD AUTO: 0.3 K/UL (ref 1–4.8)
LYMPHOCYTES NFR BLD: 4 % (ref 18–48)
MAGNESIUM SERPL-MCNC: 1.9 MG/DL (ref 1.6–2.6)
MCH RBC QN AUTO: 33.5 PG (ref 27–31)
MCHC RBC AUTO-ENTMCNC: 32.9 G/DL (ref 32–36)
MCV RBC AUTO: 102 FL (ref 82–98)
MONOCYTES # BLD AUTO: 0.1 K/UL (ref 0.3–1)
MONOCYTES NFR BLD: 1.7 % (ref 4–15)
NEUTROPHILS # BLD AUTO: 6 K/UL (ref 1.8–7.7)
NEUTROPHILS NFR BLD: 92.9 % (ref 38–73)
NITRITE UR QL STRIP: NEGATIVE
NRBC BLD-RTO: 0 /100 WBC
OHS QRS DURATION: 76 MS
OHS QTC CALCULATION: 467 MS
PH UR STRIP: 6 [PH] (ref 5–8)
PLATELET # BLD AUTO: 194 K/UL (ref 150–450)
PMV BLD AUTO: 8.6 FL (ref 9.2–12.9)
POTASSIUM SERPL-SCNC: 4.4 MMOL/L (ref 3.5–5.1)
PROCALCITONIN SERPL IA-MCNC: 0.09 NG/ML (ref 0–0.5)
PROT SERPL-MCNC: 7.4 G/DL (ref 6–8.4)
PROT UR QL STRIP: NEGATIVE
RBC # BLD AUTO: 4.15 M/UL (ref 4–5.4)
SAMPLE: NORMAL
SARS-COV-2 AG RESP QL IA.RAPID: NEGATIVE
SARS-COV-2 RDRP RESP QL NAA+PROBE: NEGATIVE
SODIUM SERPL-SCNC: 134 MMOL/L (ref 136–145)
SP GR UR STRIP: 1.01 (ref 1–1.03)
TROPONIN I SERPL HS-MCNC: 3.4 PG/ML (ref 0–14.9)
URN SPEC COLLECT METH UR: NORMAL
UROBILINOGEN UR STRIP-ACNC: NEGATIVE EU/DL
WBC # BLD AUTO: 6.45 K/UL (ref 3.9–12.7)

## 2024-07-24 PROCEDURE — 36415 COLL VENOUS BLD VENIPUNCTURE: CPT | Performed by: EMERGENCY MEDICINE

## 2024-07-24 PROCEDURE — 99900031 HC PATIENT EDUCATION (STAT)

## 2024-07-24 PROCEDURE — 25000003 PHARM REV CODE 250: Performed by: INTERNAL MEDICINE

## 2024-07-24 PROCEDURE — 25000242 PHARM REV CODE 250 ALT 637 W/ HCPCS: Performed by: INTERNAL MEDICINE

## 2024-07-24 PROCEDURE — 94761 N-INVAS EAR/PLS OXIMETRY MLT: CPT

## 2024-07-24 PROCEDURE — 83605 ASSAY OF LACTIC ACID: CPT | Performed by: EMERGENCY MEDICINE

## 2024-07-24 PROCEDURE — 93000 ELECTROCARDIOGRAM COMPLETE: CPT | Mod: S$GLB,,, | Performed by: STUDENT IN AN ORGANIZED HEALTH CARE EDUCATION/TRAINING PROGRAM

## 2024-07-24 PROCEDURE — 96365 THER/PROPH/DIAG IV INF INIT: CPT

## 2024-07-24 PROCEDURE — 94640 AIRWAY INHALATION TREATMENT: CPT | Mod: 59,S$GLB,, | Performed by: EMERGENCY MEDICINE

## 2024-07-24 PROCEDURE — 99215 OFFICE O/P EST HI 40 MIN: CPT | Mod: 25,S$GLB,, | Performed by: STUDENT IN AN ORGANIZED HEALTH CARE EDUCATION/TRAINING PROGRAM

## 2024-07-24 PROCEDURE — 80053 COMPREHEN METABOLIC PANEL: CPT | Performed by: EMERGENCY MEDICINE

## 2024-07-24 PROCEDURE — 63600175 PHARM REV CODE 636 W HCPCS: Performed by: INTERNAL MEDICINE

## 2024-07-24 PROCEDURE — 25000003 PHARM REV CODE 250: Performed by: EMERGENCY MEDICINE

## 2024-07-24 PROCEDURE — 99285 EMERGENCY DEPT VISIT HI MDM: CPT | Mod: 25

## 2024-07-24 PROCEDURE — 99900035 HC TECH TIME PER 15 MIN (STAT)

## 2024-07-24 PROCEDURE — 94640 AIRWAY INHALATION TREATMENT: CPT

## 2024-07-24 PROCEDURE — 94799 UNLISTED PULMONARY SVC/PX: CPT

## 2024-07-24 PROCEDURE — 83880 ASSAY OF NATRIURETIC PEPTIDE: CPT | Performed by: EMERGENCY MEDICINE

## 2024-07-24 PROCEDURE — 93005 ELECTROCARDIOGRAM TRACING: CPT | Performed by: GENERAL PRACTICE

## 2024-07-24 PROCEDURE — 27000221 HC OXYGEN, UP TO 24 HOURS

## 2024-07-24 PROCEDURE — 84145 PROCALCITONIN (PCT): CPT | Performed by: EMERGENCY MEDICINE

## 2024-07-24 PROCEDURE — 83735 ASSAY OF MAGNESIUM: CPT | Performed by: EMERGENCY MEDICINE

## 2024-07-24 PROCEDURE — 63600175 PHARM REV CODE 636 W HCPCS: Performed by: EMERGENCY MEDICINE

## 2024-07-24 PROCEDURE — 71046 X-RAY EXAM CHEST 2 VIEWS: CPT | Mod: S$GLB,,, | Performed by: RADIOLOGY

## 2024-07-24 PROCEDURE — 87040 BLOOD CULTURE FOR BACTERIA: CPT | Performed by: EMERGENCY MEDICINE

## 2024-07-24 PROCEDURE — 87811 SARS-COV-2 COVID19 W/OPTIC: CPT | Mod: QW,S$GLB,, | Performed by: STUDENT IN AN ORGANIZED HEALTH CARE EDUCATION/TRAINING PROGRAM

## 2024-07-24 PROCEDURE — 81003 URINALYSIS AUTO W/O SCOPE: CPT | Performed by: EMERGENCY MEDICINE

## 2024-07-24 PROCEDURE — 93010 ELECTROCARDIOGRAM REPORT: CPT | Mod: ,,, | Performed by: GENERAL PRACTICE

## 2024-07-24 PROCEDURE — 12000002 HC ACUTE/MED SURGE SEMI-PRIVATE ROOM

## 2024-07-24 PROCEDURE — 96372 THER/PROPH/DIAG INJ SC/IM: CPT | Mod: S$GLB,,, | Performed by: EMERGENCY MEDICINE

## 2024-07-24 PROCEDURE — 84484 ASSAY OF TROPONIN QUANT: CPT | Performed by: EMERGENCY MEDICINE

## 2024-07-24 PROCEDURE — U0002 COVID-19 LAB TEST NON-CDC: HCPCS | Performed by: EMERGENCY MEDICINE

## 2024-07-24 PROCEDURE — 85025 COMPLETE CBC W/AUTO DIFF WBC: CPT | Performed by: EMERGENCY MEDICINE

## 2024-07-24 RX ORDER — NALOXONE HCL 0.4 MG/ML
0.02 VIAL (ML) INJECTION
Status: DISCONTINUED | OUTPATIENT
Start: 2024-07-24 | End: 2024-07-25 | Stop reason: HOSPADM

## 2024-07-24 RX ORDER — ACETAMINOPHEN 325 MG/1
650 TABLET ORAL EVERY 4 HOURS PRN
Status: DISCONTINUED | OUTPATIENT
Start: 2024-07-24 | End: 2024-07-25 | Stop reason: HOSPADM

## 2024-07-24 RX ORDER — LEVALBUTEROL INHALATION SOLUTION 1.25 MG/3ML
1.25 SOLUTION RESPIRATORY (INHALATION)
Status: DISCONTINUED | OUTPATIENT
Start: 2024-07-24 | End: 2024-07-24 | Stop reason: HOSPADM

## 2024-07-24 RX ORDER — ONDANSETRON HYDROCHLORIDE 2 MG/ML
4 INJECTION, SOLUTION INTRAVENOUS EVERY 6 HOURS PRN
Status: DISCONTINUED | OUTPATIENT
Start: 2024-07-24 | End: 2024-07-25 | Stop reason: HOSPADM

## 2024-07-24 RX ORDER — LEVALBUTEROL INHALATION SOLUTION 1.25 MG/3ML
1.25 SOLUTION RESPIRATORY (INHALATION)
Status: DISCONTINUED | OUTPATIENT
Start: 2024-07-24 | End: 2024-07-24

## 2024-07-24 RX ORDER — DEXAMETHASONE SODIUM PHOSPHATE 4 MG/ML
8 INJECTION, SOLUTION INTRA-ARTICULAR; INTRALESIONAL; INTRAMUSCULAR; INTRAVENOUS; SOFT TISSUE
Status: DISCONTINUED | OUTPATIENT
Start: 2024-07-24 | End: 2024-07-24 | Stop reason: HOSPADM

## 2024-07-24 RX ORDER — LANOLIN ALCOHOL/MO/W.PET/CERES
800 CREAM (GRAM) TOPICAL
Status: DISCONTINUED | OUTPATIENT
Start: 2024-07-24 | End: 2024-07-25 | Stop reason: HOSPADM

## 2024-07-24 RX ORDER — EZETIMIBE 10 MG/1
10 TABLET ORAL DAILY
COMMUNITY
Start: 2024-05-20

## 2024-07-24 RX ORDER — GUAIFENESIN 600 MG/1
1200 TABLET, EXTENDED RELEASE ORAL 2 TIMES DAILY
COMMUNITY

## 2024-07-24 RX ORDER — SODIUM,POTASSIUM PHOSPHATES 280-250MG
2 POWDER IN PACKET (EA) ORAL
Status: DISCONTINUED | OUTPATIENT
Start: 2024-07-24 | End: 2024-07-25 | Stop reason: HOSPADM

## 2024-07-24 RX ORDER — IPRATROPIUM BROMIDE AND ALBUTEROL SULFATE 2.5; .5 MG/3ML; MG/3ML
3 SOLUTION RESPIRATORY (INHALATION) EVERY 4 HOURS
Status: DISCONTINUED | OUTPATIENT
Start: 2024-07-24 | End: 2024-07-24

## 2024-07-24 RX ORDER — PANTOPRAZOLE SODIUM 40 MG/1
40 TABLET, DELAYED RELEASE ORAL
Status: DISCONTINUED | OUTPATIENT
Start: 2024-07-25 | End: 2024-07-25 | Stop reason: HOSPADM

## 2024-07-24 RX ORDER — ACETAMINOPHEN 500 MG
1000 TABLET ORAL
Status: DISCONTINUED | OUTPATIENT
Start: 2024-07-24 | End: 2024-07-24 | Stop reason: HOSPADM

## 2024-07-24 RX ORDER — LEVOFLOXACIN 5 MG/ML
750 INJECTION, SOLUTION INTRAVENOUS
Status: COMPLETED | OUTPATIENT
Start: 2024-07-24 | End: 2024-07-24

## 2024-07-24 RX ORDER — METHYLPREDNISOLONE SOD SUCC 125 MG
125 VIAL (EA) INJECTION
Status: DISCONTINUED | OUTPATIENT
Start: 2024-07-24 | End: 2024-07-24

## 2024-07-24 RX ORDER — IPRATROPIUM BROMIDE AND ALBUTEROL SULFATE 2.5; .5 MG/3ML; MG/3ML
3 SOLUTION RESPIRATORY (INHALATION) EVERY 4 HOURS
Status: DISCONTINUED | OUTPATIENT
Start: 2024-07-24 | End: 2024-07-25 | Stop reason: HOSPADM

## 2024-07-24 RX ORDER — ENOXAPARIN SODIUM 100 MG/ML
40 INJECTION SUBCUTANEOUS EVERY 24 HOURS
Status: DISCONTINUED | OUTPATIENT
Start: 2024-07-24 | End: 2024-07-25 | Stop reason: HOSPADM

## 2024-07-24 RX ORDER — TALC
6 POWDER (GRAM) TOPICAL NIGHTLY PRN
Status: DISCONTINUED | OUTPATIENT
Start: 2024-07-24 | End: 2024-07-25 | Stop reason: HOSPADM

## 2024-07-24 RX ORDER — BRIMONIDINE TARTRATE 2 MG/ML
1 SOLUTION/ DROPS OPHTHALMIC 3 TIMES DAILY
COMMUNITY
Start: 2024-01-29 | End: 2024-07-24

## 2024-07-24 RX ORDER — ALUMINUM HYDROXIDE, MAGNESIUM HYDROXIDE, AND SIMETHICONE 1200; 120; 1200 MG/30ML; MG/30ML; MG/30ML
30 SUSPENSION ORAL 4 TIMES DAILY PRN
Status: DISCONTINUED | OUTPATIENT
Start: 2024-07-24 | End: 2024-07-25 | Stop reason: HOSPADM

## 2024-07-24 RX ORDER — IPRATROPIUM BROMIDE 0.5 MG/2.5ML
0.5 SOLUTION RESPIRATORY (INHALATION)
Status: DISCONTINUED | OUTPATIENT
Start: 2024-07-24 | End: 2024-07-24

## 2024-07-24 RX ORDER — SODIUM CHLORIDE 9 MG/ML
INJECTION, SOLUTION INTRAVENOUS CONTINUOUS
Status: DISCONTINUED | OUTPATIENT
Start: 2024-07-24 | End: 2024-07-25 | Stop reason: HOSPADM

## 2024-07-24 RX ORDER — ACETAMINOPHEN 325 MG/1
650 TABLET ORAL EVERY 8 HOURS PRN
Status: DISCONTINUED | OUTPATIENT
Start: 2024-07-24 | End: 2024-07-25 | Stop reason: HOSPADM

## 2024-07-24 RX ORDER — FAMOTIDINE 20 MG/1
20 TABLET, FILM COATED ORAL 2 TIMES DAILY
Status: DISCONTINUED | OUTPATIENT
Start: 2024-07-24 | End: 2024-07-24

## 2024-07-24 RX ORDER — IPRATROPIUM BROMIDE AND ALBUTEROL SULFATE 2.5; .5 MG/3ML; MG/3ML
3 SOLUTION RESPIRATORY (INHALATION) EVERY 6 HOURS
Status: DISCONTINUED | OUTPATIENT
Start: 2024-07-25 | End: 2024-07-24

## 2024-07-24 RX ADMIN — PIPERACILLIN SODIUM AND TAZOBACTAM SODIUM 4.5 G: 4; .5 INJECTION, POWDER, LYOPHILIZED, FOR SOLUTION INTRAVENOUS at 11:07

## 2024-07-24 RX ADMIN — SODIUM CHLORIDE: 9 INJECTION, SOLUTION INTRAVENOUS at 11:07

## 2024-07-24 RX ADMIN — LEVALBUTEROL INHALATION SOLUTION 1.25 MG: 1.25 SOLUTION RESPIRATORY (INHALATION) at 11:07

## 2024-07-24 RX ADMIN — ACETAMINOPHEN 650 MG: 325 TABLET ORAL at 09:07

## 2024-07-24 RX ADMIN — LEVOFLOXACIN 750 MG: 750 INJECTION, SOLUTION INTRAVENOUS at 05:07

## 2024-07-24 RX ADMIN — PIPERACILLIN SODIUM AND TAZOBACTAM SODIUM 3.38 G: 3; .375 INJECTION, POWDER, LYOPHILIZED, FOR SOLUTION INTRAVENOUS at 04:07

## 2024-07-24 RX ADMIN — IPRATROPIUM BROMIDE AND ALBUTEROL SULFATE 3 ML: 2.5; .5 SOLUTION RESPIRATORY (INHALATION) at 04:07

## 2024-07-24 RX ADMIN — METHYLPREDNISOLONE SODIUM SUCCINATE 40 MG: 40 INJECTION, POWDER, FOR SOLUTION INTRAMUSCULAR; INTRAVENOUS at 11:07

## 2024-07-24 RX ADMIN — Medication 1000 MG: at 11:07

## 2024-07-24 RX ADMIN — IPRATROPIUM BROMIDE AND ALBUTEROL SULFATE 3 ML: 2.5; .5 SOLUTION RESPIRATORY (INHALATION) at 11:07

## 2024-07-24 RX ADMIN — IPRATROPIUM BROMIDE AND ALBUTEROL SULFATE 3 ML: 2.5; .5 SOLUTION RESPIRATORY (INHALATION) at 08:07

## 2024-07-24 RX ADMIN — DEXAMETHASONE SODIUM PHOSPHATE 8 MG: 4 INJECTION, SOLUTION INTRA-ARTICULAR; INTRALESIONAL; INTRAMUSCULAR; INTRAVENOUS; SOFT TISSUE at 11:07

## 2024-07-24 RX ADMIN — SODIUM CHLORIDE: 9 INJECTION, SOLUTION INTRAVENOUS at 06:07

## 2024-07-24 RX ADMIN — METHYLPREDNISOLONE SODIUM SUCCINATE 40 MG: 40 INJECTION, POWDER, FOR SOLUTION INTRAMUSCULAR; INTRAVENOUS at 06:07

## 2024-07-24 NOTE — NURSING
Nurses Note -- 4 Eyes      7/24/2024   6:49 PM      Skin assessed during: Admit      [x] No Altered Skin Integrity Present    []Prevention Measures Documented      [] Yes- Altered Skin Integrity Present or Discovered   [] LDA Added if Not in Epic (Describe Wound)   [] New Altered Skin Integrity was Present on Admit and Documented in LDA   [] Wound Image Taken    Wound Care Consulted? No    Attending Nurse:  Halima Talavera RN/Staff Member:   jodi

## 2024-07-24 NOTE — PROGRESS NOTES
"Subjective:      Patient ID: Venkat Lara is a 74 y.o. female.    Vitals:  height is 5' 4" (1.626 m) and weight is 80.7 kg (178 lb). Her oral temperature is 100.2 °F (37.9 °C). Her blood pressure is 132/79 and her pulse is 114 (abnormal). Her respiration is 22 (abnormal).     Chief Complaint: oxygen levels, Breathing Problem, and Cough    Patient is a 74-year-old female with a past medical history of acute on chronic respiratory failure, pneumonia, COPD, chronic pulmonary aspiration, hypertension, hyperlipidemia, osteoarthritis, rheumatoid arthritis, GERD, SVT, anxiety, depression, and obstructive sleep apnea who presents to clinic for evaluation of dyspnea.  Patient reports chronic history of dyspnea due to bronchiolitis.  Patient reports symptoms worse over the past 3 days.  Patient reports that she has been on 6 days of steroids and 7 days of antibiotics previously what she stopped at the end of last week.  Patient states this was sent in via pulmonologist.  Patient states that she is currently on Augmentin for a dental procedure she is scheduled to have in a few days.  Patient states that she has monitored her oxygen at home with a decrease in saturation to 83%.  Patient states that she has also been exposed to COVID.  Patient states that she is vaccinated for COVID.  Patient reports her  did previously have symptoms however is improved.  Patient states she is concern for pneumonia as last time she felt like this she did have pneumonia.  Patient states that her symptoms have slightly improved from steroids she has been prescribed.  Patient states that she has experienced fatigue, chills, nasal sinus congestion with postnasal drainage, sore throat which she reports is from coughing, productive cough with clear mucus, dyspnea on exertion, shortness for breath at rest, and chest tightness.  Patient denies any headaches or dizziness, body aches, rash, ear pain, chest pain or palpitations, leg swelling, " wheezing, abdominal pain, nausea or vomiting, diarrhea, urinary symptoms, or change in mentation.  Patient reports she does have oxygen at home however only uses that nightly as needed.        Constitution: Positive for chills and fatigue.   HENT:  Positive for congestion, postnasal drip and sore throat (Reports with cough and due to cough). Negative for ear pain.    Neck: neck negative.   Cardiovascular:  Positive for sob on exertion. Negative for chest pain, leg swelling, palpitations and passing out.   Eyes: Negative.    Respiratory:  Positive for chest tightness, cough, sputum production (Reports clear mucus) and shortness of breath. Negative for wheezing.    Gastrointestinal: Negative.  Negative for abdominal pain, nausea, vomiting and diarrhea.   Endocrine: negative.   Genitourinary: Negative.  Negative for dysuria, frequency and urgency.   Musculoskeletal: Negative.  Negative for muscle ache.   Skin: Negative.  Negative for color change, pale, rash and erythema.   Allergic/Immunologic: Negative.    Neurological: Negative.  Negative for dizziness, light-headedness, passing out, headaches, disorientation and altered mental status.   Hematologic/Lymphatic: Negative.    Psychiatric/Behavioral: Negative.  Negative for altered mental status, disorientation and confusion.       Objective:     Physical Exam   Constitutional: She is oriented to person, place, and time. She appears well-developed. She is cooperative.  Non-toxic appearance. She appears ill. No distress.   HENT:   Head: Normocephalic and atraumatic.   Ears:   Right Ear: Hearing, tympanic membrane, external ear and ear canal normal.   Left Ear: Hearing, tympanic membrane, external ear and ear canal normal.   Nose: Congestion present. No mucosal edema, rhinorrhea or nasal deformity. No epistaxis. Right sinus exhibits no maxillary sinus tenderness and no frontal sinus tenderness. Left sinus exhibits no maxillary sinus tenderness and no frontal sinus  tenderness.   Mouth/Throat: Uvula is midline and mucous membranes are normal. Mucous membranes are moist. No trismus in the jaw. Normal dentition. No uvula swelling. Posterior oropharyngeal erythema present. No oropharyngeal exudate. Oropharynx is clear.   Eyes: Conjunctivae and lids are normal. Pupils are equal, round, and reactive to light. Right eye exhibits no discharge. Left eye exhibits no discharge. No scleral icterus.   Neck: Trachea normal and phonation normal. Neck supple. No neck rigidity present.   Cardiovascular: Regular rhythm, normal heart sounds and normal pulses. Tachycardia present.   Pulmonary/Chest: Effort normal. No respiratory distress (Equal rise and fall of chest.  Able speak in full complete sentences.). She has decreased breath sounds. She has no wheezes. She has no rhonchi. She has no rales.   Abdominal: Normal appearance and bowel sounds are normal. She exhibits no distension. Soft. There is no abdominal tenderness.   Musculoskeletal: Normal range of motion.         General: Normal range of motion.      Cervical back: She exhibits no tenderness.   Lymphadenopathy:     She has no cervical adenopathy.   Neurological: She is alert and oriented to person, place, and time. She exhibits normal muscle tone.   Skin: Skin is warm, dry, intact, not diaphoretic, not pale and no rash. Capillary refill takes 2 to 3 seconds. No erythema   Psychiatric: Her speech is normal and behavior is normal. Judgment and thought content normal.   Nursing note and vitals reviewed.chaperone present         Assessment:     1. Dyspnea, unspecified type    2. Exposure to COVID-19 virus    3. COPD with acute exacerbation        Plan:       Dyspnea, unspecified type  -     EKG 12-lead; Future  -     SARS Coronavirus 2 Antigen, POCT Manual Read  -     XR CHEST PA AND LATERAL; Future; Expected date: 07/24/2024    Exposure to COVID-19 virus    COPD with acute exacerbation    Other orders  -     dexAMETHasone injection 8  mg  -     levalbuterol nebulizer solution 1.25 mg  -     acetaminophen tablet 1,000 mg                Labs: COVID negative  EKG:  Sinus tachycardia rate of 104 beats per minute.   MS, QRS 68 MS,  MS.  No STEMI or ST changes noted.  Chest x-ray: The mediastinal and cardiac size and contours are normal. The diaphragms of pleura are clear. There is prominence of the pulmonary interstitial markings which may represent an underlying interstitial lung disease. No consolidation or solid mass is seen. No pneumothorax or pleural effusion is noted.   Oxygen saturation after ambulation 84% on room air.  Tylenol 1 g by mouth in clinic for fever.  Patient tolerated well.  No complications noted.    Decadron 8 mg IM in clinic.  Patient tolerated well.  No complications noted.    Xopenex in clinic.  Patient tolerated well.  No complications noted.    Reassessment of respiratory system revealed:  Rest and oxygen saturation at 87 on room air however with deep breaths increased to 92% on room air.  Able speak in full complete sentences.  Equal rise and fall of chest.  Patient reports has no longer experienced any chest tightness and actually feels better however unsure why her oxygen saturation still remains low.  Recommended patient present to emergency department for further evaluation and treatment however patient states would contact pulmonologist (Dr. Mccormick) to get further test recommendations and visit.  Personally reached out to office of Dr. Mccormick to determine if there is any test recommendations or appointment available today however staff stated that physician called out sick this morning and there was no provider available to see patient.  Office recommended patient present to emergency department and they would have a provider follow-up after that to schedule appointment for further assessment.  This information was passed to the patient and her spouse.  They are now in agreement to present to emergency  department for further evaluation and treatment.  Offered EMS which was refused.  EMS refusal form signed.  See scanned documentation.  Risk versus benefits including possibility of death explained to patient and spouse.  Both have clear verbal understanding.    Recommended patient present straight emergency department not to stop or have anything to eat or drink prior to arrival.    Close follow-up with PCP and pulmonologist once discharged.    Return to clinic as needed.    Patient's spouse both in agreement with plan of care.    DISCLAIMER: Please note that my documentation in this Electronic Healthcare Record was produced using speech recognition software and therefore may contain errors related to that software system.These could include grammar, punctuation and spelling errors or the inclusion/exclusion of phrases that were not intended. Garbled syntax, mangled pronouns, and other bizarre constructions may be attributed to that software system.

## 2024-07-24 NOTE — H&P
[facility] - Emergency Dept  Hospital Medicine  History & Physical    Patient Name: Venkat Lara  MRN: [id number]  Patient Class: IP- Inpatient  Admission Date: 7/24/2024  Attending Physician: Tyrell Nielsen MD   Primary Care Provider: Soha Rowan NP         Patient information was obtained from patient, past medical records, and ER records.     Subjective:     Principal Problem:Acute on chronic respiratory failure with hypoxia    Chief Complaint:   Chief Complaint   Patient presents with    Shortness of Breath     LOW  O2 SATS        HPI: 1124 74 year old pt getting admitted with worsening Hypoxia/Bronchiectasis flare up ?/COPD exacerbation  Pt was recently started on abx/steroids by her Pulmonologist(Dr Monica PUENTE)  She completed the course last week  Pt noticed last few days she has been suffering from progressive SOB along with cough  She went to urgent care and was referred to [facility]  Pt uses oxygen at night time only  She was very hypoxic yesterday at home/Urgent care and in ER today     Past Medical History:   Diagnosis Date    Colon polyp 01/01/2013    COPD, moderate 01/01/2010    was told by Dr. 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    Lumbar spinal stenosis     Oxygen dependent 01/01/2011    2L NC Nightly    Primary osteoarthritis of first carpometacarpal joints, bilateral     Rheumatoid arthritis     Scoliosis 01/01/2014    Spinal stenosis 01/01/2014    upper and lowerr back - tingling in left arm at times       Past Surgical History:    Procedure Laterality Date    BREAST SURGERY  2008    Reduction    BUNIONECTOMY Bilateral 01/1999    CARDIOVASCULAR STRESS TEST  2013    CARPAL TUNNEL RELEASE Left 10/08/2015    CATARACT EXTRACTION BILATERAL W/ ANTERIOR VITRECTOMY  04/2010    [facility] ARTHROPLASTY, RIGHT  02/23/2004    COLONOSCOPY  04/18/2013    CORRECTION OF HAMMER TOE Left 8/14/2019    Procedure: CORRECTION, HAMMER TOE;  Surgeon: Jackson Brannon DPM;  Location: [facility];  Service: Podiatry;  Laterality: Left;    ESOPHAGOGASTRODUODENOSCOPY  12/21/2012    2012-with dilation #1, 2013 #2    HAND SURGERY  2003    JOINT REPLACEMENT      OPEN REDUCTION AND INTERNAL FIXATION (ORIF) OF FRACTURE OF FEMUR USING DYNAMIC HIP SCREW Left 8/22/2023    Procedure: ORIF, HIP, USING DYNAMIC HIP SCREW;  Surgeon: Hussein Romo MD;  Location: [facility] OR;  Service: Orthopedics;  Laterality: Left;    REMOVAL OF HARDWARE FROM LOWER EXTREMITY Left 8/14/2019    Procedure: REMOVAL, HARDWARE, LOWER EXTREMITY;  Surgeon: Jackson Brannon DPM;  Location: [facility];  Service: Podiatry;  Laterality: Left;    RHINOPLASTY TIP  02/2010    SURGICAL REMOVAL OF BUNION WITH OSTEOTOMY OF METATARSAL BONE Left 8/14/2019    Procedure: BUNIONECTOMY, WITH METATARSAL OSTEOTOMY;  Surgeon: Jackson Brannon DPM;  Location: [facility];  Service: Podiatry;  Laterality: Left;  Arthrodesis 2nd PIP joint, screw 1st toe, C-arm, Synchro-Merrill, 3.0, 4.0, AO modular set MERRILL BRUNSON NOTIFIED    TONSILLECTOMY, ADENOIDECTOMY  1970    TOTAL KNEE ARTHROPLASTY Right 12/2008    TOTAL KNEE ARTHROPLASTY Left 03/2015    TOTAL REDUCTION MAMMOPLASTY  01/2008    TUBAL LIGATION      VAGINAL DELIVERY  1983    x2       Review of patient's allergies indicates:   Allergen Reactions    Statins-hmg-coa reductase inhibitors Other (See Comments)     Muscle cramps    Doxycycline Other (See Comments)     Stops gallbladder function    Adhesive Other (See Comments)     bruises    Erythromycin Other (See Comments)     Stomach pain        No current facility-administered medications on file prior to encounter.     Current Outpatient Medications on File Prior to Encounter   Medication Sig    albuterol (PROVENTIL) 2.5 mg /3 mL (0.083 %) nebulizer solution Take 2.5 mg by nebulization every 4 (four) hours as needed for Wheezing. Rescue    albuterol (PROVENTIL/VENTOLIN HFA) 90 mcg/actuation inhaler Inhale 2 puffs into the lungs every 6 (six) hours as needed for Wheezing or Shortness of Breath.    benzonatate (TESSALON) 200 MG capsule Take 200 mg by mouth 2 (two) times daily as needed for Cough.    buPROPion (WELLBUTRIN XL) 300 MG 24 hr tablet Take 300 mg by mouth every evening.     cholecalciferol, vitamin D3, 10 mcg (400 unit) Cap capsule Take 400 Units by mouth once daily.    cyclobenzaprine (FLEXERIL) 10 MG tablet Take 10 mg by mouth every evening.    eszopiclone (LUNESTA) 3 mg Tab Take 3 mg by mouth every evening.    ezetimibe (ZETIA) 10 mg tablet Take 10 mg by mouth once daily.    gabapentin (NEURONTIN) 400 MG capsule Take 400 mg by mouth 2 (two) times daily.    garlic 1,000 mg Cap Take 1,000 mg by mouth once daily.    guaiFENesin (MUCINEX) 600 mg 12 hr tablet Take 1,200 mg by mouth 2 (two) times daily.    indomethacin (INDOCIN) 50 MG capsule Take 50 mg by mouth 2 (two) times daily with meals.    MYRBETRIQ 50 mg Tb24 Take 1 tablet by mouth once daily.    omega 3-dha-epa-fish oil 1,000 mg (120 mg-180 mg) Cap Take 1 capsule by mouth once daily.    RESTASIS 0.05 % ophthalmic emulsion Place 1 drop into both eyes 2 (two) times daily.    telmisartan (MICARDIS) 80 MG Tab Take 80 mg by mouth once daily.    tiotropium-olodateroL (STIOLTO RESPIMAT) 2.5-2.5 mcg/actuation Mist Inhale 1 puff into the lungs once daily. Controller     UNABLE TO FIND Take 1 tablet by mouth once daily. BONE BOAST    verapamil (VERELAN) 240 MG C24P Take 240 mg by mouth 2 (two) times daily.     [DISCONTINUED] brimonidine 0.2% (ALPHAGAN) 0.2 % Drop Place 1 drop into both eyes 3  (three) times daily.    [DISCONTINUED] aspirin 81 MG Chew Take 1 tablet (81 mg total) by mouth 2 (two) times daily.    [DISCONTINUED] CALCIUM FRUCTOBORATE ORAL Take 1 tablet by mouth once daily.    [DISCONTINUED] co-enzyme Q-10 30 mg capsule Take 100 mg by mouth once daily.    [DISCONTINUED] cyanocobalamin (VITAMIN B-12) 1000 MCG tablet Take 100 mcg by mouth once daily.    [DISCONTINUED] ibuprofen 200 mg Cap Take 1 tablet by mouth every 6 (six) hours as needed.    [DISCONTINUED] Lactobacillus rhamnosus GG (CULTURELLE) 15 billion cell capsule Take 1 capsule by mouth every morning.    [DISCONTINUED] magnesium gluconate 27.5 mg magne- sium (500 mg) Tab Take 500 mg by mouth once daily.    [DISCONTINUED] multivitamin with minerals tablet Take 1 tablet by mouth once daily.    [DISCONTINUED] omeprazole (PRILOSEC) 40 MG capsule Take 40 mg by mouth every morning.     [DISCONTINUED] red yeast rice 600 mg Tab Take 2 tablets by mouth once daily.    [DISCONTINUED] vitamin A 54002 UNIT capsule Take 10,000 Units by mouth.     Family History       Problem Relation (Age of Onset)    Cancer Father          Tobacco Use    Smoking status: Former     Current packs/day: 0.00     Average packs/day: 2.0 packs/day for 33.0 years (66.0 ttl pk-yrs)     Types: Cigarettes     Start date: 1964     Quit date: 1997     Years since quittin.1    Smokeless tobacco: Former     Quit date: 1997   Substance and Sexual Activity    Alcohol use: Yes     Alcohol/week: 4.0 standard drinks of alcohol     Types: 4 Glasses of wine per week     Comment: monthly    Drug use: Never    Sexual activity: Not on file     Review of Systems   Constitutional:  Negative for activity change and appetite change.   HENT:  Negative for congestion and dental problem.    Eyes:  Negative for discharge and itching.   Respiratory:  Positive for cough and shortness of breath.    Cardiovascular:  Negative for chest pain.   Gastrointestinal:  Negative for abdominal  distention and abdominal pain.   Endocrine: Negative for cold intolerance.   Genitourinary:  Negative for difficulty urinating and dysuria.   Musculoskeletal:  Negative for arthralgias and back pain.   Skin:  Negative for color change.   Neurological:  Negative for dizziness and facial asymmetry.   Hematological:  Negative for adenopathy.   Psychiatric/Behavioral:  Negative for agitation and behavioral problems.      Objective:     Vital Signs (Most Recent):  Temp: 98.8 °F (37.1 °C) (07/24/24 1339)  Pulse: 91 (07/24/24 1729)  Resp: 19 (07/24/24 1709)  BP: (!) 142/68 (07/24/24 1729)  SpO2: 95 % (07/24/24 1714) Vital Signs (24h Range):  Temp:  [98.8 °F (37.1 °C)-100.2 °F (37.9 °C)] 98.8 °F (37.1 °C)  Pulse:  [] 91  Resp:  [18-22] 19  SpO2:  [86 %-97 %] 95 %  BP: (128-172)/(68-92) 142/68     Weight: 80.7 kg (178 lb)  Body mass index is 30.55 kg/m².     Physical Exam  Vitals and nursing note reviewed.   Constitutional:       General: She is not in acute distress.  HENT:      Head: Atraumatic.      Right Ear: External ear normal.      Left Ear: External ear normal.      Nose: Nose normal.      Mouth/Throat:      Mouth: Mucous membranes are moist.   Eyes:      Extraocular Movements: Extraocular movements intact.   Cardiovascular:      Rate and Rhythm: Normal rate.   Pulmonary:      Effort: Pulmonary effort is normal.      Breath sounds: Wheezing present.   Musculoskeletal:         General: Normal range of motion.      Cervical back: Normal range of motion.   Skin:     General: Skin is warm.   Neurological:      Mental Status: She is alert and oriented to person, place, and time.   Psychiatric:         Behavior: Behavior normal.                Significant Labs: All pertinent labs within the past 24 hours have been reviewed.  CBC:   Recent Labs   Lab 07/24/24  1402   WBC 6.45   HGB 13.9   HCT 42.2        CMP:   Recent Labs   Lab 07/24/24  1402   *   K 4.4      CO2 22*   *   BUN 26*    CREATININE 0.8   CALCIUM 9.4   PROT 7.4   ALBUMIN 4.4   BILITOT 0.6   ALKPHOS 78   AST 25   ALT 24   ANIONGAP 8       Significant Imaging: I have reviewed all pertinent imaging results/findings within the past 24 hours.    Assessment/Plan:     * Acute on chronic respiratory failure with hypoxia  Patient with Hypoxic Respiratory failure which is Acute on chronic.  she is on home oxygen at 2  LPM. Supplemental oxygen was provided and noted-      .   Signs/symptoms of respiratory failure include- increased work of breathing, respiratory distress, and wheezing. Contributing diagnoses includes - COPD and Bronchiectasis  Labs and images were reviewed. Patient Has not had a recent ABG. Will treat underlying causes and adjust management of respiratory failure as follows-     Chronic obstructive pulmonary disease with acute lower respiratory infection  Maintain nebs/oxygen/iv steroids/Pseudomonas dose iv zosyn   Already received iv levofloxacin one dose too in ER today    Bronchiectasis without complication  Aware       Essential hypertension  Chronic, controlled. Latest blood pressure and vitals reviewed-     Temp:  [98.8 °F (37.1 °C)-100.2 °F (37.9 °C)]   Pulse:  []   Resp:  [18-22]   BP: (128-172)/(68-92)   SpO2:  [86 %-97 %] .   Home meds for hypertension were reviewed and noted below.   Hypertension Medications               telmisartan (MICARDIS) 80 MG Tab Take 80 mg by mouth once daily.    verapamil (VERELAN) 240 MG C24P Take 240 mg by mouth 2 (two) times daily.             While in the hospital, will manage blood pressure as follows; Continue home antihypertensive regimen    Will utilize p.r.n. blood pressure medication only if patient's blood pressure greater than 140/90 and she develops symptoms such as worsening chest pain or shortness of breath.      VTE Risk Mitigation (From admission, onward)           Ordered     enoxaparin injection 40 mg  Daily         07/24/24 1618     IP VTE HIGH RISK PATIENT  Once          07/24/24 1618     Place sequential compression device  Until discontinued         07/24/24 1618                                    Tyrell Nielsen MD  Department of Hospital Medicine  [facility] - Emergency Dept

## 2024-07-24 NOTE — ED PROVIDER NOTES
Encounter Date: 7/24/2024       History     Chief Complaint   Patient presents with    Shortness of Breath     LOW  O2 SATS     Patient with a history of bronchiectasis.  Patient only wears oxygen at nighttime.  Usual O2 saturations are about 93% on room air.  Patient reports increasing dyspnea on exertion and shortness of breath.  Yesterday her O2 saturation was 74%.  Without oxygen today her O2 saturation has been in the 80s.  There is no chest pain.  Positive subjective chills and fever.  Patient went to urgent care clinic prior to arrival.  Patient had temperature 100.2° F. noted to be hypoxic so sent here for further evaluation.  There is no chest pain, pleurisy hemoptysis      Review of patient's allergies indicates:   Allergen Reactions    Statins-hmg-coa reductase inhibitors Other (See Comments)     Muscle cramps    Doxycycline Other (See Comments)     Stops gallbladder function    Adhesive Other (See Comments)     bruises    Erythromycin Other (See Comments)     Stomach pain     Past Medical History:   Diagnosis Date    Colon polyp 01/01/2013    COPD, moderate 01/01/2010    was told by Dr. 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    Lumbar spinal stenosis     Oxygen dependent 01/01/2011    2L NC Nightly    Primary osteoarthritis of first carpometacarpal joints, bilateral     Rheumatoid arthritis     Scoliosis 01/01/2014    Spinal stenosis 01/01/2014    upper and lowerr back - tingling in left arm at times     Past Surgical History:   Procedure Laterality Date     BREAST SURGERY  2008    Reduction    BUNIONECTOMY Bilateral 1999    CARDIOVASCULAR STRESS TEST  2013    CARPAL TUNNEL RELEASE Left 10/08/2015    CATARACT EXTRACTION BILATERAL W/ ANTERIOR VITRECTOMY  2010    [facility] ARTHROPLASTY, RIGHT  2004    COLONOSCOPY  2013    CORRECTION OF HAMMER TOE Left 2019    Procedure: CORRECTION, HAMMER TOE;  Surgeon: Jackson Brannon DPM;  Location: [facility];  Service: Podiatry;  Laterality: Left;    ESOPHAGOGASTRODUODENOSCOPY  2012    2012-with dilation #1, 2013 #2    HAND SURGERY      JOINT REPLACEMENT      OPEN REDUCTION AND INTERNAL FIXATION (ORIF) OF FRACTURE OF FEMUR USING DYNAMIC HIP SCREW Left 2023    Procedure: ORIF, HIP, USING DYNAMIC HIP SCREW;  Surgeon: Hussein Romo MD;  Location: [facility];  Service: Orthopedics;  Laterality: Left;    REMOVAL OF HARDWARE FROM LOWER EXTREMITY Left 2019    Procedure: REMOVAL, HARDWARE, LOWER EXTREMITY;  Surgeon: Jackson Brannon DPM;  Location: [facility];  Service: Podiatry;  Laterality: Left;    RHINOPLASTY TIP  2010    SURGICAL REMOVAL OF BUNION WITH OSTEOTOMY OF METATARSAL BONE Left 2019    Procedure: BUNIONECTOMY, WITH METATARSAL OSTEOTOMY;  Surgeon: Jackson Brannon DPM;  Location: [facility];  Service: Podiatry;  Laterality: Left;  Arthrodesis 2nd PIP joint, screw 1st toe, C-arm, [employer], 3.0, 4.0, AO modular set MERRILL BRUNSON NOTIFIED    TONSILLECTOMY, ADENOIDECTOMY  1970    TOTAL KNEE ARTHROPLASTY Right 2008    TOTAL KNEE ARTHROPLASTY Left 2015    TOTAL REDUCTION MAMMOPLASTY  2008    TUBAL LIGATION      VAGINAL DELIVERY  1983    x2     Family History   Problem Relation Name Age of Onset    Cancer Father          colon     Social History     Tobacco Use    Smoking status: Former     Current packs/day: 0.00     Average packs/day: 2.0 packs/day for 33.0 years (66.0 ttl pk-yrs)     Types: Cigarettes     Start date: 1964     Quit date: 1997     Years since quittin.1     Smokeless tobacco: Former     Quit date: 1/1/1997   Substance Use Topics    Alcohol use: Yes     Alcohol/week: 4.0 standard drinks of alcohol     Types: 4 Glasses of wine per week     Comment: monthly    Drug use: Never     Review of Systems   Constitutional:  Negative for chills and fever.   HENT:  Positive for congestion.    Eyes:  Negative for visual disturbance.   Respiratory:  Positive for cough and shortness of breath.    Cardiovascular:  Negative for chest pain and palpitations.   Gastrointestinal:  Negative for abdominal pain and vomiting.   Genitourinary:  Negative for dysuria.   Musculoskeletal:  Negative for joint swelling.   Neurological:  Negative for headaches.   Psychiatric/Behavioral:  Negative for confusion.        Physical Exam     Initial Vitals [07/24/24 1339]   BP Pulse Resp Temp SpO2   (!) 141/77 99 20 98.8 °F (37.1 °C) (!) 86 %      MAP       --         Physical Exam    Nursing note and vitals reviewed.  Constitutional: She is not diaphoretic. No distress.   HENT:   Head: Normocephalic and atraumatic.   Eyes: Conjunctivae are normal.   Neck:   Normal range of motion.  Cardiovascular:  Normal rate.           Pulmonary/Chest:   Good breath sounds with no wheezing.  No audible rhonchi.  No accessory muscle use.   Abdominal: Abdomen is soft. There is no abdominal tenderness.   Musculoskeletal:         General: Normal range of motion.      Cervical back: Normal range of motion.     Neurological: She is alert. She has normal strength. No cranial nerve deficit or sensory deficit.   No gross deficits   Skin: No rash noted.   Psychiatric: She has a normal mood and affect.         ED Course   Procedures  Labs Reviewed   CBC W/ AUTO DIFFERENTIAL - Abnormal       Result Value    WBC 6.45      RBC 4.15      Hemoglobin 13.9      Hematocrit 42.2       (*)     MCH 33.5 (*)     MCHC 32.9      RDW 12.9      Platelets 194      MPV 8.6 (*)     Immature Granulocytes 0.3      Gran # (ANC) 6.0       Immature Grans (Abs) 0.02      Lymph # 0.3 (*)     Mono # 0.1 (*)     Eos # 0.1      Baso # 0.02      nRBC 0      Gran % 92.9 (*)     Lymph % 4.0 (*)     Mono % 1.7 (*)     Eosinophil % 0.8      Basophil % 0.3      Differential Method Automated     COMPREHENSIVE METABOLIC PANEL - Abnormal    Sodium 134 (*)     Potassium 4.4      Chloride 104      CO2 22 (*)     Glucose 129 (*)     BUN 26 (*)     Creatinine 0.8      Calcium 9.4      Total Protein 7.4      Albumin 4.4      Total Bilirubin 0.6      Alkaline Phosphatase 78      AST 25      ALT 24      eGFR >60.0      Anion Gap 8     CULTURE, BLOOD   CULTURE, BLOOD   SARS-COV-2 RNA AMPLIFICATION, QUAL    SARS-CoV-2 RNA, Amplification, Qual Negative     MAGNESIUM    Magnesium 1.9     TROPONIN I HIGH SENSITIVITY    Troponin I High Sensitivity 3.4     B-TYPE NATRIURETIC PEPTIDE    BNP 50     URINALYSIS, REFLEX TO URINE CULTURE    Specimen UA Urine, Clean Catch      Color, UA Yellow      Appearance, UA Clear      pH, UA 6.0      Specific Gravity, UA 1.010      Protein, UA Negative      Glucose, UA Negative      Ketones, UA Negative      Bilirubin (UA) Negative      Occult Blood UA Negative      Nitrite, UA Negative      Urobilinogen, UA Negative      Leukocytes, UA Negative      Narrative:     Specimen Source->Urine   PROCALCITONIN   PROCALCITONIN    Procalcitonin 0.094     LACTIC ACID, PLASMA   ISTAT LACTATE    POC Lactate 0.88      Sample VENOUS     POCT LACTATE        ECG Results              EKG 12-lead (In process)        Collection Time Result Time QRS Duration OHS QTC Calculation    07/24/24 14:20:15 07/24/24 14:39:02 76 467                     In process by Interface, Lab In [facility] (07/24/24 14:39:11)                   Narrative:    Test Reason : R09.02,    Vent. Rate : 091 BPM     Atrial Rate : 091 BPM     P-R Int : 180 ms          QRS Dur : 076 ms      QT Int : 380 ms       P-R-T Axes : 073 -05 063 degrees     QTc Int : 467 ms    Normal sinus rhythm  Low voltage  QRS  Cannot rule out Anterior infarct ,age undetermined  Abnormal ECG  When compared with ECG of 21-AUG-2023 15:51,  NM interval has decreased    Referred By: [email or web address]   SELF           Confirmed By:                                   Imaging Results              X-Ray Chest PA And Lateral (Final result)  Result time 07/24/24 14:39:24   Procedure changed from X-Ray Chest 1 View     Final result by Jackson Reilly MD (07/24/24 14:39:24)                   Impression:      No acute cardiac or pulmonary process.      Electronically signed by: Jackson Reilly  Date:    07/24/2024  Time:    14:39               Narrative:    CLINICAL HISTORY:  ([id number])75 y/o  (1950) F    Shortness of breath;Hypoxia;    TECHNIQUE:  (A#[id number], exam time 7/24/2024 14:38)    XR CHEST PA AND LATERAL IMG36    COMPARISON:  Radiograph from 10:58    FINDINGS:  The lungs are clear. Costophrenic angles are seen without effusion. No pneumothorax is identified. The heart is normal in size. The mediastinum is within normal limits. Osseous structures show degenerative changes in the spine. The visualized upper abdomen is unremarkable.                                       Medications   levoFLOXacin 750 mg/150 mL IVPB 750 mg (750 mg Intravenous New Bag 7/24/24 1722)   pantoprazole EC tablet 40 mg (has no administration in time range)   melatonin tablet 6 mg (has no administration in time range)   ondansetron injection 4 mg (has no administration in time range)   acetaminophen tablet 650 mg (has no administration in time range)   aluminum-magnesium hydroxide-simethicone 200-200-20 mg/5 mL suspension 30 mL (has no administration in time range)   acetaminophen tablet 650 mg (has no administration in time range)   naloxone 0.4 mg/mL injection 0.02 mg (has no administration in time range)   potassium bicarbonate disintegrating tablet 50 mEq (has no administration in time range)   potassium bicarbonate disintegrating tablet 35 mEq (has no  administration in time range)   potassium bicarbonate disintegrating tablet 60 mEq (has no administration in time range)   magnesium oxide tablet 800 mg (has no administration in time range)   magnesium oxide tablet 800 mg (has no administration in time range)   potassium, sodium phosphates 280-160-250 mg packet 2 packet (has no administration in time range)   potassium, sodium phosphates 280-160-250 mg packet 2 packet (has no administration in time range)   potassium, sodium phosphates 280-160-250 mg packet 2 packet (has no administration in time range)   enoxaparin injection 40 mg (has no administration in time range)   piperacillin-tazobactam 4.5 g in dextrose 5 % 100 mL IVPB (ready to mix) (has no administration in time range)   albuterol-ipratropium 2.5 mg-0.5 mg/3 mL nebulizer solution 3 mL (3 mLs Nebulization Given 7/24/24 1658)   methylPREDNISolone sodium succinate injection 40 mg (has no administration in time range)   0.9%  NaCl infusion (has no administration in time range)   methylPREDNISolone sodium succinate injection 125 mg (has no administration in time range)   levalbuterol nebulizer solution 1.25 mg (has no administration in time range)   ipratropium 0.02 % nebulizer solution 0.5 mg (has no administration in time range)   piperacillin-tazobactam 3.375 g in dextrose 5 % 100 mL IVPB (ready to mix) (0 g Intravenous Stopped 7/24/24 1630)     Medical Decision Making  Patient presents with increasing shortness breath.  Differential diagnosis includes bronchiectasis exacerbation, pneumonia, COPD exacerbation, pulmonary embolus.  Here CBC CMP unremarkable.  EKG sinus rhythm.  Chest x-ray with no definite infiltrates.  Here patient with increased oxygen requirement with low-grade fever at urgent care clinic.  Patient is likely having bronchiectasis exacerbation versus occult pneumonia.  Patient does respond to corticosteroids.  Will give Solu-Medrol and nebulizer.  Will begin broad-spectrum antibiotics with  Zosyn and Levaquin to cover for possible pathogens of bronchiectasis including Pseudomonas.  Given significant increased oxygen requirement, discussed with Dr. Nielsen with Hospital Medicine.  He will evaluate for possible admission.    Amount and/or Complexity of Data Reviewed  Labs: ordered. Decision-making details documented in ED Course.  Radiology: ordered. Decision-making details documented in ED Course.  ECG/medicine tests:  Decision-making details documented in ED Course.    Risk  Prescription drug management.  Decision regarding hospitalization.                                      Clinical Impression:  Final diagnoses:  [R09.02] Hypoxia  [J47.1] Bronchiectasis with (acute) exacerbation (Primary)  [J96.21] Acute on chronic respiratory failure with hypoxia          ED Disposition Condition    Admit                 Gilmar Schwartz MD  07/24/24 9995

## 2024-07-25 VITALS
RESPIRATION RATE: 18 BRPM | BODY MASS INDEX: 29.77 KG/M2 | WEIGHT: 174.38 LBS | TEMPERATURE: 98 F | DIASTOLIC BLOOD PRESSURE: 63 MMHG | SYSTOLIC BLOOD PRESSURE: 101 MMHG | HEART RATE: 93 BPM | HEIGHT: 64 IN | OXYGEN SATURATION: 93 %

## 2024-07-25 LAB
ALBUMIN SERPL BCP-MCNC: 4 G/DL (ref 3.5–5.2)
ALP SERPL-CCNC: 71 U/L (ref 55–135)
ALT SERPL W/O P-5'-P-CCNC: 23 U/L (ref 10–44)
ANION GAP SERPL CALC-SCNC: 7 MMOL/L (ref 8–16)
AST SERPL-CCNC: 22 U/L (ref 10–40)
BASOPHILS # BLD AUTO: 0 K/UL (ref 0–0.2)
BASOPHILS NFR BLD: 0 % (ref 0–1.9)
BILIRUB SERPL-MCNC: 0.4 MG/DL (ref 0.1–1)
BUN SERPL-MCNC: 30 MG/DL (ref 8–23)
CALCIUM SERPL-MCNC: 9.5 MG/DL (ref 8.7–10.5)
CHLORIDE SERPL-SCNC: 106 MMOL/L (ref 95–110)
CO2 SERPL-SCNC: 24 MMOL/L (ref 23–29)
CREAT SERPL-MCNC: 0.7 MG/DL (ref 0.5–1.4)
DIFFERENTIAL METHOD BLD: ABNORMAL
EOSINOPHIL # BLD AUTO: 0 K/UL (ref 0–0.5)
EOSINOPHIL NFR BLD: 0 % (ref 0–8)
ERYTHROCYTE [DISTWIDTH] IN BLOOD BY AUTOMATED COUNT: 12.4 % (ref 11.5–14.5)
EST. GFR  (NO RACE VARIABLE): >60 ML/MIN/1.73 M^2
GLUCOSE SERPL-MCNC: 150 MG/DL (ref 70–110)
HCT VFR BLD AUTO: 40.4 % (ref 37–48.5)
HGB BLD-MCNC: 13.2 G/DL (ref 12–16)
IMM GRANULOCYTES # BLD AUTO: 0.01 K/UL (ref 0–0.04)
IMM GRANULOCYTES NFR BLD AUTO: 0.3 % (ref 0–0.5)
LYMPHOCYTES # BLD AUTO: 0.4 K/UL (ref 1–4.8)
LYMPHOCYTES NFR BLD: 13.6 % (ref 18–48)
MAGNESIUM SERPL-MCNC: 2.1 MG/DL (ref 1.6–2.6)
MCH RBC QN AUTO: 33.2 PG (ref 27–31)
MCHC RBC AUTO-ENTMCNC: 32.7 G/DL (ref 32–36)
MCV RBC AUTO: 102 FL (ref 82–98)
MONOCYTES # BLD AUTO: 0.1 K/UL (ref 0.3–1)
MONOCYTES NFR BLD: 1.7 % (ref 4–15)
NEUTROPHILS # BLD AUTO: 2.5 K/UL (ref 1.8–7.7)
NEUTROPHILS NFR BLD: 84.4 % (ref 38–73)
NRBC BLD-RTO: 0 /100 WBC
PLATELET # BLD AUTO: 188 K/UL (ref 150–450)
PMV BLD AUTO: 8.6 FL (ref 9.2–12.9)
POTASSIUM SERPL-SCNC: 4.3 MMOL/L (ref 3.5–5.1)
PROT SERPL-MCNC: 7 G/DL (ref 6–8.4)
RBC # BLD AUTO: 3.98 M/UL (ref 4–5.4)
SODIUM SERPL-SCNC: 137 MMOL/L (ref 136–145)
WBC # BLD AUTO: 2.94 K/UL (ref 3.9–12.7)

## 2024-07-25 PROCEDURE — 94761 N-INVAS EAR/PLS OXIMETRY MLT: CPT

## 2024-07-25 PROCEDURE — 94640 AIRWAY INHALATION TREATMENT: CPT

## 2024-07-25 PROCEDURE — 80053 COMPREHEN METABOLIC PANEL: CPT | Performed by: INTERNAL MEDICINE

## 2024-07-25 PROCEDURE — 25000242 PHARM REV CODE 250 ALT 637 W/ HCPCS: Performed by: INTERNAL MEDICINE

## 2024-07-25 PROCEDURE — 25000003 PHARM REV CODE 250: Performed by: INTERNAL MEDICINE

## 2024-07-25 PROCEDURE — 36415 COLL VENOUS BLD VENIPUNCTURE: CPT | Performed by: INTERNAL MEDICINE

## 2024-07-25 PROCEDURE — 85025 COMPLETE CBC W/AUTO DIFF WBC: CPT | Performed by: INTERNAL MEDICINE

## 2024-07-25 PROCEDURE — 83735 ASSAY OF MAGNESIUM: CPT | Performed by: INTERNAL MEDICINE

## 2024-07-25 PROCEDURE — 63600175 PHARM REV CODE 636 W HCPCS: Performed by: INTERNAL MEDICINE

## 2024-07-25 PROCEDURE — 27000221 HC OXYGEN, UP TO 24 HOURS

## 2024-07-25 RX ORDER — LEVOFLOXACIN 750 MG/1
750 TABLET ORAL DAILY
Qty: 6 TABLET | Refills: 0 | Status: SHIPPED | OUTPATIENT
Start: 2024-07-25 | End: 2024-07-25

## 2024-07-25 RX ORDER — PREDNISONE 20 MG/1
TABLET ORAL
Qty: 24 TABLET | Refills: 0 | Status: SHIPPED | OUTPATIENT
Start: 2024-07-25 | End: 2024-07-25

## 2024-07-25 RX ORDER — LEVOFLOXACIN 750 MG/1
750 TABLET ORAL DAILY
Qty: 6 TABLET | Refills: 0 | Status: SHIPPED | OUTPATIENT
Start: 2024-07-25 | End: 2024-07-31

## 2024-07-25 RX ORDER — PREDNISONE 20 MG/1
TABLET ORAL
Qty: 24 TABLET | Refills: 0 | Status: SHIPPED | OUTPATIENT
Start: 2024-07-25 | End: 2024-08-09

## 2024-07-25 RX ADMIN — PANTOPRAZOLE SODIUM 40 MG: 40 TABLET, DELAYED RELEASE ORAL at 05:07

## 2024-07-25 RX ADMIN — PIPERACILLIN SODIUM AND TAZOBACTAM SODIUM 4.5 G: 4; .5 INJECTION, POWDER, LYOPHILIZED, FOR SOLUTION INTRAVENOUS at 09:07

## 2024-07-25 RX ADMIN — IPRATROPIUM BROMIDE AND ALBUTEROL SULFATE 3 ML: 2.5; .5 SOLUTION RESPIRATORY (INHALATION) at 07:07

## 2024-07-25 RX ADMIN — METHYLPREDNISOLONE SODIUM SUCCINATE 40 MG: 40 INJECTION, POWDER, FOR SOLUTION INTRAMUSCULAR; INTRAVENOUS at 05:07

## 2024-07-25 RX ADMIN — METHYLPREDNISOLONE SODIUM SUCCINATE 40 MG: 40 INJECTION, POWDER, FOR SOLUTION INTRAMUSCULAR; INTRAVENOUS at 11:07

## 2024-07-25 RX ADMIN — IPRATROPIUM BROMIDE AND ALBUTEROL SULFATE 3 ML: 2.5; .5 SOLUTION RESPIRATORY (INHALATION) at 03:07

## 2024-07-25 NOTE — PLAN OF CARE
Patient cleared for discharge by case management to home, no needs.       07/25/24 1201   Final Note   Assessment Type Final Discharge Note   Anticipated Discharge Disposition Home   Hospital Resources/Appts/Education Provided Appointments scheduled and added to AVS

## 2024-07-25 NOTE — PLAN OF CARE
Problem: Pneumonia  Goal: Fluid Balance  Outcome: Progressing  Goal: Resolution of Infection Signs and Symptoms  Outcome: Progressing  Goal: Effective Oxygenation and Ventilation  Outcome: Progressing     Problem: Adult Inpatient Plan of Care  Goal: Plan of Care Review  Outcome: Progressing  Goal: Patient-Specific Goal (Individualized)  Outcome: Progressing  Goal: Absence of Hospital-Acquired Illness or Injury  Outcome: Progressing  Goal: Optimal Comfort and Wellbeing  Outcome: Progressing  Goal: Readiness for Transition of Care  Outcome: Progressing

## 2024-07-25 NOTE — ASSESSMENT & PLAN NOTE
Chronic, controlled. Latest blood pressure and vitals reviewed-     Temp:  [98.8 °F (37.1 °C)-100.2 °F (37.9 °C)]   Pulse:  []   Resp:  [18-22]   BP: (128-172)/(68-92)   SpO2:  [86 %-97 %] .   Home meds for hypertension were reviewed and noted below.   Hypertension Medications               telmisartan (MICARDIS) 80 MG Tab Take 80 mg by mouth once daily.    verapamil (VERELAN) 240 MG C24P Take 240 mg by mouth 2 (two) times daily.             While in the hospital, will manage blood pressure as follows; Continue home antihypertensive regimen    Will utilize p.r.n. blood pressure medication only if patient's blood pressure greater than 140/90 and she develops symptoms such as worsening chest pain or shortness of breath.

## 2024-07-25 NOTE — ASSESSMENT & PLAN NOTE
Maintain nebs/oxygen/iv steroids/Pseudomonas dose iv zosyn   Already received iv levofloxacin one dose too in ER today

## 2024-07-25 NOTE — HPI
1124 74 year old pt getting admitted with worsening Hypoxia/Bronchiectasis flare up ?/COPD exacerbation  Pt was recently started on abx/steroids by her Pulmonologist(Dr Monica PUENTE)  She completed the course last week  Pt noticed last few days she has been suffering from progressive SOB along with cough  She went to urgent care and was referred to [facility] ER  Pt uses oxygen at night time only  She was very hypoxic yesterday at home/Urgent care and in ER today

## 2024-07-25 NOTE — PLAN OF CARE
Venkat Lara's COPD exacerbation and associated clinical symptoms improved after supplemental oxygen in the time period spanning less than 2 midnights. This was an unexpected, rapid recovery. She was able to be discharged home to follow up with her PCP.

## 2024-07-25 NOTE — DISCHARGE SUMMARY
[facility] Medicine  Discharge Summary      Patient Name: Venkat Lara  MRN: [id number]  MELLY: [id number]  Patient Class: IP- Inpatient  Admission Date: 7/24/2024  Hospital Length of Stay: 1 days  Discharge Date and Time: 7/25/2024 12:32 PM  Attending Physician: No att. providers found   Discharging Provider: Diamante Dean NP  Primary Care Provider: Soha Rowan NP    Primary Care Team: Networked reference to record PCT     HPI:   1124 74 year old pt getting admitted with worsening Hypoxia/Bronchiectasis flare up ?/COPD exacerbation  Pt was recently started on abx/steroids by her Pulmonologist(Dr Monica PUENTE)  She completed the course last week  Pt noticed last few days she has been suffering from progressive SOB along with cough  She went to urgent care and was referred to [facility] ER  Pt uses oxygen at night time only  She was very hypoxic yesterday at home/Urgent care and in ER today     * No surgery found *      Hospital Course:   Ms. Lara has been monitored closely during her hospitalization. She was admitted on 7/24/24 for acute on chronic hypoxemic resp failure d/t bronchiectasis with exacerbation. She was treated with IV steroids, duonebs, IV zosyn and IV levaquin. Symptoms improved and she returns to her baseline oxygen needs of 2L NC which she typically only uses at night, but states during exacerbations she uses around the clock. She is well established with Dr. Mccormick and will have close follow up with him. She will be sent home with a long steroid taper and PO levaquin. Attempted to get her an O2 tank for travel home, but she declined stating it was only a 7 minute drive. She was seen and examined on the date of discharge. Strict return prxn provided.     Goals of Care Treatment Preferences:  Code Status: Full Code      Consults:     No new Assessment & Plan notes have been filed under this hospital service since the last note was generated.  Service: Hospital  Medicine    Final Active Diagnoses:    Diagnosis Date Noted POA    PRINCIPAL PROBLEM:  Acute on chronic respiratory failure with hypoxia [J96.21] 08/25/2020 Yes    Chronic obstructive pulmonary disease with acute lower respiratory infection [J44.0] 09/09/2020 Yes    Bronchiectasis without complication [J47.9] 09/09/2020 Yes    Essential hypertension [I10] 08/25/2020 Yes      Problems Resolved During this Admission:       Discharged Condition: stable    Disposition: Home or Self Care    Follow Up:   Follow-up Information       Gregory Mccormick MD Follow up in 1 week(s).    Specialty: Pulmonary Disease  Why: office will contact patient to schedule follow up appointment.  Contact information:  [address] [facility] [address]  [phone]               Kathia Correia NP Follow up on 7/30/2024.    Specialty: Nurse Practitioner  Why: @ 9:15 am  Contact information:  [address] [facility] [address]  [phone]                           Patient Instructions:      Diet Adult Regular     Notify your health care provider if you experience any of the following:  temperature >100.4     Notify your health care provider if you experience any of the following:  persistent nausea and vomiting or diarrhea     Notify your health care provider if you experience any of the following:  difficulty breathing or increased cough     Activity as tolerated       Significant Diagnostic Studies: Labs: CMP   Recent Labs   Lab 07/24/24  1402 07/25/24  0640   * 137   K 4.4 4.3    106   CO2 22* 24   * 150*   BUN 26* 30*   CREATININE 0.8 0.7   CALCIUM 9.4 9.5   PROT 7.4 7.0   ALBUMIN 4.4 4.0   BILITOT 0.6 0.4   ALKPHOS 78 71   AST 25 22   ALT 24 23   ANIONGAP 8 7*    and CBC   Recent Labs   Lab 07/24/24  1402 07/25/24  0640   WBC 6.45 2.94*   HGB 13.9 13.2   HCT 42.2 40.4    188     X-Ray Chest PA And Lateral    Result Date: 7/24/2024  CLINICAL HISTORY: ([id number])75 y/o  (1950) F Shortness of  breath;Hypoxia; TECHNIQUE: (A#[id number], exam time 7/24/2024 14:38) XR CHEST PA AND LATERAL IMG36 COMPARISON: Radiograph from 10:58 FINDINGS: The lungs are clear. Costophrenic angles are seen without effusion. No pneumothorax is identified. The heart is normal in size. The mediastinum is within normal limits. Osseous structures show degenerative changes in the spine. The visualized upper abdomen is unremarkable.     No acute cardiac or pulmonary process. Electronically signed by: Jackson Reilly Date:    07/24/2024 Time:    14:39    XR CHEST PA AND LATERAL    Result Date: 7/24/2024  EXAMINATION: XR CHEST PA AND LATERAL CLINICAL HISTORY: Dyspnea, unspecified TECHNIQUE: PA and lateral views of the chest were performed. COMPARISON: Chest x-ray of August 21, 2023 and December 6, 2013. FINDINGS: The mediastinal and cardiac size and contours are normal.  The diaphragms of pleura are clear.  There is prominence of the pulmonary interstitial markings which may represent an underlying interstitial lung disease.  No consolidation or solid mass is seen.  No pneumothorax or pleural effusion is noted.     Prominence of the interstitial lung markings bilaterally diffusely may represent an underlying interstitial lung disease.  Otherwise negative chest x-ray. Electronically signed by: Jeffery Carpenter MD Date:    07/24/2024 Time:    11:33     Pending Diagnostic Studies:       None           Medications:  Reconciled Home Medications:      Medication List        START taking these medications      levoFLOXacin 750 MG tablet  Commonly known as: LEVAQUIN  Take 1 tablet (750 mg total) by mouth once daily. for 6 days     predniSONE 20 MG tablet  Commonly known as: DELTASONE  Take 3 tablets (60 mg total) by mouth once daily for 3 days, THEN 2 tablets (40 mg total) once daily for 3 days, THEN 1.5 tablets (30 mg total) once daily for 3 days, THEN 1 tablet (20 mg total) once daily for 3 days, THEN 0.5 tablets (10 mg total) once daily for 3  days.  Start taking on: July 25, 2024            CONTINUE taking these medications      * albuterol 90 mcg/actuation inhaler  Commonly known as: PROVENTIL/VENTOLIN HFA  Inhale 2 puffs into the lungs every 6 (six) hours as needed for Wheezing or Shortness of Breath.     * albuterol 2.5 mg /3 mL (0.083 %) nebulizer solution  Commonly known as: PROVENTIL  Take 2.5 mg by nebulization every 4 (four) hours as needed for Wheezing. Rescue     benzonatate 200 MG capsule  Commonly known as: TESSALON  Take 200 mg by mouth 2 (two) times daily as needed for Cough.     buPROPion 300 MG 24 hr tablet  Commonly known as: WELLBUTRIN XL  Take 300 mg by mouth every evening.     cholecalciferol (vitamin D3) 10 mcg (400 unit) Cap capsule  Take 400 Units by mouth once daily.     cyclobenzaprine 10 MG tablet  Commonly known as: FLEXERIL  Take 10 mg by mouth every evening.     eszopiclone 3 mg Tab  Commonly known as: LUNESTA  Take 3 mg by mouth every evening.     ezetimibe 10 mg tablet  Commonly known as: ZETIA  Take 10 mg by mouth once daily.     gabapentin 400 MG capsule  Commonly known as: NEURONTIN  Take 400 mg by mouth 2 (two) times daily.     garlic 1,000 mg Cap  Take 1,000 mg by mouth once daily.     guaiFENesin 600 mg 12 hr tablet  Commonly known as: MUCINEX  Take 1,200 mg by mouth 2 (two) times daily.     indomethacin 50 MG capsule  Commonly known as: INDOCIN  Take 50 mg by mouth 2 (two) times daily with meals.     MYRBETRIQ 50 mg Tb24  Generic drug: mirabegron  Take 1 tablet by mouth once daily.     omega 3-dha-epa-fish oil 1,000 mg (120 mg-180 mg) Cap  Take 1 capsule by mouth once daily.     RESTASIS 0.05 % ophthalmic emulsion  Generic drug: cycloSPORINE  Place 1 drop into both eyes 2 (two) times daily.     telmisartan 80 MG Tab  Commonly known as: MICARDIS  Take 80 mg by mouth once daily.     tiotropium-olodateroL 2.5-2.5 mcg/actuation Mist  Commonly known as: STIOLTO RESPIMAT  Inhale 1 puff into the lungs once daily.  Controller     UNABLE TO FIND  Take 1 tablet by mouth once daily. BONE BOAST     verapamiL 240 MG C24p  Commonly known as: VERELAN  Take 240 mg by mouth 2 (two) times daily.           * This list has 2 medication(s) that are the same as other medications prescribed for you. Read the directions carefully, and ask your doctor or other care provider to review them with you.                  Indwelling Lines/Drains at time of discharge:   Lines/Drains/Airways       None                   Time spent on the discharge of patient: 42 minutes         Diamante Dean NP  Department of Hospital Medicine  [facility]

## 2024-07-25 NOTE — CARE UPDATE
07/24/24 2009   Patient Assessment/Suction   Level of Consciousness (AVPU) alert   Respiratory Effort Normal   Expansion/Accessory Muscles/Retractions no use of accessory muscles   All Lung Fields Breath Sounds diminished   Rhythm/Pattern, Respiratory unlabored   Cough Frequency infrequent   Skin Integrity   $ Wound Care Tech Time 15 min   Area Observed Left;Right;Cheek;Nares   Skin Appearance without discoloration   PRE-TX-O2   Device (Oxygen Therapy) nasal cannula   Flow (L/min) (Oxygen Therapy) 2   SpO2 96 %   Pulse Oximetry Type Intermittent   $ Pulse Oximetry - Multiple Charge Pulse Oximetry - Multiple   Pulse 100   Resp 19   Aerosol Therapy   $ Aerosol Therapy Charges Aerosol Treatment   Daily Review of Necessity (SVN) completed   Respiratory Treatment Status (SVN) given   Treatment Route (SVN) mouthpiece utilized;oxygen   Patient Position sitting on edge of bed   Post Treatment Assessment (SVN) breath sounds unchanged;vital signs unchanged   Signs of Intolerance (SVN) none   Education   $ Education Bronchodilator;15 min   Respiratory Evaluation   $ Care Plan Tech Time 15 min

## 2024-07-25 NOTE — SUBJECTIVE & OBJECTIVE
Past Medical History:   Diagnosis Date    Colon polyp 01/01/2013    COPD, moderate 01/01/2010    was told by Dr. 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    Lumbar spinal stenosis     Oxygen dependent 01/01/2011    2L NC Nightly    Primary osteoarthritis of first carpometacarpal joints, bilateral     Rheumatoid arthritis     Scoliosis 01/01/2014    Spinal stenosis 01/01/2014    upper and lowerr back - tingling in left arm at times       Past Surgical History:   Procedure Laterality Date    BREAST SURGERY  2008    Reduction    BUNIONECTOMY Bilateral 01/1999    CARDIOVASCULAR STRESS TEST  2013    CARPAL TUNNEL RELEASE Left 10/08/2015    CATARACT EXTRACTION BILATERAL W/ ANTERIOR VITRECTOMY  04/2010    CMC ARTHROPLASTY, RIGHT  02/23/2004    COLONOSCOPY  04/18/2013    CORRECTION OF HAMMER TOE Left 8/14/2019    Procedure: CORRECTION, HAMMER TOE;  Surgeon: Jackson Truong DPM;  Location: [facility] OR;  Service: Podiatry;  Laterality: Left;    ESOPHAGOGASTRODUODENOSCOPY  12/21/2012    2012-with dilation #1, 2013 #2    HAND SURGERY  2003    JOINT REPLACEMENT      OPEN REDUCTION AND INTERNAL FIXATION (ORIF) OF FRACTURE OF FEMUR USING DYNAMIC HIP SCREW Left 8/22/2023    Procedure: ORIF, HIP, USING DYNAMIC HIP SCREW;  Surgeon: Hussein Romo MD;  Location: [facility] OR;  Service: Orthopedics;  Laterality: Left;    REMOVAL OF HARDWARE FROM LOWER EXTREMITY Left 8/14/2019    Procedure: REMOVAL, HARDWARE, LOWER EXTREMITY;  Surgeon: Jackson Truong DPM;  Location: [facility] OR;  Service: Podiatry;   Laterality: Left;    RHINOPLASTY TIP  02/2010    SURGICAL REMOVAL OF BUNION WITH OSTEOTOMY OF METATARSAL BONE Left 8/14/2019    Procedure: BUNIONECTOMY, WITH METATARSAL OSTEOTOMY;  Surgeon: Jackson Brannon DPM;  Location: [facility];  Service: Podiatry;  Laterality: Left;  Arthrodesis 2nd PIP joint, screw 1st toe, C-arm, synthes-Merrill, 3.0, 4.0, AO modular set MERRILL BRUNSON NOTIFIED    TONSILLECTOMY, ADENOIDECTOMY  1970    TOTAL KNEE ARTHROPLASTY Right 12/2008    TOTAL KNEE ARTHROPLASTY Left 03/2015    TOTAL REDUCTION MAMMOPLASTY  01/2008    TUBAL LIGATION      VAGINAL DELIVERY  1983    x2       Review of patient's allergies indicates:   Allergen Reactions    Statins-hmg-coa reductase inhibitors Other (See Comments)     Muscle cramps    Doxycycline Other (See Comments)     Stops gallbladder function    Adhesive Other (See Comments)     bruises    Erythromycin Other (See Comments)     Stomach pain       No current facility-administered medications on file prior to encounter.     Current Outpatient Medications on File Prior to Encounter   Medication Sig    albuterol (PROVENTIL) 2.5 mg /3 mL (0.083 %) nebulizer solution Take 2.5 mg by nebulization every 4 (four) hours as needed for Wheezing. Rescue    albuterol (PROVENTIL/VENTOLIN HFA) 90 mcg/actuation inhaler Inhale 2 puffs into the lungs every 6 (six) hours as needed for Wheezing or Shortness of Breath.    benzonatate (TESSALON) 200 MG capsule Take 200 mg by mouth 2 (two) times daily as needed for Cough.    buPROPion (WELLBUTRIN XL) 300 MG 24 hr tablet Take 300 mg by mouth every evening.     cholecalciferol, vitamin D3, 10 mcg (400 unit) Cap capsule Take 400 Units by mouth once daily.    cyclobenzaprine (FLEXERIL) 10 MG tablet Take 10 mg by mouth every evening.    eszopiclone (LUNESTA) 3 mg Tab Take 3 mg by mouth every evening.    ezetimibe (ZETIA) 10 mg tablet Take 10 mg by mouth once daily.    gabapentin (NEURONTIN) 400 MG capsule Take 400 mg by mouth 2 (two) times  daily.    garlic 1,000 mg Cap Take 1,000 mg by mouth once daily.    guaiFENesin (MUCINEX) 600 mg 12 hr tablet Take 1,200 mg by mouth 2 (two) times daily.    indomethacin (INDOCIN) 50 MG capsule Take 50 mg by mouth 2 (two) times daily with meals.    MYRBETRIQ 50 mg Tb24 Take 1 tablet by mouth once daily.    omega 3-dha-epa-fish oil 1,000 mg (120 mg-180 mg) Cap Take 1 capsule by mouth once daily.    RESTASIS 0.05 % ophthalmic emulsion Place 1 drop into both eyes 2 (two) times daily.    telmisartan (MICARDIS) 80 MG Tab Take 80 mg by mouth once daily.    tiotropium-olodateroL (STIOLTO RESPIMAT) 2.5-2.5 mcg/actuation Mist Inhale 1 puff into the lungs once daily. Controller     UNABLE TO FIND Take 1 tablet by mouth once daily. BONE BOAST    verapamil (VERELAN) 240 MG C24P Take 240 mg by mouth 2 (two) times daily.     [DISCONTINUED] brimonidine 0.2% (ALPHAGAN) 0.2 % Drop Place 1 drop into both eyes 3 (three) times daily.    [DISCONTINUED] aspirin 81 MG Chew Take 1 tablet (81 mg total) by mouth 2 (two) times daily.    [DISCONTINUED] CALCIUM FRUCTOBORATE ORAL Take 1 tablet by mouth once daily.    [DISCONTINUED] co-enzyme Q-10 30 mg capsule Take 100 mg by mouth once daily.    [DISCONTINUED] cyanocobalamin (VITAMIN B-12) 1000 MCG tablet Take 100 mcg by mouth once daily.    [DISCONTINUED] ibuprofen 200 mg Cap Take 1 tablet by mouth every 6 (six) hours as needed.    [DISCONTINUED] Lactobacillus rhamnosus GG (CULTURELLE) 15 billion cell capsule Take 1 capsule by mouth every morning.    [DISCONTINUED] magnesium gluconate 27.5 mg magne- sium (500 mg) Tab Take 500 mg by mouth once daily.    [DISCONTINUED] multivitamin with minerals tablet Take 1 tablet by mouth once daily.    [DISCONTINUED] omeprazole (PRILOSEC) 40 MG capsule Take 40 mg by mouth every morning.     [DISCONTINUED] red yeast rice 600 mg Tab Take 2 tablets by mouth once daily.    [DISCONTINUED] vitamin A 51176 UNIT capsule Take 10,000 Units by mouth.     Family History        Problem Relation (Age of Onset)    Cancer Father          Tobacco Use    Smoking status: Former     Current packs/day: 0.00     Average packs/day: 2.0 packs/day for 33.0 years (66.0 ttl pk-yrs)     Types: Cigarettes     Start date: 1964     Quit date: 1997     Years since quittin.1    Smokeless tobacco: Former     Quit date: 1997   Substance and Sexual Activity    Alcohol use: Yes     Alcohol/week: 4.0 standard drinks of alcohol     Types: 4 Glasses of wine per week     Comment: monthly    Drug use: Never    Sexual activity: Not on file     Review of Systems   Constitutional:  Negative for activity change and appetite change.   HENT:  Negative for congestion and dental problem.    Eyes:  Negative for discharge and itching.   Respiratory:  Positive for cough and shortness of breath.    Cardiovascular:  Negative for chest pain.   Gastrointestinal:  Negative for abdominal distention and abdominal pain.   Endocrine: Negative for cold intolerance.   Genitourinary:  Negative for difficulty urinating and dysuria.   Musculoskeletal:  Negative for arthralgias and back pain.   Skin:  Negative for color change.   Neurological:  Negative for dizziness and facial asymmetry.   Hematological:  Negative for adenopathy.   Psychiatric/Behavioral:  Negative for agitation and behavioral problems.      Objective:     Vital Signs (Most Recent):  Temp: 98.8 °F (37.1 °C) (24 1339)  Pulse: 91 (24 1729)  Resp: 19 (24 1709)  BP: (!) 142/68 (24 1729)  SpO2: 95 % (24 1714) Vital Signs (24h Range):  Temp:  [98.8 °F (37.1 °C)-100.2 °F (37.9 °C)] 98.8 °F (37.1 °C)  Pulse:  [] 91  Resp:  [18-22] 19  SpO2:  [86 %-97 %] 95 %  BP: (128-172)/(68-92) 142/68     Weight: 80.7 kg (178 lb)  Body mass index is 30.55 kg/m².     Physical Exam  Vitals and nursing note reviewed.   Constitutional:       General: She is not in acute distress.  HENT:      Head: Atraumatic.      Right Ear: External ear  normal.      Left Ear: External ear normal.      Nose: Nose normal.      Mouth/Throat:      Mouth: Mucous membranes are moist.   Eyes:      Extraocular Movements: Extraocular movements intact.   Cardiovascular:      Rate and Rhythm: Normal rate.   Pulmonary:      Effort: Pulmonary effort is normal.      Breath sounds: Wheezing present.   Musculoskeletal:         General: Normal range of motion.      Cervical back: Normal range of motion.   Skin:     General: Skin is warm.   Neurological:      Mental Status: She is alert and oriented to person, place, and time.   Psychiatric:         Behavior: Behavior normal.                Significant Labs: All pertinent labs within the past 24 hours have been reviewed.  CBC:   Recent Labs   Lab 07/24/24  1402   WBC 6.45   HGB 13.9   HCT 42.2        CMP:   Recent Labs   Lab 07/24/24  1402   *   K 4.4      CO2 22*   *   BUN 26*   CREATININE 0.8   CALCIUM 9.4   PROT 7.4   ALBUMIN 4.4   BILITOT 0.6   ALKPHOS 78   AST 25   ALT 24   ANIONGAP 8       Significant Imaging: I have reviewed all pertinent imaging results/findings within the past 24 hours.

## 2024-07-25 NOTE — PLAN OF CARE
[facility]  Initial Discharge Assessment    Met with patient at bedside for initial cm assessment. Patient lives at home with her  and is independent with ADLs and drives. Patient's home is a 1 level home with 2 steps to enter. Patient only current dme is home o2 concentrator and portable concentrator that she owns. Discussed with patient regarding traveling home at discharge with portable o2, patient refused and states she only lives 7 minutes away. NP aware. Patient denies HD and coumadin. Denies AD or living will.   Current discharge  plan is home. No case management needs identified at this time.     Primary Care Provider: Soha Rowan NP    Admission Diagnosis: Hypoxia [R09.02]    Admission Date: 7/24/2024  Expected Discharge Date: 7/25/2024    Transition of Care Barriers: None    Payor: MEDICARE / Plan: MEDICARE PART A & B / Product Type: Government /     Extended Emergency Contact Information  Primary Emergency Contact: Warren Lara  Address: [address] [place] [address]  Home Phone: [phone]  Mobile Phone: [phone]  Relation: Spouse    Discharge Plan A: Home Health  Discharge Plan B: Home Health      [facility] - [place], [place] - [facility]  [address] [place] [address]  Phone: [phone] Fax: [phone]    [id number] - [facility] [address] [facility] AT [facility]  [address] [facility] [address]  Phone: [phone] Fax: [phone]    [id number] - [place], [place] - [address] AT [facility] & [facility] [address]  [address]  [facility] [address]  Phone: [phone] Fax: [phone]    [facility] - [place], [place] - [address]  [address]  [facility] [address]  Phone: [phone] Fax: [phone]    [id number] - [place], [place] - [address]  [address] [address]  Phone: [phone] Fax: [phone]      Initial Assessment (most recent)       Adult Discharge Assessment - 07/25/24 1139          Discharge Assessment    Assessment Type Discharge Planning Assessment     Confirmed/corrected address, phone number and insurance Yes     Confirmed Demographics Correct on Facesheet     Source of Information patient     Does patient/caregiver understand observation status Yes     Communicated GENI with patient/caregiver Yes     Reason For Admission acute on chronic respiratory failure with hypoxia     People in Home spouse     Do you expect to return to your current living situation? Yes     Do you have help at home or someone to help you manage your care at home? Yes     Who are your caregiver(s) and their phone number(s)? Warren Lara [phone]     Prior to hospitilization cognitive status: Alert/Oriented     Current cognitive status: Alert/Oriented     Walking or Climbing Stairs Difficulty no     Dressing/Bathing Difficulty no     Home Accessibility stairs to enter home     Number of Stairs, Main Entrance two     Home Layout Able to live on 1st floor     Equipment Currently Used at Home oxygen;nebulizer     Readmission within 30 days? No     Patient currently being followed by outpatient case management? No     Do you currently have service(s) that help you manage your care at home? No     Do you take prescription medications? Yes     Do you have prescription coverage? Yes     Coverage      Do you have any problems affording any of your prescribed medications? No     Is the patient taking medications as prescribed? yes     Who is going to help you get home at discharge? pt's      How do you get to doctors appointments? car, drives self;family or friend will provide     Are you on dialysis? No     Do you take coumadin? No     Discharge Plan A Home Health     Discharge Plan B Home Health     DME Needed Upon Discharge  none      Discharge Plan discussed with: Patient     Transition of Care Barriers None

## 2024-07-25 NOTE — CARE UPDATE
07/25/24 0751   Patient Assessment/Suction   Level of Consciousness (AVPU) alert   All Lung Fields Breath Sounds diminished   PRE-TX-O2   Device (Oxygen Therapy) nasal cannula   $ Is the patient on Low Flow Oxygen? Yes   Flow (L/min) (Oxygen Therapy) 2   SpO2 97 %   Pulse Oximetry Type Intermittent   $ Pulse Oximetry - Multiple Charge Pulse Oximetry - Multiple   Pulse 105  (Simultaneous filing. User may not have seen previous data.)   Resp 18  (Simultaneous filing. User may not have seen previous data.)   Temp 97.3 °F (36.3 °C)   BP (!) 153/94   Aerosol Therapy   $ Aerosol Therapy Charges Aerosol Treatment   Respiratory Treatment Status (SVN) given   Treatment Route (SVN) mouthpiece utilized   Signs of Intolerance (SVN) none   Breath Sounds Post-Respiratory Treatment   Throughout All Fields Post-Treatment All Fields   Throughout All Fields Post-Treatment aeration increased   Post-treatment Heart Rate (beats/min) 88   Post-treatment Resp Rate (breaths/min) 18

## 2024-07-25 NOTE — HOSPITAL COURSE
Ms. Lara has been monitored closely during her hospitalization. She was admitted on 7/24/24 for acute on chronic hypoxemic resp failure d/t bronchiectasis with exacerbation. She was treated with IV steroids, duonebs, IV zosyn and IV levaquin. Symptoms improved and she returns to her baseline oxygen needs of 2L NC which she typically only uses at night, but states during exacerbations she uses around the clock. She is well established with Dr. Mccormick and will have close follow up with him. She will be sent home with a long steroid taper and PO levaquin. Attempted to get her an O2 tank for travel home, but she declined stating it was only a 7 minute drive. She was seen and examined on the date of discharge. Strict return prxn provided.

## 2024-07-25 NOTE — ASSESSMENT & PLAN NOTE
Patient with Hypoxic Respiratory failure which is Acute on chronic.  she is on home oxygen at 2  LPM. Supplemental oxygen was provided and noted-      .   Signs/symptoms of respiratory failure include- increased work of breathing, respiratory distress, and wheezing. Contributing diagnoses includes - COPD and Bronchiectasis  Labs and images were reviewed. Patient Has not had a recent ABG. Will treat underlying causes and adjust management of respiratory failure as follows-

## 2024-07-27 ENCOUNTER — TELEPHONE (OUTPATIENT)
Dept: ADMINISTRATIVE | Facility: CLINIC | Age: 74
End: 2024-07-27
Payer: MEDICARE

## 2024-07-27 NOTE — TELEPHONE ENCOUNTER
PT calling in to notify that her prescriptions were sent to [facility] and Jere told her that they have to be canceled before they can fill them for insurance to cover and it could take up to 72 hours. Pt paid out of pocket for steroids and waiting for antibiotics to be filled. Denies worsening and says feeling better. Instructed to call OOC# if changes. Verbalized understanding, Unable to route to PCP.

## 2024-07-29 LAB
BACTERIA BLD CULT: NORMAL
BACTERIA BLD CULT: NORMAL

## 2024-08-06 ENCOUNTER — HOSPITAL ENCOUNTER (EMERGENCY)
Facility: HOSPITAL | Age: 74
Discharge: HOME | End: 2024-08-06
Attending: EMERGENCY MEDICINE
Payer: MEDICARE

## 2024-08-06 VITALS
BODY MASS INDEX: 29.88 KG/M2 | SYSTOLIC BLOOD PRESSURE: 147 MMHG | HEART RATE: 88 BPM | DIASTOLIC BLOOD PRESSURE: 80 MMHG | RESPIRATION RATE: 18 BRPM | HEIGHT: 64 IN | WEIGHT: 175 LBS | TEMPERATURE: 99 F | OXYGEN SATURATION: 96 %

## 2024-08-06 DIAGNOSIS — R07.9 CHEST PAIN: ICD-10-CM

## 2024-08-06 DIAGNOSIS — J44.1 COPD EXACERBATION: Primary | ICD-10-CM

## 2024-08-06 DIAGNOSIS — R06.02 SHORTNESS OF BREATH: ICD-10-CM

## 2024-08-06 LAB
ALBUMIN SERPL BCP-MCNC: 3.7 G/DL (ref 3.5–5.2)
ALLENS TEST: NORMAL
ALP SERPL-CCNC: 56 U/L (ref 55–135)
ALT SERPL W/O P-5'-P-CCNC: 26 U/L (ref 10–44)
ANION GAP SERPL CALC-SCNC: 6 MMOL/L (ref 8–16)
AST SERPL-CCNC: 23 U/L (ref 10–40)
BASOPHILS # BLD AUTO: 0.02 K/UL (ref 0–0.2)
BASOPHILS NFR BLD: 0.4 % (ref 0–1.9)
BILIRUB SERPL-MCNC: 0.4 MG/DL (ref 0.1–1)
BNP SERPL-MCNC: 26 PG/ML (ref 0–99)
BUN SERPL-MCNC: 31 MG/DL (ref 8–23)
CALCIUM SERPL-MCNC: 9.4 MG/DL (ref 8.7–10.5)
CHLORIDE SERPL-SCNC: 105 MMOL/L (ref 95–110)
CO2 SERPL-SCNC: 25 MMOL/L (ref 23–29)
CREAT SERPL-MCNC: 0.8 MG/DL (ref 0.5–1.4)
CREAT SERPL-MCNC: 0.8 MG/DL (ref 0.5–1.4)
DELSYS: NORMAL
DIFFERENTIAL METHOD BLD: ABNORMAL
EOSINOPHIL # BLD AUTO: 0 K/UL (ref 0–0.5)
EOSINOPHIL NFR BLD: 0.8 % (ref 0–8)
ERYTHROCYTE [DISTWIDTH] IN BLOOD BY AUTOMATED COUNT: 13.1 % (ref 11.5–14.5)
EST. GFR  (NO RACE VARIABLE): >60 ML/MIN/1.73 M^2
FLOW: 2
GLUCOSE SERPL-MCNC: 110 MG/DL (ref 70–110)
HCO3 UR-SCNC: 26.2 MMOL/L (ref 24–28)
HCT VFR BLD AUTO: 40.6 % (ref 37–48.5)
HGB BLD-MCNC: 13.4 G/DL (ref 12–16)
IMM GRANULOCYTES # BLD AUTO: 0.03 K/UL (ref 0–0.04)
IMM GRANULOCYTES NFR BLD AUTO: 0.6 % (ref 0–0.5)
LYMPHOCYTES # BLD AUTO: 0.6 K/UL (ref 1–4.8)
LYMPHOCYTES NFR BLD: 11.2 % (ref 18–48)
MAGNESIUM SERPL-MCNC: 1.8 MG/DL (ref 1.6–2.6)
MCH RBC QN AUTO: 33.6 PG (ref 27–31)
MCHC RBC AUTO-ENTMCNC: 33 G/DL (ref 32–36)
MCV RBC AUTO: 102 FL (ref 82–98)
MODE: NORMAL
MONOCYTES # BLD AUTO: 0.3 K/UL (ref 0.3–1)
MONOCYTES NFR BLD: 5.5 % (ref 4–15)
NEUTROPHILS # BLD AUTO: 4.3 K/UL (ref 1.8–7.7)
NEUTROPHILS NFR BLD: 81.5 % (ref 38–73)
NRBC BLD-RTO: 0 /100 WBC
OHS QRS DURATION: 72 MS
OHS QTC CALCULATION: 438 MS
PCO2 BLDA: 41.1 MMHG (ref 35–45)
PH SMN: 7.41 [PH] (ref 7.35–7.45)
PLATELET # BLD AUTO: 196 K/UL (ref 150–450)
PMV BLD AUTO: 8.5 FL (ref 9.2–12.9)
PO2 BLDA: 47 MMHG (ref 40–60)
POC BE: 2 MMOL/L
POC SATURATED O2: 83 % (ref 95–100)
POC TCO2: 27 MMOL/L (ref 24–29)
POTASSIUM SERPL-SCNC: 4 MMOL/L (ref 3.5–5.1)
PROT SERPL-MCNC: 6.5 G/DL (ref 6–8.4)
RBC # BLD AUTO: 3.99 M/UL (ref 4–5.4)
SAMPLE: NORMAL
SAMPLE: NORMAL
SITE: NORMAL
SODIUM SERPL-SCNC: 136 MMOL/L (ref 136–145)
SP02: 94
TROPONIN I SERPL HS-MCNC: 3.6 PG/ML (ref 0–14.9)
WBC # BLD AUTO: 5.29 K/UL (ref 3.9–12.7)

## 2024-08-06 PROCEDURE — 99285 EMERGENCY DEPT VISIT HI MDM: CPT | Mod: 25

## 2024-08-06 PROCEDURE — 27000221 HC OXYGEN, UP TO 24 HOURS

## 2024-08-06 PROCEDURE — 25000242 PHARM REV CODE 250 ALT 637 W/ HCPCS: Performed by: EMERGENCY MEDICINE

## 2024-08-06 PROCEDURE — 93010 ELECTROCARDIOGRAM REPORT: CPT | Mod: ,,, | Performed by: INTERNAL MEDICINE

## 2024-08-06 PROCEDURE — 84484 ASSAY OF TROPONIN QUANT: CPT | Performed by: EMERGENCY MEDICINE

## 2024-08-06 PROCEDURE — 80053 COMPREHEN METABOLIC PANEL: CPT | Performed by: EMERGENCY MEDICINE

## 2024-08-06 PROCEDURE — 83735 ASSAY OF MAGNESIUM: CPT | Performed by: EMERGENCY MEDICINE

## 2024-08-06 PROCEDURE — 82803 BLOOD GASES ANY COMBINATION: CPT

## 2024-08-06 PROCEDURE — 83880 ASSAY OF NATRIURETIC PEPTIDE: CPT | Performed by: EMERGENCY MEDICINE

## 2024-08-06 PROCEDURE — 94761 N-INVAS EAR/PLS OXIMETRY MLT: CPT

## 2024-08-06 PROCEDURE — 96375 TX/PRO/DX INJ NEW DRUG ADDON: CPT

## 2024-08-06 PROCEDURE — 63600175 PHARM REV CODE 636 W HCPCS: Performed by: EMERGENCY MEDICINE

## 2024-08-06 PROCEDURE — 99900031 HC PATIENT EDUCATION (STAT)

## 2024-08-06 PROCEDURE — 25500020 PHARM REV CODE 255: Performed by: EMERGENCY MEDICINE

## 2024-08-06 PROCEDURE — 94640 AIRWAY INHALATION TREATMENT: CPT

## 2024-08-06 PROCEDURE — 93005 ELECTROCARDIOGRAM TRACING: CPT | Performed by: INTERNAL MEDICINE

## 2024-08-06 PROCEDURE — 99900035 HC TECH TIME PER 15 MIN (STAT)

## 2024-08-06 PROCEDURE — 96365 THER/PROPH/DIAG IV INF INIT: CPT

## 2024-08-06 PROCEDURE — 85025 COMPLETE CBC W/AUTO DIFF WBC: CPT | Performed by: EMERGENCY MEDICINE

## 2024-08-06 PROCEDURE — 94640 AIRWAY INHALATION TREATMENT: CPT | Mod: XB

## 2024-08-06 PROCEDURE — 82565 ASSAY OF CREATININE: CPT

## 2024-08-06 PROCEDURE — 25000003 PHARM REV CODE 250: Performed by: EMERGENCY MEDICINE

## 2024-08-06 RX ORDER — DEXAMETHASONE SODIUM PHOSPHATE 4 MG/ML
8 INJECTION, SOLUTION INTRA-ARTICULAR; INTRALESIONAL; INTRAMUSCULAR; INTRAVENOUS; SOFT TISSUE
Status: COMPLETED | OUTPATIENT
Start: 2024-08-06 | End: 2024-08-06

## 2024-08-06 RX ORDER — LEVALBUTEROL INHALATION SOLUTION 1.25 MG/3ML
1.25 SOLUTION RESPIRATORY (INHALATION)
Status: COMPLETED | OUTPATIENT
Start: 2024-08-06 | End: 2024-08-06

## 2024-08-06 RX ORDER — IPRATROPIUM BROMIDE AND ALBUTEROL SULFATE 2.5; .5 MG/3ML; MG/3ML
3 SOLUTION RESPIRATORY (INHALATION)
Status: COMPLETED | OUTPATIENT
Start: 2024-08-06 | End: 2024-08-06

## 2024-08-06 RX ORDER — AMOXICILLIN AND CLAVULANATE POTASSIUM 875; 125 MG/1; MG/1
1 TABLET, FILM COATED ORAL 2 TIMES DAILY
Qty: 20 TABLET | Refills: 0 | Status: SHIPPED | OUTPATIENT
Start: 2024-08-06 | End: 2024-08-16

## 2024-08-06 RX ADMIN — PIPERACILLIN SODIUM AND TAZOBACTAM SODIUM 4.5 G: 4; .5 INJECTION, POWDER, LYOPHILIZED, FOR SOLUTION INTRAVENOUS at 08:08

## 2024-08-06 RX ADMIN — LEVALBUTEROL HYDROCHLORIDE 1.25 MG: 1.25 SOLUTION RESPIRATORY (INHALATION) at 05:08

## 2024-08-06 RX ADMIN — IOHEXOL 100 ML: 350 INJECTION, SOLUTION INTRAVENOUS at 06:08

## 2024-08-06 RX ADMIN — DEXAMETHASONE SODIUM PHOSPHATE 8 MG: 4 INJECTION INTRA-ARTICULAR; INTRALESIONAL; INTRAMUSCULAR; INTRAVENOUS; SOFT TISSUE at 08:08

## 2024-08-06 RX ADMIN — IPRATROPIUM BROMIDE AND ALBUTEROL SULFATE 3 ML: 2.5; .5 SOLUTION RESPIRATORY (INHALATION) at 09:08

## 2024-08-06 NOTE — ED PROVIDER NOTES
Encounter Date: 8/6/2024       History     Chief Complaint   Patient presents with    Shortness of Breath     States over the last couple of days she began feeling increased SOB.  Patient arrived on RA at 80% SpO2.  Increased to 94% when applied O2@2L NC     74-year-old female with history of degenerative disc disease, gastroesophageal reflux, anxiety, depression, hypertension, hyperlipidemia, legal blindness, osteoarthritis, rheumatoid arthritis.  Patient with COPD on intermittent oxygen at 2 L nasal cannula.  Recently treated for COPD exacerbation with bronchiectasis.  Patient completed antibiotic therapy one-week ago.  Steroid therapy completed yesterday.  Patient presents back to the emergency department with complaint of persistent shortness of breath throughout the day.  Upon arrival to emergency department patient found with room air sats of 78%.  Patient states that she did not use her oxygen today.  Did take 2 home nebs prior to arrival to emergency department.  Denies fever, no URI symptoms, no chest pain, no abdominal pain.  Patient states she did come to the emergency department few days ago and was told by her pulmonology she may need a having outpatient CT.  Patient did not unfortunately get CT completed outpatient.  She is here with persistent symptoms today.      Review of patient's allergies indicates:   Allergen Reactions    Statins-hmg-coa reductase inhibitors Other (See Comments)     Muscle cramps    Doxycycline Other (See Comments)     Stops gallbladder function    Adhesive Other (See Comments)     bruises    Erythromycin Other (See Comments)     Stomach pain     Past Medical History:   Diagnosis Date    Colon polyp 01/01/2013    COPD, moderate 01/01/2010    was told by Dr. Mccormick she has 60% lung capacity    Degeneration of cervical intervertebral disc     Depression with anxiety     GERD (gastroesophageal reflux disease) 01/01/2008    Hammertoe of second toe of left foot 08/07/2019      dionne    Headaches, cluster 01/01/1997    Heart valve regurgitation 2014    dr verde    Hemorrhoids 01/01/1983    Hiatal hernia 01/01/1997    Hyperlipidemia 01/01/1995    Hypertension     on meds    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/1976    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    Lumbar spinal stenosis     Oxygen dependent 01/01/2011    2L NC Nightly    Primary osteoarthritis of first carpometacarpal joints, bilateral     Rheumatoid arthritis     Scoliosis 01/01/2014    Spinal stenosis 01/01/2014    upper and lowerr back - tingling in left arm at times     Past Surgical History:   Procedure Laterality Date    BREAST SURGERY  2008    Reduction    BUNIONECTOMY Bilateral 01/1999    CARDIOVASCULAR STRESS TEST  2013    CARPAL TUNNEL RELEASE Left 10/08/2015    CATARACT EXTRACTION BILATERAL W/ ANTERIOR VITRECTOMY  04/2010    [facility] ARTHROPLASTY, RIGHT  02/23/2004    COLONOSCOPY  04/18/2013    CORRECTION OF HAMMER TOE Left 8/14/2019    Procedure: CORRECTION, HAMMER TOE;  Surgeon: Jackson Brannon DPM;  Location: [facility];  Service: Podiatry;  Laterality: Left;    ESOPHAGOGASTRODUODENOSCOPY  12/21/2012    2012-with dilation #1, 2013 #2    HAND SURGERY  2003    JOINT REPLACEMENT      OPEN REDUCTION AND INTERNAL FIXATION (ORIF) OF FRACTURE OF FEMUR USING DYNAMIC HIP SCREW Left 8/22/2023    Procedure: ORIF, HIP, USING DYNAMIC HIP SCREW;  Surgeon: Hussein Romo MD;  Location: [facility] OR;  Service: Orthopedics;  Laterality: Left;    REMOVAL OF HARDWARE FROM LOWER EXTREMITY Left 8/14/2019    Procedure: REMOVAL, HARDWARE, LOWER EXTREMITY;  Surgeon: Jackson Brannon DPM;  Location: [facility] OR;  Service: Podiatry;  Laterality: Left;    RHINOPLASTY TIP  02/2010    SURGICAL REMOVAL OF BUNION WITH OSTEOTOMY OF METATARSAL BONE Left 8/14/2019    Procedure: BUNIONECTOMY, WITH METATARSAL OSTEOTOMY;  Surgeon: Jackson Brannon DPM;  Location: [facility] OR;  Service: Podiatry;  Laterality: Left;  Arthrodesis 2nd PIP joint, screw 1st toe[facility]  3.0, 4.0, AO modular set GABY BRUNSON NOTIFIED    TONSILLECTOMY, ADENOIDECTOMY  1970    TOTAL KNEE ARTHROPLASTY Right 2008    TOTAL KNEE ARTHROPLASTY Left 2015    TOTAL REDUCTION MAMMOPLASTY  2008    TUBAL LIGATION      VAGINAL DELIVERY  1983    x2     Family History   Problem Relation Name Age of Onset    Cancer Father          colon     Social History     Tobacco Use    Smoking status: Former     Current packs/day: 0.00     Average packs/day: 2.0 packs/day for 33.0 years (66.0 ttl pk-yrs)     Types: Cigarettes     Start date: 1964     Quit date: 1997     Years since quittin.2    Smokeless tobacco: Former     Quit date: 1997   Substance Use Topics    Alcohol use: Yes     Alcohol/week: 4.0 standard drinks of alcohol     Types: 4 Glasses of wine per week     Comment: monthly    Drug use: Never     Review of Systems   Constitutional:  Negative for fever.   HENT:  Negative for sore throat.    Respiratory:  Positive for cough, shortness of breath and wheezing. Negative for apnea.    Cardiovascular:  Negative for chest pain.   Gastrointestinal:  Negative for nausea and vomiting.   Genitourinary:  Negative for dysuria.   Musculoskeletal:  Negative for back pain.   Skin:  Negative for rash.   Neurological:  Negative for weakness.   Hematological:  Does not bruise/bleed easily.       Physical Exam     Initial Vitals [24 1621]   BP Pulse Resp Temp SpO2   (!) 155/98 103 20 98.9 °F (37.2 °C) (!) 80 %      MAP       --         Physical Exam    Nursing note and vitals reviewed.  Constitutional: She appears well-developed and well-nourished.   HENT:   Head: Normocephalic and atraumatic.   Nose: Nose normal.   Mouth/Throat: Oropharynx is clear and moist.   Eyes: Conjunctivae and EOM are normal. Pupils are equal, round, and reactive to light.   Neck: Neck supple. No thyromegaly present. No tracheal deviation present.   Normal range of motion.  Cardiovascular:  Normal rate, regular rhythm, normal  heart sounds and intact distal pulses.     Exam reveals no gallop and no friction rub.       No murmur heard.  Pulmonary/Chest: No stridor. No respiratory distress.   Course bilateral breath sounds no adventitious sounds   Abdominal: Abdomen is soft. Bowel sounds are normal. She exhibits no mass. There is no rebound and no guarding.   Musculoskeletal:         General: No edema. Normal range of motion.      Cervical back: Normal range of motion and neck supple.     Lymphadenopathy:     She has no cervical adenopathy.   Neurological: She is alert and oriented to person, place, and time. She has normal strength and normal reflexes. GCS score is 15. GCS eye subscore is 4. GCS verbal subscore is 5. GCS motor subscore is 6.   Skin: Skin is warm and dry. Capillary refill takes less than 2 seconds.   Psychiatric: She has a normal mood and affect.         ED Course   Procedures  Labs Reviewed   CBC W/ AUTO DIFFERENTIAL - Abnormal       Result Value    WBC 5.29      RBC 3.99 (*)     Hemoglobin 13.4      Hematocrit 40.6       (*)     MCH 33.6 (*)     MCHC 33.0      RDW 13.1      Platelets 196      MPV 8.5 (*)     Immature Granulocytes 0.6 (*)     Gran # (ANC) 4.3      Immature Grans (Abs) 0.03      Lymph # 0.6 (*)     Mono # 0.3      Eos # 0.0      Baso # 0.02      nRBC 0      Gran % 81.5 (*)     Lymph % 11.2 (*)     Mono % 5.5      Eosinophil % 0.8      Basophil % 0.4      Differential Method Automated     COMPREHENSIVE METABOLIC PANEL - Abnormal    Sodium 136      Potassium 4.0      Chloride 105      CO2 25      Glucose 110      BUN 31 (*)     Creatinine 0.8      Calcium 9.4      Total Protein 6.5      Albumin 3.7      Total Bilirubin 0.4      Alkaline Phosphatase 56      AST 23      ALT 26      eGFR >60.0      Anion Gap 6 (*)    MAGNESIUM    Magnesium 1.8     TROPONIN I HIGH SENSITIVITY    Troponin I High Sensitivity 3.6     B-TYPE NATRIURETIC PEPTIDE    BNP 26     TROPONIN I HIGH SENSITIVITY   ISTAT PROCEDURE    POC  PH 7.412      POC PCO2 41.1      POC PO2 47      POC HCO3 26.2      POC BE 2      POC SATURATED O2 83      POC TCO2 27      Sample VENOUS      Site Other      Allens Test N/A      DelSys Nasal Can      Mode SPONT      Flow 2      Sp02 94     ISTAT CREATININE    POC Creatinine 0.8      Sample VENOUS     POCT CREATININE     EKG Readings: (Independently Interpreted)   Initial Reading: No STEMI. Ectopy: No Ectopy. Conduction: Normal.     ECG Results              EKG 12-lead (In process)        Collection Time Result Time QRS Duration OHS QTC Calculation    08/06/24 16:26:00 08/06/24 16:32:23 72 438                     In process by Interface, Lab In [facility] (08/06/24 16:32:31)                   Narrative:    Test Reason : R06.02,    Vent. Rate : 099 BPM     Atrial Rate : 099 BPM     P-R Int : 174 ms          QRS Dur : 072 ms      QT Int : 342 ms       P-R-T Axes : 067 049 051 degrees     QTc Int : 438 ms    Normal sinus rhythm  Normal ECG  When compared with ECG of 24-JUL-2024 14:20,  No significant change was found    Referred By:             Confirmed By:                                   Imaging Results              CTA Chest Non-Coronary (PE Studies) (Final result)  Result time 08/06/24 19:12:29      Final result by Danis Cruz MD (08/06/24 19:12:29)                   Impression:      No convincing evidence of pulmonary thromboembolism.    Intermittent opacification of bibasilar subsegmental airways which may relate to mucous plugging, secretions, and/or aspiration.    Emphysematous changes of the lungs.  Bilateral ground-glass opacities with an upper lung zone predominance.  This may relate to infection, edema, or noninfectious inflammatory process.  Note is made of similar extent and distribution compared to prior CTA chest of 08/26/2020 and superimposed component of underlying chronic interstitial lung disease is a consideration.  Clinical correlation advised.    Additional findings as  above.      Electronically signed by: Danis Cruz MD  Date:    08/06/2024  Time:    19:12               Narrative:    EXAMINATION:  CTA CHEST NON CORONARY (PE STUDIES)    CLINICAL HISTORY:  dyspnea;    TECHNIQUE:  Low dose axial images, sagittal and coronal reformations were obtained from the thoracic inlet to the lung bases following the IV administration of 100 mL of Omnipaque 350.  Contrast timing was optimized to evaluate the pulmonary arteries.  MIP images were performed.    COMPARISON:  CTA chest 08/26/2020    FINDINGS:  The visualized soft tissue structures at the base of the neck appear within normal limits allowing from streak artifact from dense contrast bolus.    The thoracic aorta maintains normal caliber, contour, and course with mild atherosclerotic calcification within its course.  There is no evidence of aneurysmal dilation or dissection. The heart is not enlarged and there is no significant pericardial effusion.  There are scattered coronary artery calcifications present.  The esophagus maintains a normal course and caliber. There is no bulky axillary lymph node enlargement.  There are scattered subcentimeter mediastinal and hilar lymph nodes, similar to prior examination.    Detailed evaluation of the lung parenchyma is limited by respiratory motion.  The trachea is midline and patent.  There is intermittent opacification of bibasilar subsegmental airways.  There is no pneumothorax.  There are emphysematous changes of the lungs.  There are ground-glass opacities with an upper lung zone predominance.  There is bibasilar atelectasis.  There is no significant volume of pleural fluid.    No convincing evidence of pulmonary thromboembolism.    Limited images of the upper abdomen obtained during the course of this dedicated thoracic CT demonstrate no acute abnormalities.    The osseous structures demonstrate chronic mild superior endplate deformities of the T4 and T5 vertebral bodies.  There are mild  to moderate multilevel degenerative changes of the visualized spine.  Additional degenerative changes noted of the left shoulder.                                       X-Ray Chest AP Portable (Final result)  Result time 08/06/24 16:47:38      Final result by Ruiz Riggs MD (08/06/24 16:47:38)                   Impression:      No evidence of acute cardiopulmonary disease.      Electronically signed by: Ruiz Riggs  Date:    08/06/2024  Time:    16:47               Narrative:    EXAMINATION:  XR CHEST AP PORTABLE    CLINICAL HISTORY:  Chest Pain; and dyspnea, hypoxia.    FINDINGS:  Portable chest radiograph at 16:38 hours compared to prior exams shows the cardiomediastinal silhouette and pulmonary vasculature are within normal limits.    The lungs are normally expanded, with no consolidation, large pleural effusion, or evidence of pulmonary edema. No pneumothorax. There are no acute fractures.                                       Medications   piperacillin-tazobactam 4.5 g in dextrose 5 % 100 mL IVPB (ready to mix) (has no administration in time range)   dexAMETHasone injection 8 mg (has no administration in time range)   albuterol-ipratropium 2.5 mg-0.5 mg/3 mL nebulizer solution 3 mL (has no administration in time range)   levalbuterol nebulizer solution 1.25 mg (1.25 mg Nebulization Given 8/6/24 1715)   iohexoL (OMNIPAQUE 350) injection 100 mL (100 mLs Intravenous Given 8/6/24 1850)     Medical Decision Making  74-year-old with cough and shortness of breath and needing supplemental oxygen at home.  Patient has bronchiectasis and presentation consistent with COPD exacerbation.  CT of the chest did not show any evidence of pulmonary emboli and there is evidence of atelectasis versus mucus plugs versus aspiration likely causing pneumonia.  Will start treatment with Zosyn and Augmentin.  As patient feeling well and patient has supplemental oxygen at home and will continue using oxygen and follow-up with  pulmonologist.  Patient does have breathing treatments at home as well.  Started on antibiotics and discharged with return precautions and instructions and follow-up and with supplemental oxygen patient is not short of breath and not hypoxic and is tolerating treatment well.    Amount and/or Complexity of Data Reviewed  Labs: ordered. Decision-making details documented in ED Course.  Radiology: ordered. Decision-making details documented in ED Course.  ECG/medicine tests: ordered. Decision-making details documented in ED Course.    Risk  Prescription drug management.                          Medical Decision Making:   Initial Assessment:   74-year-old female with history of degenerative disc disease, gastroesophageal reflux, anxiety, depression, hypertension, hyperlipidemia, legal blindness, osteoarthritis, rheumatoid arthritis.  Patient with COPD on intermittent oxygen at 2 L nasal cannula.  Recently treated for COPD exacerbation with bronchiectasis.  Patient completed antibiotic therapy one-week ago.  Steroid therapy completed yesterday.  Patient presents back to the emergency department with complaint of persistent shortness of breath throughout the day.  Upon arrival to emergency department patient found with room air sats of 78%.  Patient states that she did not use her oxygen today.  Did take 2 home nebs prior to arrival to emergency department.  Denies fever, no URI symptoms, no chest pain, no abdominal pain.  Patient states she did come to the emergency department few days ago and was told by her pulmonology she may need a having outpatient CT.  Patient did not unfortunately get CT completed outpatient.  She is here with persistent symptoms today.    Differential Diagnosis:   COPD exacerbation, pneumonia, viral syndrome, pulmonary embolism  Clinical Tests:   Lab Tests: Ordered  Radiological Study: Ordered and Reviewed  Medical Tests: Ordered and Reviewed             Clinical Impression:  Final  diagnoses:  [R06.02] Shortness of breath  [R07.9] Chest pain  [J44.1] COPD exacerbation (Primary)                 Donovan Donaldson MD  08/06/24 1720       Casey Patel MD  08/06/24 2018

## 2024-08-06 NOTE — DISCHARGE INSTRUCTIONS
Continue breathing treatments with albuterol.  Follow-up with your pulmonologist.  Rest.  Return to emergency department for worsening symptoms or any problems.  Use supplemental oxygen as discussed and follow-up with your pulmonologist.

## 2024-08-07 NOTE — CARE UPDATE
08/06/24 2496   Patient Assessment/Suction   Level of Consciousness (AVPU) alert   Respiratory Effort Normal;Unlabored   Expansion/Accessory Muscles/Retractions no use of accessory muscles   All Lung Fields Breath Sounds diminished   Rhythm/Pattern, Respiratory pattern regular;unlabored   Cough Frequency no cough   PRE-TX-O2   Device (Oxygen Therapy) nasal cannula   $ Is the patient on Low Flow Oxygen? Yes   Flow (L/min) (Oxygen Therapy) 2   SpO2 95 %   Pulse Oximetry Type Intermittent   $ Pulse Oximetry - Multiple Charge Pulse Oximetry - Multiple   Pulse 86   Resp 18   Positioning   Head of Bed (HOB) Positioning HOB at 20 degrees   Positioning/Transfer Devices pillows;in use   Aerosol Therapy   $ Aerosol Therapy Charges Aerosol Treatment   Daily Review of Necessity (SVN) completed   Respiratory Treatment Status (SVN) given   Treatment Route (SVN) mask;oxygen   Patient Position HOB elevated   Post Treatment Assessment (SVN) patient reports breathing improved   Signs of Intolerance (SVN) none   Education   $ Education Bronchodilator;15 min

## 2024-08-07 NOTE — ED NOTES
Patient resting in bed. Chest rise and fall noted on exam. Patient denies any needs at this time. Bed locked and in lowest position. Side rails raised x 2. Significant other at bedside. Call light left in reach of patient and patient instructed to call staff for assistance.

## 2024-08-11 ENCOUNTER — HOSPITAL ENCOUNTER (INPATIENT)
Facility: HOSPITAL | Age: 74
LOS: 1 days | Discharge: HOME | DRG: 189 | End: 2024-08-13
Attending: EMERGENCY MEDICINE | Admitting: FAMILY MEDICINE
Payer: MEDICARE

## 2024-08-11 DIAGNOSIS — J44.1 COPD EXACERBATION: Primary | ICD-10-CM

## 2024-08-11 DIAGNOSIS — J96.21 ACUTE ON CHRONIC RESPIRATORY FAILURE WITH HYPOXIA: ICD-10-CM

## 2024-08-11 DIAGNOSIS — J47.9 BRONCHIECTASIS WITHOUT COMPLICATION: ICD-10-CM

## 2024-08-11 DIAGNOSIS — I49.9 ARRHYTHMIA: ICD-10-CM

## 2024-08-11 DIAGNOSIS — R79.89 ELEVATED TROPONIN: ICD-10-CM

## 2024-08-11 DIAGNOSIS — R79.89 ELEVATED BRAIN NATRIURETIC PEPTIDE (BNP) LEVEL: ICD-10-CM

## 2024-08-11 PROBLEM — I95.9 HYPOTENSION: Status: ACTIVE | Noted: 2024-08-11

## 2024-08-11 LAB
ADENOVIRUS: NOT DETECTED
ALBUMIN SERPL BCP-MCNC: 3.3 G/DL (ref 3.5–5.2)
ALLENS TEST: ABNORMAL
ALP SERPL-CCNC: 60 U/L (ref 55–135)
ALT SERPL W/O P-5'-P-CCNC: 18 U/L (ref 10–44)
ANION GAP SERPL CALC-SCNC: 8 MMOL/L (ref 8–16)
AST SERPL-CCNC: 20 U/L (ref 10–40)
BASOPHILS # BLD AUTO: 0.02 K/UL (ref 0–0.2)
BASOPHILS NFR BLD: 0.4 % (ref 0–1.9)
BILIRUB SERPL-MCNC: 0.6 MG/DL (ref 0.1–1)
BNP SERPL-MCNC: 366 PG/ML (ref 0–99)
BORDETELLA PARAPERTUSSIS (IS1001): NOT DETECTED
BORDETELLA PERTUSSIS (PTXP): NOT DETECTED
BUN SERPL-MCNC: 27 MG/DL (ref 8–23)
CALCIUM SERPL-MCNC: 9.1 MG/DL (ref 8.7–10.5)
CHLAMYDIA PNEUMONIAE: NOT DETECTED
CHLORIDE SERPL-SCNC: 104 MMOL/L (ref 95–110)
CO2 SERPL-SCNC: 24 MMOL/L (ref 23–29)
CORONAVIRUS 229E, COMMON COLD VIRUS: NOT DETECTED
CORONAVIRUS HKU1, COMMON COLD VIRUS: NOT DETECTED
CORONAVIRUS NL63, COMMON COLD VIRUS: NOT DETECTED
CORONAVIRUS OC43, COMMON COLD VIRUS: NOT DETECTED
CREAT SERPL-MCNC: 0.7 MG/DL (ref 0.5–1.4)
DIFFERENTIAL METHOD BLD: ABNORMAL
EOSINOPHIL # BLD AUTO: 0.2 K/UL (ref 0–0.5)
EOSINOPHIL NFR BLD: 2.8 % (ref 0–8)
ERYTHROCYTE [DISTWIDTH] IN BLOOD BY AUTOMATED COUNT: 13 % (ref 11.5–14.5)
EST. GFR  (NO RACE VARIABLE): >60 ML/MIN/1.73 M^2
FLUBV RNA NPH QL NAA+NON-PROBE: NOT DETECTED
GLUCOSE SERPL-MCNC: 119 MG/DL (ref 70–110)
HCO3 UR-SCNC: 22.8 MMOL/L (ref 24–28)
HCT VFR BLD AUTO: 41.1 % (ref 37–48.5)
HGB BLD-MCNC: 13.8 G/DL (ref 12–16)
HPIV1 RNA NPH QL NAA+NON-PROBE: NOT DETECTED
HPIV2 RNA NPH QL NAA+NON-PROBE: NOT DETECTED
HPIV3 RNA NPH QL NAA+NON-PROBE: NOT DETECTED
HPIV4 RNA NPH QL NAA+NON-PROBE: NOT DETECTED
HUMAN METAPNEUMOVIRUS: NOT DETECTED
IMM GRANULOCYTES # BLD AUTO: 0.01 K/UL (ref 0–0.04)
IMM GRANULOCYTES NFR BLD AUTO: 0.2 % (ref 0–0.5)
INFLUENZA A (SUBTYPES H1,H1-2009,H3): NOT DETECTED
INFLUENZA A, MOLECULAR: NEGATIVE
INFLUENZA B, MOLECULAR: NEGATIVE
LACTATE SERPL-SCNC: 1.2 MMOL/L (ref 0.5–1.9)
LACTATE SERPL-SCNC: 10.9 MMOL/L (ref 0.5–1.9)
LDH SERPL L TO P-CCNC: 0.78 MMOL/L (ref 0.5–2.2)
LDH SERPL L TO P-CCNC: 1.19 MMOL/L (ref 0.5–2.2)
LYMPHOCYTES # BLD AUTO: 1.2 K/UL (ref 1–4.8)
LYMPHOCYTES NFR BLD: 21 % (ref 18–48)
MAGNESIUM SERPL-MCNC: 1.7 MG/DL (ref 1.6–2.6)
MCH RBC QN AUTO: 33.6 PG (ref 27–31)
MCHC RBC AUTO-ENTMCNC: 33.6 G/DL (ref 32–36)
MCV RBC AUTO: 100 FL (ref 82–98)
MONOCYTES # BLD AUTO: 0.3 K/UL (ref 0.3–1)
MONOCYTES NFR BLD: 4.9 % (ref 4–15)
MYCOPLASMA PNEUMONIAE: NOT DETECTED
NEUTROPHILS # BLD AUTO: 4 K/UL (ref 1.8–7.7)
NEUTROPHILS NFR BLD: 70.7 % (ref 38–73)
NRBC BLD-RTO: 0 /100 WBC
OHS QRS DURATION: 70 MS
OHS QTC CALCULATION: 459 MS
PCO2 BLDA: 35 MMHG (ref 35–45)
PH SMN: 7.42 [PH] (ref 7.35–7.45)
PLATELET # BLD AUTO: 224 K/UL (ref 150–450)
PMV BLD AUTO: 8.4 FL (ref 9.2–12.9)
PO2 BLDA: 61 MMHG (ref 40–60)
POC BE: -2 MMOL/L
POC SATURATED O2: 92 % (ref 95–100)
POC TCO2: 24 MMOL/L (ref 24–29)
POTASSIUM SERPL-SCNC: 4 MMOL/L (ref 3.5–5.1)
PROCALCITONIN SERPL IA-MCNC: 0.17 NG/ML (ref 0–0.5)
PROT SERPL-MCNC: 6.2 G/DL (ref 6–8.4)
RBC # BLD AUTO: 4.11 M/UL (ref 4–5.4)
RESPIRATORY INFECTION PANEL SOURCE: NORMAL
RSV RNA NPH QL NAA+NON-PROBE: NOT DETECTED
RV+EV RNA NPH QL NAA+NON-PROBE: NOT DETECTED
SAMPLE: ABNORMAL
SAMPLE: NORMAL
SAMPLE: NORMAL
SARS-COV-2 RDRP RESP QL NAA+PROBE: NEGATIVE
SARS-COV-2 RNA RESP QL NAA+PROBE: NOT DETECTED
SITE: ABNORMAL
SODIUM SERPL-SCNC: 136 MMOL/L (ref 136–145)
SPECIMEN SOURCE: NORMAL
TROPONIN I SERPL HS-MCNC: 37.6 PG/ML (ref 0–14.9)
TROPONIN I SERPL HS-MCNC: 57.8 PG/ML (ref 0–14.9)
WBC # BLD AUTO: 5.71 K/UL (ref 3.9–12.7)

## 2024-08-11 PROCEDURE — 25000003 PHARM REV CODE 250: Performed by: NURSE PRACTITIONER

## 2024-08-11 PROCEDURE — 84145 PROCALCITONIN (PCT): CPT | Performed by: EMERGENCY MEDICINE

## 2024-08-11 PROCEDURE — G0378 HOSPITAL OBSERVATION PER HR: HCPCS

## 2024-08-11 PROCEDURE — 99900035 HC TECH TIME PER 15 MIN (STAT)

## 2024-08-11 PROCEDURE — 80053 COMPREHEN METABOLIC PANEL: CPT | Performed by: EMERGENCY MEDICINE

## 2024-08-11 PROCEDURE — 25000242 PHARM REV CODE 250 ALT 637 W/ HCPCS: Performed by: EMERGENCY MEDICINE

## 2024-08-11 PROCEDURE — 83880 ASSAY OF NATRIURETIC PEPTIDE: CPT | Performed by: EMERGENCY MEDICINE

## 2024-08-11 PROCEDURE — 99285 EMERGENCY DEPT VISIT HI MDM: CPT | Mod: 25

## 2024-08-11 PROCEDURE — 63600175 PHARM REV CODE 636 W HCPCS: Performed by: HOSPITALIST

## 2024-08-11 PROCEDURE — 25000003 PHARM REV CODE 250: Performed by: HOSPITALIST

## 2024-08-11 PROCEDURE — 99900031 HC PATIENT EDUCATION (STAT)

## 2024-08-11 PROCEDURE — 96361 HYDRATE IV INFUSION ADD-ON: CPT

## 2024-08-11 PROCEDURE — 83605 ASSAY OF LACTIC ACID: CPT | Performed by: EMERGENCY MEDICINE

## 2024-08-11 PROCEDURE — 87486 CHLMYD PNEUM DNA AMP PROBE: CPT | Performed by: NURSE PRACTITIONER

## 2024-08-11 PROCEDURE — 87502 INFLUENZA DNA AMP PROBE: CPT | Performed by: EMERGENCY MEDICINE

## 2024-08-11 PROCEDURE — 82803 BLOOD GASES ANY COMBINATION: CPT

## 2024-08-11 PROCEDURE — 93010 ELECTROCARDIOGRAM REPORT: CPT | Mod: ,,, | Performed by: INTERNAL MEDICINE

## 2024-08-11 PROCEDURE — 94799 UNLISTED PULMONARY SVC/PX: CPT

## 2024-08-11 PROCEDURE — 84484 ASSAY OF TROPONIN QUANT: CPT | Mod: 91 | Performed by: NURSE PRACTITIONER

## 2024-08-11 PROCEDURE — 94640 AIRWAY INHALATION TREATMENT: CPT | Mod: XB

## 2024-08-11 PROCEDURE — 87040 BLOOD CULTURE FOR BACTERIA: CPT | Performed by: EMERGENCY MEDICINE

## 2024-08-11 PROCEDURE — 84484 ASSAY OF TROPONIN QUANT: CPT | Performed by: EMERGENCY MEDICINE

## 2024-08-11 PROCEDURE — U0002 COVID-19 LAB TEST NON-CDC: HCPCS | Performed by: NURSE PRACTITIONER

## 2024-08-11 PROCEDURE — 36415 COLL VENOUS BLD VENIPUNCTURE: CPT | Performed by: EMERGENCY MEDICINE

## 2024-08-11 PROCEDURE — 63600175 PHARM REV CODE 636 W HCPCS: Performed by: EMERGENCY MEDICINE

## 2024-08-11 PROCEDURE — 83735 ASSAY OF MAGNESIUM: CPT | Performed by: EMERGENCY MEDICINE

## 2024-08-11 PROCEDURE — 27000221 HC OXYGEN, UP TO 24 HOURS

## 2024-08-11 PROCEDURE — 94760 N-INVAS EAR/PLS OXIMETRY 1: CPT | Mod: XB

## 2024-08-11 PROCEDURE — 96365 THER/PROPH/DIAG IV INF INIT: CPT

## 2024-08-11 PROCEDURE — 85025 COMPLETE CBC W/AUTO DIFF WBC: CPT | Performed by: EMERGENCY MEDICINE

## 2024-08-11 PROCEDURE — 87581 M.PNEUMON DNA AMP PROBE: CPT | Performed by: NURSE PRACTITIONER

## 2024-08-11 PROCEDURE — 25000003 PHARM REV CODE 250: Performed by: EMERGENCY MEDICINE

## 2024-08-11 PROCEDURE — 93005 ELECTROCARDIOGRAM TRACING: CPT | Performed by: INTERNAL MEDICINE

## 2024-08-11 RX ORDER — ACETAMINOPHEN 325 MG/1
650 TABLET ORAL EVERY 4 HOURS PRN
Status: DISCONTINUED | OUTPATIENT
Start: 2024-08-11 | End: 2024-08-13 | Stop reason: HOSPADM

## 2024-08-11 RX ORDER — IPRATROPIUM BROMIDE AND ALBUTEROL SULFATE 2.5; .5 MG/3ML; MG/3ML
SOLUTION RESPIRATORY (INHALATION)
Status: COMPLETED
Start: 2024-08-11 | End: 2024-08-11

## 2024-08-11 RX ORDER — CARBOXYMETHYLCELLULOSE SODIUM 5 MG/ML
2 SOLUTION/ DROPS OPHTHALMIC
Status: DISCONTINUED | OUTPATIENT
Start: 2024-08-11 | End: 2024-08-13 | Stop reason: HOSPADM

## 2024-08-11 RX ORDER — ACETAMINOPHEN 325 MG/1
650 TABLET ORAL EVERY 8 HOURS PRN
Status: DISCONTINUED | OUTPATIENT
Start: 2024-08-11 | End: 2024-08-13 | Stop reason: HOSPADM

## 2024-08-11 RX ORDER — OXYBUTYNIN CHLORIDE 5 MG/1
5 TABLET, EXTENDED RELEASE ORAL DAILY
Status: DISCONTINUED | OUTPATIENT
Start: 2024-08-12 | End: 2024-08-13 | Stop reason: HOSPADM

## 2024-08-11 RX ORDER — VANCOMYCIN HCL IN 5 % DEXTROSE 1G/250ML
1000 PLASTIC BAG, INJECTION (ML) INTRAVENOUS
Status: DISCONTINUED | OUTPATIENT
Start: 2024-08-12 | End: 2024-08-13 | Stop reason: HOSPADM

## 2024-08-11 RX ORDER — TALC
9 POWDER (GRAM) TOPICAL NIGHTLY PRN
Status: DISCONTINUED | OUTPATIENT
Start: 2024-08-11 | End: 2024-08-11

## 2024-08-11 RX ORDER — AMOXICILLIN 250 MG
1 CAPSULE ORAL 2 TIMES DAILY PRN
Status: DISCONTINUED | OUTPATIENT
Start: 2024-08-11 | End: 2024-08-13 | Stop reason: HOSPADM

## 2024-08-11 RX ORDER — LANOLIN ALCOHOL/MO/W.PET/CERES
800 CREAM (GRAM) TOPICAL
Status: DISCONTINUED | OUTPATIENT
Start: 2024-08-11 | End: 2024-08-13 | Stop reason: HOSPADM

## 2024-08-11 RX ORDER — CHOLECALCIFEROL (VITAMIN D3) 10 MCG
400 TABLET ORAL DAILY
Status: DISCONTINUED | OUTPATIENT
Start: 2024-08-12 | End: 2024-08-13 | Stop reason: HOSPADM

## 2024-08-11 RX ORDER — SODIUM CHLORIDE 0.9 % (FLUSH) 0.9 %
3 SYRINGE (ML) INJECTION EVERY 8 HOURS PRN
Status: DISCONTINUED | OUTPATIENT
Start: 2024-08-11 | End: 2024-08-13 | Stop reason: HOSPADM

## 2024-08-11 RX ORDER — BUPROPION HYDROCHLORIDE 150 MG/1
300 TABLET ORAL NIGHTLY
Status: DISCONTINUED | OUTPATIENT
Start: 2024-08-11 | End: 2024-08-13 | Stop reason: HOSPADM

## 2024-08-11 RX ORDER — IPRATROPIUM BROMIDE AND ALBUTEROL SULFATE 2.5; .5 MG/3ML; MG/3ML
3 SOLUTION RESPIRATORY (INHALATION) EVERY 4 HOURS PRN
Status: DISCONTINUED | OUTPATIENT
Start: 2024-08-11 | End: 2024-08-13 | Stop reason: HOSPADM

## 2024-08-11 RX ORDER — PREDNISONE 20 MG/1
40 TABLET ORAL DAILY
Status: DISCONTINUED | OUTPATIENT
Start: 2024-08-12 | End: 2024-08-13 | Stop reason: HOSPADM

## 2024-08-11 RX ORDER — SODIUM CHLORIDE 0.9 % (FLUSH) 0.9 %
3 SYRINGE (ML) INJECTION
Status: DISCONTINUED | OUTPATIENT
Start: 2024-08-11 | End: 2024-08-13 | Stop reason: HOSPADM

## 2024-08-11 RX ORDER — ASPIRIN 325 MG
325 TABLET ORAL
Status: COMPLETED | OUTPATIENT
Start: 2024-08-11 | End: 2024-08-11

## 2024-08-11 RX ORDER — IPRATROPIUM BROMIDE AND ALBUTEROL SULFATE 2.5; .5 MG/3ML; MG/3ML
3 SOLUTION RESPIRATORY (INHALATION)
Status: DISCONTINUED | OUTPATIENT
Start: 2024-08-11 | End: 2024-08-13 | Stop reason: HOSPADM

## 2024-08-11 RX ORDER — EZETIMIBE 10 MG/1
10 TABLET ORAL DAILY
Status: DISCONTINUED | OUTPATIENT
Start: 2024-08-12 | End: 2024-08-13 | Stop reason: HOSPADM

## 2024-08-11 RX ORDER — SODIUM,POTASSIUM PHOSPHATES 280-250MG
2 POWDER IN PACKET (EA) ORAL
Status: DISCONTINUED | OUTPATIENT
Start: 2024-08-11 | End: 2024-08-13 | Stop reason: HOSPADM

## 2024-08-11 RX ORDER — AMOXICILLIN AND CLAVULANATE POTASSIUM 875; 125 MG/1; MG/1
1 TABLET, FILM COATED ORAL 2 TIMES DAILY
Status: DISCONTINUED | OUTPATIENT
Start: 2024-08-11 | End: 2024-08-11

## 2024-08-11 RX ORDER — BENZONATATE 100 MG/1
200 CAPSULE ORAL 2 TIMES DAILY PRN
Status: DISCONTINUED | OUTPATIENT
Start: 2024-08-11 | End: 2024-08-13 | Stop reason: HOSPADM

## 2024-08-11 RX ORDER — GUAIFENESIN 600 MG/1
1200 TABLET, EXTENDED RELEASE ORAL 2 TIMES DAILY
Status: DISCONTINUED | OUTPATIENT
Start: 2024-08-11 | End: 2024-08-13 | Stop reason: HOSPADM

## 2024-08-11 RX ORDER — CARBOXYMETHYLCELLULOSE SODIUM 5 MG/ML
2 SOLUTION/ DROPS OPHTHALMIC 4 TIMES DAILY PRN
Status: DISCONTINUED | OUTPATIENT
Start: 2024-08-11 | End: 2024-08-13 | Stop reason: HOSPADM

## 2024-08-11 RX ORDER — IPRATROPIUM BROMIDE AND ALBUTEROL SULFATE 2.5; .5 MG/3ML; MG/3ML
3 SOLUTION RESPIRATORY (INHALATION) ONCE
Status: COMPLETED | OUTPATIENT
Start: 2024-08-11 | End: 2024-08-11

## 2024-08-11 RX ORDER — NALOXONE HCL 0.4 MG/ML
0.02 VIAL (ML) INJECTION
Status: DISCONTINUED | OUTPATIENT
Start: 2024-08-11 | End: 2024-08-13 | Stop reason: HOSPADM

## 2024-08-11 RX ORDER — ARFORMOTEROL TARTRATE 15 UG/2ML
15 SOLUTION RESPIRATORY (INHALATION) 2 TIMES DAILY
Status: DISCONTINUED | OUTPATIENT
Start: 2024-08-11 | End: 2024-08-13 | Stop reason: HOSPADM

## 2024-08-11 RX ORDER — MIDODRINE HYDROCHLORIDE 2.5 MG/1
5 TABLET ORAL ONCE
Status: COMPLETED | OUTPATIENT
Start: 2024-08-11 | End: 2024-08-11

## 2024-08-11 RX ORDER — LEVALBUTEROL INHALATION SOLUTION 1.25 MG/3ML
1.25 SOLUTION RESPIRATORY (INHALATION)
Status: COMPLETED | OUTPATIENT
Start: 2024-08-11 | End: 2024-08-11

## 2024-08-11 RX ORDER — ONDANSETRON HYDROCHLORIDE 2 MG/ML
4 INJECTION, SOLUTION INTRAVENOUS EVERY 6 HOURS PRN
Status: DISCONTINUED | OUTPATIENT
Start: 2024-08-11 | End: 2024-08-13 | Stop reason: HOSPADM

## 2024-08-11 RX ORDER — TALC
6 POWDER (GRAM) TOPICAL NIGHTLY PRN
Status: DISCONTINUED | OUTPATIENT
Start: 2024-08-11 | End: 2024-08-13 | Stop reason: HOSPADM

## 2024-08-11 RX ADMIN — ASPIRIN 325 MG ORAL TABLET 325 MG: 325 PILL ORAL at 04:08

## 2024-08-11 RX ADMIN — SODIUM CHLORIDE, POTASSIUM CHLORIDE, SODIUM LACTATE AND CALCIUM CHLORIDE 1000 ML: 600; 310; 30; 20 INJECTION, SOLUTION INTRAVENOUS at 02:08

## 2024-08-11 RX ADMIN — IPRATROPIUM BROMIDE AND ALBUTEROL SULFATE 3 ML: .5; 3 SOLUTION RESPIRATORY (INHALATION) at 03:08

## 2024-08-11 RX ADMIN — LEVALBUTEROL HYDROCHLORIDE 1.25 MG: 1.25 SOLUTION RESPIRATORY (INHALATION) at 01:08

## 2024-08-11 RX ADMIN — PIPERACILLIN SODIUM AND TAZOBACTAM SODIUM 3.38 G: 3; .375 INJECTION, POWDER, LYOPHILIZED, FOR SOLUTION INTRAVENOUS at 07:08

## 2024-08-11 RX ADMIN — VANCOMYCIN HYDROCHLORIDE 1500 MG: 1.5 INJECTION, POWDER, LYOPHILIZED, FOR SOLUTION INTRAVENOUS at 11:08

## 2024-08-11 RX ADMIN — BUPROPION HYDROCHLORIDE 300 MG: 150 TABLET, EXTENDED RELEASE ORAL at 08:08

## 2024-08-11 RX ADMIN — GUAIFENESIN 1200 MG: 600 TABLET, EXTENDED RELEASE ORAL at 08:08

## 2024-08-11 RX ADMIN — CARBOXYMETHYLCELLULOSE SODIUM 2 DROP: 5 SOLUTION/ DROPS OPHTHALMIC at 09:08

## 2024-08-11 RX ADMIN — MIDODRINE HYDROCHLORIDE 5 MG: 2.5 TABLET ORAL at 04:08

## 2024-08-11 NOTE — ASSESSMENT & PLAN NOTE
Patient has a diagnosis of pneumonia. The cause of the pneumonia is suspected to be bacterial in etiology but organism is not known. The pneumonia is stable. The patient has the following signs/symptoms of pneumonia: persistent hypoxia , cough, and shortness of breath. The patient does have a current oxygen requirement and the patient does have a home oxygen requirement. I have reviewed the pertinent imaging. The following cultures have been collected: Blood cultures The culture results are listed below.     Current antimicrobial regimen consists of the antibiotics listed below. Will monitor patient closely and stable    Antibiotics (From admission, onward)      Start     Stop Route Frequency Ordered    08/11/24 2100  amoxicillin-clavulanate 875-125mg per tablet 1 tablet         -- Oral 2 times daily 08/11/24 1656            Microbiology Results (last 7 days)       Procedure Component Value Units Date/Time    Respiratory Infection Panel (PCR), Nasopharyngeal [[id number]]     Order Status: No result Specimen: Nasopharyngeal Swab     Blood culture x two cultures. Draw prior to antibiotics. [[id number]] Collected: 08/11/24 1321    Order Status: Sent Specimen: Blood Updated: 08/11/24 1342    Blood culture x two cultures. Draw prior to antibiotics. [[id number]] Collected: 08/11/24 1328    Order Status: Sent Specimen: Blood Updated: 08/11/24 1340

## 2024-08-11 NOTE — SUBJECTIVE & OBJECTIVE
Past Medical History:   Diagnosis Date    Colon polyp 01/01/2013    COPD, moderate 01/01/2010    was told by Dr. 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    Lumbar spinal stenosis     Oxygen dependent 01/01/2011    2L NC Nightly    Primary osteoarthritis of first carpometacarpal joints, bilateral     Rheumatoid arthritis     Scoliosis 01/01/2014    Spinal stenosis 01/01/2014    upper and lowerr back - tingling in left arm at times       Past Surgical History:   Procedure Laterality Date    BREAST SURGERY  2008    Reduction    BUNIONECTOMY Bilateral 01/1999    CARDIOVASCULAR STRESS TEST  2013    CARPAL TUNNEL RELEASE Left 10/08/2015    CATARACT EXTRACTION BILATERAL W/ ANTERIOR VITRECTOMY  04/2010    [facility] ARTHROPLASTY, RIGHT  02/23/2004    COLONOSCOPY  04/18/2013    CORRECTION OF HAMMER TOE Left 8/14/2019    Procedure: CORRECTION, HAMMER TOE;  Surgeon: Jackson Truong DPM;  Location: [facility] OR;  Service: Podiatry;  Laterality: Left;    ESOPHAGOGASTRODUODENOSCOPY  12/21/2012    2012-with dilation #1, 2013 #2    HAND SURGERY  2003    JOINT REPLACEMENT      OPEN REDUCTION AND INTERNAL FIXATION (ORIF) OF FRACTURE OF FEMUR USING DYNAMIC HIP SCREW Left 8/22/2023    Procedure: ORIF, HIP, USING DYNAMIC HIP SCREW;  Surgeon: Hussein Romo MD;  Location: [facility] OR;  Service: Orthopedics;  Laterality: Left;    REMOVAL OF HARDWARE FROM LOWER EXTREMITY Left 8/14/2019    Procedure: REMOVAL, HARDWARE, LOWER EXTREMITY;  Surgeon: Jackson Truong DPM;  Location: [facility] OR;  Service: Podiatry;   Laterality: Left;    RHINOPLASTY TIP  02/2010    SURGICAL REMOVAL OF BUNION WITH OSTEOTOMY OF METATARSAL BONE Left 8/14/2019    Procedure: BUNIONECTOMY, WITH METATARSAL OSTEOTOMY;  Surgeon: Jackson Brannon DPM;  Location: [facility];  Service: Podiatry;  Laterality: Left;  Arthrodesis 2nd PIP joint, screw 1st toe, C-arm, synthes-Merrill, 3.0, 4.0, AO modular set MERRILL BRUNSON NOTIFIED    TONSILLECTOMY, ADENOIDECTOMY  1970    TOTAL KNEE ARTHROPLASTY Right 12/2008    TOTAL KNEE ARTHROPLASTY Left 03/2015    TOTAL REDUCTION MAMMOPLASTY  01/2008    TUBAL LIGATION      VAGINAL DELIVERY  1983    x2       Review of patient's allergies indicates:   Allergen Reactions    Statins-hmg-coa reductase inhibitors Other (See Comments)     Muscle cramps    Doxycycline Other (See Comments)     Stops gallbladder function    Adhesive Other (See Comments)     bruises    Erythromycin Other (See Comments)     Stomach pain       No current facility-administered medications on file prior to encounter.     Current Outpatient Medications on File Prior to Encounter   Medication Sig    albuterol (PROVENTIL) 2.5 mg /3 mL (0.083 %) nebulizer solution Take 2.5 mg by nebulization every 4 (four) hours as needed for Wheezing. Rescue    albuterol (PROVENTIL/VENTOLIN HFA) 90 mcg/actuation inhaler Inhale 2 puffs into the lungs every 6 (six) hours as needed for Wheezing or Shortness of Breath.    amoxicillin-clavulanate 875-125mg (AUGMENTIN) 875-125 mg per tablet Take 1 tablet by mouth 2 (two) times daily. for 10 days    benzonatate (TESSALON) 200 MG capsule Take 200 mg by mouth 2 (two) times daily as needed for Cough.    buPROPion (WELLBUTRIN XL) 300 MG 24 hr tablet Take 300 mg by mouth every evening.     cholecalciferol, vitamin D3, 10 mcg (400 unit) Cap capsule Take 400 Units by mouth once daily.    cyclobenzaprine (FLEXERIL) 10 MG tablet Take 10 mg by mouth every evening.    eszopiclone (LUNESTA) 3 mg Tab Take 3 mg by mouth nightly as needed (Insomnia).     ezetimibe (ZETIA) 10 mg tablet Take 10 mg by mouth once daily.    gabapentin (NEURONTIN) 400 MG capsule Take 400 mg by mouth 2 (two) times daily as needed (Pain).    garlic 1,000 mg Cap Take 1,000 mg by mouth once daily.    guaiFENesin (MUCINEX) 600 mg 12 hr tablet Take 1,200 mg by mouth 2 (two) times daily.    indomethacin (INDOCIN) 50 MG capsule Take 50 mg by mouth 2 (two) times daily as needed (Flare Up).    MYRBETRIQ 50 mg Tb24 Take 1 tablet by mouth once daily.    omega 3-dha-epa-fish oil 1,000 mg (120 mg-180 mg) Cap Take 1 capsule by mouth once daily.    RESTASIS 0.05 % ophthalmic emulsion Place 1 drop into both eyes 2 (two) times daily.    telmisartan (MICARDIS) 80 MG Tab Take 80 mg by mouth every evening.    tiotropium-olodateroL (STIOLTO RESPIMAT) 2.5-2.5 mcg/actuation Mist Inhale 1 puff into the lungs once daily. Controller     UNABLE TO FIND Take 1 tablet by mouth once daily. BONE BOOST    verapamil (VERELAN) 240 MG C24P Take 240 mg by mouth 2 (two) times daily.      Family History       Problem Relation (Age of Onset)    Cancer Father          Tobacco Use    Smoking status: Former     Current packs/day: 0.00     Average packs/day: 2.0 packs/day for 33.0 years (66.0 ttl pk-yrs)     Types: Cigarettes     Start date: 1964     Quit date: 1997     Years since quittin.2    Smokeless tobacco: Former     Quit date: 1997   Substance and Sexual Activity    Alcohol use: Yes     Alcohol/week: 4.0 standard drinks of alcohol     Types: 4 Glasses of wine per week     Comment: monthly    Drug use: Never    Sexual activity: Not on file     Review of Systems   Constitutional:  Positive for fatigue.   Respiratory:  Positive for cough, shortness of breath and wheezing.    Cardiovascular: Negative.    Gastrointestinal: Negative.    Genitourinary: Negative.    Musculoskeletal: Negative.    Neurological: Negative.      Objective:     Vital Signs (Most Recent):  Temp: 98.3 °F (36.8 °C) (24 1252)  Pulse:  83 (08/11/24 1541)  Resp: 14 (08/11/24 1541)  BP: (!) 100/52 (08/11/24 1542)  SpO2: (!) 94 % (08/11/24 1541) Vital Signs (24h Range):  Temp:  [98.3 °F (36.8 °C)] 98.3 °F (36.8 °C)  Pulse:  [] 83  Resp:  [14-25] 14  SpO2:  [92 %-97 %] 94 %  BP: ()/(51-62) 100/52     Weight: 80 kg (176 lb 5.9 oz)  Body mass index is 30.27 kg/m².     Physical Exam  Vitals reviewed.   Constitutional:       General: She is not in acute distress.     Appearance: Normal appearance. She is not ill-appearing.   HENT:      Head: Normocephalic and atraumatic.      Mouth/Throat:      Mouth: Mucous membranes are moist.   Eyes:      Pupils: Pupils are equal, round, and reactive to light.   Cardiovascular:      Rate and Rhythm: Normal rate and regular rhythm.      Pulses: Normal pulses.      Heart sounds: No murmur heard.  Pulmonary:      Breath sounds: Decreased air movement present. Examination of the right-upper field reveals wheezing and rales. Examination of the left-upper field reveals wheezing and rales. Examination of the right-middle field reveals wheezing. Examination of the left-middle field reveals wheezing. Examination of the right-lower field reveals decreased breath sounds. Decreased breath sounds, wheezing and rales present.   Abdominal:      General: Bowel sounds are normal. There is no distension.      Palpations: Abdomen is soft.      Tenderness: There is no abdominal tenderness.   Musculoskeletal:         General: No swelling. Normal range of motion.      Right lower leg: No edema.      Left lower leg: No edema.   Skin:     General: Skin is warm and dry.      Capillary Refill: Capillary refill takes less than 2 seconds.   Neurological:      General: No focal deficit present.      Mental Status: She is alert.      GCS: GCS eye subscore is 4. GCS verbal subscore is 5. GCS motor subscore is 6.   Psychiatric:         Mood and Affect: Mood normal.              CRANIAL NERVES     CN III, IV, VI   Pupils are equal, round,  and reactive to light.       Significant Labs: All pertinent labs within the past 24 hours have been reviewed.  Recent Lab Results         08/11/24  1329   08/11/24  1325   08/11/24  1323   08/11/24  1322        Influenza A, Molecular Negative             Influenza B, Molecular Negative             Procalcitonin   0.173  Comment: The Procalcitonin test is intended to aid in the risk assessment of   critically ill patients on their first day of ICU admission for   progression   to severe sepsis and septic shock.    A result of <0.50 ng/mL is associated with a low risk of severe   sepsis   and/or septic shock.  This does not exclude localized infection.    A result between 0.50 ng/mL and 2.0 ng/mL should be interpreted with   consideration of the patient's history and is recommended to retest   within 6   to 24 hours.        A result of >2.0 ng/mL is associated with a high risk of severe   sepsis   and/or septic shock.    Procalcitonin may not be accurate among patients with   localized  infection, recent trauma or major surgery, immunosuppressed   state,  invasive fungal infection, renal dysfunction. Decisions   regarding  initiation or continuation of antibiotic therapy should not be   based  solely on procalcitonin levels.             Albumin   3.3           ALP   60           Allens Test       N/A       ALT   18           Anion Gap   8           AST   20           Baso #   0.02           Basophil %   0.4           BILIRUBIN TOTAL   0.6  Comment: For infants and newborns, interpretation of results should be based  on gestational age, weight and in agreement with clinical  observations.    Premature Infant recommended reference ranges:  Up to 24 hours.............<8.0 mg/dL  Up to 48 hours............<12.0 mg/dL  3-5 days..................<15.0 mg/dL  6-29 days.................<15.0 mg/dL             BNP   366  Comment: Values of less than 100 pg/ml are consistent with non-CHF populations.           Site        Other       BUN   27           Calcium   9.1           Chloride   104           CO2   24           Creatinine   0.7           Differential Method   Automated           eGFR   >60.0           Eos #   0.2           Eos %   2.8           Flu A & B Source NP             Glucose   119           Gran # (ANC)   4.0           Gran %   70.7           Hematocrit   41.1           Hemoglobin   13.8           Immature Grans (Abs)   0.01  Comment: Mild elevation in immature granulocytes is non specific and   can be seen in a variety of conditions including stress response,   acute inflammation, trauma and pregnancy. Correlation with other   laboratory and clinical findings is essential.             Immature Granulocytes   0.2           Lymph #   1.2           Lymph %   21.0           Magnesium    1.7           MCH   33.6           MCHC   33.6           MCV   100           Mono #   0.3           Mono %   4.9           MPV   8.4           nRBC   0           Platelet Count   224           POC BE       -2       POC HCO3       22.8       POC Lactate     0.78         POC PCO2       35.0       POC PH       7.423       POC PO2       61       POC SATURATED O2       92       POC TCO2       24       Potassium   4.0           PROTEIN TOTAL   6.2           RBC   4.11           RDW   13.0           Sample     VENOUS   VENOUS       Sodium   136           Troponin I High Sensitivity   57.8  Comment: Troponin results differ between methods. Do not use   results between Troponin methods interchangeably.    Alkaline Phospatase levels above 400 U/L may   cause false positive results.    Access hsTnI should not be used for patients taking   Asfotase zoraida (Strensiq).  Critical result TNIHS 57.8 pg/mL called to and read back by Smita Morocho  RN/ED at 11-Aug-2024 14:24 by [facility].             WBC   5.71                   Significant Imaging: I have reviewed all pertinent imaging results/findings within the past 24 hours.  Imaging Results               X-Ray Chest AP Portable (Final result)  Result time 08/11/24 14:15:49      Final result by Merrill Pereira MD (08/11/24 14:15:49)                   Impression:      No significant change.      Electronically signed by: Merrill Pereira  Date:    08/11/2024  Time:    14:15               Narrative:    EXAMINATION:  XR CHEST AP PORTABLE    CLINICAL HISTORY:  Sepsis;    FINDINGS:  Portable chest at 1333 compared with 08/06/2024 shows normal cardiomediastinal silhouette.    Slightly prominent interstitial markings bilaterally have not significantly changed from the prior CTA.  No new confluent alveolar consolidation, pleural effusion, or pneumothorax.  Pulmonary vasculature is normal. No acute osseous abnormality.

## 2024-08-11 NOTE — PHARMACY MED REC
"Admission Medication History     The home medication history was taken by Parth Burnett.    You may go to "Admission" then "Reconcile Home Medications" tabs to review and/or act upon these items.     The home medication list has been updated by the Pharmacy department.   Please read ALL comments highlighted in yellow.   Please address this information as you see fit.    Feel free to contact us if you have any questions or require assistance.        Medications listed below were obtained from: Patient/family and Analytic software- [employer]  No current facility-administered medications on file prior to encounter.     Current Outpatient Medications on File Prior to Encounter   Medication Sig Dispense Refill    albuterol (PROVENTIL) 2.5 mg /3 mL (0.083 %) nebulizer solution Take 2.5 mg by nebulization every 4 (four) hours as needed for Wheezing. Rescue      albuterol (PROVENTIL/VENTOLIN HFA) 90 mcg/actuation inhaler Inhale 2 puffs into the lungs every 6 (six) hours as needed for Wheezing or Shortness of Breath.      amoxicillin-clavulanate 875-125mg (AUGMENTIN) 875-125 mg per tablet Take 1 tablet by mouth 2 (two) times daily. for 10 days 20 tablet 0    benzonatate (TESSALON) 200 MG capsule Take 200 mg by mouth 2 (two) times daily as needed for Cough.      buPROPion (WELLBUTRIN XL) 300 MG 24 hr tablet Take 300 mg by mouth every evening.       cholecalciferol, vitamin D3, 10 mcg (400 unit) Cap capsule Take 400 Units by mouth once daily.      cyclobenzaprine (FLEXERIL) 10 MG tablet Take 10 mg by mouth every evening.      eszopiclone (LUNESTA) 3 mg Tab Take 3 mg by mouth nightly as needed (Insomnia).      ezetimibe (ZETIA) 10 mg tablet Take 10 mg by mouth once daily.      gabapentin (NEURONTIN) 400 MG capsule Take 400 mg by mouth 2 (two) times daily as needed (Pain).      garlic 1,000 mg Cap Take 1,000 mg by mouth once daily.      guaiFENesin (MUCINEX) 600 mg 12 hr tablet Take 1,200 mg by mouth 2 (two) times daily.      " indomethacin (INDOCIN) 50 MG capsule Take 50 mg by mouth 2 (two) times daily as needed (Flare Up).      MYRBETRIQ 50 mg Tb24 Take 1 tablet by mouth once daily.      omega 3-dha-epa-fish oil 1,000 mg (120 mg-180 mg) Cap Take 1 capsule by mouth once daily.      RESTASIS 0.05 % ophthalmic emulsion Place 1 drop into both eyes 2 (two) times daily.      telmisartan (MICARDIS) 80 MG Tab Take 80 mg by mouth every evening.      tiotropium-olodateroL (STIOLTO RESPIMAT) 2.5-2.5 mcg/actuation Mist Inhale 1 puff into the lungs once daily. Controller       UNABLE TO FIND Take 1 tablet by mouth once daily. BONE BOOST      verapamil (VERELAN) 240 MG C24P Take 240 mg by mouth 2 (two) times daily.                Parth Burnett  EXT 1924                .

## 2024-08-11 NOTE — ED PROVIDER NOTES
Encounter Date: 8/11/2024       History     Chief Complaint   Patient presents with    Shortness of Breath     States more short of breath than normal, duoned enroute to the hospital with improvement      74-year-old female with history of degenerative disc disease, gastroesophageal reflux, anxiety, depression, hypertension, hyperlipidemia, legal blindness, osteoarthritis, rheumatoid arthritis.  Patient with COPD on intermittent oxygen at 2 L nasal cannula.  Recently treated for COPD exacerbation with bronchiectasis.  Currently on Augmentin over last 5 days.  Patient diagnosed with bronchitis/atypical pneumonia and COPD exacerbation.  Was seen emergency department on 8/62024.  CT chest at that time showed emphysematous changes and findings consistent with pneumonia.  Patient was discharged home with close follow-up.  She returns back to the emergency department today with persistent wheezing, shortness of breath and exertional hypoxia.  Patient states that she has had several episodes of dropping her sats throughout the day in which has sats goes into the high 70s mid 80s.  This is associated with generalized weakness and shortness of breath.  Patient denies chest pain, no fever, does have slightly productive clears cough.       Review of patient's allergies indicates:   Allergen Reactions    Statins-hmg-coa reductase inhibitors Other (See Comments)     Muscle cramps    Doxycycline Other (See Comments)     Stops gallbladder function    Adhesive Other (See Comments)     bruises    Erythromycin Other (See Comments)     Stomach pain     Past Medical History:   Diagnosis Date    Colon polyp 01/01/2013    COPD, moderate 01/01/2010    was told by Dr. Mccormick she has 60% lung capacity    Degeneration of cervical intervertebral disc     Depression with anxiety     GERD (gastroesophageal reflux disease) 01/01/2008    Hammertoe of second toe of left foot 08/07/2019    dr truong    Headaches, cluster 01/01/1997    Heart  valve regurgitation 2014    dr verde    Hemorrhoids 01/01/1983    Hiatal hernia 01/01/1997    Hyperlipidemia 01/01/1995    Hypertension     on meds    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/1976    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    Lumbar spinal stenosis     Oxygen dependent 01/01/2011    2L NC Nightly    Primary osteoarthritis of first carpometacarpal joints, bilateral     Rheumatoid arthritis     Scoliosis 01/01/2014    Spinal stenosis 01/01/2014    upper and lowerr back - tingling in left arm at times     Past Surgical History:   Procedure Laterality Date    BREAST SURGERY  2008    Reduction    BUNIONECTOMY Bilateral 01/1999    CARDIOVASCULAR STRESS TEST  2013    CARPAL TUNNEL RELEASE Left 10/08/2015    CATARACT EXTRACTION BILATERAL W/ ANTERIOR VITRECTOMY  04/2010    [facility] ARTHROPLASTY, RIGHT  02/23/2004    COLONOSCOPY  04/18/2013    CORRECTION OF HAMMER TOE Left 8/14/2019    Procedure: CORRECTION, HAMMER TOE;  Surgeon: Jackson Brannon DPM;  Location: [facility] OR;  Service: Podiatry;  Laterality: Left;    ESOPHAGOGASTRODUODENOSCOPY  12/21/2012    2012-with dilation #1, 2013 #2    HAND SURGERY  2003    JOINT REPLACEMENT      OPEN REDUCTION AND INTERNAL FIXATION (ORIF) OF FRACTURE OF FEMUR USING DYNAMIC HIP SCREW Left 8/22/2023    Procedure: ORIF, HIP, USING DYNAMIC HIP SCREW;  Surgeon: Hussein Romo MD;  Location: [facility] OR;  Service: Orthopedics;  Laterality: Left;    REMOVAL OF HARDWARE FROM LOWER EXTREMITY Left 8/14/2019    Procedure: REMOVAL, HARDWARE, LOWER EXTREMITY;  Surgeon: Jackson Brannon DPM;  Location: [facility] OR;  Service: Podiatry;  Laterality: Left;    RHINOPLASTY TIP  02/2010    SURGICAL REMOVAL OF BUNION WITH OSTEOTOMY OF METATARSAL BONE Left 8/14/2019    Procedure: BUNIONECTOMY, WITH METATARSAL OSTEOTOMY;  Surgeon: Jackson Brannon DPM;  Location: [facility] OR;  Service: Podiatry;  Laterality: Left;  Arthrodesis 2nd PIP joint, screw 1st toe, C-arm, synthes-Merrill, 3.0, 4.0, AO modular set MERRILL BRUNSON NOTIFIED     TONSILLECTOMY, ADENOIDECTOMY  1970    TOTAL KNEE ARTHROPLASTY Right 2008    TOTAL KNEE ARTHROPLASTY Left 2015    TOTAL REDUCTION MAMMOPLASTY  2008    TUBAL LIGATION      VAGINAL DELIVERY  1983    x2     Family History   Problem Relation Name Age of Onset    Cancer Father          colon     Social History     Tobacco Use    Smoking status: Former     Current packs/day: 0.00     Average packs/day: 2.0 packs/day for 33.0 years (66.0 ttl pk-yrs)     Types: Cigarettes     Start date: 1964     Quit date: 1997     Years since quittin.2    Smokeless tobacco: Former     Quit date: 1997   Substance Use Topics    Alcohol use: Yes     Alcohol/week: 4.0 standard drinks of alcohol     Types: 4 Glasses of wine per week     Comment: monthly    Drug use: Never     Review of Systems   Constitutional:  Positive for fatigue. Negative for fever.   HENT:  Negative for sore throat.    Respiratory:  Positive for cough, shortness of breath and wheezing.    Cardiovascular:  Negative for chest pain.   Gastrointestinal:  Negative for nausea.   Genitourinary:  Negative for dysuria.   Musculoskeletal:  Negative for back pain.   Skin:  Negative for rash.   Neurological:  Negative for weakness.   Hematological:  Does not bruise/bleed easily.       Physical Exam     Initial Vitals [24 1252]   BP Pulse Resp Temp SpO2   (!) 96/51 105 (!) 24 98.3 °F (36.8 °C) (!) 92 %      MAP       --         Physical Exam    Nursing note and vitals reviewed.  Constitutional: She appears well-developed and well-nourished.   HENT:   Head: Normocephalic and atraumatic.   Nose: Nose normal.   Mouth/Throat: Oropharynx is clear and moist.   Eyes: Conjunctivae and EOM are normal. Pupils are equal, round, and reactive to light.   Neck: Neck supple. No thyromegaly present. No tracheal deviation present.   Normal range of motion.  Cardiovascular:  Normal rate, regular rhythm, normal heart sounds and intact distal pulses.     Exam reveals no  gallop and no friction rub.       No murmur heard.  Pulmonary/Chest: No stridor. No respiratory distress. She has no rhonchi. She has no rales.   Course breath sounds with scant occasional wheeze, mildly diminished at bases.   Abdominal: Abdomen is soft. Bowel sounds are normal. She exhibits no mass. There is no rebound and no guarding.   Musculoskeletal:         General: No tenderness or edema. Normal range of motion.      Cervical back: Normal range of motion and neck supple.     Lymphadenopathy:     She has no cervical adenopathy.   Neurological: She is alert and oriented to person, place, and time. She has normal strength and normal reflexes. GCS score is 15. GCS eye subscore is 4. GCS verbal subscore is 5. GCS motor subscore is 6.   Skin: Skin is warm and dry. Capillary refill takes less than 2 seconds.   Psychiatric: She has a normal mood and affect.         ED Course   Procedures  Labs Reviewed   CBC W/ AUTO DIFFERENTIAL - Abnormal       Result Value    WBC 5.71      RBC 4.11      Hemoglobin 13.8      Hematocrit 41.1       (*)     MCH 33.6 (*)     MCHC 33.6      RDW 13.0      Platelets 224      MPV 8.4 (*)     Immature Granulocytes 0.2      Gran # (ANC) 4.0      Immature Grans (Abs) 0.01      Lymph # 1.2      Mono # 0.3      Eos # 0.2      Baso # 0.02      nRBC 0      Gran % 70.7      Lymph % 21.0      Mono % 4.9      Eosinophil % 2.8      Basophil % 0.4      Differential Method Automated     COMPREHENSIVE METABOLIC PANEL - Abnormal    Sodium 136      Potassium 4.0      Chloride 104      CO2 24      Glucose 119 (*)     BUN 27 (*)     Creatinine 0.7      Calcium 9.1      Total Protein 6.2      Albumin 3.3 (*)     Total Bilirubin 0.6      Alkaline Phosphatase 60      AST 20      ALT 18      eGFR >60.0      Anion Gap 8     B-TYPE NATRIURETIC PEPTIDE - Abnormal     (*)    TROPONIN I HIGH SENSITIVITY - Abnormal    Troponin I High Sensitivity 57.8 (*)    ISTAT PROCEDURE - Abnormal    POC PH 7.423       POC PCO2 35.0      POC PO2 61 (*)     POC HCO3 22.8 (*)     POC BE -2      POC SATURATED O2 92      POC TCO2 24      Sample VENOUS      Site Other      Allens Test N/A     CULTURE, BLOOD   CULTURE, BLOOD   INFLUENZA A AND B ANTIGEN    Influenza A, Molecular Negative      Influenza B, Molecular Negative      Flu A & B Source NP      Narrative:     Specimen Source->Nasopharyngeal Swab   MAGNESIUM    Magnesium 1.7     PROCALCITONIN    Procalcitonin 0.173     URINALYSIS, REFLEX TO URINE CULTURE   ISTAT LACTATE    POC Lactate 0.78      Sample VENOUS     POCT LACTATE        ECG Results              EKG 12-lead (In process)        Collection Time Result Time QRS Duration OHS QTC Calculation    08/11/24 13:47:56 08/11/24 13:51:42 70 459                     In process by Interface, Lab In [facility] (08/11/24 13:51:46)                   Narrative:    Test Reason : J44.1,    Vent. Rate : 090 BPM     Atrial Rate : 090 BPM     P-R Int : 162 ms          QRS Dur : 070 ms      QT Int : 376 ms       P-R-T Axes : 059 056 068 degrees     QTc Int : 459 ms    Normal sinus rhythm  Low voltage QRS  Borderline Abnormal ECG  When compared with ECG of 06-AUG-2024 16:33,  No significant change was found    Referred By: [email or web address]   SELF           Confirmed By:                                   Imaging Results              X-Ray Chest AP Portable (Final result)  Result time 08/11/24 14:15:49      Final result by Merrill Pereira MD (08/11/24 14:15:49)                   Impression:      No significant change.      Electronically signed by: Merrill Pereira  Date:    08/11/2024  Time:    14:15               Narrative:    EXAMINATION:  XR CHEST AP PORTABLE    CLINICAL HISTORY:  Sepsis;    FINDINGS:  Portable chest at 1333 compared with 08/06/2024 shows normal cardiomediastinal silhouette.    Slightly prominent interstitial markings bilaterally have not significantly changed from the prior CTA.  No new confluent alveolar consolidation, pleural effusion, or  pneumothorax.  Pulmonary vasculature is normal. No acute osseous abnormality.                                       Medications   lactated ringers bolus 1,000 mL (1,000 mLs Intravenous New Bag 8/11/24 1454)   aspirin tablet 325 mg (has no administration in time range)   levalbuterol nebulizer solution 1.25 mg (1.25 mg Nebulization Given 8/11/24 1320)   albuterol-ipratropium 2.5 mg-0.5 mg/3 mL nebulizer solution 3 mL (0 mLs Nebulization Override Pull 8/11/24 1530)     Medical Decision Making  Amount and/or Complexity of Data Reviewed  Labs: ordered.  Radiology: ordered.    Risk  Prescription drug management.               ED Course as of 08/11/24 1542   Sun Aug 11, 2024   1534 Patient seen evaluated emergency department.  Currently at this time patient has been on antibiotic therapy for presumptive atypical pneumonia and bronchitis.  Patient has been on Augmentin for day 5/ 10.  Patient presents back to the emergency department with complaint of recurrent hypoxic episodes with exertion.  States has sats have been running in the low 70s to 80s.  She denies chest pain, no fever.  Patient on arrival found at 5 L nasal cannula with sats 92%.  Did have scant occasional wheeze.  Given Solu-Medrol her EMS prior to arrival as well as dual neb. patient workup in emergency department found with significant interval changes for elevated BNP at 366 up from 26 one-week ago and also troponin 57.8 up from 3.6.  Chest x-ray shows no acute infiltrates and no significant interval changes.  Patient found with procalcitonin 0.173.  Cultures obtained.  White count 5000.  EKG without significant ischemic changes, normal sinus rhythm, 90.  At this time patient will be admitted for COPD exacerbation however will need further evaluation for possible NSTEMI versus congestive heart failure.  Will be admitted for 2D echo, serial cardiac enzyme monitoring.  Also will continue to treat for COPD.  Patient remained hemodynamically adequate.  Did  "have low blood pressure of 90s over 60.  Patient was given 500 bolus of LR then placed on 130 cc an hour for a total of 1 L. currently awaiting admission to Hospital Medicine. [RM]      ED Course User Index  [RM] Donovan Donaldson MD               Medical Decision Making:   Initial Assessment:   74-year-old female with history of degenerative disc disease, gastroesophageal reflux, anxiety, depression, hypertension, hyperlipidemia, legal blindness, osteoarthritis, rheumatoid arthritis.  Patient with COPD on intermittent oxygen at 2 L nasal cannula.  Recently treated for COPD exacerbation with bronchiectasis.  Currently on Augmentin over last 5 days.  Patient diagnosed with bronchitis/atypical pneumonia and COPD exacerbation.  Was seen emergency department on 8/62024.  CT chest at that time showed emphysematous changes and findings consistent with pneumonia.  Patient was discharged home with close follow-up.  She returns back to the emergency department today with persistent wheezing, shortness of breath and exertional hypoxia.  Patient states that she has had several episodes of dropping her sats throughout the day in which has sats goes into the high 70s mid 80s.  This is associated with generalized weakness and shortness of breath.  Patient denies chest pain, no fever, does have slightly productive clears cough.     Differential Diagnosis:   COPD exacerbation, CHF, viral syndrome, pneumonia,  Clinical Tests:   Lab Tests: Ordered and Reviewed  Radiological Study: Ordered and Reviewed  Medical Tests: Ordered and Reviewed  Sepsis Perfusion Assessment: "I attest a sepsis perfusion exam was performed within 6 hours of sepsis, severe sepsis, or septic shock presentation, following fluid resuscitation."             Clinical Impression:  Final diagnoses:  [J44.1] COPD exacerbation (Primary)  [R79.89] Elevated brain natriuretic peptide (BNP) level  [R79.89] Elevated troponin          ED Disposition Condition    Observation  "                Donovan Donaldson MD  08/11/24 0840

## 2024-08-11 NOTE — HPI
74-year-old female with history of degenerative disc disease, gastroesophageal reflux, anxiety, depression, hypertension, hyperlipidemia, legal blindness, osteoarthritis, rheumatoid arthritis, COPD on intermittent oxygen at 2 L nasal cannula.  Recently treated for COPD exacerbation with bronchiectasis.  Currently on Augmentin over last 5 days.  Patient diagnosed with bronchitis/atypical pneumonia and COPD exacerbation after being seen  in the emergency department on 8/6/2024. CT chest at that time showed emphysematous changes and findings consistent with pneumonia. She presents to the emergency department today with persistent wheezing, shortness of breath and exertional hypoxia.  In the Emergency room, recurrent hypoxic episodes with exertion. States has sats have been running in the low 70s to 80s. .  Patient on 5L NC, Duo neb, Solumedrol given in route by EMS, elevated BNP at 366, troponin 57.8 up from 3.6. Chest x-ray shows no acute infiltrates and no significant interval changes. Procalcitonin 0.173. Cultures sent. White count 5000. EKG without significant ischemic changes, normal sinus rhythm, 90. Admit to hospital medicine for COPD exacerbation and concerning for ACS, possible NSTEMI versus congestive heart failure.  2D echo, serial cardiac enzyme monitoring. Hypotensive BP 90s over 60. Patient was given 500 bolus of LR then placed on 130 cc an hour for a total of 1 L. currently

## 2024-08-11 NOTE — PROGRESS NOTES
"Pharmacokinetic Assessment: IV Vancomycin     Vancomycin Day # 1    Venkat Lara is a 74 y.o. female on antimicrobial therapy with IV Vancomycin.    Indication & Goal: Lower Respiratory Infection - Trough 10 - 15    Patient Labs  80 kg (176 lb 5.9 oz)  Recent Labs   Lab 08/06/24  1818 08/11/24  1325   WBC 5.29 5.71   CREATININE 0.8 0.7    Estimated Creatinine Clearance: 72.1 mL/min (based on SCr of 0.7 mg/dL).    Dialysis N/A    Drug levels (last 3 results):  No results for input(s): "VANCOMYCINRA", "VANCOMYCINTR", "VANCOTROUGH" in the last 2160 hours.    Assessment / Plan:  1.5g x1 8/11 @ 23:00 then 1g q12h    - Vancomycin Trough due on 8/13 at 10:00    Pharmacy will continue to follow and monitor vancomycin.    Please contact pharmacy at extension [phone] for questions regarding this assessment.    Thank you for this consult,  Kyree Fox, PharmD 08/11/2024 6:15 PM    "

## 2024-08-11 NOTE — CARE UPDATE
08/11/24 1518   Patient Assessment/Suction   Level of Consciousness (AVPU) alert   Respiratory Effort Normal;Unlabored   Expansion/Accessory Muscles/Retractions no use of accessory muscles   All Lung Fields Breath Sounds diminished;clear  (coarse after resp tx)   Rhythm/Pattern, Respiratory unlabored   Cough Frequency frequent   Cough Type congested   PRE-TX-O2   Device (Oxygen Therapy) nasal cannula   Flow (L/min) (Oxygen Therapy) 5   SpO2 (!) 92 %   Pulse Oximetry Type Continuous   Pulse 82   Resp (!) 25   Aerosol Therapy   $ Aerosol Therapy Charges Aerosol Treatment   Daily Review of Necessity (SVN) completed   Respiratory Treatment Status (SVN) given   Treatment Route (SVN) mouthpiece utilized;oxygen   Patient Position HOB elevated   Post Treatment Assessment (SVN) increased aeration   Signs of Intolerance (SVN) none   Breath Sounds Post-Respiratory Treatment   Throughout All Fields Post-Treatment All Fields   Throughout All Fields Post-Treatment aeration increased   Post-treatment Heart Rate (beats/min) 77   Post-treatment Resp Rate (breaths/min) 22   Tobacco Cessation Intervention   Do you use any type of tobacco product? No   Respiratory Evaluation   $ Care Plan Tech Time 15 min   $ Respiratory Evaluation Complete   Evaluation For New Orders   Admitting Diagnosis shortness of breath   Cardiac Diagnosis htn   Pulmonary Diagnosis chronic resp failure, chronic pulmonary aspiration, copd, rafa   Current Surgeries femur surgery 8/23   Home Oxygen   Has Home Oxygen? Yes   Liter Flow 2   Duration with sleep   Route nasal cannula   Mode continuous   Device home concentrator   Home Aerosol, MDI, DPI, and Other Treatments/Therapies   Home Respiratory Therapy Per Patient/Review of Chart Yes   Aerosol Home Meds/Freq albuterol q4prn   MDI Home Meds/Freq albuterol q6prn, stiolto respimat qd   Other Home Respiratory Therapies cpap   Oxygen Care Plan   Oxygen Care Plan Per Protocol   SPO2 Goal (%) 92% non-cardiac    Rationale Maintain Home oxygen   Bronchodilator Care Plan   Bronchodilator Care Plan Aerosol   Rationale Maintain home respiratory medicine   Atelectasis Care Plan   Rationale No Rational Found   Airway Clearance Care Plan   Rationale No rationale found

## 2024-08-11 NOTE — H&P
Counts include 234 beds at the [facility] - Emergency Dept  Hospital Medicine  History & Physical    Patient Name: Venkat Lara  MRN: [id number]  Patient Class: OP- Observation  Admission Date: 8/11/2024  Attending Physician: Roula Ayoub MD   Primary Care Provider: Holli Hector         Patient information was obtained from patient, spouse/SO, past medical records, and ER records.     Subjective:     Principal Problem:Acute on chronic respiratory failure with hypoxia    Chief Complaint:   Chief Complaint   Patient presents with    Shortness of Breath     States more short of breath than normal, duoned enroute to the hospital with improvement         HPI: 74-year-old female with history of degenerative disc disease, gastroesophageal reflux, anxiety, depression, hypertension, hyperlipidemia, legal blindness, osteoarthritis, rheumatoid arthritis, COPD on intermittent oxygen at 2 L nasal cannula.  Recently treated for COPD exacerbation with bronchiectasis.  Currently on Augmentin over last 5 days.  Patient diagnosed with bronchitis/atypical pneumonia and COPD exacerbation after being seen  in the emergency department on 8/6/2024. CT chest at that time showed emphysematous changes and findings consistent with pneumonia. She presents to the emergency department today with persistent wheezing, shortness of breath and exertional hypoxia.  In the Emergency room, recurrent hypoxic episodes with exertion. States has sats have been running in the low 70s to 80s. .  Patient on 5L NC, Duo neb, Solumedrol given in route by EMS, elevated BNP at 366, troponin 57.8 up from 3.6. Chest x-ray shows no acute infiltrates and no significant interval changes. Procalcitonin 0.173. Cultures sent. White count 5000. EKG without significant ischemic changes, normal sinus rhythm, 90. Admit to hospital medicine for COPD exacerbation and concerning for ACS, possible NSTEMI versus congestive heart failure.  2D echo, serial cardiac enzyme monitoring.  Hypotensive BP 90s over 60. Patient was given 500 bolus of LR then placed on 130 cc an hour for a total of 1 L. currently        Past Medical History:   Diagnosis Date    Colon polyp 01/01/2013    COPD, moderate 01/01/2010    was told by Dr. 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    Lumbar spinal stenosis     Oxygen dependent 01/01/2011    2L NC Nightly    Primary osteoarthritis of first carpometacarpal joints, bilateral     Rheumatoid arthritis     Scoliosis 01/01/2014    Spinal stenosis 01/01/2014    upper and lowerr back - tingling in left arm at times       Past Surgical History:   Procedure Laterality Date    BREAST SURGERY  2008    Reduction    BUNIONECTOMY Bilateral 01/1999    CARDIOVASCULAR STRESS TEST  2013    CARPAL TUNNEL RELEASE Left 10/08/2015    CATARACT EXTRACTION BILATERAL W/ ANTERIOR VITRECTOMY  04/2010    [facility] ARTHROPLASTY, RIGHT  02/23/2004    COLONOSCOPY  04/18/2013    CORRECTION OF HAMMER TOE Left 8/14/2019    Procedure: CORRECTION, HAMMER TOE;  Surgeon: Jackson Truong DPM;  Location: [facility];  Service: Podiatry;  Laterality: Left;    ESOPHAGOGASTRODUODENOSCOPY  12/21/2012    2012-with dilation #1, 2013 #2    HAND SURGERY  2003    JOINT REPLACEMENT      OPEN REDUCTION AND INTERNAL FIXATION (ORIF) OF FRACTURE OF FEMUR USING DYNAMIC HIP SCREW Left 8/22/2023    Procedure: ORIF, HIP, USING DYNAMIC HIP SCREW;  Surgeon: Hussein Romo MD;  Location: [facility] OR;  Service: Orthopedics;  Laterality: Left;    REMOVAL OF HARDWARE FROM LOWER EXTREMITY Left  8/14/2019    Procedure: REMOVAL, HARDWARE, LOWER EXTREMITY;  Surgeon: Jackson Brannon DPM;  Location: [facility] OR;  Service: Podiatry;  Laterality: Left;    RHINOPLASTY TIP  02/2010    SURGICAL REMOVAL OF BUNION WITH OSTEOTOMY OF METATARSAL BONE Left 8/14/2019    Procedure: BUNIONECTOMY, WITH METATARSAL OSTEOTOMY;  Surgeon: Jackson Brannon DPM;  Location: [facility] OR;  Service: Podiatry;  Laterality: Left;  Arthrodesis 2nd PIP joint, screw 1st toe, C-arm, synthes-Merrill, 3.0, 4.0, AO modular set MERRILL BRUNSON NOTIFIED    TONSILLECTOMY, ADENOIDECTOMY  1970    TOTAL KNEE ARTHROPLASTY Right 12/2008    TOTAL KNEE ARTHROPLASTY Left 03/2015    TOTAL REDUCTION MAMMOPLASTY  01/2008    TUBAL LIGATION      VAGINAL DELIVERY  1983    x2       Review of patient's allergies indicates:   Allergen Reactions    Statins-hmg-coa reductase inhibitors Other (See Comments)     Muscle cramps    Doxycycline Other (See Comments)     Stops gallbladder function    Adhesive Other (See Comments)     bruises    Erythromycin Other (See Comments)     Stomach pain       No current facility-administered medications on file prior to encounter.     Current Outpatient Medications on File Prior to Encounter   Medication Sig    albuterol (PROVENTIL) 2.5 mg /3 mL (0.083 %) nebulizer solution Take 2.5 mg by nebulization every 4 (four) hours as needed for Wheezing. Rescue    albuterol (PROVENTIL/VENTOLIN HFA) 90 mcg/actuation inhaler Inhale 2 puffs into the lungs every 6 (six) hours as needed for Wheezing or Shortness of Breath.    amoxicillin-clavulanate 875-125mg (AUGMENTIN) 875-125 mg per tablet Take 1 tablet by mouth 2 (two) times daily. for 10 days    benzonatate (TESSALON) 200 MG capsule Take 200 mg by mouth 2 (two) times daily as needed for Cough.    buPROPion (WELLBUTRIN XL) 300 MG 24 hr tablet Take 300 mg by mouth every evening.     cholecalciferol, vitamin D3, 10 mcg (400 unit) Cap capsule Take 400 Units by mouth once daily.    cyclobenzaprine  (FLEXERIL) 10 MG tablet Take 10 mg by mouth every evening.    eszopiclone (LUNESTA) 3 mg Tab Take 3 mg by mouth nightly as needed (Insomnia).    ezetimibe (ZETIA) 10 mg tablet Take 10 mg by mouth once daily.    gabapentin (NEURONTIN) 400 MG capsule Take 400 mg by mouth 2 (two) times daily as needed (Pain).    garlic 1,000 mg Cap Take 1,000 mg by mouth once daily.    guaiFENesin (MUCINEX) 600 mg 12 hr tablet Take 1,200 mg by mouth 2 (two) times daily.    indomethacin (INDOCIN) 50 MG capsule Take 50 mg by mouth 2 (two) times daily as needed (Flare Up).    MYRBETRIQ 50 mg Tb24 Take 1 tablet by mouth once daily.    omega 3-dha-epa-fish oil 1,000 mg (120 mg-180 mg) Cap Take 1 capsule by mouth once daily.    RESTASIS 0.05 % ophthalmic emulsion Place 1 drop into both eyes 2 (two) times daily.    telmisartan (MICARDIS) 80 MG Tab Take 80 mg by mouth every evening.    tiotropium-olodateroL (STIOLTO RESPIMAT) 2.5-2.5 mcg/actuation Mist Inhale 1 puff into the lungs once daily. Controller     UNABLE TO FIND Take 1 tablet by mouth once daily. BONE BOOST    verapamil (VERELAN) 240 MG C24P Take 240 mg by mouth 2 (two) times daily.      Family History       Problem Relation (Age of Onset)    Cancer Father          Tobacco Use    Smoking status: Former     Current packs/day: 0.00     Average packs/day: 2.0 packs/day for 33.0 years (66.0 ttl pk-yrs)     Types: Cigarettes     Start date: 1964     Quit date: 1997     Years since quittin.2    Smokeless tobacco: Former     Quit date: 1997   Substance and Sexual Activity    Alcohol use: Yes     Alcohol/week: 4.0 standard drinks of alcohol     Types: 4 Glasses of wine per week     Comment: monthly    Drug use: Never    Sexual activity: Not on file     Review of Systems   Constitutional:  Positive for fatigue.   Respiratory:  Positive for cough, shortness of breath and wheezing.    Cardiovascular: Negative.    Gastrointestinal: Negative.    Genitourinary: Negative.     Musculoskeletal: Negative.    Neurological: Negative.      Objective:     Vital Signs (Most Recent):  Temp: 98.3 °F (36.8 °C) (08/11/24 1252)  Pulse: 83 (08/11/24 1541)  Resp: 14 (08/11/24 1541)  BP: (!) 100/52 (08/11/24 1542)  SpO2: (!) 94 % (08/11/24 1541) Vital Signs (24h Range):  Temp:  [98.3 °F (36.8 °C)] 98.3 °F (36.8 °C)  Pulse:  [] 83  Resp:  [14-25] 14  SpO2:  [92 %-97 %] 94 %  BP: ()/(51-62) 100/52     Weight: 80 kg (176 lb 5.9 oz)  Body mass index is 30.27 kg/m².     Physical Exam  Vitals reviewed.   Constitutional:       General: She is not in acute distress.     Appearance: Normal appearance. She is not ill-appearing.   HENT:      Head: Normocephalic and atraumatic.      Mouth/Throat:      Mouth: Mucous membranes are moist.   Eyes:      Pupils: Pupils are equal, round, and reactive to light.   Cardiovascular:      Rate and Rhythm: Normal rate and regular rhythm.      Pulses: Normal pulses.      Heart sounds: No murmur heard.  Pulmonary:      Breath sounds: Decreased air movement present. Examination of the right-upper field reveals wheezing and rales. Examination of the left-upper field reveals wheezing and rales. Examination of the right-middle field reveals wheezing. Examination of the left-middle field reveals wheezing. Examination of the right-lower field reveals decreased breath sounds. Decreased breath sounds, wheezing and rales present.   Abdominal:      General: Bowel sounds are normal. There is no distension.      Palpations: Abdomen is soft.      Tenderness: There is no abdominal tenderness.   Musculoskeletal:         General: No swelling. Normal range of motion.      Right lower leg: No edema.      Left lower leg: No edema.   Skin:     General: Skin is warm and dry.      Capillary Refill: Capillary refill takes less than 2 seconds.   Neurological:      General: No focal deficit present.      Mental Status: She is alert.      GCS: GCS eye subscore is 4. GCS verbal subscore is 5.  GCS motor subscore is 6.   Psychiatric:         Mood and Affect: Mood normal.              CRANIAL NERVES     CN III, IV, VI   Pupils are equal, round, and reactive to light.       Significant Labs: All pertinent labs within the past 24 hours have been reviewed.  Recent Lab Results         08/11/24  1329   08/11/24  1325   08/11/24  1323   08/11/24  1322        Influenza A, Molecular Negative             Influenza B, Molecular Negative             Procalcitonin   0.173  Comment: The Procalcitonin test is intended to aid in the risk assessment of   critically ill patients on their first day of ICU admission for   progression   to severe sepsis and septic shock.    A result of <0.50 ng/mL is associated with a low risk of severe   sepsis   and/or septic shock.  This does not exclude localized infection.    A result between 0.50 ng/mL and 2.0 ng/mL should be interpreted with   consideration of the patient's history and is recommended to retest   within 6   to 24 hours.        A result of >2.0 ng/mL is associated with a high risk of severe   sepsis   and/or septic shock.    Procalcitonin may not be accurate among patients with   localized  infection, recent trauma or major surgery, immunosuppressed   state,  invasive fungal infection, renal dysfunction. Decisions   regarding  initiation or continuation of antibiotic therapy should not be   based  solely on procalcitonin levels.             Albumin   3.3           ALP   60           Allens Test       N/A       ALT   18           Anion Gap   8           AST   20           Baso #   0.02           Basophil %   0.4           BILIRUBIN TOTAL   0.6  Comment: For infants and newborns, interpretation of results should be based  on gestational age, weight and in agreement with clinical  observations.    Premature Infant recommended reference ranges:  Up to 24 hours.............<8.0 mg/dL  Up to 48 hours............<12.0 mg/dL  3-5 days..................<15.0 mg/dL  6-29  days.................<15.0 mg/dL             BNP   366  Comment: Values of less than 100 pg/ml are consistent with non-CHF populations.           Site       Other       BUN   27           Calcium   9.1           Chloride   104           CO2   24           Creatinine   0.7           Differential Method   Automated           eGFR   >60.0           Eos #   0.2           Eos %   2.8           Flu A & B Source NP             Glucose   119           Gran # (ANC)   4.0           Gran %   70.7           Hematocrit   41.1           Hemoglobin   13.8           Immature Grans (Abs)   0.01  Comment: Mild elevation in immature granulocytes is non specific and   can be seen in a variety of conditions including stress response,   acute inflammation, trauma and pregnancy. Correlation with other   laboratory and clinical findings is essential.             Immature Granulocytes   0.2           Lymph #   1.2           Lymph %   21.0           Magnesium    1.7           MCH   33.6           MCHC   33.6           MCV   100           Mono #   0.3           Mono %   4.9           MPV   8.4           nRBC   0           Platelet Count   224           POC BE       -2       POC HCO3       22.8       POC Lactate     0.78         POC PCO2       35.0       POC PH       7.423       POC PO2       61       POC SATURATED O2       92       POC TCO2       24       Potassium   4.0           PROTEIN TOTAL   6.2           RBC   4.11           RDW   13.0           Sample     VENOUS   VENOUS       Sodium   136           Troponin I High Sensitivity   57.8  Comment: Troponin results differ between methods. Do not use   results between Troponin methods interchangeably.    Alkaline Phospatase levels above 400 U/L may   cause false positive results.    Access hsTnI should not be used for patients taking   Asfotase zoraida (Strensiq).  Critical result TNIHS 57.8 pg/mL called to and read back by Smita Morocho  RN/ED at 11-Aug-2024 14:24 by [facility].              WBC   5.71                   Significant Imaging: I have reviewed all pertinent imaging results/findings within the past 24 hours.  Imaging Results              X-Ray Chest AP Portable (Final result)  Result time 08/11/24 14:15:49      Final result by Merrill Pereira MD (08/11/24 14:15:49)                   Impression:      No significant change.      Electronically signed by: Merrill Pereira  Date:    08/11/2024  Time:    14:15               Narrative:    EXAMINATION:  XR CHEST AP PORTABLE    CLINICAL HISTORY:  Sepsis;    FINDINGS:  Portable chest at 1333 compared with 08/06/2024 shows normal cardiomediastinal silhouette.    Slightly prominent interstitial markings bilaterally have not significantly changed from the prior CTA.  No new confluent alveolar consolidation, pleural effusion, or pneumothorax.  Pulmonary vasculature is normal. No acute osseous abnormality.                                      Assessment/Plan:     * Acute on chronic respiratory failure with hypoxia  Flu negative   COVID ordered, resp viral panel ordered  Supplemental oxygen   COPD pathway initiated  Chest xray: Slightly prominent interstitial markings bilaterally have not significantly changed from the prior CTA. No new confluent alveolar consolidation, pleural effusion, or pneumothorax. Pulmonary vasculature is normal. No acute osseous abnormality.   CT Chest: 8/6/24: Intermittent opacification of bibasilar subsegmental airways which may relate to mucous plugging, secretions, and/or aspiration. Emphysematous changes of the lungs.  Bilateral ground-glass opacities with an upper lung zone predominance.     Patient with Hypoxic Respiratory failure which is Acute on chronic.  she is on home oxygen at 2 LPM. Supplemental oxygen was provided and noted-  patient on 5L NC oxygen in ER.     .   Signs/symptoms of respiratory failure include- use of accessory muscles, wheezing, and cyanosis. Contributing diagnoses includes - COPD Labs and images were  reviewed. Patient Has recent ABG, which has been reviewed. Will treat underlying causes and adjust management of respiratory failure as follows-     Acute exacerbation of chronic obstructive pulmonary disease (COPD)  Patient's COPD is with exacerbation noted by continued dyspnea and worsening of baseline hypoxia currently.  Patient is currently on COPD Pathway. Continue scheduled inhalers Steroids, Antibiotics, and Supplemental oxygen and monitor respiratory status closely.     Pneumonia  Patient has a diagnosis of pneumonia. The cause of the pneumonia is suspected to be bacterial in etiology but organism is not known. The pneumonia is stable. The patient has the following signs/symptoms of pneumonia: persistent hypoxia , cough, and shortness of breath. The patient does have a current oxygen requirement and the patient does have a home oxygen requirement. I have reviewed the pertinent imaging. The following cultures have been collected: Blood cultures The culture results are listed below.     Current antimicrobial regimen consists of the antibiotics listed below. Will monitor patient closely and stable    Antibiotics (From admission, onward)      Start     Stop Route Frequency Ordered    08/11/24 2100  amoxicillin-clavulanate 875-125mg per tablet 1 tablet         -- Oral 2 times daily 08/11/24 1656            Microbiology Results (last 7 days)       Procedure Component Value Units Date/Time    Respiratory Infection Panel (PCR), Nasopharyngeal [[id number]]     Order Status: No result Specimen: Nasopharyngeal Swab     Blood culture x two cultures. Draw prior to antibiotics. [[id number]] Collected: 08/11/24 1321    Order Status: Sent Specimen: Blood Updated: 08/11/24 1342    Blood culture x two cultures. Draw prior to antibiotics. [[id number]] Collected: 08/11/24 1328    Order Status: Sent Specimen: Blood Updated: 08/11/24 1340            Elevated brain natriuretic peptide (BNP) level  With elevated troponin. Concern  for ACS, NSTEMI, or CHF.   mg po given in ER    , baseline 26  Troponin I high sensitivity 57.8, baseline 3.6  EKG prn  Echo ordered  Tele monitoring.        Hypotension  SBP 90s.   IV fluids 1L LR, LR @ 130 ml/h  Midodrine 5mg po x1 ordered  Hold antihypertensives      VTE Risk Mitigation (From admission, onward)           Ordered     IP VTE HIGH RISK PATIENT  Once         08/11/24 1613     Place sequential compression device  Until discontinued         08/11/24 1613                         On 08/11/2024, patient should be placed in hospital observation services under my care in collaboration with Dr. Ayoub.           Graciela Diaz, NP  Department of Hospital Medicine  Formerly [facility] - Emergency Dept

## 2024-08-11 NOTE — ASSESSMENT & PLAN NOTE
Flu negative   COVID ordered, resp viral panel ordered  Supplemental oxygen   COPD pathway initiated  Chest xray: Slightly prominent interstitial markings bilaterally have not significantly changed from the prior CTA. No new confluent alveolar consolidation, pleural effusion, or pneumothorax. Pulmonary vasculature is normal. No acute osseous abnormality.   CT Chest: 8/6/24: Intermittent opacification of bibasilar subsegmental airways which may relate to mucous plugging, secretions, and/or aspiration. Emphysematous changes of the lungs.  Bilateral ground-glass opacities with an upper lung zone predominance.     Patient with Hypoxic Respiratory failure which is Acute on chronic.  she is on home oxygen at 2 LPM. Supplemental oxygen was provided and noted-  patient on 5L NC oxygen in ER.     .   Signs/symptoms of respiratory failure include- use of accessory muscles, wheezing, and cyanosis. Contributing diagnoses includes - COPD Labs and images were reviewed. Patient Has recent ABG, which has been reviewed. Will treat underlying causes and adjust management of respiratory failure as follows-

## 2024-08-11 NOTE — ASSESSMENT & PLAN NOTE
With elevated troponin. Concern for ACS, NSTEMI, or CHF.   mg po given in ER    , baseline 26  Troponin I high sensitivity 57.8, baseline 3.6  EKG prn  Echo ordered  Tele monitoring.

## 2024-08-12 ENCOUNTER — CLINICAL SUPPORT (OUTPATIENT)
Dept: CARDIOLOGY | Facility: HOSPITAL | Age: 74
DRG: 189 | End: 2024-08-12
Attending: EMERGENCY MEDICINE
Payer: MEDICARE

## 2024-08-12 VITALS — HEIGHT: 64 IN | BODY MASS INDEX: 30.41 KG/M2 | WEIGHT: 178.13 LBS

## 2024-08-12 LAB
ANION GAP SERPL CALC-SCNC: 8 MMOL/L (ref 8–16)
BACTERIA SPEC AEROBE CULT: NORMAL
BASOPHILS # BLD AUTO: 0.01 K/UL (ref 0–0.2)
BASOPHILS NFR BLD: 0.3 % (ref 0–1.9)
BUN SERPL-MCNC: 29 MG/DL (ref 8–23)
CALCIUM SERPL-MCNC: 9.4 MG/DL (ref 8.7–10.5)
CHLORIDE SERPL-SCNC: 103 MMOL/L (ref 95–110)
CO2 SERPL-SCNC: 24 MMOL/L (ref 23–29)
CREAT SERPL-MCNC: 0.7 MG/DL (ref 0.5–1.4)
DIFFERENTIAL METHOD BLD: ABNORMAL
EOSINOPHIL # BLD AUTO: 0 K/UL (ref 0–0.5)
EOSINOPHIL NFR BLD: 0 % (ref 0–8)
ERYTHROCYTE [DISTWIDTH] IN BLOOD BY AUTOMATED COUNT: 12.9 % (ref 11.5–14.5)
EST. GFR  (NO RACE VARIABLE): >60 ML/MIN/1.73 M^2
GLUCOSE SERPL-MCNC: 159 MG/DL (ref 70–110)
GRAM STN SPEC: NORMAL
HCT VFR BLD AUTO: 37.7 % (ref 37–48.5)
HGB BLD-MCNC: 12.7 G/DL (ref 12–16)
IMM GRANULOCYTES # BLD AUTO: 0.01 K/UL (ref 0–0.04)
IMM GRANULOCYTES NFR BLD AUTO: 0.3 % (ref 0–0.5)
LYMPHOCYTES # BLD AUTO: 0.4 K/UL (ref 1–4.8)
LYMPHOCYTES NFR BLD: 11.2 % (ref 18–48)
MAGNESIUM SERPL-MCNC: 1.9 MG/DL (ref 1.6–2.6)
MCH RBC QN AUTO: 33.9 PG (ref 27–31)
MCHC RBC AUTO-ENTMCNC: 33.7 G/DL (ref 32–36)
MCV RBC AUTO: 101 FL (ref 82–98)
MONOCYTES # BLD AUTO: 0.1 K/UL (ref 0.3–1)
MONOCYTES NFR BLD: 2.1 % (ref 4–15)
MRSA SCREEN BY PCR: POSITIVE
NEUTROPHILS # BLD AUTO: 2.9 K/UL (ref 1.8–7.7)
NEUTROPHILS NFR BLD: 86.1 % (ref 38–73)
NRBC BLD-RTO: 0 /100 WBC
PHOSPHATE SERPL-MCNC: 3.1 MG/DL (ref 2.7–4.5)
PLATELET # BLD AUTO: 204 K/UL (ref 150–450)
PMV BLD AUTO: 8.6 FL (ref 9.2–12.9)
POTASSIUM SERPL-SCNC: 4.3 MMOL/L (ref 3.5–5.1)
RBC # BLD AUTO: 3.75 M/UL (ref 4–5.4)
SODIUM SERPL-SCNC: 135 MMOL/L (ref 136–145)
TROPONIN I SERPL HS-MCNC: 16.5 PG/ML (ref 0–14.9)
TROPONIN I SERPL HS-MCNC: 30.4 PG/ML (ref 0–14.9)
WBC # BLD AUTO: 3.39 K/UL (ref 3.9–12.7)

## 2024-08-12 PROCEDURE — 25000242 PHARM REV CODE 250 ALT 637 W/ HCPCS: Performed by: HOSPITALIST

## 2024-08-12 PROCEDURE — 93306 TTE W/DOPPLER COMPLETE: CPT | Mod: 26,,, | Performed by: INTERNAL MEDICINE

## 2024-08-12 PROCEDURE — 87205 SMEAR GRAM STAIN: CPT | Performed by: NURSE PRACTITIONER

## 2024-08-12 PROCEDURE — 25000242 PHARM REV CODE 250 ALT 637 W/ HCPCS: Performed by: NURSE PRACTITIONER

## 2024-08-12 PROCEDURE — 84484 ASSAY OF TROPONIN QUANT: CPT | Performed by: NURSE PRACTITIONER

## 2024-08-12 PROCEDURE — 84100 ASSAY OF PHOSPHORUS: CPT | Performed by: NURSE PRACTITIONER

## 2024-08-12 PROCEDURE — 93306 TTE W/DOPPLER COMPLETE: CPT

## 2024-08-12 PROCEDURE — 25000003 PHARM REV CODE 250: Performed by: HOSPITALIST

## 2024-08-12 PROCEDURE — 85025 COMPLETE CBC W/AUTO DIFF WBC: CPT | Performed by: NURSE PRACTITIONER

## 2024-08-12 PROCEDURE — 87641 MR-STAPH DNA AMP PROBE: CPT | Performed by: NURSE PRACTITIONER

## 2024-08-12 PROCEDURE — 63600175 PHARM REV CODE 636 W HCPCS: Performed by: HOSPITALIST

## 2024-08-12 PROCEDURE — 25000003 PHARM REV CODE 250: Performed by: NURSE PRACTITIONER

## 2024-08-12 PROCEDURE — 87070 CULTURE OTHR SPECIMN AEROBIC: CPT | Performed by: NURSE PRACTITIONER

## 2024-08-12 PROCEDURE — 5A09357 ASSISTANCE WITH RESPIRATORY VENTILATION, LESS THAN 24 CONSECUTIVE HOURS, CONTINUOUS POSITIVE AIRWAY PRESSURE: ICD-10-PCS | Performed by: HOSPITALIST

## 2024-08-12 PROCEDURE — 21400001 HC TELEMETRY ROOM

## 2024-08-12 PROCEDURE — 80048 BASIC METABOLIC PNL TOTAL CA: CPT | Performed by: NURSE PRACTITIONER

## 2024-08-12 PROCEDURE — 94640 AIRWAY INHALATION TREATMENT: CPT | Mod: XB

## 2024-08-12 PROCEDURE — 36415 COLL VENOUS BLD VENIPUNCTURE: CPT | Performed by: NURSE PRACTITIONER

## 2024-08-12 PROCEDURE — 94761 N-INVAS EAR/PLS OXIMETRY MLT: CPT

## 2024-08-12 PROCEDURE — 99223 1ST HOSP IP/OBS HIGH 75: CPT | Mod: ,,, | Performed by: STUDENT IN AN ORGANIZED HEALTH CARE EDUCATION/TRAINING PROGRAM

## 2024-08-12 PROCEDURE — 84484 ASSAY OF TROPONIN QUANT: CPT | Mod: 91 | Performed by: NURSE PRACTITIONER

## 2024-08-12 PROCEDURE — 63600175 PHARM REV CODE 636 W HCPCS: Performed by: NURSE PRACTITIONER

## 2024-08-12 PROCEDURE — 27000221 HC OXYGEN, UP TO 24 HOURS

## 2024-08-12 PROCEDURE — 83735 ASSAY OF MAGNESIUM: CPT | Performed by: NURSE PRACTITIONER

## 2024-08-12 PROCEDURE — 99900031 HC PATIENT EDUCATION (STAT)

## 2024-08-12 PROCEDURE — 27000207 HC ISOLATION

## 2024-08-12 RX ORDER — MUPIROCIN 20 MG/G
OINTMENT TOPICAL 2 TIMES DAILY
Status: DISCONTINUED | OUTPATIENT
Start: 2024-08-12 | End: 2024-08-13 | Stop reason: HOSPADM

## 2024-08-12 RX ORDER — CHLORHEXIDINE GLUCONATE ORAL RINSE 1.2 MG/ML
15 SOLUTION DENTAL 2 TIMES DAILY
Status: DISCONTINUED | OUTPATIENT
Start: 2024-08-12 | End: 2024-08-13 | Stop reason: HOSPADM

## 2024-08-12 RX ADMIN — OXYBUTYNIN CHLORIDE 5 MG: 5 TABLET, EXTENDED RELEASE ORAL at 08:08

## 2024-08-12 RX ADMIN — ARFORMOTEROL TARTRATE 15 MCG: 15 SOLUTION RESPIRATORY (INHALATION) at 07:08

## 2024-08-12 RX ADMIN — IPRATROPIUM BROMIDE AND ALBUTEROL SULFATE 3 ML: 2.5; .5 SOLUTION RESPIRATORY (INHALATION) at 02:08

## 2024-08-12 RX ADMIN — EZETIMIBE 10 MG: 10 TABLET ORAL at 08:08

## 2024-08-12 RX ADMIN — GUAIFENESIN 1200 MG: 600 TABLET, EXTENDED RELEASE ORAL at 09:08

## 2024-08-12 RX ADMIN — CARBOXYMETHYLCELLULOSE SODIUM 2 DROP: 5 SOLUTION/ DROPS OPHTHALMIC at 08:08

## 2024-08-12 RX ADMIN — PREDNISONE 40 MG: 20 TABLET ORAL at 08:08

## 2024-08-12 RX ADMIN — ACETAMINOPHEN 650 MG: 325 TABLET ORAL at 11:08

## 2024-08-12 RX ADMIN — CHOLECALCIFEROL (VITAMIN D3) 10 MCG (400 UNIT) TABLET 400 UNITS: at 08:08

## 2024-08-12 RX ADMIN — IPRATROPIUM BROMIDE AND ALBUTEROL SULFATE 3 ML: 2.5; .5 SOLUTION RESPIRATORY (INHALATION) at 07:08

## 2024-08-12 RX ADMIN — PIPERACILLIN SODIUM AND TAZOBACTAM SODIUM 3.38 G: 3; .375 INJECTION, POWDER, LYOPHILIZED, FOR SOLUTION INTRAVENOUS at 04:08

## 2024-08-12 RX ADMIN — BENZONATATE 200 MG: 100 CAPSULE ORAL at 11:08

## 2024-08-12 RX ADMIN — PIPERACILLIN SODIUM AND TAZOBACTAM SODIUM 3.38 G: 3; .375 INJECTION, POWDER, LYOPHILIZED, FOR SOLUTION INTRAVENOUS at 11:08

## 2024-08-12 RX ADMIN — MUPIROCIN 1 G: 20 OINTMENT TOPICAL at 09:08

## 2024-08-12 RX ADMIN — GUAIFENESIN 1200 MG: 600 TABLET, EXTENDED RELEASE ORAL at 08:08

## 2024-08-12 RX ADMIN — VANCOMYCIN HYDROCHLORIDE 1000 MG: 1 INJECTION, POWDER, LYOPHILIZED, FOR SOLUTION INTRAVENOUS at 06:08

## 2024-08-12 RX ADMIN — BUPROPION HYDROCHLORIDE 300 MG: 150 TABLET, EXTENDED RELEASE ORAL at 09:08

## 2024-08-12 RX ADMIN — PIPERACILLIN SODIUM AND TAZOBACTAM SODIUM 3.38 G: 3; .375 INJECTION, POWDER, LYOPHILIZED, FOR SOLUTION INTRAVENOUS at 02:08

## 2024-08-12 NOTE — ASSESSMENT & PLAN NOTE
Patient's COPD is with exacerbation noted by continued dyspnea and worsening of baseline hypoxia currently.  Patient is currently on COPD Pathway. Continue scheduled inhalers Steroids, Antibiotics, and Supplemental oxygen and monitor respiratory status closely.   -continue broad-spectrum Abx for now, MRSA swab pending   -sputum culture ordered   -reviewed respiratory panel, negative   -pulmonology consult pending

## 2024-08-12 NOTE — HOSPITAL COURSE
Pt was monitored closely during her stay.  She was treated with steroids and Abx.  She had isolated severe lactic acidosis here, felt to be lab error as repeat normalized.  Her Abx were broadened as a result.  Pulmonology was consulted. They did not feel her acute presentation was indicative of a COPD exacerbation or her chronic bronchiectasis.  Differentials for consideration included a worsening of her chronic respiratory failure r/t to underlying hypersensitivity pneumonitis leading to some fibrosis vs. Pulmonary hypertension.  She did not have any clinical improvement on steroids or antibiotics.  She had stable oxygen levels on continuous oxygen and remained asymptomatic while ambulatory on oxygen.  She underwent repeat home o2 evaluation and required 6L of continuous oxygen.  These updated orders were sent to her home oxygen provider. She had echo results revealing for moderate pulmonary hypertension according to PA pressure calculations. Pulmonogy recommended discharge without need for any ongoing antibiotic or steroid.  Patient agreed with discharge plan.  She did request to reestablish with pulmonologist, Dr. Katie Magallon.  A referral was placed.  She will need close outpatient follow up for continued workup as appropriate and modification to treatment plan. She was able to produce a sputum on day of discharge that was sent for micro. An CAL level is pending at time of discharge.    Patient seen on day of discharge and verbalized understanding with discharge plan.

## 2024-08-12 NOTE — PROGRESS NOTES
[facility] Medicine  Progress Note    Patient Name: Venkat Lara  MRN: [id number]  Patient Class: OP- Observation   Admission Date: 8/11/2024  Length of Stay: 0 days  Attending Physician: Jani Merino DO  Primary Care Provider: Holli Hector        Subjective:     Principal Problem:Acute on chronic respiratory failure with hypoxia        HPI:  74-year-old female with history of degenerative disc disease, gastroesophageal reflux, anxiety, depression, hypertension, hyperlipidemia, legal blindness, osteoarthritis, rheumatoid arthritis, COPD on intermittent oxygen at 2 L nasal cannula.  Recently treated for COPD exacerbation with bronchiectasis.  Currently on Augmentin over last 5 days.  Patient diagnosed with bronchitis/atypical pneumonia and COPD exacerbation after being seen  in the emergency department on 8/6/2024. CT chest at that time showed emphysematous changes and findings consistent with pneumonia. She presents to the emergency department today with persistent wheezing, shortness of breath and exertional hypoxia.  In the Emergency room, recurrent hypoxic episodes with exertion. States has sats have been running in the low 70s to 80s. .  Patient on 5L NC, Duo neb, Solumedrol given in route by EMS, elevated BNP at 366, troponin 57.8 up from 3.6. Chest x-ray shows no acute infiltrates and no significant interval changes. Procalcitonin 0.173. Cultures sent. White count 5000. EKG without significant ischemic changes, normal sinus rhythm, 90. Admit to hospital medicine for COPD exacerbation and concerning for ACS, possible NSTEMI versus congestive heart failure.  2D echo, serial cardiac enzyme monitoring. Hypotensive BP 90s over 60. Patient was given 500 bolus of LR then placed on 130 cc an hour for a total of 1 L. currently        Overview/Hospital Course:  Pt was monitored closely during her stay.  She was treated with steroids and Abx.  She had isolated severe lactic  acidosis here, felt to be lab error as repeat normalized.  Her Abx were broadened as a result.  Pulmonology was consulted.    Interval History:  Pt seen and examined.  Reports her breathing feels about at baseline right now, better after a breathing treatment.  She is on 4 L oxygen.  She reports she was only using oxygen for sleep up until 4 weeks ago.  Since that time she has had to use it sporadically in varying amounts up to 8 L at home to maintain normal oxygen saturation.  She denies any significant cough at this time.  No chest pain.  An echo and pulmonary consult pending.    Review of Systems   Constitutional:  Negative for chills and fever.   Respiratory:  Positive for shortness of breath. Negative for cough and wheezing.    Cardiovascular:  Negative for chest pain.   Gastrointestinal:  Negative for nausea and vomiting.   Psychiatric/Behavioral:  Negative for confusion.      Objective:     Vital Signs (Most Recent):  Temp: 97.3 °F (36.3 °C) (08/12/24 0728)  Pulse: 81 (08/12/24 0741)  Resp: 18 (08/12/24 0741)  BP: 127/64 (08/12/24 0728)  SpO2: 95 % (08/12/24 0741) Vital Signs (24h Range):  Temp:  [97.3 °F (36.3 °C)-98.3 °F (36.8 °C)] 97.3 °F (36.3 °C)  Pulse:  [] 81  Resp:  [14-25] 18  SpO2:  [90 %-97 %] 95 %  BP: ()/(50-64) 127/64     Weight: 80.8 kg (178 lb 2.1 oz)  Body mass index is 30.58 kg/m².    Intake/Output Summary (Last 24 hours) at 8/12/2024 1051  Last data filed at 8/12/2024 0952  Gross per 24 hour   Intake 1240 ml   Output 1 ml   Net 1239 ml         Physical Exam  Vitals and nursing note reviewed.   Constitutional:       Appearance: Normal appearance.   HENT:      Head: Normocephalic and atraumatic.   Eyes:      Extraocular Movements: Extraocular movements intact.      Conjunctiva/sclera: Conjunctivae normal.      Pupils: Pupils are equal, round, and reactive to light.   Cardiovascular:      Rate and Rhythm: Normal rate and regular rhythm.   Pulmonary:      Effort: Pulmonary effort is  normal. No respiratory distress.      Breath sounds: Normal breath sounds.      Comments: No adventitious breath sounds today. On 4L NC- appears comfortable  Abdominal:      General: Abdomen is flat. Bowel sounds are normal. There is no distension.      Palpations: Abdomen is soft.      Tenderness: There is no abdominal tenderness.   Musculoskeletal:         General: Normal range of motion.      Right lower leg: No edema.      Left lower leg: No edema.   Skin:     General: Skin is warm and dry.      Capillary Refill: Capillary refill takes less than 2 seconds.   Neurological:      General: No focal deficit present.      Mental Status: She is alert and oriented to person, place, and time. Mental status is at baseline.   Psychiatric:         Mood and Affect: Mood normal.         Behavior: Behavior normal.             Significant Labs: All pertinent labs within the past 24 hours have been reviewed.  CBC:   Recent Labs   Lab 08/11/24  1325 08/12/24  0433   WBC 5.71 3.39*   HGB 13.8 12.7   HCT 41.1 37.7    204     CMP:   Recent Labs   Lab 08/11/24  1325 08/12/24  0433    135*   K 4.0 4.3    103   CO2 24 24   * 159*   BUN 27* 29*   CREATININE 0.7 0.7   CALCIUM 9.1 9.4   PROT 6.2  --    ALBUMIN 3.3*  --    BILITOT 0.6  --    ALKPHOS 60  --    AST 20  --    ALT 18  --    ANIONGAP 8 8     Lactic Acid:   Recent Labs   Lab 08/11/24  1657 08/11/24  1839   LACTATE 10.9* 1.2       Significant Imaging: I have reviewed all pertinent imaging results/findings within the past 24 hours.    Assessment/Plan:      * Acute on chronic respiratory failure with hypoxia  Flu negative   COVID ordered, resp viral panel ordered  Supplemental oxygen   COPD pathway initiated  Chest xray: Slightly prominent interstitial markings bilaterally have not significantly changed from the prior CTA. No new confluent alveolar consolidation, pleural effusion, or pneumothorax. Pulmonary vasculature is normal. No acute osseous  abnormality.   CT Chest: 8/6/24: Intermittent opacification of bibasilar subsegmental airways which may relate to mucous plugging, secretions, and/or aspiration. Emphysematous changes of the lungs.  Bilateral ground-glass opacities with an upper lung zone predominance.     Patient with Hypoxic Respiratory failure which is Acute on chronic.  she is on home oxygen at 2 LPM. Supplemental oxygen was provided and noted-  patient on 4L NC oxygen.     .   Signs/symptoms of respiratory failure include- use of accessory muscles, wheezing, and cyanosis. Contributing diagnoses includes - COPD Labs and images were reviewed. Patient Has recent ABG, which has been reviewed. Will treat underlying causes and adjust management of respiratory failure as follows-     -continue COPD treatment as per plan   -obtain echo to evaluate for any CHF  -follow-up with pulmonology for recs    Hypotension  SBP 90s.   IV fluids 1L LR, LR @ 130 ml/h  Midodrine 5mg po x1 ordered  Hold antihypertensives    Acute exacerbation of chronic obstructive pulmonary disease (COPD)  Patient's COPD is with exacerbation noted by continued dyspnea and worsening of baseline hypoxia currently.  Patient is currently on COPD Pathway. Continue scheduled inhalers Steroids, Antibiotics, and Supplemental oxygen and monitor respiratory status closely.   -continue broad-spectrum Abx for now, MRSA swab pending   -sputum culture ordered   -reviewed respiratory panel, negative   -pulmonology consult pending    Elevated brain natriuretic peptide (BNP) level  With elevated troponin. Concern for ACS, NSTEMI, or CHF.   mg po given in ER    , baseline 26  Troponin I high sensitivity 57.8, baseline 3.6  EKG prn  Echo ordered  Tele monitoring.        Pneumonia  Patient has a diagnosis of pneumonia. The cause of the pneumonia is suspected to be bacterial in etiology but organism is not known. The pneumonia is stable. The patient has the following signs/symptoms of  pneumonia: persistent hypoxia , cough, and shortness of breath. The patient does have a current oxygen requirement and the patient does have a home oxygen requirement. I have reviewed the pertinent imaging. The following cultures have been collected: Blood cultures The culture results are listed below.     Current antimicrobial regimen consists of the antibiotics listed below. Will monitor patient closely and stable    Antibiotics (From admission, onward)      Start     Stop Route Frequency Ordered    08/11/24 2100  amoxicillin-clavulanate 875-125mg per tablet 1 tablet         -- Oral 2 times daily 08/11/24 1656            Microbiology Results (last 7 days)       Procedure Component Value Units Date/Time    Respiratory Infection Panel (PCR), Nasopharyngeal [[id number]]     Order Status: No result Specimen: Nasopharyngeal Swab     Blood culture x two cultures. Draw prior to antibiotics. [[id number]] Collected: 08/11/24 1321    Order Status: Sent Specimen: Blood Updated: 08/11/24 1342    Blood culture x two cultures. Draw prior to antibiotics. [[id number]] Collected: 08/11/24 1328    Order Status: Sent Specimen: Blood Updated: 08/11/24 1340              VTE Risk Mitigation (From admission, onward)           Ordered     IP VTE HIGH RISK PATIENT  Once         08/11/24 1613     Place sequential compression device  Until discontinued         08/11/24 1613                    Discharge Planning   GENI: 8/14/2024     Code Status: Full Code   Is the patient medically ready for discharge?:     Reason for patient still in hospital (select all that apply): Patient trending condition, Treatment, and Consult recommendations                     Elvira Naik NP  Department of Hospital Medicine   [facility]

## 2024-08-12 NOTE — PLAN OF CARE
Problem: Adult Inpatient Plan of Care  Goal: Plan of Care Review  Outcome: Progressing  Goal: Patient-Specific Goal (Individualized)  Outcome: Progressing  Goal: Absence of Hospital-Acquired Illness or Injury  Outcome: Progressing  Goal: Optimal Comfort and Wellbeing  Outcome: Progressing  Goal: Readiness for Transition of Care  Outcome: Progressing     Problem: COPD (Chronic Obstructive Pulmonary Disease)  Goal: Optimal Chronic Illness Coping  Outcome: Progressing  Goal: Optimal Level of Functional [place]  Outcome: Progressing  Goal: Absence of Infection Signs and Symptoms  Outcome: Progressing  Goal: Improved Oral Intake  Outcome: Progressing  Goal: Effective Oxygenation and Ventilation  Outcome: Progressing     Problem: Pneumonia  Goal: Fluid Balance  Outcome: Progressing  Goal: Resolution of Infection Signs and Symptoms  Outcome: Progressing  Goal: Effective Oxygenation and Ventilation  Outcome: Progressing

## 2024-08-12 NOTE — SUBJECTIVE & OBJECTIVE
Interval History:  Pt seen and examined.  Reports her breathing feels about at baseline right now, better after a breathing treatment.  She is on 4 L oxygen.  She reports she was only using oxygen for sleep up until 4 weeks ago.  Since that time she has had to use it sporadically in varying amounts up to 8 L at home to maintain normal oxygen saturation.  She denies any significant cough at this time.  No chest pain.  An echo and pulmonary consult pending.    Review of Systems   Constitutional:  Negative for chills and fever.   Respiratory:  Positive for shortness of breath. Negative for cough and wheezing.    Cardiovascular:  Negative for chest pain.   Gastrointestinal:  Negative for nausea and vomiting.   Psychiatric/Behavioral:  Negative for confusion.      Objective:     Vital Signs (Most Recent):  Temp: 97.3 °F (36.3 °C) (08/12/24 0728)  Pulse: 81 (08/12/24 0741)  Resp: 18 (08/12/24 0741)  BP: 127/64 (08/12/24 0728)  SpO2: 95 % (08/12/24 0741) Vital Signs (24h Range):  Temp:  [97.3 °F (36.3 °C)-98.3 °F (36.8 °C)] 97.3 °F (36.3 °C)  Pulse:  [] 81  Resp:  [14-25] 18  SpO2:  [90 %-97 %] 95 %  BP: ()/(50-64) 127/64     Weight: 80.8 kg (178 lb 2.1 oz)  Body mass index is 30.58 kg/m².    Intake/Output Summary (Last 24 hours) at 8/12/2024 1051  Last data filed at 8/12/2024 0952  Gross per 24 hour   Intake 1240 ml   Output 1 ml   Net 1239 ml         Physical Exam  Vitals and nursing note reviewed.   Constitutional:       Appearance: Normal appearance.   HENT:      Head: Normocephalic and atraumatic.   Eyes:      Extraocular Movements: Extraocular movements intact.      Conjunctiva/sclera: Conjunctivae normal.      Pupils: Pupils are equal, round, and reactive to light.   Cardiovascular:      Rate and Rhythm: Normal rate and regular rhythm.   Pulmonary:      Effort: Pulmonary effort is normal. No respiratory distress.      Breath sounds: Normal breath sounds.      Comments: No adventitious breath sounds  today. On 4L NC- appears comfortable  Abdominal:      General: Abdomen is flat. Bowel sounds are normal. There is no distension.      Palpations: Abdomen is soft.      Tenderness: There is no abdominal tenderness.   Musculoskeletal:         General: Normal range of motion.      Right lower leg: No edema.      Left lower leg: No edema.   Skin:     General: Skin is warm and dry.      Capillary Refill: Capillary refill takes less than 2 seconds.   Neurological:      General: No focal deficit present.      Mental Status: She is alert and oriented to person, place, and time. Mental status is at baseline.   Psychiatric:         Mood and Affect: Mood normal.         Behavior: Behavior normal.             Significant Labs: All pertinent labs within the past 24 hours have been reviewed.  CBC:   Recent Labs   Lab 08/11/24  1325 08/12/24  0433   WBC 5.71 3.39*   HGB 13.8 12.7   HCT 41.1 37.7    204     CMP:   Recent Labs   Lab 08/11/24  1325 08/12/24  0433    135*   K 4.0 4.3    103   CO2 24 24   * 159*   BUN 27* 29*   CREATININE 0.7 0.7   CALCIUM 9.1 9.4   PROT 6.2  --    ALBUMIN 3.3*  --    BILITOT 0.6  --    ALKPHOS 60  --    AST 20  --    ALT 18  --    ANIONGAP 8 8     Lactic Acid:   Recent Labs   Lab 08/11/24  1657 08/11/24  1839   LACTATE 10.9* 1.2       Significant Imaging: I have reviewed all pertinent imaging results/findings within the past 24 hours.

## 2024-08-12 NOTE — CARE UPDATE
08/12/24 0741   Patient Assessment/Suction   Level of Consciousness (AVPU) alert   Respiratory Effort Normal;Unlabored   Expansion/Accessory Muscles/Retractions expansion symmetric;no retractions;no use of accessory muscles   All Lung Fields Breath Sounds Anterior:;Lateral:;diminished;clear   Rhythm/Pattern, Respiratory pattern regular;unlabored   PRE-TX-O2   Device (Oxygen Therapy) nasal cannula   $ Is the patient on Low Flow Oxygen? Yes   Flow (L/min) (Oxygen Therapy) (S)  4   SpO2 95 %   Pulse Oximetry Type Intermittent   $ Pulse Oximetry - Multiple Charge Pulse Oximetry - Multiple   Pulse 81   Resp 18   Aerosol Therapy   $ Aerosol Therapy Charges Aerosol Treatment   Daily Review of Necessity (SVN) completed   Respiratory Treatment Status (SVN) given   Treatment Route (SVN) mask;oxygen   Patient Position HOB elevated   Post Treatment Assessment (SVN) breath sounds improved   Signs of Intolerance (SVN) none   Education   $ Education Bronchodilator;15 min

## 2024-08-12 NOTE — ASSESSMENT & PLAN NOTE
Flu negative   COVID ordered, resp viral panel ordered  Supplemental oxygen   COPD pathway initiated  Chest xray: Slightly prominent interstitial markings bilaterally have not significantly changed from the prior CTA. No new confluent alveolar consolidation, pleural effusion, or pneumothorax. Pulmonary vasculature is normal. No acute osseous abnormality.   CT Chest: 8/6/24: Intermittent opacification of bibasilar subsegmental airways which may relate to mucous plugging, secretions, and/or aspiration. Emphysematous changes of the lungs.  Bilateral ground-glass opacities with an upper lung zone predominance.     Patient with Hypoxic Respiratory failure which is Acute on chronic.  she is on home oxygen at 2 LPM. Supplemental oxygen was provided and noted-  patient on 4L NC oxygen.     .   Signs/symptoms of respiratory failure include- use of accessory muscles, wheezing, and cyanosis. Contributing diagnoses includes - COPD Labs and images were reviewed. Patient Has recent ABG, which has been reviewed. Will treat underlying causes and adjust management of respiratory failure as follows-     -continue COPD treatment as per plan   -obtain echo to evaluate for any CHF  -follow-up with pulmonology for recs

## 2024-08-12 NOTE — PROGRESS NOTES
Lactic acid came back elevated at 10.9.  Patient clinically looks stable.  Repeat lactic acid came back at 1.2, so suspect lab error.  However given her tachycardia of 105, tachypnea with a respiratory rate of 24 on presentation, and her worsening hypoxia on presentation, she actually meets severe sepsis.  Again, Augmentin was changed to Zosyn.  Will add vanco.  Blood culture has been collected prior to antibiotics.  I will order respiratory viral panel.  Pending Pulmonary consultation.

## 2024-08-12 NOTE — CONSULTS
Pulmonary/Critical Care Consult      PATIENT NAME: Venkat Lara  MRN: [id number]  TODAY'S DATE: 2024  2:21 PM  ADMIT DATE: 2024  AGE: 74 y.o. : 1950    CONSULT REQUESTED BY: Jani Merino DO    REASON FOR CONSULT:   Recurrent respiraotry failure     HPI:  Ms Lara is a former smoker with reported history of COPD, rheumatoid arthritis, scoliosis, osteoarthritis and underlying lung disease.     She reports that she has had underlying lung disease for at least 10 years or more.  She has been following outpatient with Dr. Ayers.  She reports that is it is suspected that she had a pneumonia several years ago and since then she has not ongoing bronchiectasis and lung disease related to that.  She reports to me that she has a been on several rounds of antibiotics for the last 4 weeks.  She reports that her breathing has been particularly worse over the last month.  Prior to that she has been overall stable and not had any changes in her breathing for several years.  She reports that she was started on a new off-label medication by [facility] in her breathing has been stable since then.  Over the last month she has had a worsening and increasing oxygen requirements, typically only uses 2 L of oxygen at night but is now using oxygen 247.    She reports a chronic cough which is consistent with her stable lung disease, she typically expectorates sputum with that.  She denies having any fevers or night sweats or shaking chills.  She is not coughing up blood.  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    She had no other jobs that she worked.  Her  was in the .  No asbestos exposures    No reported history of underlying asthma    She reports to me that she does not have a history of autoimmune disease, however rheumatoid arthritis as documented in medical history    She denies having any  blue or purple fingertips when exposed to cold.  No red hot swollen joints.  No red hot itchy eyes.        Review of Systems   Constitutional:  Negative for appetite change, chills, fever and unexpected weight change.   HENT:  Negative for nosebleeds, postnasal drip, trouble swallowing and voice change.    Eyes:  Negative for visual disturbance.   Respiratory:  Positive for cough and shortness of breath. Negative for wheezing.    Cardiovascular:  Negative for chest pain and leg swelling.   Gastrointestinal:  Negative for diarrhea, nausea and vomiting.   Endocrine: Negative for cold intolerance and heat intolerance.   Genitourinary:  Negative for dysuria, flank pain and frequency.   Musculoskeletal:  Negative for neck pain and neck stiffness.   Allergic/Immunologic: Negative for environmental allergies and immunocompromised state.   Neurological:  Negative for syncope, speech difficulty and weakness.   Hematological:  Negative for adenopathy.   Psychiatric/Behavioral:  Negative for confusion.            ALLERGIES  Review of patient's allergies indicates:   Allergen Reactions    Statins-hmg-coa reductase inhibitors Other (See Comments)     Muscle cramps    Doxycycline Other (See Comments)     Stops gallbladder function    Adhesive Other (See Comments)     bruises    Erythromycin Other (See Comments)     Stomach pain       INPATIENT SCHEDULED MEDICATIONS   albuterol-ipratropium  3 mL Nebulization Q6H WAKE    arformoteroL  15 mcg Nebulization BID    buPROPion  300 mg Oral QHS    carboxymethylcellulose  2 drop Both Eyes Q4H While awake    cholecalciferol (vitamin D3)  400 Units Oral Daily    ezetimibe  10 mg Oral Daily    guaiFENesin  1,200 mg Oral BID    oxybutynin  5 mg Oral Daily    piperacillin-tazobactam (Zosyn) IV (PEDS and ADULTS) (extended infusion is not appropriate)  3.375 g Intravenous Q8H    predniSONE  40 mg Oral Daily    vancomycin (VANCOCIN) IV (PEDS and ADULTS)  1,000 mg Intravenous Q12H         MEDICAL  AND SURGICAL HISTORY  Past Medical History:   Diagnosis Date    Colon polyp 01/01/2013    COPD, moderate 01/01/2010    was told by Dr. 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    Lumbar spinal stenosis     Oxygen dependent 01/01/2011    2L NC Nightly    Primary osteoarthritis of first carpometacarpal joints, bilateral     Rheumatoid arthritis     Scoliosis 01/01/2014    Spinal stenosis 01/01/2014    upper and lowerr back - tingling in left arm at times     Past Surgical History:   Procedure Laterality Date    BREAST SURGERY  2008    Reduction    BUNIONECTOMY Bilateral 01/1999    CARDIOVASCULAR STRESS TEST  2013    CARPAL TUNNEL RELEASE Left 10/08/2015    CATARACT EXTRACTION BILATERAL W/ ANTERIOR VITRECTOMY  04/2010    [facility] ARTHROPLASTY, RIGHT  02/23/2004    COLONOSCOPY  04/18/2013    CORRECTION OF HAMMER TOE Left 8/14/2019    Procedure: CORRECTION, HAMMER TOE;  Surgeon: Jackson Truong DPM;  Location: [facility] OR;  Service: Podiatry;  Laterality: Left;    ESOPHAGOGASTRODUODENOSCOPY  12/21/2012    2012-with dilation #1, 2013 #2    HAND SURGERY  2003    JOINT REPLACEMENT      OPEN REDUCTION AND INTERNAL FIXATION (ORIF) OF FRACTURE OF FEMUR USING DYNAMIC HIP SCREW Left 8/22/2023    Procedure: ORIF, HIP, USING DYNAMIC HIP SCREW;  Surgeon: Hussein Romo MD;  Location: [facility] OR;  Service: Orthopedics;  Laterality: Left;    REMOVAL OF HARDWARE FROM LOWER EXTREMITY Left 8/14/2019    Procedure: REMOVAL, HARDWARE, LOWER EXTREMITY;  Surgeon: Jacksno Truong DPM;  Location: [facility];   Service: Podiatry;  Laterality: Left;    RHINOPLASTY TIP  2010    SURGICAL REMOVAL OF BUNION WITH OSTEOTOMY OF METATARSAL BONE Left 2019    Procedure: BUNIONECTOMY, WITH METATARSAL OSTEOTOMY;  Surgeon: Jackson Brannon DPM;  Location: [facility];  Service: Podiatry;  Laterality: Left;  Arthrodesis 2nd PIP joint, screw 1st toe, C-arm, synthes-Merrill, 3.0, 4.0, AO modular set MERRILL BOY NOTIFIED    TONSILLECTOMY, ADENOIDECTOMY  1970    TOTAL KNEE ARTHROPLASTY Right 2008    TOTAL KNEE ARTHROPLASTY Left 2015    TOTAL REDUCTION MAMMOPLASTY  2008    TUBAL LIGATION      VAGINAL DELIVERY  1983    x2       ALCOHOL, TOBACCO AND DRUG USE  Social History     Tobacco Use   Smoking Status Former    Current packs/day: 0.00    Average packs/day: 2.0 packs/day for 33.0 years (66.0 ttl pk-yrs)    Types: Cigarettes    Start date: 1964    Quit date: 1997    Years since quittin.2   Smokeless Tobacco Former    Quit date: 1997     Social History     Substance and Sexual Activity   Alcohol Use Yes    Alcohol/week: 4.0 standard drinks of alcohol    Types: 4 Glasses of wine per week    Comment: monthly     Social History     Substance and Sexual Activity   Drug Use Never       FAMILY HISTORY  Family History   Problem Relation Name Age of Onset    Cancer Father          colon       VITAL SIGNS (MOST RECENT)  Temp: 97.9 °F (36.6 °C) (24 1147)  Pulse: 98 (24 1419)  Resp: 18 (24 1419)  BP: (!) 140/79 (24 1147)  SpO2: 96 % (24 1419)    INTAKE AND OUTPUT (LAST 24 HOURS):  Intake/Output Summary (Last 24 hours) at 2024 1421  Last data filed at 2024 0952  Gross per 24 hour   Intake 1240 ml   Output 1 ml   Net 1239 ml       WEIGHT  Wt Readings from Last 1 Encounters:   24 80.8 kg (178 lb 2.1 oz)       Physical Exam  Vitals reviewed.   Constitutional:       General: She is not in acute distress.     Appearance: She is well-developed. She is not diaphoretic.   HENT:       "Head: Normocephalic and atraumatic.      Mouth/Throat:      Pharynx: No oropharyngeal exudate or posterior oropharyngeal erythema.   Eyes:      General: No scleral icterus.     Pupils: Pupils are equal, round, and reactive to light.   Neck:      Vascular: No JVD.   Cardiovascular:      Rate and Rhythm: Normal rate and regular rhythm.      Heart sounds: Normal heart sounds. No murmur heard.  Pulmonary:      Effort: Pulmonary effort is normal. No respiratory distress.      Breath sounds: Normal breath sounds. No wheezing.      Comments: Mild base crackles  Abdominal:      General: Bowel sounds are normal. There is no distension.      Palpations: Abdomen is soft.      Tenderness: There is no abdominal tenderness.   Musculoskeletal:         General: No swelling.      Cervical back: Normal range of motion and neck supple. No rigidity.   Skin:     General: Skin is warm and dry.      Capillary Refill: Capillary refill takes less than 2 seconds.      Coloration: Skin is not pale.      Findings: No rash.   Neurological:      General: No focal deficit present.      Mental Status: She is alert and oriented to person, place, and time.      Cranial Nerves: No cranial nerve deficit.      Motor: No weakness or abnormal muscle tone.           ACUTE PHASE REACTANT (LAST 24 HOURS)  No results for input(s): "FERRITIN", "CRP", "LDH", "DDIMER" in the last 24 hours.    CBC LAST (LAST 24 HOURS)  Recent Labs   Lab 08/12/24  0433   WBC 3.39*   RBC 3.75*   HGB 12.7   HCT 37.7   *   MCH 33.9*   MCHC 33.7   RDW 12.9      MPV 8.6*   GRAN 86.1*  2.9   LYMPH 11.2*  0.4*   MONO 2.1*  0.1*   BASO 0.01   NRBC 0       CHEMISTRY LAST (LAST 24 HOURS)  Recent Labs   Lab 08/12/24  0433   *   K 4.3      CO2 24   ANIONGAP 8   BUN 29*   CREATININE 0.7   *   CALCIUM 9.4   MG 1.9       COAGULATION LAST (LAST 24 HOURS)  No results for input(s): "LABPT", "INR", "APTT" in the last 24 hours.    CARDIAC PROFILE (LAST 24 " HOURS)  Recent Labs   Lab 08/11/24  1325 08/11/24  1823 08/12/24  0850   *  --   --    TROPONINIHS 57.8*   < > 16.5*    < > = values in this interval not displayed.       LAST 7 DAYS MICROBIOLOGY   Microbiology Results (last 7 days)       Procedure Component Value Units Date/Time    Culture, Respiratory with Gram Stain [[id number]] Collected: 08/12/24 1252    Order Status: Sent Specimen: Sputum, Expectorated Updated: 08/12/24 1312    MRSA Screen by PCR [[id number]]  (Abnormal) Collected: 08/12/24 1030    Order Status: Completed Specimen: Nasal Swab Updated: 08/12/24 1215     MRSA SCREEN BY PCR Positive     Comment: critical result(s) called and verbal readback obtained from Frieda Killian RN on [facility] by [facility] 08/12/2024 12:15         Narrative:         critical result(s) called and verbal readback obtained from Frieda Killian RN on [facility] by [facility] 08/12/2024 12:15    Respiratory Infection Panel (PCR), Nasopharyngeal [[id number]] Collected: 08/11/24 1758    Order Status: Completed Specimen: Nasopharyngeal Swab Updated: 08/11/24 2022     Respiratory Infection Panel Source NP swab     Adenovirus Not Detected     Coronavirus 229E, Common Cold Virus Not Detected     Coronavirus HKU1, Common Cold Virus Not Detected     Coronavirus NL63, Common Cold Virus Not Detected     Coronavirus OC43, Common Cold Virus Not Detected     Comment: Coronavirus strains 229E, HKU1, NL63, and OC43 can cause the common   cold   and are not associated with the respiratory disease outbreak caused   by  the COVID-19 (SARS-CoV-2 novel Coronavirus) strain.           SARS-CoV2 (COVID-19) Qualitative PCR Not Detected     Human Metapneumovirus Not Detected     Human Rhinovirus/Enterovirus Not Detected     Influenza A (subtypes H1, H1-2009,H3) Not Detected     Influenza B Not Detected     Parainfluenza Virus 1 Not Detected     Parainfluenza Virus 2 Not Detected     Parainfluenza Virus 3 Not Detected     Parainfluenza Virus 4 Not Detected      Respiratory Syncytial Virus Not Detected     Bordetella Parapertussis ([id number]) Not Detected     Bordetella pertussis (ptxP) Not Detected     Chlamydia pneumoniae Not Detected     Mycoplasma pneumoniae Not Detected     Comment: Respiratory Infection Panel testing performed by Multiplex PCR.       Narrative:      Respiratory Infection Panel source->NP Swab    Blood culture x two cultures. Draw prior to antibiotics. [[id number]] Collected: 08/11/24 1328    Order Status: Completed Specimen: Blood Updated: 08/11/24 1958     Blood Culture, Routine No Growth to date    Narrative:      Aerobic and anaerobic    Blood culture x two cultures. Draw prior to antibiotics. [[id number]] Collected: 08/11/24 1321    Order Status: Completed Specimen: Blood Updated: 08/11/24 1958     Blood Culture, Routine No Growth to date    Narrative:      Aerobic and anaerobic            MOST RECENT IMAGING  Echo    Left Ventricle: The left ventricle is normal in size. Normal wall   thickness. There is normal systolic function with a visually estimated   ejection fraction of 65 - 70%. There is normal diastolic function.    Right Ventricle: Normal right ventricular cavity size. Wall thickness   is normal. Systolic function is normal.    Tricuspid Valve: There is mild regurgitation with a centrally directed   jet.      CURRENT VISIT EKG  Results for orders placed or performed during the hospital encounter of 08/11/24   EKG 12-lead   Result Value Ref Range    QRS Duration 70 ms    [facility] QTC Calculation 459 ms    Narrative    Test Reason : J44.1,    Vent. Rate : 090 BPM     Atrial Rate : 090 BPM     P-R Int : 162 ms          QRS Dur : 070 ms      QT Int : 376 ms       P-R-T Axes : 059 056 068 degrees     QTc Int : 459 ms    Normal sinus rhythm  Low voltage QRS  Borderline Abnormal ECG    Confirmed by Vickie Valverde MD ([id number]) on 8/11/2024 5:47:32 PM    Referred By: [email or web address]   SELF           Confirmed By:Vickie Valverde MD       ECHOCARDIOGRAM  RESULTS  Results for orders placed during the hospital encounter of 08/11/24    Echo    Interpretation Summary    Left Ventricle: The left ventricle is normal in size. Normal wall thickness. There is normal systolic function with a visually estimated ejection fraction of 65 - 70%. There is normal diastolic function.    Right Ventricle: Normal right ventricular cavity size. Wall thickness is normal. Systolic function is normal.    Tricuspid Valve: There is mild regurgitation with a centrally directed jet.        VENTILATOR INFORMATION              LAST ARTERIAL BLOOD GAS  ABG  Recent Labs   Lab 08/11/24  1322   PH 7.423   PO2 61*   PCO2 35.0   HCO3 22.8*   BE -2                 ASSESSMENT:   Acute on chronic respiratory failure  Impaired AA gradient  Lactic acidosis - resolving   Mild dysphagia     Ms. Lara is a 74-year-old woman who is a former smoker and presents with recurrent shortness of breath.    Imaging reviewed extensively, I do not see any changes to her lung disease when compared her comparing to prior CT even as far back as 2020.  Chest imaging is mostly suggestive mild-to-moderate amount of fibrosis with predominantly underlying emphysema.  There is some additional ground-glass particularly in the upper lobes but diffuse and bilateral.  There could be a mosaic appearance, noting that there is likely emphysema with surrounding ground-glass that could produce the same image.  I see really only very mild bronchiectasis.     Differential is currently broad, but I primarily suspect that she has progressive worsening lung disease primarily related to underlying emphysema.  In addition I think that there could be some additional explanation for the ground-glass opacification which could include either fluid overload or inflammation.    The patient came in with a lactic acidosis, I wonder if this is primarily related to over utilization of beta agonists    Differential includes- progression of unknown ILD,  heart failure exacerbation, chronic HP, IPAF, aspiration. Provisional diagnosis at this time is underlying emphysema with subacute vs chronic HP.     PLAN:   - continue broad-spectrum antibiotics   - continue prednisone  - Bronchoscopy unlikely to be helpful right now  - Rule out PH with ECHO   - Continue current steroid regimen- I'm not convinced this is a ILD flare    Shane Rangel MD  Date of Service: 08/12/2024  2:21 PM

## 2024-08-12 NOTE — NURSING
Nurses Note -- 4 Eyes      8/11/2024   11:35 PM      Skin assessed during: Admit      [x] No Altered Skin Integrity Present    []Prevention Measures Documented      [] Yes- Altered Skin Integrity Present or Discovered   [] LDA Added if Not in Epic (Describe Wound)   [] New Altered Skin Integrity was Present on Admit and Documented in LDA   [] Wound Image Taken    Wound Care Consulted? No    Attending Nurse:  Nery Talavera RN/Staff Member:  Kristy

## 2024-08-13 VITALS
DIASTOLIC BLOOD PRESSURE: 97 MMHG | BODY MASS INDEX: 30.41 KG/M2 | WEIGHT: 178.13 LBS | OXYGEN SATURATION: 96 % | TEMPERATURE: 98 F | RESPIRATION RATE: 24 BRPM | HEIGHT: 64 IN | HEART RATE: 88 BPM | SYSTOLIC BLOOD PRESSURE: 173 MMHG

## 2024-08-13 PROBLEM — J18.9 PNEUMONIA: Status: RESOLVED | Noted: 2020-09-08 | Resolved: 2024-08-13

## 2024-08-13 LAB
ANION GAP SERPL CALC-SCNC: 10 MMOL/L (ref 8–16)
AORTIC ROOT ANNULUS: 2.1 CM
AORTIC VALVE CUSP SEPERATION: 1.9 CM
APICAL FOUR CHAMBER EJECTION FRACTION: 70 %
APICAL TWO CHAMBER EJECTION FRACTION: 70 %
AV INDEX (PROSTH): 0.81
AV MEAN GRADIENT: 8 MMHG
AV PEAK GRADIENT: 14 MMHG
AV VALVE AREA BY VELOCITY RATIO: 2.39 CM²
AV VALVE AREA: 2.55 CM²
AV VELOCITY RATIO: 0.76
BASOPHILS # BLD AUTO: 0.01 K/UL (ref 0–0.2)
BASOPHILS NFR BLD: 0.1 % (ref 0–1.9)
BSA FOR ECHO PROCEDURE: 1.91 M2
BUN SERPL-MCNC: 30 MG/DL (ref 8–23)
CALCIUM SERPL-MCNC: 9.4 MG/DL (ref 8.7–10.5)
CHLORIDE SERPL-SCNC: 101 MMOL/L (ref 95–110)
CO2 SERPL-SCNC: 27 MMOL/L (ref 23–29)
CREAT SERPL-MCNC: 0.8 MG/DL (ref 0.5–1.4)
CV ECHO LV RWT: 0.45 CM
DIFFERENTIAL METHOD BLD: ABNORMAL
DOP CALC AO PEAK VEL: 1.89 M/S
DOP CALC AO VTI: 30.5 CM
DOP CALC LVOT AREA: 3.1 CM2
DOP CALC LVOT DIAMETER: 2 CM
DOP CALC LVOT PEAK VEL: 1.44 M/S
DOP CALC LVOT STROKE VOLUME: 77.87 CM3
DOP CALC MV VTI: 20.8 CM
DOP CALCLVOT PEAK VEL VTI: 24.8 CM
E WAVE DECELERATION TIME: 160 MSEC
E/A RATIO: 0.84
E/E' RATIO: 6.24 M/S
ECHO LV POSTERIOR WALL: 0.9 CM (ref 0.6–1.1)
EOSINOPHIL # BLD AUTO: 0 K/UL (ref 0–0.5)
EOSINOPHIL NFR BLD: 0.1 % (ref 0–8)
ERYTHROCYTE [DISTWIDTH] IN BLOOD BY AUTOMATED COUNT: 12.9 % (ref 11.5–14.5)
EST. GFR  (NO RACE VARIABLE): >60 ML/MIN/1.73 M^2
FRACTIONAL SHORTENING: 35 % (ref 28–44)
GLUCOSE SERPL-MCNC: 121 MG/DL (ref 70–110)
HCT VFR BLD AUTO: 39.8 % (ref 37–48.5)
HGB BLD-MCNC: 13.1 G/DL (ref 12–16)
IMM GRANULOCYTES # BLD AUTO: 0.05 K/UL (ref 0–0.04)
IMM GRANULOCYTES NFR BLD AUTO: 0.7 % (ref 0–0.5)
INTERVENTRICULAR SEPTUM: 0.7 CM (ref 0.6–1.1)
IVC DIAMETER: 1.8 CM
LEFT ATRIUM AREA SYSTOLIC (APICAL 2 CHAMBER): 6.07 CM2
LEFT ATRIUM AREA SYSTOLIC (APICAL 4 CHAMBER): 9.15 CM2
LEFT ATRIUM SIZE: 2.7 CM
LEFT ATRIUM VOLUME INDEX MOD: 7.7 ML/M2
LEFT ATRIUM VOLUME MOD: 14.3 CM3
LEFT INTERNAL DIMENSION IN SYSTOLE: 2.6 CM (ref 2.1–4)
LEFT VENTRICLE DIASTOLIC VOLUME INDEX: 37.63 ML/M2
LEFT VENTRICLE DIASTOLIC VOLUME: 70 ML
LEFT VENTRICLE END DIASTOLIC VOLUME APICAL 2 CHAMBER: 53.8 ML
LEFT VENTRICLE END DIASTOLIC VOLUME APICAL 4 CHAMBER: 56.1 ML
LEFT VENTRICLE END SYSTOLIC VOLUME APICAL 2 CHAMBER: 9.81 ML
LEFT VENTRICLE END SYSTOLIC VOLUME APICAL 4 CHAMBER: 17.5 ML
LEFT VENTRICLE MASS INDEX: 50 G/M2
LEFT VENTRICLE SYSTOLIC VOLUME INDEX: 13.2 ML/M2
LEFT VENTRICLE SYSTOLIC VOLUME: 24.61 ML
LEFT VENTRICULAR INTERNAL DIMENSION IN DIASTOLE: 4 CM (ref 3.5–6)
LEFT VENTRICULAR MASS: 93.46 G
LV LATERAL E/E' RATIO: 5.2 M/S
LV SEPTAL E/E' RATIO: 7.8 M/S
LVED V (TEICH): 70 ML
LVES V (TEICH): 24.61 ML
LVOT MG: 5 MMHG
LVOT MV: 1 CM/S
LYMPHOCYTES # BLD AUTO: 0.6 K/UL (ref 1–4.8)
LYMPHOCYTES NFR BLD: 7.6 % (ref 18–48)
MAGNESIUM SERPL-MCNC: 2.1 MG/DL (ref 1.6–2.6)
MCH RBC QN AUTO: 33.7 PG (ref 27–31)
MCHC RBC AUTO-ENTMCNC: 32.9 G/DL (ref 32–36)
MCV RBC AUTO: 102 FL (ref 82–98)
MONOCYTES # BLD AUTO: 0.5 K/UL (ref 0.3–1)
MONOCYTES NFR BLD: 6.9 % (ref 4–15)
MV MEAN GRADIENT: 3 MMHG
MV PEAK A VEL: 0.93 M/S
MV PEAK E VEL: 0.78 M/S
MV PEAK GRADIENT: 4 MMHG
MV STENOSIS PRESSURE HALF TIME: 46 MS
MV VALVE AREA BY CONTINUITY EQUATION: 3.74 CM2
MV VALVE AREA P 1/2 METHOD: 4.78 CM2
NEUTROPHILS # BLD AUTO: 6.1 K/UL (ref 1.8–7.7)
NEUTROPHILS NFR BLD: 84.6 % (ref 38–73)
NRBC BLD-RTO: 0 /100 WBC
OHS LV EJECTION FRACTION SIMPSONS BIPLANE MOD: 67 %
PHOSPHATE SERPL-MCNC: 3.5 MG/DL (ref 2.7–4.5)
PISA TR MAX VEL: 3.3 M/S
PLATELET # BLD AUTO: 246 K/UL (ref 150–450)
PMV BLD AUTO: 8.8 FL (ref 9.2–12.9)
POTASSIUM SERPL-SCNC: 4.1 MMOL/L (ref 3.5–5.1)
PV MV: 0.9 M/S
PV PEAK GRADIENT: 7 MMHG
PV PEAK VELOCITY: 1.28 M/S
RA PRESSURE ESTIMATED: 8 MMHG
RBC # BLD AUTO: 3.89 M/UL (ref 4–5.4)
RIGHT ATRIUM VOLUME AREA LENGTH APICAL 4 CHAMBER: 19.1 ML
RV TB RVSP: 11 MMHG
RV TISSUE DOPPLER FREE WALL SYSTOLIC VELOCITY 1 (APICAL 4 CHAMBER VIEW): 15.9 CM/S
SODIUM SERPL-SCNC: 138 MMOL/L (ref 136–145)
TDI LATERAL: 0.15 M/S
TDI SEPTAL: 0.1 M/S
TDI: 0.13 M/S
TR MAX PG: 44 MMHG
TRICUSPID ANNULAR PLANE SYSTOLIC EXCURSION: 2.17 CM
TV REST PULMONARY ARTERY PRESSURE: 52 MMHG
VANCOMYCIN TROUGH SERPL-MCNC: 17 UG/ML
WBC # BLD AUTO: 7.22 K/UL (ref 3.9–12.7)
Z-SCORE OF LEFT VENTRICULAR DIMENSION IN END DIASTOLE: -2.58
Z-SCORE OF LEFT VENTRICULAR DIMENSION IN END SYSTOLE: -1.62

## 2024-08-13 PROCEDURE — 94761 N-INVAS EAR/PLS OXIMETRY MLT: CPT

## 2024-08-13 PROCEDURE — 80048 BASIC METABOLIC PNL TOTAL CA: CPT | Performed by: NURSE PRACTITIONER

## 2024-08-13 PROCEDURE — 99900031 HC PATIENT EDUCATION (STAT)

## 2024-08-13 PROCEDURE — 87205 SMEAR GRAM STAIN: CPT | Performed by: NURSE PRACTITIONER

## 2024-08-13 PROCEDURE — 86003 ALLG SPEC IGE CRUDE XTRC EA: CPT | Performed by: STUDENT IN AN ORGANIZED HEALTH CARE EDUCATION/TRAINING PROGRAM

## 2024-08-13 PROCEDURE — 94664 DEMO&/EVAL PT USE INHALER: CPT

## 2024-08-13 PROCEDURE — 63600175 PHARM REV CODE 636 W HCPCS: Performed by: NURSE PRACTITIONER

## 2024-08-13 PROCEDURE — 25000003 PHARM REV CODE 250: Performed by: NURSE PRACTITIONER

## 2024-08-13 PROCEDURE — 25000003 PHARM REV CODE 250: Performed by: HOSPITALIST

## 2024-08-13 PROCEDURE — 27000221 HC OXYGEN, UP TO 24 HOURS

## 2024-08-13 PROCEDURE — 94618 PULMONARY STRESS TESTING: CPT

## 2024-08-13 PROCEDURE — 86671 FUNGUS NES ANTIBODY: CPT | Performed by: STUDENT IN AN ORGANIZED HEALTH CARE EDUCATION/TRAINING PROGRAM

## 2024-08-13 PROCEDURE — 86038 ANTINUCLEAR ANTIBODIES: CPT | Performed by: STUDENT IN AN ORGANIZED HEALTH CARE EDUCATION/TRAINING PROGRAM

## 2024-08-13 PROCEDURE — 84100 ASSAY OF PHOSPHORUS: CPT | Performed by: NURSE PRACTITIONER

## 2024-08-13 PROCEDURE — 85025 COMPLETE CBC W/AUTO DIFF WBC: CPT | Performed by: NURSE PRACTITIONER

## 2024-08-13 PROCEDURE — 25000242 PHARM REV CODE 250 ALT 637 W/ HCPCS: Performed by: HOSPITALIST

## 2024-08-13 PROCEDURE — 86602 ANTINOMYCES ANTIBODY: CPT | Mod: 91 | Performed by: STUDENT IN AN ORGANIZED HEALTH CARE EDUCATION/TRAINING PROGRAM

## 2024-08-13 PROCEDURE — 94660 CPAP INITIATION&MGMT: CPT

## 2024-08-13 PROCEDURE — 99900035 HC TECH TIME PER 15 MIN (STAT)

## 2024-08-13 PROCEDURE — 87070 CULTURE OTHR SPECIMN AEROBIC: CPT | Performed by: NURSE PRACTITIONER

## 2024-08-13 PROCEDURE — 25000242 PHARM REV CODE 250 ALT 637 W/ HCPCS: Performed by: NURSE PRACTITIONER

## 2024-08-13 PROCEDURE — 36415 COLL VENOUS BLD VENIPUNCTURE: CPT | Performed by: NURSE PRACTITIONER

## 2024-08-13 PROCEDURE — 83735 ASSAY OF MAGNESIUM: CPT | Performed by: NURSE PRACTITIONER

## 2024-08-13 PROCEDURE — 36415 COLL VENOUS BLD VENIPUNCTURE: CPT | Performed by: FAMILY MEDICINE

## 2024-08-13 PROCEDURE — 36415 COLL VENOUS BLD VENIPUNCTURE: CPT | Performed by: STUDENT IN AN ORGANIZED HEALTH CARE EDUCATION/TRAINING PROGRAM

## 2024-08-13 PROCEDURE — 80202 ASSAY OF VANCOMYCIN: CPT | Performed by: FAMILY MEDICINE

## 2024-08-13 PROCEDURE — 86606 ASPERGILLUS ANTIBODY: CPT | Performed by: STUDENT IN AN ORGANIZED HEALTH CARE EDUCATION/TRAINING PROGRAM

## 2024-08-13 PROCEDURE — 63600175 PHARM REV CODE 636 W HCPCS: Performed by: HOSPITALIST

## 2024-08-13 PROCEDURE — 94640 AIRWAY INHALATION TREATMENT: CPT

## 2024-08-13 RX ORDER — MUPIROCIN 20 MG/G
OINTMENT TOPICAL 2 TIMES DAILY
Qty: 1 G | Refills: 0 | Status: SHIPPED | OUTPATIENT
Start: 2024-08-13 | End: 2024-08-16

## 2024-08-13 RX ADMIN — PREDNISONE 40 MG: 20 TABLET ORAL at 09:08

## 2024-08-13 RX ADMIN — CHOLECALCIFEROL (VITAMIN D3) 10 MCG (400 UNIT) TABLET 400 UNITS: at 09:08

## 2024-08-13 RX ADMIN — PIPERACILLIN SODIUM AND TAZOBACTAM SODIUM 3.38 G: 3; .375 INJECTION, POWDER, LYOPHILIZED, FOR SOLUTION INTRAVENOUS at 09:08

## 2024-08-13 RX ADMIN — ARFORMOTEROL TARTRATE 15 MCG: 15 SOLUTION RESPIRATORY (INHALATION) at 08:08

## 2024-08-13 RX ADMIN — MUPIROCIN 1 G: 20 OINTMENT TOPICAL at 09:08

## 2024-08-13 RX ADMIN — GUAIFENESIN 1200 MG: 600 TABLET, EXTENDED RELEASE ORAL at 09:08

## 2024-08-13 RX ADMIN — EZETIMIBE 10 MG: 10 TABLET ORAL at 09:08

## 2024-08-13 RX ADMIN — IPRATROPIUM BROMIDE AND ALBUTEROL SULFATE 3 ML: 2.5; .5 SOLUTION RESPIRATORY (INHALATION) at 02:08

## 2024-08-13 RX ADMIN — CHLORHEXIDINE GLUCONATE 15 ML: 1.2 RINSE ORAL at 09:08

## 2024-08-13 RX ADMIN — VANCOMYCIN HYDROCHLORIDE 1000 MG: 1 INJECTION, POWDER, LYOPHILIZED, FOR SOLUTION INTRAVENOUS at 06:08

## 2024-08-13 RX ADMIN — OXYBUTYNIN CHLORIDE 5 MG: 5 TABLET, EXTENDED RELEASE ORAL at 09:08

## 2024-08-13 RX ADMIN — CARBOXYMETHYLCELLULOSE SODIUM 2 DROP: 5 SOLUTION/ DROPS OPHTHALMIC at 09:08

## 2024-08-13 RX ADMIN — IPRATROPIUM BROMIDE AND ALBUTEROL SULFATE 3 ML: 2.5; .5 SOLUTION RESPIRATORY (INHALATION) at 08:08

## 2024-08-13 NOTE — DISCHARGE SUMMARY
[facility] Medicine  Discharge Summary      Patient Name: Venkat Lara  MRN: [id number]  MELLY: [id number]  Patient Class: IP- Inpatient  Admission Date: 8/11/2024  Hospital Length of Stay: 1 days  Discharge Date and Time:  08/13/2024 4:08 PM  Attending Physician: Jani Merino DO   Discharging Provider: Elvira Naik NP  Primary Care Provider: Holli Hector    Primary Care Team: Networked reference to record PCT     HPI:   74-year-old female with history of degenerative disc disease, gastroesophageal reflux, anxiety, depression, hypertension, hyperlipidemia, legal blindness, osteoarthritis, rheumatoid arthritis, COPD on intermittent oxygen at 2 L nasal cannula.  Recently treated for COPD exacerbation with bronchiectasis.  Currently on Augmentin over last 5 days.  Patient diagnosed with bronchitis/atypical pneumonia and COPD exacerbation after being seen  in the emergency department on 8/6/2024. CT chest at that time showed emphysematous changes and findings consistent with pneumonia. She presents to the emergency department today with persistent wheezing, shortness of breath and exertional hypoxia.  In the Emergency room, recurrent hypoxic episodes with exertion. States has sats have been running in the low 70s to 80s. .  Patient on 5L NC, Duo neb, Solumedrol given in route by EMS, elevated BNP at 366, troponin 57.8 up from 3.6. Chest x-ray shows no acute infiltrates and no significant interval changes. Procalcitonin 0.173. Cultures sent. White count 5000. EKG without significant ischemic changes, normal sinus rhythm, 90. Admit to hospital medicine for COPD exacerbation and concerning for ACS, possible NSTEMI versus congestive heart failure.  2D echo, serial cardiac enzyme monitoring. Hypotensive BP 90s over 60. Patient was given 500 bolus of LR then placed on 130 cc an hour for a total of 1 L. currently        * No surgery found *      Hospital Course:   Pt was monitored  "closely during her stay.  She was treated with steroids and Abx.  She had isolated severe lactic acidosis here, felt to be lab error as repeat normalized.  Her Abx were broadened as a result.  Pulmonology was consulted. They did not feel her acute presentation was indicative of a COPD exacerbation or her chronic bronchiectasis.  Differentials for consideration included a worsening of her chronic respiratory failure r/t to underlying hypersensitivity pneumonitis leading to some fibrosis vs. Pulmonary hypertension.  She did not have any clinical improvement on steroids or antibiotics.  She had stable oxygen levels on continuous oxygen and remained asymptomatic while ambulatory on oxygen.  She underwent repeat home o2 evaluation and required 6L of continuous oxygen.  These updated orders were sent to her home oxygen provider. She had echo results revealing for moderate pulmonary hypertension according to PA pressure calculations. Pulmonogy recommended discharge without need for any ongoing antibiotic or steroid.  Patient agreed with discharge plan.  She did request to reestablish with pulmonologist, Dr. Katie Magallon.  A referral was placed.  She will need close outpatient follow up for continued workup as appropriate and modification to treatment plan. She was able to produce a sputum on day of discharge that was sent for micro. An CAL level is pending at time of discharge.    Patient seen on day of discharge and verbalized understanding with discharge plan.     Goals of Care Treatment Preferences:  Code Status: Full Code      SDOH Screening:  The patient was screened for utility difficulties, food insecurity, transport difficulties, housing insecurity, and interpersonal safety and there were no concerns identified this admission.     Consults:   Consults (From admission, onward)          Status Ordering Provider     Pharmacy to dose Vancomycin consult  Once        Provider:  (Not yet assigned)   Placed in "And" " "Linked Group    Acknowledged ALEXUS ZARAGOZA     Inpatient consult to Pulmonology  Once        Provider:  Katie Magallon MD    Completed ISMAEL ROSARIO            No new Assessment & Plan notes have been filed under this hospital service since the last note was generated.  Service: Hospital Medicine    Final Active Diagnoses:    Diagnosis Date Noted POA    PRINCIPAL PROBLEM:  Acute on chronic respiratory failure with hypoxia [J96.21] 08/25/2020 Yes    Hypotension [I95.9] 08/11/2024 Yes    Elevated brain natriuretic peptide (BNP) level [R79.89] 09/09/2020 Yes    Acute exacerbation of chronic obstructive pulmonary disease (COPD) [J44.1] 09/09/2020 Yes      Problems Resolved During this Admission:    Diagnosis Date Noted Date Resolved POA    Pneumonia [J18.9] 09/08/2020 08/13/2024 Yes       Discharged Condition: fair    Disposition: Home or Self Care    Follow Up:   Follow-up Information       [facility], Provider Follow up in 1 week(s).               Katie Magallon MD Follow up.    Specialties: Pulmonary Disease, Sleep Medicine  Contact information:  [address] [place] [id number] [phone]                           Patient Instructions:      OXYGEN FOR HOME USE     Order Specific Question Answer Comments   Liter Flow 6    Duration Continuous    Qualifying Test Performed at: Activity    Oxygen saturation at rest 87    Oxygen saturation with activity 88    Oxygen saturation with activity on oxygen 92    Portable mode: continuous    Route nasal cannula    Device: home concentrator    Length of need (in months): 99 mos    Patient condition with qualifying saturation COPD    Height: 5' 4" (1.626 m)    Weight: 80.8 kg (178 lb 2.1 oz)    Alternative treatment measures have been tried or considered and deemed clinically ineffective. Yes      Ambulatory referral/consult to Pulmonology   Standing Status: Future   Referral Priority: Routine Referral Type: Consultation   Referral Reason: Specialty " Services Required   Referred to Provider: REX MARTÍNEZ Requested Specialty: Pulmonary Disease   Number of Visits Requested: 1     Diet Adult Regular     Notify your health care provider if you experience any of the following:  temperature >100.4     Notify your health care provider if you experience any of the following:  persistent dizziness, light-headedness, or visual disturbances     Notify your health care provider if you experience any of the following:  difficulty breathing or increased cough     Activity as tolerated       Significant Diagnostic Studies: Labs: CMP   Recent Labs   Lab 08/12/24 0433 08/13/24 0428   * 138   K 4.3 4.1    101   CO2 24 27   * 121*   BUN 29* 30*   CREATININE 0.7 0.8   CALCIUM 9.4 9.4   ANIONGAP 8 10    and CBC   Recent Labs   Lab 08/12/24 0433 08/13/24 0428   WBC 3.39* 7.22   HGB 12.7 13.1   HCT 37.7 39.8    246       Pending Diagnostic Studies:       Procedure Component Value Units Date/Time    CAL Screen w/Reflex [[id number]] Collected: 08/13/24 0919    Order Status: Sent Lab Status: In process Updated: 08/13/24 1007    Specimen: Blood     Aspergillus fumagatus IgE [[id number]] Collected: 08/13/24 0919    Order Status: Sent Lab Status: In process Updated: 08/13/24 1007    Specimen: Blood     Fungal Precipitins (Hypersensitivity PneumonitisI) [[id number]] Collected: 08/13/24 0919    Order Status: Sent Lab Status: In process Updated: 08/13/24 1007    Specimen: Blood     VANCOMYCIN, TROUGH [[id number]]     Order Status: Sent Lab Status: No result     Specimen: Blood            Imaging Results              X-Ray Chest AP Portable (Final result)  Result time 08/11/24 14:15:49      Final result by Merrill Pereira MD (08/11/24 14:15:49)                   Impression:      No significant change.      Electronically signed by: Merrill Pereira  Date:    08/11/2024  Time:    14:15               Narrative:    EXAMINATION:  XR CHEST AP PORTABLE    CLINICAL  HISTORY:  Sepsis;    FINDINGS:  Portable chest at 1333 compared with 08/06/2024 shows normal cardiomediastinal silhouette.    Slightly prominent interstitial markings bilaterally have not significantly changed from the prior CTA.  No new confluent alveolar consolidation, pleural effusion, or pneumothorax.  Pulmonary vasculature is normal. No acute osseous abnormality.                                      Medications:  Reconciled Home Medications:      Medication List        START taking these medications      mupirocin 2 % ointment  Commonly known as: BACTROBAN  by Nasal route 2 (two) times daily. for 3 days            CONTINUE taking these medications      * albuterol 90 mcg/actuation inhaler  Commonly known as: PROVENTIL/VENTOLIN HFA  Inhale 2 puffs into the lungs every 6 (six) hours as needed for Wheezing or Shortness of Breath.     * albuterol 2.5 mg /3 mL (0.083 %) nebulizer solution  Commonly known as: PROVENTIL  Take 2.5 mg by nebulization every 4 (four) hours as needed for Wheezing. Rescue     benzonatate 200 MG capsule  Commonly known as: TESSALON  Take 200 mg by mouth 2 (two) times daily as needed for Cough.     buPROPion 300 MG 24 hr tablet  Commonly known as: WELLBUTRIN XL  Take 300 mg by mouth every evening.     cholecalciferol (vitamin D3) 10 mcg (400 unit) Cap capsule  Take 400 Units by mouth once daily.     cyclobenzaprine 10 MG tablet  Commonly known as: FLEXERIL  Take 10 mg by mouth every evening.     eszopiclone 3 mg Tab  Commonly known as: LUNESTA  Take 3 mg by mouth nightly as needed (Insomnia).     ezetimibe 10 mg tablet  Commonly known as: ZETIA  Take 10 mg by mouth once daily.     gabapentin 400 MG capsule  Commonly known as: NEURONTIN  Take 400 mg by mouth 2 (two) times daily as needed (Pain).     garlic 1,000 mg Cap  Take 1,000 mg by mouth once daily.     guaiFENesin 600 mg 12 hr tablet  Commonly known as: MUCINEX  Take 1,200 mg by mouth 2 (two) times daily.     indomethacin 50 MG  capsule  Commonly known as: INDOCIN  Take 50 mg by mouth 2 (two) times daily as needed (Flare Up).     MYRBETRIQ 50 mg Tb24  Generic drug: mirabegron  Take 1 tablet by mouth once daily.     omega 3-dha-epa-fish oil 1,000 mg (120 mg-180 mg) Cap  Take 1 capsule by mouth once daily.     RESTASIS 0.05 % ophthalmic emulsion  Generic drug: cycloSPORINE  Place 1 drop into both eyes 2 (two) times daily.     telmisartan 80 MG Tab  Commonly known as: MICARDIS  Take 80 mg by mouth every evening.     tiotropium-olodateroL 2.5-2.5 mcg/actuation Mist  Commonly known as: STIOLTO RESPIMAT  Inhale 1 puff into the lungs once daily. Controller     UNABLE TO FIND  Take 1 tablet by mouth once daily. BONE BOOST     verapamiL 240 MG C24p  Commonly known as: VERELAN  Take 240 mg by mouth 2 (two) times daily.           * This list has 2 medication(s) that are the same as other medications prescribed for you. Read the directions carefully, and ask your doctor or other care provider to review them with you.                STOP taking these medications      amoxicillin-clavulanate 875-125mg 875-125 mg per tablet  Commonly known as: AUGMENTIN              Indwelling Lines/Drains at time of discharge:   Lines/Drains/Airways       None                   Time spent on the discharge of patient: 45 minutes         Elvira Naik NP  Department of Hospital Medicine  [facility]

## 2024-08-13 NOTE — PLAN OF CARE
Problem: Adult Inpatient Plan of Care  Goal: Plan of Care Review  Outcome: Progressing  Goal: Patient-Specific Goal (Individualized)  Outcome: Progressing  Goal: Absence of Hospital-Acquired Illness or Injury  Outcome: Progressing  Goal: Optimal Comfort and Wellbeing  Outcome: Progressing  Goal: Readiness for Transition of Care  Outcome: Progressing     Problem: COPD (Chronic Obstructive Pulmonary Disease)  Goal: Optimal Chronic Illness Coping  Outcome: Progressing  Goal: Optimal Level of Functional [place]  Outcome: Progressing  Goal: Absence of Infection Signs and Symptoms  Outcome: Progressing  Goal: Improved Oral Intake  Outcome: Progressing  Goal: Effective Oxygenation and Ventilation  Outcome: Progressing     Problem: Pneumonia  Goal: Fluid Balance  Outcome: Progressing  Goal: Resolution of Infection Signs and Symptoms  Outcome: Progressing  Goal: Effective Oxygenation and Ventilation  Outcome: Progressing     Problem: Infection  Goal: Absence of Infection Signs and Symptoms  Outcome: Progressing

## 2024-08-13 NOTE — CONSULTS
Pulmonary/Critical Care progress note      PATIENT NAME: Venkat Lara  MRN: [id number]  TODAY'S DATE: 2024  2:21 PM  ADMIT DATE: 2024  AGE: 74 y.o. : 1950    CONSULT REQUESTED BY: Jani Merino DO    REASON FOR CONSULT:   Recurrent respiraotry failure     HPI:  Ms Lara is a former smoker with reported history of COPD, rheumatoid arthritis, scoliosis, osteoarthritis and underlying lung disease.     She reports that she has had underlying lung disease for at least 10 years or more.  She has been following outpatient with Dr. Ayers.  She reports that is it is suspected that she had a pneumonia several years ago and since then she has not ongoing bronchiectasis and lung disease related to that.  She reports to me that she has a been on several rounds of antibiotics for the last 4 weeks.  She reports that her breathing has been particularly worse over the last month.  Prior to that she has been overall stable and not had any changes in her breathing for several years.  She reports that she was started on a new off-label medication by [facility] in her breathing has been stable since then.  Over the last month she has had a worsening and increasing oxygen requirements, typically only uses 2 L of oxygen at night but is now using oxygen 247.    She reports a chronic cough which is consistent with her stable lung disease, she typically expectorates sputum with that.  She denies having any fevers or night sweats or shaking chills.  She is not coughing up blood.  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    She had no other jobs that she worked.  Her  was in the .  No asbestos exposures    No reported history of underlying asthma    She reports to me that she does not have a history of autoimmune disease, however rheumatoid arthritis as documented in medical history    She denies having  any blue or purple fingertips when exposed to cold.  No red hot swollen joints.  No red hot itchy eyes.    Today:     No new issues. Her breathing is about the same     Review of Systems   Constitutional:  Negative for appetite change, chills, fever and unexpected weight change.   HENT:  Negative for nosebleeds, postnasal drip, trouble swallowing and voice change.    Eyes:  Negative for visual disturbance.   Respiratory:  Positive for cough and shortness of breath. Negative for wheezing.    Cardiovascular:  Negative for chest pain and leg swelling.   Gastrointestinal:  Negative for diarrhea, nausea and vomiting.   Endocrine: Negative for cold intolerance and heat intolerance.   Genitourinary:  Negative for dysuria, flank pain and frequency.   Musculoskeletal:  Negative for neck pain and neck stiffness.   Allergic/Immunologic: Negative for environmental allergies and immunocompromised state.   Neurological:  Negative for syncope, speech difficulty and weakness.   Hematological:  Negative for adenopathy.   Psychiatric/Behavioral:  Negative for confusion.            ALLERGIES  Review of patient's allergies indicates:   Allergen Reactions    Statins-hmg-coa reductase inhibitors Other (See Comments)     Muscle cramps    Doxycycline Other (See Comments)     Stops gallbladder function    Adhesive Other (See Comments)     bruises    Erythromycin Other (See Comments)     Stomach pain       INPATIENT SCHEDULED MEDICATIONS   albuterol-ipratropium  3 mL Nebulization Q6H WAKE    arformoteroL  15 mcg Nebulization BID    buPROPion  300 mg Oral QHS    carboxymethylcellulose  2 drop Both Eyes Q4H While awake    chlorhexidine  15 mL Mouth/Throat BID    cholecalciferol (vitamin D3)  400 Units Oral Daily    ezetimibe  10 mg Oral Daily    guaiFENesin  1,200 mg Oral BID    mupirocin   Nasal BID    oxybutynin  5 mg Oral Daily    piperacillin-tazobactam (Zosyn) IV (PEDS and ADULTS) (extended infusion is not appropriate)  3.375 g  Intravenous Q8H    predniSONE  40 mg Oral Daily    vancomycin (VANCOCIN) IV (PEDS and ADULTS)  1,000 mg Intravenous Q12H         MEDICAL AND SURGICAL HISTORY  Past Medical History:   Diagnosis Date    Colon polyp 01/01/2013    COPD, moderate 01/01/2010    was told by Dr. 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    Lumbar spinal stenosis     Oxygen dependent 01/01/2011    2L NC Nightly    Primary osteoarthritis of first carpometacarpal joints, bilateral     Rheumatoid arthritis     Scoliosis 01/01/2014    Spinal stenosis 01/01/2014    upper and lowerr back - tingling in left arm at times     Past Surgical History:   Procedure Laterality Date    BREAST SURGERY  2008    Reduction    BUNIONECTOMY Bilateral 01/1999    CARDIOVASCULAR STRESS TEST  2013    CARPAL TUNNEL RELEASE Left 10/08/2015    CATARACT EXTRACTION BILATERAL W/ ANTERIOR VITRECTOMY  04/2010    CMC ARTHROPLASTY, RIGHT  02/23/2004    COLONOSCOPY  04/18/2013    CORRECTION OF HAMMER TOE Left 8/14/2019    Procedure: CORRECTION, HAMMER TOE;  Surgeon: Jackson Truong DPM;  Location: [facility] OR;  Service: Podiatry;  Laterality: Left;    ESOPHAGOGASTRODUODENOSCOPY  12/21/2012    2012-with dilation #1, 2013 #2    HAND SURGERY  2003    JOINT REPLACEMENT      OPEN REDUCTION AND INTERNAL FIXATION (ORIF) OF FRACTURE OF FEMUR USING DYNAMIC HIP SCREW Left 8/22/2023    Procedure: ORIF, HIP, USING DYNAMIC HIP SCREW;  Surgeon: Hussein Romo MD;  Location: [facility] OR;  Service: Orthopedics;  Laterality: Left;    REMOVAL OF HARDWARE FROM  LOWER EXTREMITY Left 2019    Procedure: REMOVAL, HARDWARE, LOWER EXTREMITY;  Surgeon: Jackson Brannon DPM;  Location: [facility] OR;  Service: Podiatry;  Laterality: Left;    RHINOPLASTY TIP  2010    SURGICAL REMOVAL OF BUNION WITH OSTEOTOMY OF METATARSAL BONE Left 2019    Procedure: BUNIONECTOMY, WITH METATARSAL OSTEOTOMY;  Surgeon: Jackson Brannon DPM;  Location: [facility] OR;  Service: Podiatry;  Laterality: Left;  Arthrodesis 2nd PIP joint, screw 1st toe, C-arm, synthes-Merrill, 3.0, 4.0, AO modular set MERRILL BOY NOTIFIED    TONSILLECTOMY, ADENOIDECTOMY  1970    TOTAL KNEE ARTHROPLASTY Right 2008    TOTAL KNEE ARTHROPLASTY Left 2015    TOTAL REDUCTION MAMMOPLASTY  2008    TUBAL LIGATION      VAGINAL DELIVERY  1983    x2       ALCOHOL, TOBACCO AND DRUG USE  Social History     Tobacco Use   Smoking Status Former    Current packs/day: 0.00    Average packs/day: 2.0 packs/day for 33.0 years (66.0 ttl pk-yrs)    Types: Cigarettes    Start date: 1964    Quit date: 1997    Years since quittin.2   Smokeless Tobacco Former    Quit date: 1997     Social History     Substance and Sexual Activity   Alcohol Use Yes    Alcohol/week: 4.0 standard drinks of alcohol    Types: 4 Glasses of wine per week    Comment: monthly     Social History     Substance and Sexual Activity   Drug Use Never       FAMILY HISTORY  Family History   Problem Relation Name Age of Onset    Cancer Father          colon       VITAL SIGNS (MOST RECENT)  Temp: 98.2 °F (36.8 °C) (24 1538)  Pulse: 88 (24 1538)  Resp: (!) 24 (24 1425)  BP: (!) 173/97 (24 1538)  SpO2: 96 % (24 1538)    INTAKE AND OUTPUT (LAST 24 HOURS):  Intake/Output Summary (Last 24 hours) at 2024 1727  Last data filed at 2024 1455  Gross per 24 hour   Intake 960 ml   Output --   Net 960 ml       WEIGHT  Wt Readings from Last 1 Encounters:   24 80.8 kg (178 lb 2.1 oz)       Physical Exam  Vitals reviewed.  "  Constitutional:       General: She is not in acute distress.     Appearance: She is well-developed. She is not diaphoretic.   HENT:      Head: Normocephalic and atraumatic.      Mouth/Throat:      Pharynx: No oropharyngeal exudate or posterior oropharyngeal erythema.   Eyes:      General: No scleral icterus.     Pupils: Pupils are equal, round, and reactive to light.   Neck:      Vascular: No JVD.   Cardiovascular:      Rate and Rhythm: Normal rate and regular rhythm.      Heart sounds: Normal heart sounds. No murmur heard.  Pulmonary:      Effort: Pulmonary effort is normal. No respiratory distress.      Breath sounds: Normal breath sounds. No wheezing.      Comments: Mild base crackles  Abdominal:      General: Bowel sounds are normal. There is no distension.      Palpations: Abdomen is soft.      Tenderness: There is no abdominal tenderness.   Musculoskeletal:         General: No swelling.      Cervical back: Normal range of motion and neck supple. No rigidity.   Skin:     General: Skin is warm and dry.      Capillary Refill: Capillary refill takes less than 2 seconds.      Coloration: Skin is not pale.      Findings: No rash.   Neurological:      General: No focal deficit present.      Mental Status: She is alert and oriented to person, place, and time.      Cranial Nerves: No cranial nerve deficit.      Motor: No weakness or abnormal muscle tone.           ACUTE PHASE REACTANT (LAST 24 HOURS)  No results for input(s): "FERRITIN", "CRP", "LDH", "DDIMER" in the last 24 hours.    CBC LAST (LAST 24 HOURS)  Recent Labs   Lab 08/13/24  0428   WBC 7.22   RBC 3.89*   HGB 13.1   HCT 39.8   *   MCH 33.7*   MCHC 32.9   RDW 12.9      MPV 8.8*   GRAN 84.6*  6.1   LYMPH 7.6*  0.6*   MONO 6.9  0.5   BASO 0.01   NRBC 0       CHEMISTRY LAST (LAST 24 HOURS)  Recent Labs   Lab 08/13/24  0428      K 4.1      CO2 27   ANIONGAP 10   BUN 30*   CREATININE 0.8   *   CALCIUM 9.4   MG 2.1 " "      COAGULATION LAST (LAST 24 HOURS)  No results for input(s): "LABPT", "INR", "APTT" in the last 24 hours.    CARDIAC PROFILE (LAST 24 HOURS)  Recent Labs   Lab 08/11/24  1325 08/11/24  1823 08/12/24  0850   *  --   --    TROPONINIHS 57.8*   < > 16.5*    < > = values in this interval not displayed.       LAST 7 DAYS MICROBIOLOGY   Microbiology Results (last 7 days)       Procedure Component Value Units Date/Time    Culture, Respiratory with Gram Stain [[id number]] Collected: 08/13/24 1650    Order Status: Sent Specimen: Respiratory from Sputum, Expectorated Updated: 08/13/24 1701    Blood culture x two cultures. Draw prior to antibiotics. [[id number]] Collected: 08/11/24 1328    Order Status: Completed Specimen: Blood Updated: 08/13/24 1432     Blood Culture, Routine No Growth to date      No Growth to date      No Growth to date    Narrative:      Aerobic and anaerobic    Blood culture x two cultures. Draw prior to antibiotics. [[id number]] Collected: 08/11/24 1321    Order Status: Completed Specimen: Blood Updated: 08/13/24 1432     Blood Culture, Routine No Growth to date      No Growth to date      No Growth to date    Narrative:      Aerobic and anaerobic    Culture, Respiratory with Gram Stain [[id number]] Collected: 08/12/24 1252    Order Status: Completed Specimen: Sputum, Expectorated Updated: 08/12/24 1636     Respiratory Culture Specimen inadequate - culture not performed     Gram Stain (Respiratory) >10epis/lfp and <than many WBC's     Gram Stain (Respiratory) Moderate Gram positive cocci     Gram Stain (Respiratory) Few budding yeast     Gram Stain (Respiratory) Few Gram positive rods     Gram Stain (Respiratory) Predominance of oropharyngeal jennifer. Please recollect.     Gram Stain (Respiratory) Results called to and read back by:Racheal DESAI  08/12/2024  16:36     Gram Stain (Respiratory) [facility]    MRSA Screen by PCR [[id number]]  (Abnormal) Collected: 08/12/24 1030    Order Status: " Completed Specimen: Nasal Swab Updated: 08/12/24 1215     MRSA SCREEN BY PCR Positive     Comment: critical result(s) called and verbal readback obtained from Frieda Killian RN on [facility] by [facility] 08/12/2024 12:15         Narrative:         critical result(s) called and verbal readback obtained from Frieda Killian RN on [facility] by [facility] 08/12/2024 12:15    Respiratory Infection Panel (PCR), Nasopharyngeal [[id number]] Collected: 08/11/24 1758    Order Status: Completed Specimen: Nasopharyngeal Swab Updated: 08/11/24 2022     Respiratory Infection Panel Source NP swab     Adenovirus Not Detected     Coronavirus 229E, Common Cold Virus Not Detected     Coronavirus HKU1, Common Cold Virus Not Detected     Coronavirus NL63, Common Cold Virus Not Detected     Coronavirus OC43, Common Cold Virus Not Detected     Comment: Coronavirus strains 229E, HKU1, NL63, and OC43 can cause the common   cold   and are not associated with the respiratory disease outbreak caused   by  the COVID-19 (SARS-CoV-2 novel Coronavirus) strain.           SARS-CoV2 (COVID-19) Qualitative PCR Not Detected     Human Metapneumovirus Not Detected     Human Rhinovirus/Enterovirus Not Detected     Influenza A (subtypes H1, H1-2009,H3) Not Detected     Influenza B Not Detected     Parainfluenza Virus 1 Not Detected     Parainfluenza Virus 2 Not Detected     Parainfluenza Virus 3 Not Detected     Parainfluenza Virus 4 Not Detected     Respiratory Syncytial Virus Not Detected     Bordetella Parapertussis (XK7007) Not Detected     Bordetella pertussis (ptxP) Not Detected     Chlamydia pneumoniae Not Detected     Mycoplasma pneumoniae Not Detected     Comment: Respiratory Infection Panel testing performed by Mult[facility] PCR.       Narrative:      Respiratory Infection Panel source->NP Swab            MOST RECENT IMAGING  Echo  Addendum:   Left Ventricle: The left ventricle is normal in size. Normal wall  thickness. There is concentric remodeling. There is normal  systolic  function with a visually estimated ejection fraction of 65 - 70%. Biplane  (2D) method of discs ejection fraction is 67%. There is normal diastolic  function.     Right Ventricle: Normal right ventricular cavity size. Wall thickness  is normal. Systolic function is normal.     Left Atrium: Left atrium is dilated.     Tricuspid Valve: There is mild regurgitation with a centrally directed  jet.     Pulmonary Artery: The estimated pulmonary artery systolic pressure is  52 mmHg. Moderate pulmonary Hypertension.     IVC/SVC: Intermediate venous pressure at 8 mmHg.  Narrative:   Left Ventricle: The left ventricle is normal in size. Normal wall   thickness. There is normal systolic function with a visually estimated   ejection fraction of 65 - 70%. There is normal diastolic function.    Right Ventricle: Normal right ventricular cavity size. Wall thickness   is normal. Systolic function is normal.    Tricuspid Valve: There is mild regurgitation with a centrally directed   jet.      CURRENT VISIT EKG  Results for orders placed or performed during the hospital encounter of 08/11/24   EKG 12-lead   Result Value Ref Range    QRS Duration 70 ms    OHS QTC Calculation 459 ms    Narrative    Test Reason : J44.1,    Vent. Rate : 090 BPM     Atrial Rate : 090 BPM     P-R Int : 162 ms          QRS Dur : 070 ms      QT Int : 376 ms       P-R-T Axes : 059 056 068 degrees     QTc Int : 459 ms    Normal sinus rhythm  Low voltage QRS  Borderline Abnormal ECG    Confirmed by Vickie Valverde MD ([id number]) on 8/11/2024 5:47:32 PM    Referred By: [email or web address]   SELF           Confirmed By:Vickie Valverde MD       ECHOCARDIOGRAM RESULTS  Results for orders placed during the hospital encounter of 08/11/24    Echo    Interpretation Summary    Left Ventricle: The left ventricle is normal in size. Normal wall thickness. There is normal systolic function with a visually estimated ejection fraction of 65 - 70%. There is normal diastolic  function.    Right Ventricle: Normal right ventricular cavity size. Wall thickness is normal. Systolic function is normal.    Tricuspid Valve: There is mild regurgitation with a centrally directed jet.        VENTILATOR INFORMATION              LAST ARTERIAL BLOOD GAS  ABG  Recent Labs   Lab 08/11/24  1322   PH 7.423   PO2 61*   PCO2 35.0   HCO3 22.8*   BE -2                 ASSESSMENT:   Acute on chronic respiratory failure  Impaired AA gradient  Lactic acidosis - resolving   Mild dysphagia     Ms. Lara is a 74-year-old woman who is a former smoker and presents with recurrent shortness of breath.    Imaging reviewed extensively, I do not see any changes to her lung disease when compared her comparing to prior CT even as far back as 2020.  Chest imaging is mostly suggestive mild-to-moderate amount of fibrosis with predominantly underlying emphysema.  There is some additional ground-glass particularly in the upper lobes but diffuse and bilateral.  There could be a mosaic appearance, noting that there is likely emphysema with surrounding ground-glass that could produce the same image.  I see really only very mild bronchiectasis.     Differential is currently broad, but I primarily suspect that she has progressive worsening lung disease primarily related to underlying emphysema.  In addition I think that there could be some additional explanation for the ground-glass opacification which could include either fluid overload or inflammation.    The patient came in with a lactic acidosis, I wonder if this is primarily related to over utilization of beta agonists    Differential includes- progression of unknown ILD, heart failure exacerbation, chronic HP, IPAF, aspiration. Provisional diagnosis at this time is underlying emphysema with subacute vs chronic HP.     PLAN:   - ok for discharge- I suspect this is chronic lung disease which is stable, and she needs to use her oxygen during the day with exertion and night while  asleep   - Bronchoscopy unlikely to be helpful right now  - mild ph on echo- she likely will need consideration of RHC outpatient.   - Continue current steroid regimen- I'm not convinced this is a ILD flare    Shane Rangel MD  Date of Service: 08/13/2024  2:21 PM

## 2024-08-13 NOTE — PLAN OF CARE
[facility]  Initial Discharge Assessment       Primary Care Provider: Holli Hector    Admission Diagnosis: Congestive heart failure, unspecified HF chronicity, unspecified heart failure type [I50.9]  COPD exacerbation [J44.1]    Admission Date: 8/11/2024    DC assessment completed with patient at bedside.  Information verified as correct on facesheet.  Patient denies HPOA.  Denies HH/HD/Coumadin.  Uses oxygen at home as needed (2L at night).  Oxygen supplied by [facility].  Patient independent in ADL's.  DC plan is home. Spouse to provide transport on discharge.      Patient requesting referral to Dr. Magallon (pulmonologist) to follow up outpatient.  Also requests that our hospital pharmacy is added to Epic in the event patient discharges during normal business hours, would like meds delivered to bedside.  Pulmology referral placed and pharmacy updated.        Transition of Care Barriers: None    Payor: MEDICARE / Plan: MEDICARE PART A & B / Product Type: Government /     Extended Emergency Contact Information  Primary Emergency Contact: Warren Lara  Address: [address] [place] [address]  Home Phone: [phone]  Mobile Phone: [phone]  Relation: Spouse    Discharge Plan A: Home with family  Discharge Plan B: Home Health      [id number] [place] - [address] AT [facility] & [facility]  [address] [facility] [address]  Phone: [phone] Fax: [phone]    [id number] - [place] - [address] AT [facility] & On [address] of [facility] [address]  [address]  [place] [address]  Phone: [phone] Fax: [phone]    [facility] - [place] - [address]  [address] [place] [address]  Phone: [phone] Fax: [phone]    [id number] - [place] - [address] AT [facility] [address]  [address] [place] [address]  Phone:  [phone] Fax: [phone]      Initial Assessment (most recent)       Adult Discharge Assessment - 08/13/24 1349          Discharge Assessment    Assessment Type Discharge Planning Assessment     Confirmed/corrected address, phone number and insurance Yes     Confirmed Demographics Correct on Facesheet     Source of Information patient     Communicated GENI with patient/caregiver Yes     Reason For Admission acute on chronic respiratory failure with hypoxia     People in Home spouse;child(gilles), adult     Facility Arrived From: home     Do you expect to return to your current living situation? Yes     Do you have help at home or someone to help you manage your care at home? Yes     Current cognitive status: Alert/Oriented     Equipment Currently Used at Home oxygen     Readmission within 30 days? Yes     Do you currently have service(s) that help you manage your care at home? No     Do you take prescription medications? Yes     Do you have prescription coverage? Yes     Do you have any problems affording any of your prescribed medications? No     Is the patient taking medications as prescribed? yes     Who is going to help you get home at discharge? spouse     How do you get to doctors appointments? car, drives self     Are you on dialysis? No     Do you take coumadin? No     Discharge Plan A Home with family     Discharge Plan B Home Health     DME Needed Upon Discharge  none     Discharge Plan discussed with: Patient     Transition of Care Barriers None        Physical Activity    On average, how many days per week do you engage in moderate to strenuous exercise (like a brisk walk)? 4 days     On average, how many minutes do you engage in exercise at this level? 40 min        Financial Resource Strain    How hard is it for you to pay for the very basics like food, housing, medical care, and heating? Not hard at all        Housing Stability    In the last 12 months, was there a time when you were not able to pay  the mortgage or rent on time? No     At any time in the past 12 months, were you homeless or living in a shelter (including now)? No        Transportation Needs    Has the lack of transportation kept you from medical appointments, meetings, work or from getting things needed for daily living? No        Food Insecurity    Within the past 12 months, you worried that your food would run out before you got the money to buy more. Never true     Within the past 12 months, the food you bought just didn't last and you didn't have money to get more. Never true        Stress    Do you feel stress - tense, restless, nervous, or anxious, or unable to sleep at night because your mind is troubled all the time - these days? Not at all        Social Isolation    How often do you feel lonely or isolated from those around you?  Never        Alcohol Use    Q1: How often do you have a drink containing alcohol? 2-4 times a month     Q2: How many drinks containing alcohol do you have on a typical day when you are drinking? 1 or 2     Q3: How often do you have six or more drinks on one occasion? Never        Utilities    In the past 12 months has the electric, gas, oil, or water company threatened to shut off services in your home? No        Health Literacy    How often do you need to have someone help you when you read instructions, pamphlets, or other written material from your doctor or pharmacy? Never

## 2024-08-13 NOTE — CARE UPDATE
08/13/24 0818   Patient Assessment/Suction   Level of Consciousness (AVPU) alert   Respiratory Effort Mild;Labored   Expansion/Accessory Muscles/Retractions no use of accessory muscles   All Lung Fields Breath Sounds Anterior:;Lateral:   Rhythm/Pattern, Respiratory shortness of breath   Cough Frequency frequent   Cough Type congested;loose;nonproductive   PRE-TX-O2   Device (Oxygen Therapy) room air   SpO2 (!) 86 %   Pulse Oximetry Type Intermittent   $ Pulse Oximetry - Multiple Charge Pulse Oximetry - Multiple   Pulse 92   Resp (!) 22   Aerosol Therapy   $ Aerosol Therapy Charges Aerosol Treatment   Daily Review of Necessity (SVN) completed   Respiratory Treatment Status (SVN) given   Treatment Route (SVN) mask;oxygen   Patient Position HOB elevated   Post Treatment Assessment (SVN) increased aeration   Breath Sounds Post-Respiratory Treatment   Throughout All Fields Post-Treatment All Fields   Throughout All Fields Post-Treatment aeration increased   Post-treatment Heart Rate (beats/min) 96   Post-treatment Resp Rate (breaths/min) 20   Vibratory PEP Therapy   $ Vibratory PEP Charges Aerobika Therapy   $ Vibratory PEP Equipment Aerobika Equipment   $ Vibratory PEP Tech Time Charges 15 min   Daily Review of Necessity (PEP Therapy) completed   Type (PEP Therapy) vibratory/oscillatory   Device (PEP Therapy) flutter   Route (PEP Therapy) mouthpiece   Breaths per Cycle (PEP Therapy) 10   Cycles (PEP Therapy) 2   PEP Pressure (cm H2O) 5   PEP Duration (min) 10   Settings (PEP Therapy) PEP 5   Post Treatment Assessment (PEP) breath sounds improved   Signs of Intolerance (PEP Therapy) none   Preset CPAP/BiPAP Settings   Mode Of Delivery CPAP  (Home unit)   $ CPAP/BiPAP Daily Charge BiPAP/CPAP Daily   $ Is patient using? Yes   Size of Mask Other (see comments)  (Patient using home nasal mask)   Education   $ Education Bronchodilator     Patient family to bring vest today

## 2024-08-13 NOTE — PLAN OF CARE
Charts and orders reviewed. Pt to discharge home.  sent a message to discharge clinic to have pt a hospital f/u scheduled. Pt has no other needs to be addressed by case management.   [facility] [place] will be delivering O2 to hospital for pt. Pt cleared to discharge from case management standpoint, after O2 is delivered to pt at bedside.        08/13/24 1602   Final Note   Assessment Type Final Discharge Note   Anticipated Discharge Disposition Home   What phone number can be called within the next 1-3 days to see how you are doing after discharge? [phone]   Post-Acute Status   Discharge Delays None known at this time

## 2024-08-13 NOTE — RESPIRATORY THERAPY
08/12/24 1952   Patient Assessment/Suction   Level of Consciousness (AVPU) alert   Respiratory Effort Normal;Unlabored   Expansion/Accessory Muscles/Retractions no use of accessory muscles   All Lung Fields Breath Sounds Anterior:;diminished   Rhythm/Pattern, Respiratory unlabored;depth regular;pattern regular;no shortness of breath reported   PRE-TX-O2   Device (Oxygen Therapy) nasal cannula   Flow (L/min) (Oxygen Therapy) 4   SpO2 (!) 94 %   Pulse Oximetry Type Intermittent   $ Pulse Oximetry - Multiple Charge Pulse Oximetry - Multiple   Pulse 96   Resp 20   Aerosol Therapy   $ Aerosol Therapy Charges Aerosol Treatment   Daily Review of Necessity (SVN) completed   Respiratory Treatment Status (SVN) given   Treatment Route (SVN) mask;oxygen   Patient Position sitting in chair   Post Treatment Assessment (SVN) increased aeration   Signs of Intolerance (SVN) none   Breath Sounds Post-Respiratory Treatment   Throughout All Fields Post-Treatment aeration increased   Post-treatment Heart Rate (beats/min) 96   Post-treatment Resp Rate (breaths/min) 20   Preset CPAP/BiPAP Settings   Mode Of Delivery CPAP  (HOME UNIT WITH 3L O2 BLED IN)   Equipment Type   (HOME UNIT)   Education   $ Education Bronchodilator;DME Nebulizer;DME Oxygen;15 min

## 2024-08-13 NOTE — CARE UPDATE
08/13/24 1405   Home Oxygen Qualification   $ Home O2 Qualification Pulmonary Stress Test/6 min walk;Tech time 15 minutes   Room Air SpO2 At Rest (!) 87 %   Room Air SpO2 During Ambulation (!) 88 %   SpO2 During Ambulation on O2 92 %   Heart Rate on O2 96 bpm   Ambulation O2 LPM 6 LPM   SpO2 Post Ambulation 94 %   Post Ambulation Heart Rate 98 bpm   Post Ambulation O2 LPM 4 LPM   Home O2 Eval Comments NC @ 2lpm 88%, 4lpm 87%. Patient requires 6 lpm to maintain sats > or = 92%.     On 5 lpm the patient was hovering at 90%.

## 2024-08-13 NOTE — DISCHARGE INSTRUCTIONS
Wear the oxygen continuously at home  Seek emergent medical attention for oxygen levels less than 88% despite continuous supplemental o2 therapy.  You will need close follow up with pulmonology for ongoing management

## 2024-08-14 ENCOUNTER — TELEPHONE (OUTPATIENT)
Dept: PRIMARY CARE CLINIC | Facility: CLINIC | Age: 74
End: 2024-08-14
Payer: MEDICARE

## 2024-08-14 LAB — ANA TITR SER IF: NEGATIVE {TITER}

## 2024-08-14 NOTE — TELEPHONE ENCOUNTER
Rn Navigator spoke with pt who informed me that she has a Pulmonology appt with her regular pulmonologist next week and will now be following up with Kathia Correia in Primary Care and will make a hospital follow up within the next seven days. She declined the offered and previously scheduled Hospital Discharge Clinic appt for tomorrow at 9:00 a.m. and RN Navigator per her request cancelled this appt.

## 2024-08-15 LAB
BACTERIA SPEC AEROBE CULT: NORMAL
GRAM STN SPEC: NORMAL
GRAM STN SPEC: NORMAL

## 2024-08-16 LAB
BACTERIA BLD CULT: NORMAL
BACTERIA BLD CULT: NORMAL

## 2024-08-17 LAB — A FUMIGATUS IGE QN: <0.1 KU/L

## 2024-08-28 ENCOUNTER — OFFICE VISIT (OUTPATIENT)
Dept: PULMONOLOGY | Facility: CLINIC | Age: 74
End: 2024-08-28
Payer: MEDICARE

## 2024-08-28 VITALS
BODY MASS INDEX: 29.19 KG/M2 | HEIGHT: 64 IN | HEART RATE: 74 BPM | SYSTOLIC BLOOD PRESSURE: 140 MMHG | OXYGEN SATURATION: 74 % | DIASTOLIC BLOOD PRESSURE: 80 MMHG | WEIGHT: 171 LBS

## 2024-08-28 DIAGNOSIS — J84.9 INTERSTITIAL PULMONARY DISEASE, UNSPECIFIED: Primary | ICD-10-CM

## 2024-08-28 DIAGNOSIS — J44.9 CHRONIC OBSTRUCTIVE PULMONARY DISEASE, UNSPECIFIED COPD TYPE: ICD-10-CM

## 2024-08-28 PROCEDURE — 99214 OFFICE O/P EST MOD 30 MIN: CPT | Mod: PBBFAC,PN | Performed by: INTERNAL MEDICINE

## 2024-08-28 PROCEDURE — 99215 OFFICE O/P EST HI 40 MIN: CPT | Mod: S$PBB,,, | Performed by: INTERNAL MEDICINE

## 2024-08-28 PROCEDURE — 99999 PR PBB SHADOW E&M-EST. PATIENT-LVL IV: CPT | Mod: PBBFAC,,, | Performed by: INTERNAL MEDICINE

## 2024-08-28 RX ORDER — IPRATROPIUM BROMIDE AND ALBUTEROL SULFATE 2.5; .5 MG/3ML; MG/3ML
3 SOLUTION RESPIRATORY (INHALATION)
Qty: 120 EACH | Refills: 11 | Status: SHIPPED | OUTPATIENT
Start: 2024-08-28

## 2024-08-28 RX ORDER — BUDESONIDE, GLYCOPYRROLATE, AND FORMOTEROL FUMARATE 160; 9; 4.8 UG/1; UG/1; UG/1
2 AEROSOL, METERED RESPIRATORY (INHALATION) 2 TIMES DAILY
Qty: 10.7 G | Refills: 11 | Status: SHIPPED | OUTPATIENT
Start: 2024-08-28

## 2024-08-28 RX ORDER — NITROFURANTOIN (MACROCRYSTALS) 100 MG/1
100 CAPSULE ORAL 2 TIMES DAILY
COMMUNITY
Start: 2024-08-26

## 2024-08-28 NOTE — PROGRESS NOTES
SUBJECTIVE:    Patient ID: Venkat Lara is a 74 y.o. female.    Chief Complaint: Establish Care    HPI the patient is here with significant hypoxemia.  She carries multiple diagnoses of COPD, bronchiectasis, ILD, possible old histo.  She has been hospitalized this month twice.  She is on 5-6 liters on her home concentrator.  She had been on CPAP and 2 liters of oxygen.  Since July, she has been requiring oxygen all the time.      She is rapidly deteriorating.    She was on 4 liters 3 weeks ago on her elliptical with sats in the 90's.  She is now requiring 6 liters to cover her sats.      Her CT shows ground-glass opacities, emphysematous blebs, minimal right lower lobe bronchiectasis.  She has been and a study with [employer] for a new bronchiectasis medicine that is supposed to come out next year and she will be on it.  She has been following with Dr. Mccormick who has had her on rotating antibiotics for many years.  She also has what she describes as mild BRADY but has 2 L of oxygen bleeding into it.  She can not explain the why for this.    Past Medical History:   Diagnosis Date    Colon polyp 01/01/2013    COPD, moderate 01/01/2010    was told by Dr. 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    Lumbar spinal stenosis     Oxygen dependent 01/01/2011    2L NC Nightly    Primary osteoarthritis of first carpometacarpal joints, bilateral     Rheumatoid arthritis     Scoliosis 01/01/2014    Spinal stenosis 01/01/2014    upper and lowerr back - tingling in left arm at times      Past Surgical History:   Procedure Laterality Date    BREAST SURGERY  2008    Reduction    BUNIONECTOMY Bilateral 01/1999    CARDIOVASCULAR STRESS TEST  2013    CARPAL TUNNEL RELEASE Left 10/08/2015    CATARACT EXTRACTION BILATERAL W/ ANTERIOR VITRECTOMY  04/2010    [facility] ARTHROPLASTY, RIGHT  02/23/2004    COLONOSCOPY  04/18/2013    CORRECTION OF HAMMER TOE Left 8/14/2019    Procedure: CORRECTION, HAMMER TOE;  Surgeon: Jackson Brannon DPM;  Location: [facility];  Service: Podiatry;  Laterality: Left;    ESOPHAGOGASTRODUODENOSCOPY  12/21/2012    2012-with dilation #1, 2013 #2    HAND SURGERY  2003    JOINT REPLACEMENT      OPEN REDUCTION AND INTERNAL FIXATION (ORIF) OF FRACTURE OF FEMUR USING DYNAMIC HIP SCREW Left 8/22/2023    Procedure: ORIF, HIP, USING DYNAMIC HIP SCREW;  Surgeon: Hussein Romo MD;  Location: [facility];  Service: Orthopedics;  Laterality: Left;    REMOVAL OF HARDWARE FROM LOWER EXTREMITY Left 8/14/2019    Procedure: REMOVAL, HARDWARE, LOWER EXTREMITY;  Surgeon: Jackson Brannon DPM;  Location: [facility];  Service: Podiatry;  Laterality: Left;    RHINOPLASTY TIP  02/2010    SURGICAL REMOVAL OF BUNION WITH OSTEOTOMY OF METATARSAL BONE Left 8/14/2019    Procedure: BUNIONECTOMY, WITH METATARSAL OSTEOTOMY;  Surgeon: Jackson Brannon DPM;  Location: [facility];  Service: Podiatry;  Laterality: Left;  Arthrodesis 2nd PIP joint, screw 1st toe, C-arm, synthes-Merrill, 3.0, 4.0, AO modular set MERRILL BRUNSON NOTIFIED    TONSILLECTOMY, ADENOIDECTOMY  1970    TOTAL KNEE ARTHROPLASTY Right 12/2008    TOTAL KNEE ARTHROPLASTY Left 03/2015    TOTAL REDUCTION MAMMOPLASTY  01/2008    TUBAL LIGATION      VAGINAL DELIVERY  1983    x2     Family History   Problem Relation Name Age of Onset    Cancer Father          colon        Social History:   Marital Status:   Occupation: Data Unavailable  Alcohol History:  reports current alcohol use of about 4.0 standard drinks of alcohol per week.  Tobacco History:  reports  that she quit smoking about 27 years ago. Her smoking use included cigarettes. She started smoking about 60 years ago. She has a 66 pack-year smoking history. She quit smokeless tobacco use about 27 years ago.  Drug History:  reports no history of drug use.    Review of patient's allergies indicates:   Allergen Reactions    Statins-hmg-coa reductase inhibitors Other (See Comments)     Muscle cramps    Doxycycline Other (See Comments)     Stops gallbladder function    Adhesive Other (See Comments)     bruises    Erythromycin Other (See Comments)     Stomach pain       Current Outpatient Medications   Medication Sig Dispense Refill    albuterol (PROVENTIL) 2.5 mg /3 mL (0.083 %) nebulizer solution Take 2.5 mg by nebulization every 4 (four) hours as needed for Wheezing. Rescue      albuterol (PROVENTIL/VENTOLIN HFA) 90 mcg/actuation inhaler Inhale 2 puffs into the lungs every 6 (six) hours as needed for Wheezing or Shortness of Breath.      benzonatate (TESSALON) 200 MG capsule Take 200 mg by mouth 2 (two) times daily as needed for Cough.      buPROPion (WELLBUTRIN XL) 300 MG 24 hr tablet Take 300 mg by mouth every evening.       cholecalciferol, vitamin D3, 10 mcg (400 unit) Cap capsule Take 400 Units by mouth once daily.      cyclobenzaprine (FLEXERIL) 10 MG tablet Take 10 mg by mouth every evening.      eszopiclone (LUNESTA) 3 mg Tab Take 3 mg by mouth nightly as needed (Insomnia).      ezetimibe (ZETIA) 10 mg tablet Take 10 mg by mouth once daily.      gabapentin (NEURONTIN) 400 MG capsule Take 400 mg by mouth 2 (two) times daily as needed (Pain).      guaiFENesin (MUCINEX) 600 mg 12 hr tablet Take 1,200 mg by mouth 2 (two) times daily.      indomethacin (INDOCIN) 50 MG capsule Take 50 mg by mouth 2 (two) times daily as needed (Flare Up).      MYRBETRIQ 50 mg Tb24 Take 1 tablet by mouth once daily.      nitrofurantoin (MACRODANTIN) 100 MG capsule Take 100 mg by mouth 2 (two) times daily.      omega 3-dha-epa-fish  oil 1,000 mg (120 mg-180 mg) Cap Take 1 capsule by mouth once daily.      RESTASIS 0.05 % ophthalmic emulsion Place 1 drop into both eyes 2 (two) times daily.      telmisartan (MICARDIS) 80 MG Tab Take 80 mg by mouth every evening.      verapamil (VERELAN) 240 MG C24P Take 240 mg by mouth 2 (two) times daily.       albuterol-ipratropium (DUO-NEB) 2.5 mg-0.5 mg/3 mL nebulizer solution Take 3 mLs by nebulization every 4 to 6 hours as needed for Shortness of Breath. Rescue 120 each 11    budesonide-glycopyr-formoterol (BREZTRI AEROSPHERE) 160-9-4.8 mcg/actuation HFAA Inhale 2 puffs into the lungs 2 (two) times a day. 10.7 g 11    garlic 1,000 mg Cap Take 1,000 mg by mouth once daily.      UNABLE TO FIND Take 1 tablet by mouth once daily. BONE BOOST       No current facility-administered medications for this visit.       Alpha-1 Antitrypsin:  Last PFT:   Last CT:  Last Echo: 8/12/24    Left Ventricle: The left ventricle is normal in size. Normal wall thickness. There is concentric remodeling. There is normal systolic function with a visually estimated ejection fraction of 65 - 70%. Biplane (2D) method of discs ejection fraction is 67%. There is normal diastolic function.    Right Ventricle: Normal right ventricular cavity size. Wall thickness is normal. Systolic function is normal.    Left Atrium: Left atrium is dilated.    Tricuspid Valve: There is mild regurgitation with a centrally directed jet.    Pulmonary Artery: The estimated pulmonary artery systolic pressure is 52 mmHg. Moderate pulmonary Hypertension.    IVC/SVC: Intermediate venous pressure at 8 mmHg.      CAL is negative, aspergillus is negative, hypersensitivity pneumonitis panel is negative    Review of Systems  General: Feeling Well.  Eyes: Vision is complicated by glaucoma.  ENT:  Every 4 weeks produces green stuff out of her nose which then drops into her lungs.  Heart:: She has a heart rate in the 90's.  She has pulmonary hypertension and mild TR.  She  "is on verapamil 240mg bid for tachycardia. She is also on Micardis 40-80 mg  Lungs: some wheezes, coughs a lot especially in the evenings. Percussion vest bid to tid. Mucus is fairly clear to murky.  Goes to green when off of antibiotics.  Mucus expectorated is more purulent when she uses her vest  GI:  reflux. Constipated, on Miralax  : Nocturia 2-3 x a night  Musculoskeletal:L hip   Skin: No lesions or rashes. Bruises easily  Neuro: No headaches or neuropathy.  Lymph: No edema or adenopathy.  Psych: Mild depression.  Endo: recently lost 14 pounds    OBJECTIVE:      BP (!) 140/80 (BP Location: Right arm, Patient Position: Sitting, BP Method: Medium (Manual))   Pulse 74   Ht 5' 4" (1.626 m)   Wt 77.6 kg (171 lb)   SpO2 (!) 74% Comment: 5LPM PD 90% on 5LPM CONT  BMI 29.35 kg/m²     Physical Exam  GENERAL: Older patient in no distress.  HEENT: Pupils equal and reactive. Extraocular movements intact. Nose intact. Pharynx moist.  NECK: Supple.   HEART: Regular rate and rhythm. No murmur or gallop auscultated.  LUNGS:  There are rhonchi and crackles in the right lower lobe.  The remainder of the lungs are clear without wheezes.. Lung excursion symmetrical. No change in fremitus.   ABDOMEN: Bowel sounds present. Non-tender, no masses palpated.  EXTREMITIES: Normal muscle tone and joint movement, no cyanosis or clubbing.   LYMPHATICS: No adenopathy palpated, no edema.  SKIN: Dry, intact, no lesions.   NEURO: Cranial nerves II-XII intact. Motor strength 5/5 bilaterally, upper and lower extremities.  PSYCH: Appropriate affect.    Assessment:       1. Interstitial pulmonary disease, unspecified    2. Chronic obstructive pulmonary disease, unspecified COPD type        The patient has a rapidly progressive oxygen requirements in a background of emphysema, mild bronchiectasis, COPD, ground-glass consistent with ILD that has progressed since her CT in 2022.  During that time her oxygen requirements have increased " dramatically since July of this year.  There are 2 entries into the problem list for aspiration pneumonia and chronic aspiration.  Patient also has pulmonary hypertension seen on her last echo.  One assumes this is secondary and not primary.  Need more data....  Plan:       Interstitial pulmonary disease, unspecified  -     CT Chest Without Contrast; Future; Expected date: 08/28/2024    Chronic obstructive pulmonary disease, unspecified COPD type  -     Complete PFT with bronchodilator; Future  -     Stress test, pulmonary; Future; Expected date: 08/28/2024  -     budesonide-glycopyr-formoterol (BREZTRI AEROSPHERE) 160-9-4.8 mcg/actuation HFAA; Inhale 2 puffs into the lungs 2 (two) times a day.  Dispense: 10.7 g; Refill: 11  -     albuterol-ipratropium (DUO-NEB) 2.5 mg-0.5 mg/3 mL nebulizer solution; Take 3 mLs by nebulization every 4 to 6 hours as needed for Shortness of Breath. Rescue  Dispense: 120 each; Refill: 11         Follow up in about 2 weeks (around 9/11/2024).  Move patient from Stiolto to breast tree   Move patient from albuterol to DuoNebs  Get notes and PFT's from Dr. Mccormick   Have [employer] check the machine  Delve into this diagnosis of chronic aspiration  Repeat PFTs and 6 minute walk   CT chest prone and supine, inspiratory and expiratory  Return to clinic in 2 weeks whom hopefully we have more answers

## 2024-09-03 ENCOUNTER — TELEPHONE (OUTPATIENT)
Dept: PULMONOLOGY | Facility: HOSPITAL | Age: 74
End: 2024-09-03

## 2024-09-03 NOTE — TELEPHONE ENCOUNTER
Dr. Mccormick's office note reports moderate COPD.  He notes bronchiectasis with rotating antibiotics of Augmentin and Ceftin.  He lost hypoxemia with her using oxygen at 2 L but then above this states her room air sats are 93 on room air.  Her obstructive sleep apnea shows an RDI of 16.4 and she is on an auto Pap from 6-20.  She has bruxism.

## 2024-09-05 ENCOUNTER — HOSPITAL ENCOUNTER (OUTPATIENT)
Dept: PULMONOLOGY | Facility: HOSPITAL | Age: 74
Discharge: HOME | End: 2024-09-05
Attending: INTERNAL MEDICINE
Payer: MEDICARE

## 2024-09-05 ENCOUNTER — TELEPHONE (OUTPATIENT)
Dept: PULMONOLOGY | Facility: HOSPITAL | Age: 74
End: 2024-09-05

## 2024-09-05 ENCOUNTER — HOSPITAL ENCOUNTER (OUTPATIENT)
Dept: RADIOLOGY | Facility: HOSPITAL | Age: 74
Discharge: HOME | End: 2024-09-05
Attending: INTERNAL MEDICINE
Payer: MEDICARE

## 2024-09-05 DIAGNOSIS — J44.9 CHRONIC OBSTRUCTIVE PULMONARY DISEASE, UNSPECIFIED COPD TYPE: ICD-10-CM

## 2024-09-05 DIAGNOSIS — J84.9 INTERSTITIAL PULMONARY DISEASE, UNSPECIFIED: ICD-10-CM

## 2024-09-05 LAB
DLCO SINGLE BREATH LLN: 14.96
DLCO SINGLE BREATH PRE REF: 31.7 %
DLCO SINGLE BREATH REF: 20.7
DLCOC SBVA LLN: 2.76
DLCOC SBVA REF: 4.19
DLCOC SINGLE BREATH LLN: 14.96
DLCOC SINGLE BREATH REF: 20.7
DLCOVA LLN: 2.76
DLCOVA PRE REF: 39.5 %
DLCOVA PRE: 1.66 ML/(MIN*MMHG*L) (ref 2.76–5.62)
DLCOVA REF: 4.19
ERVN2 LLN: -16449.41
ERVN2 PRE REF: 0 %
ERVN2 PRE: 0 L (ref -16449.41–16450.59)
ERVN2 REF: 0.59
FEF 25 75 CHG: -11 %
FEF 25 75 LLN: 1.04
FEF 25 75 POST REF: 29 %
FEF 25 75 PRE REF: 32.6 %
FEF 25 75 REF: 2.44
FET100 CHG: 15 %
FEV1 CHG: 2.8 %
FEV1 FVC CHG: -1.9 %
FEV1 FVC LLN: 64
FEV1 FVC POST REF: 74.3 %
FEV1 FVC PRE REF: 75.7 %
FEV1 FVC REF: 78
FEV1 LLN: 1.49
FEV1 POST REF: 71.7 %
FEV1 PRE REF: 69.8 %
FEV1 REF: 2.08
FRCN2 LLN: 1.89
FRCN2 PRE REF: 90.1 %
FRCN2 REF: 2.72
FVC CHG: 4.8 %
FVC LLN: 1.94
FVC POST REF: 95.6 %
FVC PRE REF: 91.3 %
FVC REF: 2.71
IVC PRE: 2.36 L (ref 1.94–3.52)
IVC SINGLE BREATH LLN: 1.94
IVC SINGLE BREATH PRE REF: 87.2 %
IVC SINGLE BREATH REF: 2.71
PEF CHG: 7.9 %
PEF LLN: 3.62
PEF POST REF: 49 %
PEF PRE REF: 45.4 %
PEF REF: 5.33
POST FEF 25 75: 0.71 L/S (ref 1.04–3.83)
POST FET 100: 7.49 SEC
POST FEV1 FVC: 57.68 % (ref 63.75–89.59)
POST FEV1: 1.49 L (ref 1.49–2.65)
POST FVC: 2.59 L (ref 1.94–3.52)
POST PEF: 2.61 L/S (ref 3.62–7.04)
PRE DLCO: 6.56 ML/(MIN*MMHG) (ref 14.96–26.43)
PRE FEF 25 75: 0.79 L/S (ref 1.04–3.83)
PRE FET 100: 6.51 SEC
PRE FEV1 FVC: 58.79 % (ref 63.75–89.59)
PRE FEV1: 1.45 L (ref 1.49–2.65)
PRE FRC N2: 2.45 L (ref 1.89–3.54)
PRE FVC: 2.47 L (ref 1.94–3.52)
PRE PEF: 2.42 L/S (ref 3.62–7.04)
RVN2 LLN: 1.55
RVN2 PRE REF: 115 %
RVN2 PRE: 2.45 L (ref 1.55–2.7)
RVN2 REF: 2.13
RVN2TLCN2 LLN: 34.53
RVN2TLCN2 PRE REF: 112.7 %
RVN2TLCN2 PRE: 49.71 % (ref 34.53–53.71)
RVN2TLCN2 REF: 44.12
TLCN2 LLN: 3.95
TLCN2 PRE REF: 99.6 %
TLCN2 PRE: 4.92 L (ref 3.95–5.93)
TLCN2 REF: 4.94
VA PRE: 3.96 L (ref 4.79–4.79)
VA SINGLE BREATH LLN: 4.79
VA SINGLE BREATH PRE REF: 82.7 %
VA SINGLE BREATH REF: 4.79
VCMAXN2 LLN: 1.94
VCMAXN2 PRE REF: 91.3 %
VCMAXN2 PRE: 2.47 L (ref 1.94–3.52)
VCMAXN2 REF: 2.71

## 2024-09-05 PROCEDURE — 94729 DIFFUSING CAPACITY: CPT

## 2024-09-05 PROCEDURE — 71250 CT THORAX DX C-: CPT | Mod: TC

## 2024-09-05 PROCEDURE — 71250 CT THORAX DX C-: CPT | Mod: 26,,, | Performed by: RADIOLOGY

## 2024-09-05 PROCEDURE — 94729 DIFFUSING CAPACITY: CPT | Mod: 26,,, | Performed by: INTERNAL MEDICINE

## 2024-09-05 PROCEDURE — 94618 PULMONARY STRESS TESTING: CPT | Mod: 26,,, | Performed by: INTERNAL MEDICINE

## 2024-09-05 PROCEDURE — 94060 EVALUATION OF WHEEZING: CPT | Mod: 26,59,, | Performed by: INTERNAL MEDICINE

## 2024-09-05 PROCEDURE — 94618 PULMONARY STRESS TESTING: CPT

## 2024-09-05 PROCEDURE — 94727 GAS DIL/WSHOT DETER LNG VOL: CPT | Mod: 26,,, | Performed by: INTERNAL MEDICINE

## 2024-09-05 PROCEDURE — 94727 GAS DIL/WSHOT DETER LNG VOL: CPT

## 2024-09-05 PROCEDURE — 94060 EVALUATION OF WHEEZING: CPT

## 2024-09-05 NOTE — TELEPHONE ENCOUNTER
New CT chest   1. Pulmonary fibrotic changes with honeycombing, similar to August 2024, but increased compared to August 2020.  2. Compared August 2024, bilateral ground-glass opacities have resolved.  3. Additional observations as above.

## 2024-09-05 NOTE — TELEPHONE ENCOUNTER
The patient's PFT show moderate obstruction, normal lung volumes, and a severe diffusion defect.  The patient's 6 minute walk shows she needs 4 L of oxygen to ambulate.

## 2024-09-10 ENCOUNTER — OFFICE VISIT (OUTPATIENT)
Dept: PULMONOLOGY | Facility: CLINIC | Age: 74
End: 2024-09-10
Payer: MEDICARE

## 2024-09-10 VITALS
HEART RATE: 96 BPM | OXYGEN SATURATION: 96 % | SYSTOLIC BLOOD PRESSURE: 130 MMHG | HEIGHT: 64 IN | DIASTOLIC BLOOD PRESSURE: 70 MMHG | BODY MASS INDEX: 29.53 KG/M2 | WEIGHT: 173 LBS

## 2024-09-10 DIAGNOSIS — J84.9 INTERSTITIAL PULMONARY DISEASE, UNSPECIFIED: ICD-10-CM

## 2024-09-10 DIAGNOSIS — J47.9 BRONCHIECTASIS WITHOUT COMPLICATION: ICD-10-CM

## 2024-09-10 DIAGNOSIS — G47.33 OBSTRUCTIVE SLEEP APNEA: ICD-10-CM

## 2024-09-10 DIAGNOSIS — J44.9 PULMONARY HYPERTENSION DUE TO COPD: ICD-10-CM

## 2024-09-10 DIAGNOSIS — I27.23 PULMONARY HYPERTENSION DUE TO COPD: ICD-10-CM

## 2024-09-10 DIAGNOSIS — J96.11 CHRONIC RESPIRATORY FAILURE WITH HYPOXIA: Primary | ICD-10-CM

## 2024-09-10 DIAGNOSIS — J44.9 CHRONIC OBSTRUCTIVE PULMONARY DISEASE, UNSPECIFIED COPD TYPE: ICD-10-CM

## 2024-09-10 PROBLEM — J69.0 ASPIRATION PNEUMONIA: Status: RESOLVED | Noted: 2020-08-25 | Resolved: 2024-09-10

## 2024-09-10 PROBLEM — J44.1 ACUTE EXACERBATION OF CHRONIC OBSTRUCTIVE PULMONARY DISEASE (COPD): Status: RESOLVED | Noted: 2020-09-09 | Resolved: 2024-09-10

## 2024-09-10 PROBLEM — J96.21 ACUTE ON CHRONIC RESPIRATORY FAILURE WITH HYPOXIA: Status: RESOLVED | Noted: 2020-08-25 | Resolved: 2024-09-10

## 2024-09-10 PROBLEM — J15.7: Status: RESOLVED | Noted: 2020-09-09 | Resolved: 2024-09-10

## 2024-09-10 PROCEDURE — 99214 OFFICE O/P EST MOD 30 MIN: CPT | Mod: S$PBB,,, | Performed by: INTERNAL MEDICINE

## 2024-09-10 PROCEDURE — 99214 OFFICE O/P EST MOD 30 MIN: CPT | Mod: PBBFAC,PN | Performed by: INTERNAL MEDICINE

## 2024-09-10 PROCEDURE — 99999 PR PBB SHADOW E&M-EST. PATIENT-LVL IV: CPT | Mod: PBBFAC,,, | Performed by: INTERNAL MEDICINE

## 2024-09-10 RX ORDER — BUDESONIDE, GLYCOPYRROLATE, AND FORMOTEROL FUMARATE 160; 9; 4.8 UG/1; UG/1; UG/1
2 AEROSOL, METERED RESPIRATORY (INHALATION) 2 TIMES DAILY
Qty: 32.1 G | Refills: 4 | Status: SHIPPED | OUTPATIENT
Start: 2024-09-10

## 2024-09-10 RX ORDER — IPRATROPIUM BROMIDE AND ALBUTEROL SULFATE 2.5; .5 MG/3ML; MG/3ML
3 SOLUTION RESPIRATORY (INHALATION)
Qty: 360 EACH | Refills: 4 | Status: SHIPPED | OUTPATIENT
Start: 2024-09-10

## 2024-09-10 RX ORDER — BRIMONIDINE TARTRATE 2 MG/ML
SOLUTION/ DROPS OPHTHALMIC
COMMUNITY
Start: 2024-07-28

## 2024-09-10 NOTE — PROGRESS NOTES
SUBJECTIVE:    Patient ID: Venkat Lara is a 74 y.o. female.    Chief Complaint: Follow-up    Follow-up     The patient returns after her repeat CT shows some emphysema but predominantly pulmonary fibrosis which is most pronounced in the left lower lobe.  The patient does have reflux.  She sleeps on 2 pillows which of course she winds up flat during the night.  Explained that this ongoing aspiration is causing her fibrosis.  They will get a new bed.  Her PFTs do show moderate obstruction.  She finds her breathing is improved using the Breztri and the DuoNebs.  She is still expectorating green mucus in the morning which becomes clear as the day goes on.  Her 6 minute walk shows she needs to be on 4 L of oxygen.  She needs a portable concentrator.  Nathaniel came out and replaced her home concentrator and she is breathing much better at home.  The patient is sleeping on her CPAP and oxygen nightly.        Past Medical History:   Diagnosis Date    Colon polyp 01/01/2013    COPD, moderate 01/01/2010    was told by Dr. 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    Lumbar spinal stenosis     Oxygen dependent 01/01/2011    2L NC Nightly    Primary osteoarthritis of first carpometacarpal joints, bilateral     Scoliosis 01/01/2014    Spinal stenosis 01/01/2014    upper and lowerr back - tingling in left arm at times     Past Surgical History:   Procedure Laterality Date    BREAST SURGERY  2008    Reduction    BUNIONECTOMY Bilateral 01/1999    CARDIOVASCULAR STRESS TEST  2013     CARPAL TUNNEL RELEASE Left 10/08/2015    CATARACT EXTRACTION BILATERAL W/ ANTERIOR VITRECTOMY  04/2010    [facility] ARTHROPLASTY, RIGHT  02/23/2004    COLONOSCOPY  04/18/2013    CORRECTION OF HAMMER TOE Left 8/14/2019    Procedure: CORRECTION, HAMMER TOE;  Surgeon: aJckson Brannon DPM;  Location: [facility] OR;  Service: Podiatry;  Laterality: Left;    ESOPHAGOGASTRODUODENOSCOPY  12/21/2012    2012-with dilation #1, 2013 #2    HAND SURGERY  2003    JOINT REPLACEMENT      OPEN REDUCTION AND INTERNAL FIXATION (ORIF) OF FRACTURE OF FEMUR USING DYNAMIC HIP SCREW Left 8/22/2023    Procedure: ORIF, HIP, USING DYNAMIC HIP SCREW;  Surgeon: Hussein Romo MD;  Location: [facility] OR;  Service: Orthopedics;  Laterality: Left;    REMOVAL OF HARDWARE FROM LOWER EXTREMITY Left 8/14/2019    Procedure: REMOVAL, HARDWARE, LOWER EXTREMITY;  Surgeon: Jackson Brannon DPM;  Location: [facility] OR;  Service: Podiatry;  Laterality: Left;    RHINOPLASTY TIP  02/2010    SURGICAL REMOVAL OF BUNION WITH OSTEOTOMY OF METATARSAL BONE Left 8/14/2019    Procedure: BUNIONECTOMY, WITH METATARSAL OSTEOTOMY;  Surgeon: Jackson Brannon DPM;  Location: [facility] OR;  Service: Podiatry;  Laterality: Left;  Arthrodesis 2nd PIP joint, screw 1st toe, C-arm, synthes-Merrill, 3.0, 4.0, AO modular set MERRILL BRUNSON NOTIFIED    TONSILLECTOMY, ADENOIDECTOMY  1970    TOTAL KNEE ARTHROPLASTY Right 12/2008    TOTAL KNEE ARTHROPLASTY Left 03/2015    TOTAL REDUCTION MAMMOPLASTY  01/2008    TUBAL LIGATION      VAGINAL DELIVERY  1983    x2     Family History   Problem Relation Name Age of Onset    Cancer Father          colon        Social History:   Marital Status:   Occupation: Data Unavailable  Alcohol History:  reports current alcohol use of about 4.0 standard drinks of alcohol per week.  Tobacco History:  reports that she quit smoking about 27 years ago. Her smoking use included cigarettes. She started smoking about 60 years ago. She has a 66 pack-year smoking history. She  quit smokeless tobacco use about 27 years ago.  Drug History:  reports no history of drug use.    Review of patient's allergies indicates:   Allergen Reactions    Statins-hmg-coa reductase inhibitors Other (See Comments)     Muscle cramps    Doxycycline Other (See Comments)     Stops gallbladder function    Adhesive Other (See Comments)     bruises    Erythromycin Other (See Comments)     Stomach pain       Current Outpatient Medications   Medication Sig Dispense Refill    albuterol (PROVENTIL) 2.5 mg /3 mL (0.083 %) nebulizer solution Take 2.5 mg by nebulization every 4 (four) hours as needed for Wheezing. Rescue      albuterol (PROVENTIL/VENTOLIN HFA) 90 mcg/actuation inhaler Inhale 2 puffs into the lungs every 6 (six) hours as needed for Wheezing or Shortness of Breath.      benzonatate (TESSALON) 200 MG capsule Take 200 mg by mouth 2 (two) times daily as needed for Cough.      brimonidine 0.2% (ALPHAGAN) 0.2 % Drop       buPROPion (WELLBUTRIN XL) 300 MG 24 hr tablet Take 300 mg by mouth every evening.       cholecalciferol, vitamin D3, 10 mcg (400 unit) Cap capsule Take 400 Units by mouth once daily.      cyclobenzaprine (FLEXERIL) 10 MG tablet Take 10 mg by mouth every evening.      eszopiclone (LUNESTA) 3 mg Tab Take 3 mg by mouth nightly as needed (Insomnia).      ezetimibe (ZETIA) 10 mg tablet Take 10 mg by mouth once daily.      gabapentin (NEURONTIN) 400 MG capsule Take 400 mg by mouth 2 (two) times daily as needed (Pain).      guaiFENesin (MUCINEX) 600 mg 12 hr tablet Take 1,200 mg by mouth 2 (two) times daily.      indomethacin (INDOCIN) 50 MG capsule Take 50 mg by mouth 2 (two) times daily as needed (Flare Up).      MYRBETRIQ 50 mg Tb24 Take 1 tablet by mouth once daily.      omega 3-dha-epa-fish oil 1,000 mg (120 mg-180 mg) Cap Take 1 capsule by mouth once daily.      RESTASIS 0.05 % ophthalmic emulsion Place 1 drop into both eyes 2 (two) times daily.      telmisartan (MICARDIS) 80 MG Tab Take 80 mg by  mouth every evening.      verapamil (VERELAN) 240 MG C24P Take 240 mg by mouth 2 (two) times daily.       albuterol-ipratropium (DUO-NEB) 2.5 mg-0.5 mg/3 mL nebulizer solution Take 3 mLs by nebulization every 4 to 6 hours as needed for Shortness of Breath. Rescue 360 each 4    budesonide-glycopyr-formoterol (BREZTRI AEROSPHERE) 160-9-4.8 mcg/actuation HFAA Inhale 2 puffs into the lungs 2 (two) times a day. 32.1 g 4    nitrofurantoin (MACRODANTIN) 100 MG capsule Take 100 mg by mouth 2 (two) times daily. (Patient not taking: Reported on 9/10/2024)       No current facility-administered medications for this visit.       Alpha-1 Antitrypsin:  Last PFT:   Last CT:  Last Echo: 8/12/24    Left Ventricle: The left ventricle is normal in size. Normal wall thickness. There is concentric remodeling. There is normal systolic function with a visually estimated ejection fraction of 65 - 70%. Biplane (2D) method of discs ejection fraction is 67%. There is normal diastolic function.    Right Ventricle: Normal right ventricular cavity size. Wall thickness is normal. Systolic function is normal.    Left Atrium: Left atrium is dilated.    Tricuspid Valve: There is mild regurgitation with a centrally directed jet.    Pulmonary Artery: The estimated pulmonary artery systolic pressure is 52 mmHg. Moderate pulmonary Hypertension.    IVC/SVC: Intermediate venous pressure at 8 mmHg.      CAL is negative, aspergillus is negative, hypersensitivity pneumonitis panel is negative    Review of Systems  General: Feeling Well.  Eyes: Vision is complicated by glaucoma.  ENT:  Still has sinus issues  Heart:: She has a heart rate in the 90's.  She has pulmonary hypertension and mild TR.  She is on verapamil 240mg bid for tachycardia. She is also on Micardis 40-80 mg  Lungs:  Expectorating green 1st thing in the morning which then becomes clear.  Breathing better with the Breztri  GI:  reflux. Constipated, on Miralax  : Nocturia 2-3 x a  "night  Musculoskeletal:L hip   Skin: No lesions or rashes. Bruises easily  Neuro: No headaches or neuropathy.  Lymph: No edema or adenopathy.  Psych: Mild depression.  Endo: up 2 pounds    OBJECTIVE:      /70 (BP Location: Right arm, Patient Position: Sitting, BP Method: Medium (Manual))   Pulse 96   Ht 5' 4" (1.626 m)   Wt 78.5 kg (173 lb)   SpO2 96% Comment: 4lpm  BMI 29.70 kg/m²     Physical Exam  GENERAL: Older patient in no distress.  HEENT: Pupils equal and reactive. Extraocular movements intact. Nose intact. Pharynx moist.  NECK: Supple.   HEART: Regular rate and rhythm. No murmur or gallop auscultated.  LUNGS:  There are crackles in the left base.  He has extend to the scapula.  There are few in the right base.. Lung excursion symmetrical. No change in fremitus.   ABDOMEN: Bowel sounds present. Non-tender, no masses palpated.  EXTREMITIES: Normal muscle tone and joint movement, no cyanosis or clubbing.   LYMPHATICS: No adenopathy palpated, no edema.  SKIN: Dry, intact, no lesions.   NEURO: Cranial nerves II-XII intact. Motor strength 5/5 bilaterally, upper and lower extremities.  PSYCH: Appropriate affect.    Assessment:       1. Chronic respiratory failure with hypoxia    2. Chronic obstructive pulmonary disease, unspecified COPD type    3. Interstitial pulmonary disease, unspecified    4. Bronchiectasis without complication    5. Obstructive sleep apnea    6. Pulmonary hypertension due to COPD          The patient has multiple issues going on.  She definitely has obstructive lung disease.  She has significant pulmonary fibrosis which is very likely due to her reflux which is not being treated.  She states she used to be on Prilosec but developed osteoporosis and lost several inches in height in his now only taking it p.r.n..  She does not have the head of her bed up on blocks.  She sleeps on 2 pillows.  Explained that that is insufficient.  They will get a new bed that raises.  Told her 30° of " elevation.  Plan:       Chronic respiratory failure with hypoxia  -     OXYGEN FOR HOME USE    Chronic obstructive pulmonary disease, unspecified COPD type  -     budesonide-glycopyr-formoterol (BREZTRI AEROSPHERE) 160-9-4.8 mcg/actuation HFAA; Inhale 2 puffs into the lungs 2 (two) times a day.  Dispense: 32.1 g; Refill: 4  -     albuterol-ipratropium (DUO-NEB) 2.5 mg-0.5 mg/3 mL nebulizer solution; Take 3 mLs by nebulization every 4 to 6 hours as needed for Shortness of Breath. Rescue  Dispense: 360 each; Refill: 4    Interstitial pulmonary disease, unspecified  Comments:  Secondary to ongoing aspiration    Bronchiectasis without complication    Obstructive sleep apnea    Pulmonary hypertension due to COPD           No follow-ups on file.  [facility]  Continue percussion vest b.i.d.  Stay on oxygen 4 L, okay to exercise on your elliptical but make sure your sats are 90%.  Stopped when you were short of breath and catch her breath.  Continue sleeping on your CPAP with your oxygen bled in  POC  [facility]  New Bed  Flu shot in October  Covid in October  RSV

## 2024-11-29 ENCOUNTER — OFFICE VISIT (OUTPATIENT)
Dept: URGENT CARE | Facility: CLINIC | Age: 74
End: 2024-11-29
Payer: MEDICARE

## 2024-11-29 VITALS
BODY MASS INDEX: 29.53 KG/M2 | HEART RATE: 87 BPM | WEIGHT: 173 LBS | OXYGEN SATURATION: 94 % | SYSTOLIC BLOOD PRESSURE: 102 MMHG | TEMPERATURE: 99 F | DIASTOLIC BLOOD PRESSURE: 64 MMHG | RESPIRATION RATE: 16 BRPM | HEIGHT: 64 IN

## 2024-11-29 DIAGNOSIS — R05.9 COUGH, UNSPECIFIED TYPE: Primary | ICD-10-CM

## 2024-11-29 DIAGNOSIS — Z99.81 SUPPLEMENTAL OXYGEN DEPENDENT: ICD-10-CM

## 2024-11-29 DIAGNOSIS — Z87.09 HISTORY OF COPD: ICD-10-CM

## 2024-11-29 LAB
CTP QC/QA: YES
SARS-COV-2 AG RESP QL IA.RAPID: NEGATIVE

## 2024-11-29 NOTE — PROGRESS NOTES
"Subjective:      Patient ID: Venkat Lara is a 74 y.o. female.    Vitals:  height is 5' 4" (1.626 m) and weight is 78.5 kg (173 lb). Her oral temperature is 98.9 °F (37.2 °C). Her blood pressure is 102/64 and her pulse is 87. Her respiration is 16 and oxygen saturation is 94% (abnormal).     Chief Complaint: Cough    Cough for the last couple of weeks.  Patient tested positive for covid at home last night.      Cough  This is a new problem. The current episode started 1 to 4 weeks ago. Associated symptoms include nasal congestion and postnasal drip. She has tried OTC cough suppressant for the symptoms. Her past medical history is significant for pneumonia.       HENT:  Positive for postnasal drip.    Respiratory:  Positive for cough.       Objective:     Physical Exam   Constitutional: She is oriented to person, place, and time.  Non-toxic appearance. She does not appear ill. No distress.   HENT:   Head: Normocephalic and atraumatic.   Ears:   Right Ear: Tympanic membrane, external ear and ear canal normal.   Left Ear: Tympanic membrane, external ear and ear canal normal.   Nose: Nose normal.   Mouth/Throat: Mucous membranes are moist. Oropharynx is clear.   Pulmonary/Chest: Effort normal and breath sounds normal.   Abdominal: Normal appearance. Soft.   Neurological: no focal deficit. She is alert and oriented to person, place, and time.   Skin: Skin is not diaphoretic. Capillary refill takes less than 2 seconds.   Nursing note and vitals reviewed.       Results for orders placed or performed in visit on 11/29/24   SARS Coronavirus 2 Antigen, POCT Manual Read    Collection Time: 11/29/24  1:44 PM   Result Value Ref Range    SARS Coronavirus 2 Antigen Negative Negative     Acceptable Yes       Assessment:     1. Cough, unspecified type    2. Supplemental oxygen dependent    3. History of COPD        Plan:   Covid negative    Cough, unspecified type  -     SARS Coronavirus 2 Antigen, POCT Manual " "Read    Supplemental oxygen dependent    History of COPD                Patient Instructions   Cough   If your condition worsens or fails to improve we recommend that you receive another evaluation at the ER immediately or contact your PCP to discuss your concerns or return here. You must understand that you've received an urgent care treatment only and that you may be released before all your medical problems are known or treated. You the patient will arrange for follouwp care as instructed. .  Rest and fluids are important  Can use honey with vasile to soothe your throat  Take prescription cough meds (pills) as prescribed; take prescription cough syrup at night as needed for cough.  Do not take both the prescribed cough pills and syrup at the same time.   -  Flonase (fluticasone) is a nasal spray which is available over the counter and may help with your symptoms.   -  If you have hypertension avoid using the "D" which is the decongestant.  Instead you can use Coricidin HBP for cold and cough symptoms.    -  If you just have drainage you can take plain Zyrtec, Claritin or Allegra   -  Tylenol or ibuprofen can also be used as directed for pain unless you have an allergy to them or medical condition such as stomach ulcers, kidney or liver disease or blood thinners etc for which you should not be taking these type of medications.   Please follow up with your primary care doctor or specialist in the next 48-72hrs as needed and if no improvement  If you  smoke, please stop smoking.      "

## 2024-11-29 NOTE — PATIENT INSTRUCTIONS
"Cough   If your condition worsens or fails to improve we recommend that you receive another evaluation at the ER immediately or contact your PCP to discuss your concerns or return here. You must understand that you've received an urgent care treatment only and that you may be released before all your medical problems are known or treated. You the patient will arrange for follouwp care as instructed. .  Rest and fluids are important  Can use honey with vasile to soothe your throat  Take prescription cough meds (pills) as prescribed; take prescription cough syrup at night as needed for cough.  Do not take both the prescribed cough pills and syrup at the same time.   -  Flonase (fluticasone) is a nasal spray which is available over the counter and may help with your symptoms.   -  If you have hypertension avoid using the "D" which is the decongestant.  Instead you can use Coricidin HBP for cold and cough symptoms.    -  If you just have drainage you can take plain Zyrtec, Claritin or Allegra   -  Tylenol or ibuprofen can also be used as directed for pain unless you have an allergy to them or medical condition such as stomach ulcers, kidney or liver disease or blood thinners etc for which you should not be taking these type of medications.   Please follow up with your primary care doctor or specialist in the next 48-72hrs as needed and if no improvement  If you  smoke, please stop smoking.   "

## 2025-01-16 ENCOUNTER — OFFICE VISIT (OUTPATIENT)
Dept: PULMONOLOGY | Facility: CLINIC | Age: 75
End: 2025-01-16
Payer: MEDICARE

## 2025-01-16 VITALS
BODY MASS INDEX: 28.69 KG/M2 | TEMPERATURE: 98 F | HEIGHT: 65 IN | OXYGEN SATURATION: 95 % | DIASTOLIC BLOOD PRESSURE: 64 MMHG | SYSTOLIC BLOOD PRESSURE: 108 MMHG | WEIGHT: 172.19 LBS | HEART RATE: 88 BPM

## 2025-01-16 DIAGNOSIS — J96.11 CHRONIC RESPIRATORY FAILURE WITH HYPOXIA: ICD-10-CM

## 2025-01-16 DIAGNOSIS — G47.33 OBSTRUCTIVE SLEEP APNEA: ICD-10-CM

## 2025-01-16 DIAGNOSIS — J84.9 INTERSTITIAL PULMONARY DISEASE, UNSPECIFIED: ICD-10-CM

## 2025-01-16 DIAGNOSIS — J84.10 PULMONARY FIBROSIS: Primary | ICD-10-CM

## 2025-01-16 DIAGNOSIS — J44.9 CHRONIC OBSTRUCTIVE PULMONARY DISEASE, UNSPECIFIED COPD TYPE: ICD-10-CM

## 2025-01-16 PROCEDURE — 99999 PR PBB SHADOW E&M-EST. PATIENT-LVL IV: CPT | Mod: PBBFAC,,, | Performed by: INTERNAL MEDICINE

## 2025-01-16 PROCEDURE — 99214 OFFICE O/P EST MOD 30 MIN: CPT | Mod: S$PBB,,, | Performed by: INTERNAL MEDICINE

## 2025-01-16 PROCEDURE — 99214 OFFICE O/P EST MOD 30 MIN: CPT | Mod: PBBFAC,PN | Performed by: INTERNAL MEDICINE

## 2025-01-16 NOTE — PROGRESS NOTES
SUBJECTIVE:    Patient ID: Venkat Lara is a 74 y.o. female.    Chief Complaint: Follow-up (4 month follow up BRADY, Chronic respiratory failure with hypoxia)    Follow-up     The patient returns looking better.  She has her new bed and mattress in his sleeping elevated.  She is using her vest less because she has no mucus she states.  She sounds much better to auscultation.  She is sleeping on her CPAP and her oxygen and wears her oxygen all the time during the day.  She is using her nebulizer and she is change to some natural expectorant which she states works better than guaifenesin.    With regards to her CPAP she is 100% compliant wearing it for 9 hours and 2 minutes a night.  She is set between 6 and 20.  Her maximum pressure is 10.8.  Her AHI is 4.5.  Her leak is large but does not bother her.    Past Medical History:   Diagnosis Date    Colon polyp 01/01/2013    COPD, moderate 01/01/2010    was told by Dr. 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    Lumbar spinal stenosis     Oxygen dependent 01/01/2011    2L NC Nightly    Primary osteoarthritis of first carpometacarpal joints, bilateral     Scoliosis 01/01/2014    Spinal stenosis 01/01/2014    upper and lowerr back - tingling in left arm at times     Past Surgical History:   Procedure Laterality Date    BREAST SURGERY  2008    Reduction    BUNIONECTOMY Bilateral 01/1999    CARDIOVASCULAR STRESS TEST  2013    CARPAL TUNNEL RELEASE Left 10/08/2015    CATARACT EXTRACTION BILATERAL W/ ANTERIOR VITRECTOMY  04/2010     [facility] ARTHROPLASTY, RIGHT  02/23/2004    COLONOSCOPY  04/18/2013    CORRECTION OF HAMMER TOE Left 8/14/2019    Procedure: CORRECTION, HAMMER TOE;  Surgeon: Jackson Brannon DPM;  Location: [facility];  Service: Podiatry;  Laterality: Left;    ESOPHAGOGASTRODUODENOSCOPY  12/21/2012    2012-with dilation #1, 2013 #2    HAND SURGERY  2003    JOINT REPLACEMENT      OPEN REDUCTION AND INTERNAL FIXATION (ORIF) OF FRACTURE OF FEMUR USING DYNAMIC HIP SCREW Left 8/22/2023    Procedure: ORIF, HIP, USING DYNAMIC HIP SCREW;  Surgeon: Hussein Romo MD;  Location: [facility];  Service: Orthopedics;  Laterality: Left;    REMOVAL OF HARDWARE FROM LOWER EXTREMITY Left 8/14/2019    Procedure: REMOVAL, HARDWARE, LOWER EXTREMITY;  Surgeon: Jackson Brannon DPM;  Location: [facility];  Service: Podiatry;  Laterality: Left;    RHINOPLASTY TIP  02/2010    SURGICAL REMOVAL OF BUNION WITH OSTEOTOMY OF METATARSAL BONE Left 8/14/2019    Procedure: BUNIONECTOMY, WITH METATARSAL OSTEOTOMY;  Surgeon: Jackson Brannon DPM;  Location: [facility];  Service: Podiatry;  Laterality: Left;  Arthrodesis 2nd PIP joint, screw 1st toe, C-arm, Miraculins-Merrill, 3.0, 4.0, AO modular set MERRILL BRUNSON NOTIFIED    TONSILLECTOMY, ADENOIDECTOMY  1970    TOTAL KNEE ARTHROPLASTY Right 12/2008    TOTAL KNEE ARTHROPLASTY Left 03/2015    TOTAL REDUCTION MAMMOPLASTY  01/2008    TUBAL LIGATION      VAGINAL DELIVERY  1983    x2     Family History   Problem Relation Name Age of Onset    Cancer Father          colon        Social History:   Marital Status:   Occupation: Data Unavailable  Alcohol History:  reports current alcohol use of about 4.0 standard drinks of alcohol per week.  Tobacco History:  reports that she quit smoking about 27 years ago. Her smoking use included cigarettes. She started smoking about 60 years ago. She has a 66 pack-year smoking history. She quit smokeless tobacco use about 28 years ago.  Drug History:  reports no history of drug use.    Review of  patient's allergies indicates:   Allergen Reactions    Statins-hmg-coa reductase inhibitors Other (See Comments)     Muscle cramps    Doxycycline Other (See Comments)     Stops gallbladder function    Adhesive Other (See Comments)     bruises    Erythromycin Other (See Comments)     Stomach pain       Current Outpatient Medications   Medication Sig Dispense Refill    albuterol (PROVENTIL/VENTOLIN HFA) 90 mcg/actuation inhaler Inhale 2 puffs into the lungs every 6 (six) hours as needed for Wheezing or Shortness of Breath.      albuterol-ipratropium (DUO-NEB) 2.5 mg-0.5 mg/3 mL nebulizer solution Take 3 mLs by nebulization every 4 to 6 hours as needed for Shortness of Breath. Rescue 360 each 4    benzonatate (TESSALON) 200 MG capsule Take 200 mg by mouth 2 (two) times daily as needed for Cough.      brimonidine 0.2% (ALPHAGAN) 0.2 % Drop       budesonide-glycopyr-formoterol (BREZTRI AEROSPHERE) 160-9-4.8 mcg/actuation HFAA Inhale 2 puffs into the lungs 2 (two) times a day. 32.1 g 4    buPROPion (WELLBUTRIN XL) 300 MG 24 hr tablet Take 300 mg by mouth every evening.       cholecalciferol, vitamin D3, 10 mcg (400 unit) Cap capsule Take 400 Units by mouth once daily.      eszopiclone (LUNESTA) 3 mg Tab Take 3 mg by mouth nightly as needed (Insomnia).      ezetimibe (ZETIA) 10 mg tablet Take 10 mg by mouth once daily.      MYRBETRIQ 50 mg Tb24 Take 1 tablet by mouth once daily.      RESTASIS 0.05 % ophthalmic emulsion Place 1 drop into both eyes 2 (two) times daily.      telmisartan (MICARDIS) 80 MG Tab Take 80 mg by mouth every evening.      verapamil (VERELAN) 240 MG C24P Take 240 mg by mouth 2 (two) times daily.       albuterol (PROVENTIL) 2.5 mg /3 mL (0.083 %) nebulizer solution Take 2.5 mg by nebulization every 4 (four) hours as needed for Wheezing. Rescue      cyclobenzaprine (FLEXERIL) 10 MG tablet Take 10 mg by mouth every evening.      gabapentin (NEURONTIN) 400 MG capsule Take 400 mg by mouth 2 (two) times  daily as needed (Pain).      guaiFENesin (MUCINEX) 600 mg 12 hr tablet Take 1,200 mg by mouth 2 (two) times daily.      indomethacin (INDOCIN) 50 MG capsule Take 50 mg by mouth 2 (two) times daily as needed (Flare Up).      nitrofurantoin (MACRODANTIN) 100 MG capsule Take 100 mg by mouth 2 (two) times daily. (Patient not taking: Reported on 9/10/2024)      omega 3-dha-epa-fish oil 1,000 mg (120 mg-180 mg) Cap Take 1 capsule by mouth once daily.       No current facility-administered medications for this visit.       Alpha-1 Antitrypsin:  Last PFT:   Last CT:  Last Echo: 8/12/24    Left Ventricle: The left ventricle is normal in size. Normal wall thickness. There is concentric remodeling. There is normal systolic function with a visually estimated ejection fraction of 65 - 70%. Biplane (2D) method of discs ejection fraction is 67%. There is normal diastolic function.    Right Ventricle: Normal right ventricular cavity size. Wall thickness is normal. Systolic function is normal.    Left Atrium: Left atrium is dilated.    Tricuspid Valve: There is mild regurgitation with a centrally directed jet.    Pulmonary Artery: The estimated pulmonary artery systolic pressure is 52 mmHg. Moderate pulmonary Hypertension.    IVC/SVC: Intermediate venous pressure at 8 mmHg.      CAL is negative, aspergillus is negative, hypersensitivity pneumonitis panel is negative    Review of Systems  General: Feeling Well.  Eyes: Vision is complicated by glaucoma.  ENT:  Still has sinus issues  Heart:: She has a heart rate in the 90's.  She has pulmonary hypertension and mild TR.  She is on verapamil 240mg bid for tachycardia. She is also on Micardis 40-80 mg  Lungs:  Much less sputum now that she is sleeping elevated all night.  Breathing better as well  GI:  reflux. Constipated, on Miralax  : Nocturia has improved  Musculoskeletal:L hip   Skin: No lesions or rashes. Bruises easily  Neuro: No headaches or neuropathy.  Lymph: No edema or  "adenopathy.  Psych: Mild depression.  Endo: down 1 pound    OBJECTIVE:      /64 (BP Location: Right arm, Patient Position: Sitting)   Pulse 88   Temp 98.2 °F (36.8 °C)   Ht 5' 4.5" (1.638 m)   Wt 78.1 kg (172 lb 3.2 oz)   SpO2 95% Comment: On 2 liters of Oxygen  BMI 29.10 kg/m²     Physical Exam  GENERAL: Older patient in no distress.  HEENT: Pupils equal and reactive. Extraocular movements intact. Nose intact. Pharynx moist.  NECK: Supple.   HEART: Regular rate and rhythm. No murmur or gallop auscultated.  LUNGS:  The lungs have bibasilar crackles.  Lung excursion symmetrical. No change in fremitus.   ABDOMEN: Bowel sounds present. Non-tender, no masses palpated.  EXTREMITIES: Normal muscle tone and joint movement, no cyanosis or clubbing.   LYMPHATICS: No adenopathy palpated, no edema.  SKIN: Dry, intact, no lesions.   NEURO: Cranial nerves II-XII intact. Motor strength 5/5 bilaterally, upper and lower extremities.  PSYCH: Appropriate affect.    Assessment:       1. Pulmonary fibrosis    2. Obstructive sleep apnea    3. Chronic respiratory failure with hypoxia    4. Interstitial pulmonary disease, unspecified    5. Chronic obstructive pulmonary disease, unspecified COPD type            The patient is doing much better now that she has an elevating bed.  Her mucus is much less.  She knows she has to be using her vest twice a day but is using it once every other day, usually as she has much less mucus.  She has found a homeopathic expectorant, Mullein tea that she feels works very well.  The patient is using her Breztri and she uses her DuoNebs about 3 times a day.  She finds this helps with expectoration as well  Plan:       Pulmonary fibrosis  -     CT Chest Without Contrast; Future; Expected date: 01/16/2025    Obstructive sleep apnea  -     CPAP/BIPAP SUPPLIES    Chronic respiratory failure with hypoxia    Interstitial pulmonary disease, unspecified    Chronic obstructive pulmonary disease, " unspecified COPD type             No follow-ups on file.  Continue Breztri and Anam   Continue percussion vest b.i.d.  She is sitting on 2 L of oxygen, walking on 3 L, sleeping with 2 bled into her CPAP and exercising on 4.  She has a pulse ox and keeps her sats better than 90%  Continue sleeping on your CPAP with your oxygen bled in    Flu shot and later RSV

## 2025-03-25 ENCOUNTER — RESULTS FOLLOW-UP (OUTPATIENT)
Dept: PULMONOLOGY | Facility: HOSPITAL | Age: 75
End: 2025-03-25

## 2025-03-25 ENCOUNTER — TELEPHONE (OUTPATIENT)
Dept: PULMONOLOGY | Facility: HOSPITAL | Age: 75
End: 2025-03-25

## 2025-03-25 ENCOUNTER — HOSPITAL ENCOUNTER (OUTPATIENT)
Dept: RADIOLOGY | Facility: HOSPITAL | Age: 75
Discharge: HOME | End: 2025-03-25
Attending: INTERNAL MEDICINE
Payer: MEDICARE

## 2025-03-25 DIAGNOSIS — J84.10 PULMONARY FIBROSIS: ICD-10-CM

## 2025-03-25 PROCEDURE — 71250 CT THORAX DX C-: CPT | Mod: TC,PO

## 2025-03-25 PROCEDURE — 71250 CT THORAX DX C-: CPT | Mod: 26,,, | Performed by: RADIOLOGY

## 2025-03-25 NOTE — TELEPHONE ENCOUNTER
PT wanted to know if you think it would be a good idea or safe for her to have TIF surgery     PT was notified of ct results

## 2025-03-27 ENCOUNTER — TELEPHONE (OUTPATIENT)
Dept: PULMONOLOGY | Facility: HOSPITAL | Age: 75
End: 2025-03-27

## 2025-03-27 NOTE — TELEPHONE ENCOUNTER
The patient is asking for clearance for a transoral fundiplication. She has moderate obstruction, significant pulmonary fibrosis and pulmonary emphysema with COPD. She is oxygen dependent and has signficant BRADY. She can have this done in a full service hospital under general anesthesia.  Discussed with her.

## 2025-05-02 ENCOUNTER — CLINICAL SUPPORT (OUTPATIENT)
Dept: AUDIOLOGY | Facility: CLINIC | Age: 75
End: 2025-05-02
Payer: MEDICARE

## 2025-05-02 ENCOUNTER — OFFICE VISIT (OUTPATIENT)
Dept: OTOLARYNGOLOGY | Facility: CLINIC | Age: 75
End: 2025-05-02
Payer: MEDICARE

## 2025-05-02 VITALS — HEIGHT: 65 IN | WEIGHT: 172.19 LBS | BODY MASS INDEX: 28.69 KG/M2

## 2025-05-02 DIAGNOSIS — H90.6 MIXED CONDUCTIVE AND SENSORINEURAL HEARING LOSS OF BOTH EARS: Primary | ICD-10-CM

## 2025-05-02 DIAGNOSIS — H90.6 MIXED HEARING LOSS, BILATERAL: Primary | ICD-10-CM

## 2025-05-02 PROCEDURE — 99211 OFF/OP EST MAY X REQ PHY/QHP: CPT | Mod: PBBFAC,27,PO,25 | Performed by: AUDIOLOGIST

## 2025-05-02 PROCEDURE — 99999 PR PBB SHADOW E&M-EST. PATIENT-LVL I: CPT | Mod: PBBFAC,,, | Performed by: AUDIOLOGIST

## 2025-05-02 PROCEDURE — 99213 OFFICE O/P EST LOW 20 MIN: CPT | Mod: PBBFAC,PO | Performed by: PHYSICIAN ASSISTANT

## 2025-05-02 PROCEDURE — 99999 PR PBB SHADOW E&M-EST. PATIENT-LVL III: CPT | Mod: PBBFAC,,, | Performed by: PHYSICIAN ASSISTANT

## 2025-05-02 PROCEDURE — 92567 TYMPANOMETRY: CPT | Mod: PBBFAC,PO | Performed by: AUDIOLOGIST

## 2025-05-02 PROCEDURE — 92557 COMPREHENSIVE HEARING TEST: CPT | Mod: PBBFAC,PO | Performed by: AUDIOLOGIST

## 2025-05-02 RX ORDER — CYCLOBENZAPRINE HCL 10 MG
10 TABLET ORAL NIGHTLY
COMMUNITY
Start: 2025-02-13

## 2025-05-02 RX ORDER — INDOMETHACIN 50 MG/1
CAPSULE ORAL
COMMUNITY
Start: 2025-02-13

## 2025-05-02 RX ORDER — GABAPENTIN 400 MG/1
CAPSULE ORAL
COMMUNITY
Start: 2025-02-13

## 2025-05-02 RX ORDER — LIFITEGRAST 50 MG/ML
SOLUTION/ DROPS OPHTHALMIC
COMMUNITY
Start: 2025-04-27

## 2025-05-02 NOTE — PROGRESS NOTES
[facility]    Subjective:      Patient: Venkat Lara Patient PCP: [facility], Primary Doctor         :  1950     Sex:  female      MRN:  [id number]          Date of Visit: 2025      Chief Complaint: MHL AU    Patient ID: Venkat Lara is a 75 y.o. female who presents to office for evaluation of hearing loss. Pt states that she had h/o middle ear infections around 40 years ago. She denies family h/o early onset hearing loss. She denies family h/o otosclerosis. No h/o otologic surgery.       Past Medical History  She has a past medical history of Colon polyp, COPD, moderate, Degeneration of cervical intervertebral disc, Depression with anxiety, GERD (gastroesophageal reflux disease), Hammertoe of second toe of left foot, Headaches, cluster, Heart valve regurgitation, Hemorrhoids, Hiatal hernia, Hyperlipidemia, Hypertension, Legally blind, Lumbar spinal stenosis, Oxygen dependent, Primary osteoarthritis of first carpometacarpal joints, bilateral, Scoliosis, and Spinal stenosis.    Family History  Her family history includes Cancer in her father.    Past Surgical History:   Procedure Laterality Date    BREAST SURGERY  2008    Reduction    BUNIONECTOMY Bilateral 1999    CARDIOVASCULAR STRESS TEST  2013    CARPAL TUNNEL RELEASE Left 10/08/2015    CATARACT EXTRACTION BILATERAL W/ ANTERIOR VITRECTOMY  2010    [facility] ARTHROPLASTY, RIGHT  2004    COLONOSCOPY  2013    CORRECTION OF HAMMER TOE Left 2019    Procedure: CORRECTION, HAMMER TOE;  Surgeon: Jackson Brannon DPM;  Location: [facility] OR;  Service: Podiatry;  Laterality: Left;    ESOPHAGOGASTRODUODENOSCOPY  2012    2012-with dilation #1, 2013 #2    HAND SURGERY      JOINT REPLACEMENT      OPEN REDUCTION AND INTERNAL FIXATION (ORIF) OF FRACTURE OF FEMUR USING DYNAMIC HIP SCREW Left 2023    Procedure: ORIF, HIP, USING DYNAMIC HIP SCREW;  Surgeon: Hussein Romo MD;  Location: [facility] OR;  Service: Orthopedics;  Laterality: Left;     REMOVAL OF HARDWARE FROM LOWER EXTREMITY Left 2019    Procedure: REMOVAL, HARDWARE, LOWER EXTREMITY;  Surgeon: Jackson Brannon DPM;  Location: [facility] OR;  Service: Podiatry;  Laterality: Left;    RHINOPLASTY TIP  2010    SURGICAL REMOVAL OF BUNION WITH OSTEOTOMY OF METATARSAL BONE Left 2019    Procedure: BUNIONECTOMY, WITH METATARSAL OSTEOTOMY;  Surgeon: Jackson Brannon DPM;  Location: [facility] OR;  Service: Podiatry;  Laterality: Left;  Arthrodesis 2nd PIP joint, screw 1st toe, C-arm, synthes-Merrill, 3.0, 4.0, AO modular set MERRILL BOY NOTIFIED    TONSILLECTOMY, ADENOIDECTOMY  1970    TOTAL KNEE ARTHROPLASTY Right 2008    TOTAL KNEE ARTHROPLASTY Left 2015    TOTAL REDUCTION MAMMOPLASTY  2008    TUBAL LIGATION      VAGINAL DELIVERY  1983    x2     Social History     Tobacco Use    Smoking status: Former     Current packs/day: 0.00     Average packs/day: 2.0 packs/day for 33.0 years (66.0 ttl pk-yrs)     Types: Cigarettes     Start date: 1964     Quit date: 1997     Years since quittin.9    Smokeless tobacco: Former     Quit date: 1997   Substance and Sexual Activity    Alcohol use: Yes     Alcohol/week: 4.0 standard drinks of alcohol     Types: 4 Glasses of wine per week     Comment: monthly    Drug use: Never    Sexual activity: Not on file     Medications  She has a current medication list which includes the following prescription(s): albuterol, albuterol-ipratropium, benzonatate, brimonidine 0.2%, breztri aerosphere, bupropion, cholecalciferol (vitamin d3), cyclobenzaprine, eszopiclone, ezetimibe, gabapentin, indomethacin, myrbetriq, telmisartan, verapamil, xiidra, and restasis.    Review of patient's allergies indicates:   Allergen Reactions    Statins-hmg-coa reductase inhibitors Other (See Comments)     Muscle cramps    Doxycycline Other (See Comments)     Stops gallbladder function    Adhesive Other (See Comments)     bruises    Erythromycin Other (See Comments)      "Stomach pain     All medications, allergies, and past history have been reviewed.    Objective:      Vitals:      11/29/2024     1:02 PM 1/16/2025     3:19 PM 5/2/2025     9:52 AM   Vitals - 1 value per visit   SYSTOLIC 102 108    DIASTOLIC 64 64    Pulse 87 88    Temp 98.9 °F (37.2 °C) 98.2 °F (36.8 °C)    Resp 16     SPO2 94 % 95 %    Weight (lb) 173 172.2 172.18   Weight (kg) 78.472 78.109 78.1   Height 5' 4" (1.626 m) 5' 4.5" (1.638 m) 5' 4.5" (1.638 m)   BMI (Calculated) 29.7 29.1 29.1   Pain Score  Zero        Body surface area is 1.89 meters squared.    Physical Exam  Constitutional:       General: She is not in acute distress.     Appearance: Normal appearance. She is not ill-appearing.   HENT:      Head: Normocephalic and atraumatic.      Right Ear: Ear canal and external ear normal. Tympanic membrane is scarred.      Left Ear: Ear canal and external ear normal. Tympanic membrane is scarred.      Nose: Nose normal.      Mouth/Throat:      Lips: Pink. No lesions.      Mouth: Mucous membranes are moist. No oral lesions.      Tongue: No lesions.      Palate: No lesions.      Pharynx: Oropharynx is clear. Uvula midline. No pharyngeal swelling, oropharyngeal exudate, posterior oropharyngeal erythema or uvula swelling.      Tonsils: 0 on the right. 0 on the left.      Comments: S/p T&A.  Eyes:      General:         Right eye: No discharge.         Left eye: No discharge.      Extraocular Movements: Extraocular movements intact.      Conjunctiva/sclera: Conjunctivae normal.   Pulmonary:      Effort: Pulmonary effort is normal.   Neurological:      General: No focal deficit present.      Mental Status: She is alert and oriented to person, place, and time. Mental status is at baseline.   Psychiatric:         Mood and Affect: Mood normal.         Behavior: Behavior normal.         Thought Content: Thought content normal.         Judgment: Judgment normal.         Labs:  WBC   Date Value Ref Range Status   08/13/2024 " 7.22 3.90 - 12.70 K/uL Final     Platelets   Date Value Ref Range Status   08/13/2024 246 150 - 450 K/uL Final     Creatinine   Date Value Ref Range Status   08/13/2024 0.8 0.5 - 1.4 mg/dL Final     Glucose   Date Value Ref Range Status   08/13/2024 121 (H) 70 - 110 mg/dL Final     Hemoglobin A1C   Date Value Ref Range Status   09/08/2020 5.4 4.5 - 6.2 % Final     Comment:     According to ADA guidelines, hemoglobin A1C <7.0% represents  optimal control in non-pregnant diabetic patients.  Different  metrics may apply to specific populations.   Standards of Medical Care in Diabetes - 2016.  For the purpose of screening for the presence of diabetes:  <5.7%     Consistent with the absence of diabetes  5.7-6.4%  Consistent with increasing risk for diabetes   (prediabetes)  >or=6.5%  Consistent with diabetes  Currently no consensus exists for use of hemoglobin A1C  for diagnosis of diabetes for children.         Audiogram Summary:    All lab results and imaging results have been reviewed.    Assessment:        ICD-10-CM ICD-9-CM   1. Mixed conductive and sensorineural hearing loss of both ears  H90.6 389.22           Plan:      Mixed conductive and sensorineural hearing loss of both ears  -     Audiogram reviewed with pt in detail. Pt has mild low to mid frequency MHL AS and mild to moderate to mild low to mid frequency MHL AD. Type A tymp AD and type A tymp AS. SRTs 35dB AD and 35dB AS. %AD at 75dB and 100%AS at 75dB. We will monitor hearing loss with repeat comprehensive audiogram in 6 months and if stable then will monitor with annual audiograms thereafter.   - Proceed with CT temporal bone WO to further assess MHL.  -     CT Temporal Bone without contrast; Future; Expected date: 05/02/2025    Follow up in 6 months with repeat comprehensive audiogram.

## 2025-05-02 NOTE — PROGRESS NOTES
Venkat Lara was seen on 05/02/2025 for an audiological evaluation. Pt was alone during today's visit. Pertinent complaints today include hearing loss. Pt denies history of loud noise exposure and denies early onset of genetic family history of hearing loss. Otoscopy revealed no cerumen in both ears. The tympanic membrane was visualized AU prior to proceeding with the hearing testing.     Results reveal a mild-to-moderate mixed hearing loss for the right ear and  mild mixed hearing loss for the left ear.    Speech Reception Thresholds were  35 dBHL for the right ear and 35 dBHL for the left ear.    Word recognition scores were excellent for the right ear and excellent for the left ear.   Tympanograms were Type A for the right ear and Type A for the left ear.    Recommendations: 1) Follow up with ENT     2) Annual hearing evaluation     3) Hearing aid consult when ready    Audiogram results were reviewed in detail with patient and all questions were answered. Results will be reviewed by the referring provider at the completion of this note. All complaints were addressed during this visit to the patient's satisfaction.

## 2025-05-09 ENCOUNTER — HOSPITAL ENCOUNTER (OUTPATIENT)
Dept: RADIOLOGY | Facility: HOSPITAL | Age: 75
Discharge: HOME | End: 2025-05-09
Attending: PHYSICIAN ASSISTANT
Payer: MEDICARE

## 2025-05-09 DIAGNOSIS — H90.6 MIXED CONDUCTIVE AND SENSORINEURAL HEARING LOSS OF BOTH EARS: ICD-10-CM

## 2025-05-09 PROCEDURE — 70480 CT ORBIT/EAR/FOSSA W/O DYE: CPT | Mod: 26,,, | Performed by: RADIOLOGY

## 2025-05-09 PROCEDURE — 70480 CT ORBIT/EAR/FOSSA W/O DYE: CPT | Mod: TC

## 2025-05-13 ENCOUNTER — TELEPHONE (OUTPATIENT)
Dept: OTOLARYNGOLOGY | Facility: CLINIC | Age: 75
End: 2025-05-13
Payer: MEDICARE

## 2025-05-13 ENCOUNTER — RESULTS FOLLOW-UP (OUTPATIENT)
Dept: OTOLARYNGOLOGY | Facility: CLINIC | Age: 75
End: 2025-05-13

## 2025-05-13 NOTE — TELEPHONE ENCOUNTER
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 CT was otherwise unremarkable. Pt advised that this is incidental finding. Her audiogram shows MHL in both ears, so I suspect that dehiscence is just incidental finding.    We will follow up at 6 month point with comprehensive audiogram to monitor hearing loss.

## 2025-07-23 DIAGNOSIS — M81.0 AGE-RELATED OSTEOPOROSIS WITHOUT CURRENT PATHOLOGICAL FRACTURE: ICD-10-CM

## 2025-07-23 DIAGNOSIS — Z12.31 ENCOUNTER FOR SCREENING MAMMOGRAM FOR MALIGNANT NEOPLASM OF BREAST: Primary | ICD-10-CM

## 2025-07-24 ENCOUNTER — OFFICE VISIT (OUTPATIENT)
Dept: PULMONOLOGY | Facility: CLINIC | Age: 75
End: 2025-07-24
Payer: MEDICARE

## 2025-07-24 VITALS
WEIGHT: 161.81 LBS | OXYGEN SATURATION: 93 % | HEIGHT: 65 IN | SYSTOLIC BLOOD PRESSURE: 118 MMHG | DIASTOLIC BLOOD PRESSURE: 70 MMHG | HEART RATE: 96 BPM | BODY MASS INDEX: 26.96 KG/M2

## 2025-07-24 DIAGNOSIS — J44.9 CHRONIC OBSTRUCTIVE PULMONARY DISEASE, UNSPECIFIED COPD TYPE: ICD-10-CM

## 2025-07-24 DIAGNOSIS — G47.33 OBSTRUCTIVE SLEEP APNEA: ICD-10-CM

## 2025-07-24 DIAGNOSIS — J84.10 PULMONARY FIBROSIS: Primary | ICD-10-CM

## 2025-07-24 PROCEDURE — 99214 OFFICE O/P EST MOD 30 MIN: CPT | Mod: PBBFAC,PN | Performed by: INTERNAL MEDICINE

## 2025-07-24 PROCEDURE — 99999 PR PBB SHADOW E&M-EST. PATIENT-LVL IV: CPT | Mod: PBBFAC,,, | Performed by: INTERNAL MEDICINE

## 2025-07-24 PROCEDURE — 99214 OFFICE O/P EST MOD 30 MIN: CPT | Mod: S$PBB,,, | Performed by: INTERNAL MEDICINE

## 2025-07-24 RX ORDER — PREDNISONE 10 MG/1
TABLET ORAL
Qty: 20 TABLET | Refills: 0 | Status: SHIPPED | OUTPATIENT
Start: 2025-07-24

## 2025-07-24 RX ORDER — BENZONATATE 200 MG/1
200 CAPSULE ORAL 3 TIMES DAILY PRN
Qty: 90 CAPSULE | Refills: 11 | Status: SHIPPED | OUTPATIENT
Start: 2025-07-24 | End: 2026-07-19

## 2025-07-24 RX ORDER — ALBUTEROL SULFATE 90 UG/1
2 INHALANT RESPIRATORY (INHALATION) EVERY 6 HOURS PRN
Qty: 18 G | Refills: 11 | Status: SHIPPED | OUTPATIENT
Start: 2025-07-24

## 2025-07-24 NOTE — PROGRESS NOTES
SUBJECTIVE:    Patient ID: Venkat Lara is a 75 y.o. female.    Chief Complaint: Follow-up (6 month follow up BRADY)    Follow-up     The patient returns with a tight chest.  She is not expectorating anything and is using her homeopathic mucolytic and Mucinex.  She is actually using her vest bid. She is using her Breztri as well.     With regards to her CPAP she is 100% compliant wearing it for 9 hours and 44 minutes a night.  She is set between 6 and 20.  Her maximum pressure is 10.6.  Her AHI is 2.1.  Her leak remains large.    Past Medical History:   Diagnosis Date    Colon polyp 01/01/2013    COPD, moderate 01/01/2010    was told by Dr. 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    Lumbar spinal stenosis     Oxygen dependent 01/01/2011    2L NC Nightly    Primary osteoarthritis of first carpometacarpal joints, bilateral     Scoliosis 01/01/2014    Spinal stenosis 01/01/2014    upper and lowerr back - tingling in left arm at times     Past Surgical History:   Procedure Laterality Date    BREAST SURGERY  2008    Reduction    BUNIONECTOMY Bilateral 01/1999    CARDIOVASCULAR STRESS TEST  2013    CARPAL TUNNEL RELEASE Left 10/08/2015    CATARACT EXTRACTION BILATERAL W/ ANTERIOR VITRECTOMY  04/2010    [facility] ARTHROPLASTY, RIGHT  02/23/2004    COLONOSCOPY  04/18/2013    CORRECTION OF HAMMER TOE Left 8/14/2019    Procedure: CORRECTION, HAMMER TOE;  Surgeon: Jackson Truong DPM;  Location: [facility];  Service: Podiatry;  Laterality: Left;    ESOPHAGOGASTRODUODENOSCOPY  12/21/2012     2012-with dilation #1, 2013 #2    HAND SURGERY  2003    JOINT REPLACEMENT      OPEN REDUCTION AND INTERNAL FIXATION (ORIF) OF FRACTURE OF FEMUR USING DYNAMIC HIP SCREW Left 8/22/2023    Procedure: ORIF, HIP, USING DYNAMIC HIP SCREW;  Surgeon: Hussein Romo MD;  Location: [facility];  Service: Orthopedics;  Laterality: Left;    REMOVAL OF HARDWARE FROM LOWER EXTREMITY Left 8/14/2019    Procedure: REMOVAL, HARDWARE, LOWER EXTREMITY;  Surgeon: Jackson Brannon DPM;  Location: [facility] OR;  Service: Podiatry;  Laterality: Left;    RHINOPLASTY TIP  02/2010    SURGICAL REMOVAL OF BUNION WITH OSTEOTOMY OF METATARSAL BONE Left 8/14/2019    Procedure: BUNIONECTOMY, WITH METATARSAL OSTEOTOMY;  Surgeon: Jackson Brannon DPM;  Location: [facility];  Service: Podiatry;  Laterality: Left;  Arthrodesis 2nd PIP joint, screw 1st toe, C-arm, synthes-Merrill, 3.0, 4.0, AO modular set MERRILL BRUNSON NOTIFIED    TONSILLECTOMY, ADENOIDECTOMY  1970    TOTAL KNEE ARTHROPLASTY Right 12/2008    TOTAL KNEE ARTHROPLASTY Left 03/2015    TOTAL REDUCTION MAMMOPLASTY  01/2008    TUBAL LIGATION      VAGINAL DELIVERY  1983    x2     Family History   Problem Relation Name Age of Onset    Cancer Father          colon        Social History:   Marital Status:   Occupation: Data Unavailable  Alcohol History:  reports current alcohol use of about 4.0 standard drinks of alcohol per week.  Tobacco History:  reports that she quit smoking about 28 years ago. Her smoking use included cigarettes. She started smoking about 61 years ago. She has a 66 pack-year smoking history. She quit smokeless tobacco use about 28 years ago.  Drug History:  reports no history of drug use.    Review of patient's allergies indicates:   Allergen Reactions    Statins-hmg-coa reductase inhibitors Other (See Comments)     Muscle cramps    Doxycycline Other (See Comments)     Stops gallbladder function    Adhesive Other (See Comments)     bruises    Erythromycin Other (See Comments)      Stomach pain       Current Outpatient Medications   Medication Sig Dispense Refill    albuterol (PROVENTIL/VENTOLIN HFA) 90 mcg/actuation inhaler Inhale 2 puffs into the lungs every 6 (six) hours as needed for Wheezing or Shortness of Breath.      albuterol-ipratropium (DUO-NEB) 2.5 mg-0.5 mg/3 mL nebulizer solution Take 3 mLs by nebulization every 4 to 6 hours as needed for Shortness of Breath. Rescue 360 each 4    benzonatate (TESSALON) 200 MG capsule Take 200 mg by mouth 2 (two) times daily as needed for Cough.      brimonidine 0.2% (ALPHAGAN) 0.2 % Drop       budesonide-glycopyr-formoterol (BREZTRI AEROSPHERE) 160-9-4.8 mcg/actuation HFAA Inhale 2 puffs into the lungs 2 (two) times a day. 32.1 g 4    buPROPion (WELLBUTRIN XL) 300 MG 24 hr tablet Take 300 mg by mouth every evening.       cholecalciferol, vitamin D3, 10 mcg (400 unit) Cap capsule Take 400 Units by mouth once daily.      cyclobenzaprine (FLEXERIL) 10 MG tablet Take 10 mg by mouth every evening.      eszopiclone (LUNESTA) 3 mg Tab Take 3 mg by mouth nightly as needed (Insomnia).      ezetimibe (ZETIA) 10 mg tablet Take 10 mg by mouth once daily.      gabapentin (NEURONTIN) 400 MG capsule Take by mouth.      indomethacin (INDOCIN) 50 MG capsule Take by mouth.      MYRBETRIQ 50 mg Tb24 Take 1 tablet by mouth once daily.      telmisartan (MICARDIS) 80 MG Tab Take 80 mg by mouth every evening.      verapamil (VERELAN) 240 MG C24P Take 240 mg by mouth 2 (two) times daily.       XIIDRA 5 % Dpet       albuterol (PROVENTIL/VENTOLIN HFA) 90 mcg/actuation inhaler Inhale 2 puffs into the lungs every 6 (six) hours as needed for Wheezing. Rescue 18 g 11    benzonatate (TESSALON) 200 MG capsule Take 1 capsule (200 mg total) by mouth 3 (three) times daily as needed. 90 capsule 11    predniSONE (DELTASONE) 10 MG tablet Take 4 tabs x 2 days, then take 3 tabs x 2 days, then take 2 tabs x 2 days, then take 1 tab x 2 days. 20 tablet 0    RESTASIS 0.05 % ophthalmic  emulsion Place 1 drop into both eyes 2 (two) times daily.       No current facility-administered medications for this visit.       Alpha-1 Antitrypsin:  Last PFT:  07/25/2025 moderate obstruction, normal lung volumes, severe diffusion defect  Last CT:3/25/25  EXAMINATION:  CT CHEST WITHOUT CONTRAST     CLINICAL HISTORY:  pulm fibrosis; Pulmonary fibrosis, unspecified     Impression:     1. Interval improvement in previous bilateral lower lobe bronchial wall thickening and mucous plugging, now nearly resolved, with unchanged lower lobe bronchiectasis.  2. Overall interval improvement in diffuse interstitial prominence throughout both lungs, however with interval worsening in more focal lower lobe interstitial opacities left greater than right, as well as right lung base airspace opacities which could reflect atelectasis or focal airspace disease.  3. Otherwise stable appearance of interstitial lung disease suggesting usual interstitial pneumonia.  4. Pulmonary hyperinflation and emphysema.           Last Echo: 8/12/24    Left Ventricle: The left ventricle is normal in size. Normal wall thickness. There is concentric remodeling. There is normal systolic function with a visually estimated ejection fraction of 65 - 70%. Biplane (2D) method of discs ejection fraction is 67%. There is normal diastolic function.    Right Ventricle: Normal right ventricular cavity size. Wall thickness is normal. Systolic function is normal.    Left Atrium: Left atrium is dilated.    Tricuspid Valve: There is mild regurgitation with a centrally directed jet.    Pulmonary Artery: The estimated pulmonary artery systolic pressure is 52 mmHg. Moderate pulmonary Hypertension.    IVC/SVC: Intermediate venous pressure at 8 mmHg.      CAL is negative, aspergillus is negative, hypersensitivity pneumonitis panel is negative    Review of Systems  General: Feeling Well.  Eyes: Vision is complicated by glaucoma.  ENT:  Still has sinus issues  Heart:: She  "has a heart rate in the 90's.  She has pulmonary hypertension and mild TR.  She is on verapamil 240mg bid for tachycardia. She is also on Micardis 40-80 mg  Lungs:  Feels she has mucus to expectorate but nothing is coming up.  Her chest feels very tight.  She feels a great weight on it.  She assures me her heart is fine.  GI:  reflux. Constipated, on Miralax  : Nocturia has improved  Musculoskeletal:L hip   Skin: No lesions or rashes. Bruises easily  Neuro: No headaches or neuropathy.  Lymph: No edema or adenopathy.  Psych: Mild depression.  Endo: dieting    OBJECTIVE:      /70   Pulse 96   Ht 5' 4.5" (1.638 m)   Wt 73.4 kg (161 lb 12.8 oz)   SpO2 (!) 93% Comment: On 1 liters of Oxygen  BMI 27.34 kg/m²     Physical Exam  GENERAL: Older patient in no distress.  HEENT: Pupils equal and reactive. Extraocular movements intact. Nose intact. Pharynx moist.  NECK: Supple.   HEART: Regular rate and rhythm. No murmur or gallop auscultated.  LUNGS:  The lungs have bibasilar crackles.  Lung excursion symmetrical. No change in fremitus.   ABDOMEN: Bowel sounds present. Non-tender, no masses palpated.  EXTREMITIES: Normal muscle tone and joint movement, no cyanosis or clubbing.   LYMPHATICS: No adenopathy palpated, no edema.  SKIN: Dry, intact, no lesions. Abrasion to R arm  NEURO: Cranial nerves II-XII intact. Motor strength 5/5 bilaterally, upper and lower extremities.  PSYCH: Appropriate affect.    Assessment:       1. Pulmonary fibrosis    2. Obstructive sleep apnea    3. Chronic obstructive pulmonary disease, unspecified COPD type              The patient is doing much better now that she has an elevating bed.  Her mucus is much less.  She knows she has to be using her vest twice a day but is using it once every other day, usually as she has much less mucus.  She has found a homeopathic expectorant, Mullein tea that she feels works very well.  The patient is using her Breztri and she uses her DuoNebs about 3 " times a day.  She finds this helps with expectoration as well  Plan:       Pulmonary fibrosis  -     X-Ray Chest PA And Lateral; Future  -     predniSONE (DELTASONE) 10 MG tablet; Take 4 tabs x 2 days, then take 3 tabs x 2 days, then take 2 tabs x 2 days, then take 1 tab x 2 days.  Dispense: 20 tablet; Refill: 0    Obstructive sleep apnea  -     CPAP/BIPAP SUPPLIES  -     benzonatate (TESSALON) 200 MG capsule; Take 1 capsule (200 mg total) by mouth 3 (three) times daily as needed.  Dispense: 90 capsule; Refill: 11  -     albuterol (PROVENTIL/VENTOLIN HFA) 90 mcg/actuation inhaler; Inhale 2 puffs into the lungs every 6 (six) hours as needed for Wheezing. Rescue  Dispense: 18 g; Refill: 11    Chronic obstructive pulmonary disease, unspecified COPD type               Follow up in about 3 months (around 10/24/2025).  Continue Breztri and DuoNebs   Continue percussion vest b.i.d.  She is sitting on 2 L of oxygen, walking on 3 L, sleeping with 2 bled into her CPAP and exercising on 4.  She has a pulse ox and keeps her sats better than 90% Ok to sit on room air as long as your sats are 90% or better  Continue sleeping on your CPAP with your oxygen bled in

## 2025-07-25 ENCOUNTER — HOSPITAL ENCOUNTER (OUTPATIENT)
Dept: RADIOLOGY | Facility: HOSPITAL | Age: 75
Discharge: HOME | End: 2025-07-25
Payer: MEDICARE

## 2025-07-25 ENCOUNTER — HOSPITAL ENCOUNTER (OUTPATIENT)
Dept: RADIOLOGY | Facility: HOSPITAL | Age: 75
Discharge: HOME | End: 2025-07-25
Attending: INTERNAL MEDICINE
Payer: MEDICARE

## 2025-07-25 DIAGNOSIS — M81.0 AGE-RELATED OSTEOPOROSIS WITHOUT CURRENT PATHOLOGICAL FRACTURE: ICD-10-CM

## 2025-07-25 DIAGNOSIS — J84.10 PULMONARY FIBROSIS: ICD-10-CM

## 2025-07-25 DIAGNOSIS — Z12.31 ENCOUNTER FOR SCREENING MAMMOGRAM FOR MALIGNANT NEOPLASM OF BREAST: ICD-10-CM

## 2025-07-25 PROCEDURE — 77067 SCR MAMMO BI INCL CAD: CPT | Mod: TC,PO

## 2025-07-25 PROCEDURE — 77080 DXA BONE DENSITY AXIAL: CPT | Mod: 26,,, | Performed by: RADIOLOGY

## 2025-07-25 PROCEDURE — 77080 DXA BONE DENSITY AXIAL: CPT | Mod: TC,PO

## 2025-07-25 PROCEDURE — 71046 X-RAY EXAM CHEST 2 VIEWS: CPT | Mod: TC,PO

## 2025-07-25 PROCEDURE — 71046 X-RAY EXAM CHEST 2 VIEWS: CPT | Mod: 26,,, | Performed by: RADIOLOGY

## 2025-07-25 PROCEDURE — 77067 SCR MAMMO BI INCL CAD: CPT | Mod: 26,,, | Performed by: RADIOLOGY

## 2025-07-25 PROCEDURE — 77063 BREAST TOMOSYNTHESIS BI: CPT | Mod: 26,,, | Performed by: RADIOLOGY

## 2025-07-28 ENCOUNTER — TELEPHONE (OUTPATIENT)
Dept: PULMONOLOGY | Facility: CLINIC | Age: 75
End: 2025-07-28
Payer: MEDICARE

## 2025-08-13 ENCOUNTER — OFFICE VISIT (OUTPATIENT)
Dept: UROLOGY | Facility: CLINIC | Age: 75
End: 2025-08-13
Payer: MEDICARE

## 2025-08-13 VITALS — HEIGHT: 65 IN | WEIGHT: 161.81 LBS | BODY MASS INDEX: 26.96 KG/M2

## 2025-08-13 DIAGNOSIS — R32 URINARY INCONTINENCE, UNSPECIFIED TYPE: Primary | ICD-10-CM

## 2025-08-13 DIAGNOSIS — N32.81 OAB (OVERACTIVE BLADDER): ICD-10-CM

## 2025-08-13 LAB
BILIRUBIN, UA POC OHS: NEGATIVE
BLOOD, UA POC OHS: NEGATIVE
CLARITY, UA POC OHS: CLEAR
COLOR, UA POC OHS: YELLOW
GLUCOSE, UA POC OHS: NEGATIVE
KETONES, UA POC OHS: NEGATIVE
LEUKOCYTES, UA POC OHS: NEGATIVE
NITRITE, UA POC OHS: NEGATIVE
PH, UA POC OHS: 7
POC RESIDUAL URINE VOLUME: 72 ML (ref 0–100)
PROTEIN, UA POC OHS: NEGATIVE
SPECIFIC GRAVITY, UA POC OHS: 1.01
UROBILINOGEN, UA POC OHS: 0.2

## 2025-08-13 PROCEDURE — 81003 URINALYSIS AUTO W/O SCOPE: CPT | Mod: PBBFAC,PO | Performed by: NURSE PRACTITIONER

## 2025-08-13 PROCEDURE — 99999PBSHW POCT BLADDER SCAN: Mod: PBBFAC,,,

## 2025-08-13 PROCEDURE — 99999 PR PBB SHADOW E&M-EST. PATIENT-LVL V: CPT | Mod: PBBFAC,,, | Performed by: NURSE PRACTITIONER

## 2025-08-13 PROCEDURE — 99215 OFFICE O/P EST HI 40 MIN: CPT | Mod: PBBFAC,PO | Performed by: NURSE PRACTITIONER

## 2025-08-13 PROCEDURE — G2211 COMPLEX E/M VISIT ADD ON: HCPCS | Mod: ,,, | Performed by: NURSE PRACTITIONER

## 2025-08-13 PROCEDURE — 99215 OFFICE O/P EST HI 40 MIN: CPT | Mod: S$PBB,,, | Performed by: NURSE PRACTITIONER

## 2025-08-13 PROCEDURE — 99999PBSHW POCT URINALYSIS(INSTRUMENT): Mod: PBBFAC,,,

## 2025-08-13 PROCEDURE — 51798 US URINE CAPACITY MEASURE: CPT | Mod: PBBFAC,PO | Performed by: NURSE PRACTITIONER

## 2025-08-29 ENCOUNTER — TELEPHONE (OUTPATIENT)
Dept: UROLOGY | Facility: CLINIC | Age: 75
End: 2025-08-29
Payer: MEDICARE

## 2025-08-29 DIAGNOSIS — N32.81 OAB (OVERACTIVE BLADDER): ICD-10-CM

## 2025-08-29 DIAGNOSIS — R32 URINARY INCONTINENCE, UNSPECIFIED TYPE: ICD-10-CM

## (undated) DEVICE — STERISTRIP 1/2 R1547

## (undated) DEVICE — NEEDLE SAFETY ECLIPSE 25G 1-1/2IN 305767

## (undated) DEVICE — TRAY SKIN PREP DRY

## (undated) DEVICE — SYRINGE 10ML 302995

## (undated) DEVICE — PAD ABD 5X9   7196D

## (undated) DEVICE — WALKER ANKLE MEDIUM

## (undated) DEVICE — BLADE SCALPEL #15 371115

## (undated) DEVICE — DRESSING ADAPTIC 3X8 2015

## (undated) DEVICE — COVER LIGHT HANDLE LB53

## (undated) DEVICE — SUTURE VICRYL 4-0 RB-1 27 J214H

## (undated) DEVICE — SUTURE VICRYL 2-0 CT-1 36 VCP945H

## (undated) DEVICE — PAD BOVIE ADULT

## (undated) DEVICE — SPONGE GAUZE 10S 4X4  442214

## (undated) DEVICE — SUTURE VICRYL 2-0 RB-1 27 J306H

## (undated) DEVICE — CUFF TOURNIQUET 18DUAL PRT 5921-218-235

## (undated) DEVICE — SUTURE PROLENE 4-0 PS-2 18 8682H

## (undated) DEVICE — DRILL BIT

## (undated) DEVICE — ADHESIVE MASTISOL VIAL 0523-48

## (undated) DEVICE — GLOVE BIOGEL PI GOLD SZ  8.5

## (undated) DEVICE — DRAPE C-ARM (FITS NEW C-ARM) 07-CA108

## (undated) DEVICE — BLADE SMALL BONE OSCILLATING KM33-212

## (undated) DEVICE — BANDAGE ACE STERILE 4 REB3114

## (undated) DEVICE — SOLUTION IRRI NS BOTTLE 1000ML R5200-01

## (undated) DEVICE — SUTURE VICRYL 0 CT-1 27 VCP260H

## (undated) DEVICE — SUTURE VICRYL 3-0 RB-1 27 J215H

## (undated) DEVICE — Device

## (undated) DEVICE — SOLUTION SCRUB IODINE 4OZ

## (undated) DEVICE — BANDAGE ESMARK 4X9 55514

## (undated) DEVICE — TRAY LOWER EXTREMITY  SMHS029-05

## (undated) DEVICE — UNDERGLOVE BIOGEL PI MICRO BLUE SZ 8

## (undated) DEVICE — SUCTION FRAZIER 10FR  0033100

## (undated) DEVICE — BANDAGE SOFTBAND 4 441506

## (undated) DEVICE — UNDERGLOVE BIOGEL PI MICRO BLUE SZ 7.5

## (undated) DEVICE — TRAY GENERAL SURGERY

## (undated) DEVICE — DRESSING TEGADERM 6X8 1628

## (undated) DEVICE — GLOVE BIOGEL MICRO SURGEON PINK SZ 7.5

## (undated) DEVICE — UNDERGLOVE BIOGEL MICRO BLUE SZ 8.5

## (undated) DEVICE — SOLUTION PREP IODINE 4OZ

## (undated) DEVICE — SUTURE VICRYL 1 CTX 36 J977H

## (undated) DEVICE — SUTURE MONOCRYL 3-0 27 PS-1 MCP936H

## (undated) DEVICE — GLOVE BIOGEL PI   GOLD SIZE 8

## (undated) DEVICE — DRAPE IOBAN 19X9 3/4 6617

## (undated) DEVICE — UNDERGLOVE BIOGEL PI MICRO BLUE SZ 6.5

## (undated) DEVICE — SYRINGE 3ML